# Patient Record
Sex: FEMALE | Race: BLACK OR AFRICAN AMERICAN | Employment: FULL TIME | URBAN - METROPOLITAN AREA
[De-identification: names, ages, dates, MRNs, and addresses within clinical notes are randomized per-mention and may not be internally consistent; named-entity substitution may affect disease eponyms.]

---

## 2017-01-03 ENCOUNTER — TELEPHONE (OUTPATIENT)
Dept: FAMILY MEDICINE CLINIC | Age: 57
End: 2017-01-03

## 2017-01-05 ENCOUNTER — OFFICE VISIT (OUTPATIENT)
Dept: FAMILY MEDICINE CLINIC | Age: 57
End: 2017-01-05

## 2017-01-05 VITALS
SYSTOLIC BLOOD PRESSURE: 116 MMHG | RESPIRATION RATE: 16 BRPM | DIASTOLIC BLOOD PRESSURE: 70 MMHG | OXYGEN SATURATION: 97 % | HEART RATE: 67 BPM | TEMPERATURE: 97.8 F | HEIGHT: 61 IN

## 2017-01-05 DIAGNOSIS — J40 BRONCHITIS: Primary | ICD-10-CM

## 2017-01-05 PROCEDURE — 99213 OFFICE O/P EST LOW 20 MIN: CPT | Performed by: NURSE PRACTITIONER

## 2017-01-05 RX ORDER — PROMETHAZINE HYDROCHLORIDE AND CODEINE PHOSPHATE 6.25; 1 MG/5ML; MG/5ML
10 SYRUP ORAL 4 TIMES DAILY PRN
Qty: 120 ML | Refills: 0 | Status: SHIPPED | OUTPATIENT
Start: 2017-01-05 | End: 2017-01-12

## 2017-01-05 RX ORDER — AZITHROMYCIN 250 MG/1
TABLET, FILM COATED ORAL
Qty: 6 TABLET | Refills: 0 | Status: SHIPPED | OUTPATIENT
Start: 2017-01-05 | End: 2017-01-13

## 2017-01-05 RX ORDER — PREDNISONE 20 MG/1
TABLET ORAL
Qty: 20 TABLET | Refills: 0 | Status: SHIPPED | OUTPATIENT
Start: 2017-01-05 | End: 2017-02-15

## 2017-01-11 ENCOUNTER — OFFICE VISIT (OUTPATIENT)
Dept: BARIATRICS/WEIGHT MGMT | Age: 57
End: 2017-01-11

## 2017-01-11 VITALS
HEIGHT: 61 IN | HEART RATE: 56 BPM | WEIGHT: 293 LBS | SYSTOLIC BLOOD PRESSURE: 156 MMHG | BODY MASS INDEX: 55.32 KG/M2 | DIASTOLIC BLOOD PRESSURE: 90 MMHG

## 2017-01-11 DIAGNOSIS — I10 ESSENTIAL HYPERTENSION: ICD-10-CM

## 2017-01-11 DIAGNOSIS — E66.01 MORBID OBESITY WITH BMI OF 50.0-59.9, ADULT (HCC): Primary | ICD-10-CM

## 2017-01-11 DIAGNOSIS — K76.0 FATTY LIVER: ICD-10-CM

## 2017-01-11 PROCEDURE — 99214 OFFICE O/P EST MOD 30 MIN: CPT | Performed by: SURGERY

## 2017-01-11 RX ORDER — B-COMPLEX WITH VITAMIN C
1 TABLET ORAL DAILY
Qty: 30 TABLET | Refills: 5 | Status: SHIPPED | OUTPATIENT
Start: 2017-01-11 | End: 2017-07-10 | Stop reason: SDUPTHER

## 2017-01-11 RX ORDER — AMOXICILLIN 875 MG/1
875 TABLET, COATED ORAL 2 TIMES DAILY
COMMUNITY
Start: 2016-11-10 | End: 2017-01-13

## 2017-01-13 ENCOUNTER — TELEPHONE (OUTPATIENT)
Dept: FAMILY MEDICINE CLINIC | Age: 57
End: 2017-01-13

## 2017-01-13 ENCOUNTER — HOSPITAL ENCOUNTER (OUTPATIENT)
Dept: OTHER | Age: 57
Discharge: HOME | End: 2017-01-13
Attending: NURSE PRACTITIONER | Admitting: NURSE PRACTITIONER

## 2017-01-13 DIAGNOSIS — J40 BRONCHITIS: Primary | ICD-10-CM

## 2017-01-13 DIAGNOSIS — J40 BRONCHITIS: ICD-10-CM

## 2017-01-13 RX ORDER — PREDNISONE 10 MG/1
TABLET ORAL
Qty: 30 TABLET | Refills: 0 | Status: SHIPPED | OUTPATIENT
Start: 2017-01-13 | End: 2017-02-15

## 2017-01-13 RX ORDER — LEVOFLOXACIN 500 MG/1
500 TABLET, FILM COATED ORAL DAILY
Qty: 10 TABLET | Refills: 0 | Status: SHIPPED | OUTPATIENT
Start: 2017-01-13 | End: 2017-01-23

## 2017-01-16 ENCOUNTER — OFFICE VISIT (OUTPATIENT)
Dept: CARDIOLOGY CLINIC | Age: 57
End: 2017-01-16

## 2017-01-16 VITALS
WEIGHT: 293 LBS | SYSTOLIC BLOOD PRESSURE: 124 MMHG | HEART RATE: 78 BPM | DIASTOLIC BLOOD PRESSURE: 84 MMHG | HEIGHT: 61 IN | BODY MASS INDEX: 55.32 KG/M2

## 2017-01-16 DIAGNOSIS — R06.02 SHORTNESS OF BREATH: ICD-10-CM

## 2017-01-16 DIAGNOSIS — Z01.810 PRE-OPERATIVE CARDIOVASCULAR EXAMINATION: Primary | ICD-10-CM

## 2017-01-16 DIAGNOSIS — E66.01 MORBID OBESITY, UNSPECIFIED OBESITY TYPE (HCC): ICD-10-CM

## 2017-01-16 PROCEDURE — 99203 OFFICE O/P NEW LOW 30 MIN: CPT | Performed by: INTERNAL MEDICINE

## 2017-01-16 PROCEDURE — 93000 ELECTROCARDIOGRAM COMPLETE: CPT | Performed by: INTERNAL MEDICINE

## 2017-01-18 DIAGNOSIS — E66.01 MORBID OBESITY WITH BMI OF 50.0-59.9, ADULT (HCC): Primary | ICD-10-CM

## 2017-01-23 ENCOUNTER — HOSPITAL ENCOUNTER (OUTPATIENT)
Dept: ENDOSCOPY | Age: 57
Discharge: HOME | End: 2017-01-23
Attending: SURGERY | Admitting: SURGERY

## 2017-01-25 ENCOUNTER — TELEPHONE (OUTPATIENT)
Dept: BARIATRICS/WEIGHT MGMT | Age: 57
End: 2017-01-25

## 2017-01-30 ENCOUNTER — HOSPITAL ENCOUNTER (OUTPATIENT)
Dept: NON INVASIVE DIAGNOSTICS | Age: 57
Discharge: HOME | End: 2017-01-30
Attending: INTERNAL MEDICINE | Admitting: INTERNAL MEDICINE

## 2017-01-30 ENCOUNTER — TELEPHONE (OUTPATIENT)
Dept: CARDIOLOGY CLINIC | Age: 57
End: 2017-01-30

## 2017-01-30 DIAGNOSIS — Z01.810 ENCOUNTER FOR PREPROCEDURAL CARDIOVASCULAR EXAMINATION: ICD-10-CM

## 2017-01-30 LAB
LEFT VENTRICULAR EJECTION FRACTION HIGH VALUE: 55 %
LEFT VENTRICULAR EJECTION FRACTION MODE: NORMAL

## 2017-02-01 DIAGNOSIS — E66.01 MORBID OBESITY WITH BMI OF 50.0-59.9, ADULT (HCC): Primary | ICD-10-CM

## 2017-02-15 ENCOUNTER — OFFICE VISIT (OUTPATIENT)
Dept: BARIATRICS/WEIGHT MGMT | Age: 57
End: 2017-02-15

## 2017-02-15 VITALS
BODY MASS INDEX: 55.32 KG/M2 | WEIGHT: 293 LBS | HEIGHT: 61 IN | SYSTOLIC BLOOD PRESSURE: 119 MMHG | DIASTOLIC BLOOD PRESSURE: 73 MMHG | HEART RATE: 73 BPM

## 2017-02-15 DIAGNOSIS — I10 ESSENTIAL HYPERTENSION: ICD-10-CM

## 2017-02-15 DIAGNOSIS — E66.01 MORBID OBESITY WITH BMI OF 50.0-59.9, ADULT (HCC): Primary | ICD-10-CM

## 2017-02-15 DIAGNOSIS — K76.0 FATTY LIVER: ICD-10-CM

## 2017-02-15 PROCEDURE — 99213 OFFICE O/P EST LOW 20 MIN: CPT | Performed by: SURGERY

## 2017-02-17 ENCOUNTER — HOSPITAL ENCOUNTER (OUTPATIENT)
Dept: ENDOSCOPY | Age: 57
Discharge: HOME | End: 2017-02-17
Attending: SURGERY | Admitting: SURGERY

## 2017-02-17 VITALS
SYSTOLIC BLOOD PRESSURE: 118 MMHG | TEMPERATURE: 97.8 F | WEIGHT: 293 LBS | HEIGHT: 61 IN | OXYGEN SATURATION: 98 % | BODY MASS INDEX: 55.32 KG/M2 | HEART RATE: 82 BPM | RESPIRATION RATE: 16 BRPM | DIASTOLIC BLOOD PRESSURE: 82 MMHG

## 2017-02-17 PROCEDURE — 43239 EGD BIOPSY SINGLE/MULTIPLE: CPT | Performed by: SURGERY

## 2017-02-17 RX ORDER — MEPERIDINE HYDROCHLORIDE 25 MG/ML
12.5 INJECTION INTRAMUSCULAR; INTRAVENOUS; SUBCUTANEOUS EVERY 5 MIN PRN
Status: DISCONTINUED | OUTPATIENT
Start: 2017-02-17 | End: 2017-02-18 | Stop reason: HOSPADM

## 2017-02-17 RX ORDER — FENTANYL CITRATE 50 UG/ML
25 INJECTION, SOLUTION INTRAMUSCULAR; INTRAVENOUS EVERY 5 MIN PRN
Status: DISCONTINUED | OUTPATIENT
Start: 2017-02-17 | End: 2017-02-18 | Stop reason: HOSPADM

## 2017-02-17 RX ORDER — PROMETHAZINE HYDROCHLORIDE 25 MG/ML
6.25 INJECTION, SOLUTION INTRAMUSCULAR; INTRAVENOUS
Status: ACTIVE | OUTPATIENT
Start: 2017-02-17 | End: 2017-02-17

## 2017-02-17 RX ORDER — ONDANSETRON 2 MG/ML
4 INJECTION INTRAMUSCULAR; INTRAVENOUS
Status: ACTIVE | OUTPATIENT
Start: 2017-02-17 | End: 2017-02-17

## 2017-02-17 RX ORDER — HYDRALAZINE HYDROCHLORIDE 20 MG/ML
5 INJECTION INTRAMUSCULAR; INTRAVENOUS EVERY 10 MIN PRN
Status: DISCONTINUED | OUTPATIENT
Start: 2017-02-17 | End: 2017-02-18 | Stop reason: HOSPADM

## 2017-02-17 RX ORDER — SODIUM CHLORIDE, SODIUM LACTATE, POTASSIUM CHLORIDE, CALCIUM CHLORIDE 600; 310; 30; 20 MG/100ML; MG/100ML; MG/100ML; MG/100ML
INJECTION, SOLUTION INTRAVENOUS CONTINUOUS
Status: CANCELLED | OUTPATIENT
Start: 2017-02-17

## 2017-02-17 RX ORDER — LABETALOL HYDROCHLORIDE 5 MG/ML
5 INJECTION, SOLUTION INTRAVENOUS EVERY 10 MIN PRN
Status: DISCONTINUED | OUTPATIENT
Start: 2017-02-17 | End: 2017-02-18 | Stop reason: HOSPADM

## 2017-02-17 RX ORDER — DIPHENHYDRAMINE HYDROCHLORIDE 50 MG/ML
12.5 INJECTION INTRAMUSCULAR; INTRAVENOUS
Status: ACTIVE | OUTPATIENT
Start: 2017-02-17 | End: 2017-02-17

## 2017-02-17 ASSESSMENT — ENCOUNTER SYMPTOMS: SHORTNESS OF BREATH: 1

## 2017-02-23 ENCOUNTER — TELEPHONE (OUTPATIENT)
Dept: BARIATRICS/WEIGHT MGMT | Age: 57
End: 2017-02-23

## 2017-03-13 ENCOUNTER — TELEPHONE (OUTPATIENT)
Dept: BARIATRICS/WEIGHT MGMT | Age: 57
End: 2017-03-13

## 2017-03-13 DIAGNOSIS — I10 ESSENTIAL HYPERTENSION: ICD-10-CM

## 2017-03-13 RX ORDER — METOPROLOL SUCCINATE 100 MG/1
TABLET, EXTENDED RELEASE ORAL
Qty: 90 TABLET | Refills: 0 | Status: SHIPPED | OUTPATIENT
Start: 2017-03-13 | End: 2017-06-10 | Stop reason: SDUPTHER

## 2017-03-14 ENCOUNTER — INITIAL CONSULT (OUTPATIENT)
Dept: BARIATRICS/WEIGHT MGMT | Age: 57
End: 2017-03-14

## 2017-03-14 DIAGNOSIS — E66.01 MORBID OBESITY WITH BMI OF 50.0-59.9, ADULT (HCC): Primary | ICD-10-CM

## 2017-03-14 PROCEDURE — 96101 PR PSYCHOLOGIC TESTING BY PSYCH/PHYS: CPT | Performed by: PSYCHOLOGIST

## 2017-03-14 PROCEDURE — 90791 PSYCH DIAGNOSTIC EVALUATION: CPT | Performed by: PSYCHOLOGIST

## 2017-03-15 ENCOUNTER — OFFICE VISIT (OUTPATIENT)
Dept: BARIATRICS/WEIGHT MGMT | Age: 57
End: 2017-03-15

## 2017-03-15 ENCOUNTER — TELEPHONE (OUTPATIENT)
Dept: BARIATRICS/WEIGHT MGMT | Age: 57
End: 2017-03-15

## 2017-03-15 VITALS
HEIGHT: 61 IN | DIASTOLIC BLOOD PRESSURE: 80 MMHG | HEART RATE: 68 BPM | WEIGHT: 293 LBS | SYSTOLIC BLOOD PRESSURE: 152 MMHG | BODY MASS INDEX: 55.32 KG/M2

## 2017-03-15 DIAGNOSIS — K44.9 HIATAL HERNIA WITH GASTROESOPHAGEAL REFLUX DISEASE AND ESOPHAGITIS: ICD-10-CM

## 2017-03-15 DIAGNOSIS — K76.0 FATTY LIVER: ICD-10-CM

## 2017-03-15 DIAGNOSIS — K21.00 HIATAL HERNIA WITH GASTROESOPHAGEAL REFLUX DISEASE AND ESOPHAGITIS: ICD-10-CM

## 2017-03-15 DIAGNOSIS — E66.01 MORBID OBESITY WITH BMI OF 50.0-59.9, ADULT (HCC): Primary | ICD-10-CM

## 2017-03-15 PROCEDURE — 99213 OFFICE O/P EST LOW 20 MIN: CPT | Performed by: SURGERY

## 2017-03-21 ENCOUNTER — INITIAL CONSULT (OUTPATIENT)
Dept: SLEEP MEDICINE | Age: 57
End: 2017-03-21

## 2017-03-21 VITALS
SYSTOLIC BLOOD PRESSURE: 142 MMHG | BODY MASS INDEX: 45.99 KG/M2 | DIASTOLIC BLOOD PRESSURE: 102 MMHG | HEIGHT: 67 IN | OXYGEN SATURATION: 96 % | WEIGHT: 293 LBS | HEART RATE: 83 BPM

## 2017-03-21 DIAGNOSIS — G47.10 HYPERSOMNIA: Primary | ICD-10-CM

## 2017-03-21 DIAGNOSIS — K21.00 GERD WITH ESOPHAGITIS: Chronic | ICD-10-CM

## 2017-03-21 DIAGNOSIS — I10 HYPERTENSION, ESSENTIAL: Chronic | ICD-10-CM

## 2017-03-21 DIAGNOSIS — R06.83 SNORING: ICD-10-CM

## 2017-03-21 DIAGNOSIS — E66.01 MORBID OBESITY WITH BMI OF 50.0-59.9, ADULT (HCC): Chronic | ICD-10-CM

## 2017-03-21 PROCEDURE — 99244 OFF/OP CNSLTJ NEW/EST MOD 40: CPT | Performed by: INTERNAL MEDICINE

## 2017-03-21 ASSESSMENT — ENCOUNTER SYMPTOMS
SHORTNESS OF BREATH: 0
ALLERGIC/IMMUNOLOGIC NEGATIVE: 1
VOMITING: 0
APNEA: 0
EYE PAIN: 0
CHEST TIGHTNESS: 0
ABDOMINAL PAIN: 0
ABDOMINAL DISTENTION: 0
RHINORRHEA: 0
CHOKING: 0
PHOTOPHOBIA: 0
NAUSEA: 0

## 2017-03-21 ASSESSMENT — SLEEP AND FATIGUE QUESTIONNAIRES
HOW LIKELY ARE YOU TO NOD OFF OR FALL ASLEEP WHILE SITTING INACTIVE IN A PUBLIC PLACE: 0
NECK CIRCUMFERENCE (INCHES): 18
ESS TOTAL SCORE: 6
HOW LIKELY ARE YOU TO NOD OFF OR FALL ASLEEP IN A CAR, WHILE STOPPED FOR A FEW MINUTES IN TRAFFIC: 0
HOW LIKELY ARE YOU TO NOD OFF OR FALL ASLEEP WHILE SITTING QUIETLY AFTER LUNCH WITHOUT ALCOHOL: 0
HOW LIKELY ARE YOU TO NOD OFF OR FALL ASLEEP WHILE SITTING AND TALKING TO SOMEONE: 0
HOW LIKELY ARE YOU TO NOD OFF OR FALL ASLEEP WHILE LYING DOWN TO REST IN THE AFTERNOON WHEN CIRCUMSTANCES PERMIT: 2
HOW LIKELY ARE YOU TO NOD OFF OR FALL ASLEEP WHILE SITTING AND READING: 0
HOW LIKELY ARE YOU TO NOD OFF OR FALL ASLEEP WHILE WATCHING TV: 2
HOW LIKELY ARE YOU TO NOD OFF OR FALL ASLEEP WHEN YOU ARE A PASSENGER IN A CAR FOR AN HOUR WITHOUT A BREAK: 2

## 2017-04-12 ENCOUNTER — OFFICE VISIT (OUTPATIENT)
Dept: BARIATRICS/WEIGHT MGMT | Age: 57
End: 2017-04-12

## 2017-04-12 VITALS
BODY MASS INDEX: 55.32 KG/M2 | HEART RATE: 68 BPM | WEIGHT: 293 LBS | HEIGHT: 61 IN | DIASTOLIC BLOOD PRESSURE: 78 MMHG | SYSTOLIC BLOOD PRESSURE: 133 MMHG

## 2017-04-12 DIAGNOSIS — E66.01 MORBID OBESITY WITH BMI OF 50.0-59.9, ADULT (HCC): Primary | Chronic | ICD-10-CM

## 2017-04-12 DIAGNOSIS — I10 ESSENTIAL HYPERTENSION: Chronic | ICD-10-CM

## 2017-04-12 DIAGNOSIS — K44.9 HIATAL HERNIA WITH GASTROESOPHAGEAL REFLUX DISEASE AND ESOPHAGITIS: ICD-10-CM

## 2017-04-12 DIAGNOSIS — K21.00 HIATAL HERNIA WITH GASTROESOPHAGEAL REFLUX DISEASE AND ESOPHAGITIS: ICD-10-CM

## 2017-04-12 PROCEDURE — 99213 OFFICE O/P EST LOW 20 MIN: CPT | Performed by: SURGERY

## 2017-04-13 ENCOUNTER — TELEPHONE (OUTPATIENT)
Dept: BARIATRICS/WEIGHT MGMT | Age: 57
End: 2017-04-13

## 2017-04-18 ENCOUNTER — HOSPITAL ENCOUNTER (OUTPATIENT)
Dept: SLEEP MEDICINE | Age: 57
Discharge: HOME | End: 2017-04-18
Attending: INTERNAL MEDICINE | Admitting: INTERNAL MEDICINE

## 2017-04-18 DIAGNOSIS — R06.83 SNORING: ICD-10-CM

## 2017-04-18 DIAGNOSIS — G47.10 HYPERSOMNIA: ICD-10-CM

## 2017-04-18 PROCEDURE — 95806 SLEEP STUDY UNATT&RESP EFFT: CPT | Performed by: INTERNAL MEDICINE

## 2017-04-21 ENCOUNTER — OFFICE VISIT (OUTPATIENT)
Dept: SLEEP MEDICINE | Age: 57
End: 2017-04-21

## 2017-04-21 ENCOUNTER — TELEPHONE (OUTPATIENT)
Dept: BARIATRICS/WEIGHT MGMT | Age: 57
End: 2017-04-21

## 2017-04-21 VITALS — HEART RATE: 74 BPM | BODY MASS INDEX: 55.32 KG/M2 | WEIGHT: 293 LBS | OXYGEN SATURATION: 97 % | HEIGHT: 61 IN

## 2017-04-21 DIAGNOSIS — G47.33 OBSTRUCTIVE SLEEP APNEA SYNDROME: Primary | ICD-10-CM

## 2017-04-21 DIAGNOSIS — K21.00 GERD WITH ESOPHAGITIS: Chronic | ICD-10-CM

## 2017-04-21 DIAGNOSIS — E66.01 MORBID OBESITY WITH BMI OF 50.0-59.9, ADULT (HCC): Chronic | ICD-10-CM

## 2017-04-21 DIAGNOSIS — I10 HYPERTENSION, ESSENTIAL: Chronic | ICD-10-CM

## 2017-04-21 PROCEDURE — 99214 OFFICE O/P EST MOD 30 MIN: CPT | Performed by: NURSE PRACTITIONER

## 2017-04-21 ASSESSMENT — ENCOUNTER SYMPTOMS
COUGH: 0
ABDOMINAL PAIN: 0
APNEA: 0
RHINORRHEA: 0
ABDOMINAL DISTENTION: 0
SINUS PRESSURE: 0
SHORTNESS OF BREATH: 0

## 2017-04-24 DIAGNOSIS — K21.9 GASTROESOPHAGEAL REFLUX DISEASE WITHOUT ESOPHAGITIS: ICD-10-CM

## 2017-04-24 RX ORDER — OMEPRAZOLE 40 MG/1
CAPSULE, DELAYED RELEASE ORAL
Qty: 30 CAPSULE | Refills: 5 | Status: SHIPPED | OUTPATIENT
Start: 2017-04-24 | End: 2017-07-03 | Stop reason: SDUPTHER

## 2017-05-09 ENCOUNTER — OFFICE VISIT (OUTPATIENT)
Dept: FAMILY MEDICINE CLINIC | Age: 57
End: 2017-05-09

## 2017-05-09 VITALS
RESPIRATION RATE: 16 BRPM | OXYGEN SATURATION: 98 % | BODY MASS INDEX: 55.32 KG/M2 | SYSTOLIC BLOOD PRESSURE: 122 MMHG | HEART RATE: 66 BPM | DIASTOLIC BLOOD PRESSURE: 78 MMHG | WEIGHT: 293 LBS | HEIGHT: 61 IN

## 2017-05-09 DIAGNOSIS — J06.9 URI, ACUTE: Primary | ICD-10-CM

## 2017-05-09 PROCEDURE — 99213 OFFICE O/P EST LOW 20 MIN: CPT | Performed by: NURSE PRACTITIONER

## 2017-05-10 ENCOUNTER — OFFICE VISIT (OUTPATIENT)
Dept: BARIATRICS/WEIGHT MGMT | Age: 57
End: 2017-05-10

## 2017-05-10 VITALS
DIASTOLIC BLOOD PRESSURE: 68 MMHG | HEIGHT: 61 IN | BODY MASS INDEX: 55.32 KG/M2 | WEIGHT: 293 LBS | HEART RATE: 65 BPM | SYSTOLIC BLOOD PRESSURE: 152 MMHG

## 2017-05-10 DIAGNOSIS — K21.00 HIATAL HERNIA WITH GASTROESOPHAGEAL REFLUX DISEASE AND ESOPHAGITIS: ICD-10-CM

## 2017-05-10 DIAGNOSIS — G47.33 OBSTRUCTIVE SLEEP APNEA (ADULT) (PEDIATRIC): ICD-10-CM

## 2017-05-10 DIAGNOSIS — I10 ESSENTIAL HYPERTENSION: Chronic | ICD-10-CM

## 2017-05-10 DIAGNOSIS — E66.01 MORBID OBESITY WITH BMI OF 50.0-59.9, ADULT (HCC): Primary | Chronic | ICD-10-CM

## 2017-05-10 DIAGNOSIS — K44.9 HIATAL HERNIA WITH GASTROESOPHAGEAL REFLUX DISEASE AND ESOPHAGITIS: ICD-10-CM

## 2017-05-10 DIAGNOSIS — K21.00 GERD WITH ESOPHAGITIS: Chronic | ICD-10-CM

## 2017-05-10 PROCEDURE — 99214 OFFICE O/P EST MOD 30 MIN: CPT | Performed by: SURGERY

## 2017-05-26 DIAGNOSIS — A08.4 VIRAL GASTROENTERITIS: ICD-10-CM

## 2017-05-26 RX ORDER — ONDANSETRON 4 MG/1
TABLET, FILM COATED ORAL
Qty: 10 TABLET | Refills: 0 | Status: SHIPPED | OUTPATIENT
Start: 2017-05-26 | End: 2017-08-07 | Stop reason: SDUPTHER

## 2017-06-10 DIAGNOSIS — I10 ESSENTIAL HYPERTENSION: ICD-10-CM

## 2017-06-12 RX ORDER — METOPROLOL SUCCINATE 100 MG/1
TABLET, EXTENDED RELEASE ORAL
Qty: 90 TABLET | Refills: 0 | Status: SHIPPED | OUTPATIENT
Start: 2017-06-12 | End: 2017-07-03 | Stop reason: SDUPTHER

## 2017-06-19 ENCOUNTER — OFFICE VISIT (OUTPATIENT)
Dept: SLEEP MEDICINE | Age: 57
End: 2017-06-19

## 2017-06-19 VITALS
OXYGEN SATURATION: 97 % | HEART RATE: 89 BPM | HEIGHT: 61 IN | WEIGHT: 293 LBS | SYSTOLIC BLOOD PRESSURE: 124 MMHG | DIASTOLIC BLOOD PRESSURE: 82 MMHG | BODY MASS INDEX: 55.32 KG/M2

## 2017-06-19 DIAGNOSIS — G47.33 OBSTRUCTIVE SLEEP APNEA SYNDROME: Primary | ICD-10-CM

## 2017-06-19 DIAGNOSIS — E66.01 MORBID OBESITY WITH BMI OF 50.0-59.9, ADULT (HCC): Chronic | ICD-10-CM

## 2017-06-19 DIAGNOSIS — I10 HYPERTENSION, ESSENTIAL: Chronic | ICD-10-CM

## 2017-06-19 DIAGNOSIS — K21.00 GERD WITH ESOPHAGITIS: Chronic | ICD-10-CM

## 2017-06-19 PROCEDURE — 99214 OFFICE O/P EST MOD 30 MIN: CPT | Performed by: NURSE PRACTITIONER

## 2017-06-19 ASSESSMENT — ENCOUNTER SYMPTOMS
ABDOMINAL PAIN: 0
SHORTNESS OF BREATH: 0
SINUS PRESSURE: 0
APNEA: 0
RHINORRHEA: 0
ABDOMINAL DISTENTION: 0
COUGH: 0

## 2017-06-19 ASSESSMENT — SLEEP AND FATIGUE QUESTIONNAIRES
HOW LIKELY ARE YOU TO NOD OFF OR FALL ASLEEP WHILE SITTING QUIETLY AFTER LUNCH WITHOUT ALCOHOL: 0
HOW LIKELY ARE YOU TO NOD OFF OR FALL ASLEEP IN A CAR, WHILE STOPPED FOR A FEW MINUTES IN TRAFFIC: 0
ESS TOTAL SCORE: 0
HOW LIKELY ARE YOU TO NOD OFF OR FALL ASLEEP WHILE SITTING AND TALKING TO SOMEONE: 0
HOW LIKELY ARE YOU TO NOD OFF OR FALL ASLEEP WHILE SITTING AND READING: 0
HOW LIKELY ARE YOU TO NOD OFF OR FALL ASLEEP WHILE WATCHING TV: 0
HOW LIKELY ARE YOU TO NOD OFF OR FALL ASLEEP WHEN YOU ARE A PASSENGER IN A CAR FOR AN HOUR WITHOUT A BREAK: 0
HOW LIKELY ARE YOU TO NOD OFF OR FALL ASLEEP WHILE LYING DOWN TO REST IN THE AFTERNOON WHEN CIRCUMSTANCES PERMIT: 0
HOW LIKELY ARE YOU TO NOD OFF OR FALL ASLEEP WHILE SITTING INACTIVE IN A PUBLIC PLACE: 0

## 2017-06-21 ENCOUNTER — OFFICE VISIT (OUTPATIENT)
Dept: BARIATRICS/WEIGHT MGMT | Age: 57
End: 2017-06-21

## 2017-06-21 VITALS
HEIGHT: 61 IN | DIASTOLIC BLOOD PRESSURE: 77 MMHG | HEART RATE: 76 BPM | WEIGHT: 293 LBS | SYSTOLIC BLOOD PRESSURE: 139 MMHG | BODY MASS INDEX: 55.32 KG/M2

## 2017-06-21 DIAGNOSIS — K21.00 GERD WITH ESOPHAGITIS: ICD-10-CM

## 2017-06-21 DIAGNOSIS — G47.33 OBSTRUCTIVE SLEEP APNEA (ADULT) (PEDIATRIC): ICD-10-CM

## 2017-06-21 DIAGNOSIS — I10 ESSENTIAL HYPERTENSION: ICD-10-CM

## 2017-06-21 DIAGNOSIS — E66.01 MORBID OBESITY WITH BMI OF 60.0-69.9, ADULT (HCC): Primary | ICD-10-CM

## 2017-06-21 PROCEDURE — 99213 OFFICE O/P EST LOW 20 MIN: CPT | Performed by: SURGERY

## 2017-07-03 ENCOUNTER — OFFICE VISIT (OUTPATIENT)
Dept: FAMILY MEDICINE CLINIC | Age: 57
End: 2017-07-03

## 2017-07-03 VITALS
HEART RATE: 64 BPM | OXYGEN SATURATION: 98 % | SYSTOLIC BLOOD PRESSURE: 126 MMHG | DIASTOLIC BLOOD PRESSURE: 80 MMHG | HEIGHT: 61 IN | RESPIRATION RATE: 18 BRPM | BODY MASS INDEX: 55.32 KG/M2 | WEIGHT: 293 LBS

## 2017-07-03 DIAGNOSIS — I10 ESSENTIAL HYPERTENSION: Primary | ICD-10-CM

## 2017-07-03 DIAGNOSIS — K21.9 GASTROESOPHAGEAL REFLUX DISEASE WITHOUT ESOPHAGITIS: ICD-10-CM

## 2017-07-03 PROCEDURE — 99213 OFFICE O/P EST LOW 20 MIN: CPT | Performed by: REGISTERED NURSE

## 2017-07-03 RX ORDER — OMEPRAZOLE 40 MG/1
CAPSULE, DELAYED RELEASE ORAL
Qty: 30 CAPSULE | Refills: 5 | Status: SHIPPED | OUTPATIENT
Start: 2017-07-03 | End: 2017-09-21

## 2017-07-03 RX ORDER — METOPROLOL SUCCINATE 100 MG/1
TABLET, EXTENDED RELEASE ORAL
Qty: 90 TABLET | Refills: 1 | Status: SHIPPED | OUTPATIENT
Start: 2017-07-03 | End: 2017-09-13

## 2017-07-03 ASSESSMENT — ENCOUNTER SYMPTOMS
CHEST TIGHTNESS: 0
COUGH: 0
SHORTNESS OF BREATH: 0
WHEEZING: 0

## 2017-07-03 ASSESSMENT — PATIENT HEALTH QUESTIONNAIRE - PHQ9
SUM OF ALL RESPONSES TO PHQ9 QUESTIONS 1 & 2: 0
SUM OF ALL RESPONSES TO PHQ QUESTIONS 1-9: 0
2. FEELING DOWN, DEPRESSED OR HOPELESS: 0
1. LITTLE INTEREST OR PLEASURE IN DOING THINGS: 0

## 2017-07-10 RX ORDER — B-COMPLEX WITH VITAMIN C
1 TABLET ORAL DAILY
Qty: 30 TABLET | Refills: 5 | Status: SHIPPED | OUTPATIENT
Start: 2017-07-10 | End: 2018-01-18 | Stop reason: SDUPTHER

## 2017-07-12 ENCOUNTER — OFFICE VISIT (OUTPATIENT)
Dept: BARIATRICS/WEIGHT MGMT | Age: 57
End: 2017-07-12

## 2017-07-12 VITALS
BODY MASS INDEX: 55.32 KG/M2 | HEART RATE: 74 BPM | WEIGHT: 293 LBS | HEIGHT: 61 IN | DIASTOLIC BLOOD PRESSURE: 84 MMHG | SYSTOLIC BLOOD PRESSURE: 132 MMHG

## 2017-07-12 DIAGNOSIS — E66.01 MORBID OBESITY WITH BMI OF 60.0-69.9, ADULT (HCC): Primary | ICD-10-CM

## 2017-07-12 DIAGNOSIS — G47.33 OBSTRUCTIVE SLEEP APNEA (ADULT) (PEDIATRIC): ICD-10-CM

## 2017-07-12 DIAGNOSIS — I10 ESSENTIAL HYPERTENSION: ICD-10-CM

## 2017-07-12 DIAGNOSIS — K44.9 HIATAL HERNIA WITH GASTROESOPHAGEAL REFLUX DISEASE AND ESOPHAGITIS: ICD-10-CM

## 2017-07-12 DIAGNOSIS — K21.00 HIATAL HERNIA WITH GASTROESOPHAGEAL REFLUX DISEASE AND ESOPHAGITIS: ICD-10-CM

## 2017-07-12 PROCEDURE — 99214 OFFICE O/P EST MOD 30 MIN: CPT | Performed by: SURGERY

## 2017-07-14 ENCOUNTER — TELEPHONE (OUTPATIENT)
Dept: BARIATRICS/WEIGHT MGMT | Age: 57
End: 2017-07-14

## 2017-08-01 ENCOUNTER — TELEPHONE (OUTPATIENT)
Dept: BARIATRICS/WEIGHT MGMT | Age: 57
End: 2017-08-01

## 2017-08-01 ENCOUNTER — OFFICE VISIT (OUTPATIENT)
Dept: BARIATRICS/WEIGHT MGMT | Age: 57
End: 2017-08-01

## 2017-08-01 ENCOUNTER — OFFICE VISIT (OUTPATIENT)
Dept: FAMILY MEDICINE CLINIC | Age: 57
End: 2017-08-01

## 2017-08-01 VITALS
HEIGHT: 61 IN | SYSTOLIC BLOOD PRESSURE: 130 MMHG | OXYGEN SATURATION: 99 % | TEMPERATURE: 97.8 F | HEART RATE: 66 BPM | DIASTOLIC BLOOD PRESSURE: 82 MMHG | WEIGHT: 293 LBS | BODY MASS INDEX: 55.32 KG/M2 | RESPIRATION RATE: 20 BRPM

## 2017-08-01 VITALS
RESPIRATION RATE: 16 BRPM | BODY MASS INDEX: 55.32 KG/M2 | HEART RATE: 66 BPM | HEIGHT: 61 IN | WEIGHT: 293 LBS | DIASTOLIC BLOOD PRESSURE: 80 MMHG | SYSTOLIC BLOOD PRESSURE: 136 MMHG

## 2017-08-01 DIAGNOSIS — E66.01 MORBID OBESITY WITH BMI OF 60.0-69.9, ADULT (HCC): Primary | Chronic | ICD-10-CM

## 2017-08-01 DIAGNOSIS — E66.01 MORBID OBESITY WITH BMI OF 50.0-59.9, ADULT (HCC): Chronic | ICD-10-CM

## 2017-08-01 DIAGNOSIS — Z01.818 PRE-OP EXAMINATION: Primary | ICD-10-CM

## 2017-08-01 PROCEDURE — 99999 PR OFFICE/OUTPT VISIT,PROCEDURE ONLY: CPT | Performed by: NURSE PRACTITIONER

## 2017-08-01 PROCEDURE — 99244 OFF/OP CNSLTJ NEW/EST MOD 40: CPT | Performed by: REGISTERED NURSE

## 2017-08-01 ASSESSMENT — ENCOUNTER SYMPTOMS
COUGH: 0
WHEEZING: 0
SHORTNESS OF BREATH: 0
CHEST TIGHTNESS: 0

## 2017-08-07 DIAGNOSIS — A08.4 VIRAL GASTROENTERITIS: ICD-10-CM

## 2017-08-08 RX ORDER — NAPROXEN 375 MG/1
TABLET ORAL
Qty: 20 TABLET | Refills: 3 | Status: ON HOLD | OUTPATIENT
Start: 2017-08-08 | End: 2017-08-31 | Stop reason: HOSPADM

## 2017-08-08 RX ORDER — ONDANSETRON 4 MG/1
TABLET, FILM COATED ORAL
Qty: 10 TABLET | Refills: 0 | Status: ON HOLD | OUTPATIENT
Start: 2017-08-08 | End: 2017-08-31 | Stop reason: HOSPADM

## 2017-08-17 RX ORDER — SODIUM CHLORIDE, SODIUM LACTATE, POTASSIUM CHLORIDE, CALCIUM CHLORIDE 600; 310; 30; 20 MG/100ML; MG/100ML; MG/100ML; MG/100ML
INJECTION, SOLUTION INTRAVENOUS CONTINUOUS
Status: CANCELLED | OUTPATIENT
Start: 2017-08-17

## 2017-08-17 RX ORDER — APREPITANT 125 MG/1
125 CAPSULE ORAL ONCE
Status: CANCELLED | OUTPATIENT
Start: 2017-08-17 | End: 2017-08-17

## 2017-08-17 RX ORDER — SCOLOPAMINE TRANSDERMAL SYSTEM 1 MG/1
1 PATCH, EXTENDED RELEASE TRANSDERMAL ONCE
Status: CANCELLED | OUTPATIENT
Start: 2017-08-17

## 2017-08-18 ENCOUNTER — HOSPITAL ENCOUNTER (OUTPATIENT)
Dept: PREADMISSION TESTING | Age: 57
Discharge: HOME | End: 2017-08-18
Attending: SURGERY | Admitting: SURGERY

## 2017-08-18 VITALS
OXYGEN SATURATION: 100 % | BODY MASS INDEX: 55.32 KG/M2 | WEIGHT: 293 LBS | HEART RATE: 72 BPM | TEMPERATURE: 97.6 F | DIASTOLIC BLOOD PRESSURE: 68 MMHG | RESPIRATION RATE: 16 BRPM | HEIGHT: 61 IN | SYSTOLIC BLOOD PRESSURE: 120 MMHG

## 2017-08-18 LAB
A/G RATIO: 1.1 (ref 1.1–2.2)
ABO/RH: NORMAL
ALBUMIN SERPL-MCNC: 3.9 G/DL (ref 3.4–5)
ALP BLD-CCNC: 146 U/L (ref 40–129)
ALT SERPL-CCNC: 19 U/L (ref 10–40)
ANION GAP SERPL CALCULATED.3IONS-SCNC: 12 MMOL/L (ref 3–16)
ANTIBODY SCREEN: NORMAL
AST SERPL-CCNC: 18 U/L (ref 15–37)
BILIRUB SERPL-MCNC: 0.4 MG/DL (ref 0–1)
BUN BLDV-MCNC: 16 MG/DL (ref 7–20)
CALCIUM SERPL-MCNC: 9.9 MG/DL (ref 8.3–10.6)
CHLORIDE BLD-SCNC: 101 MMOL/L (ref 99–110)
CO2: 23 MMOL/L (ref 21–32)
CREAT SERPL-MCNC: 0.8 MG/DL (ref 0.6–1.1)
EKG ATRIAL RATE: 50 BPM
EKG DIAGNOSIS: NORMAL
EKG P AXIS: 52 DEGREES
EKG P-R INTERVAL: 152 MS
EKG Q-T INTERVAL: 406 MS
EKG QRS DURATION: 80 MS
EKG QTC CALCULATION (BAZETT): 370 MS
EKG R AXIS: 55 DEGREES
EKG T AXIS: 56 DEGREES
EKG VENTRICULAR RATE: 50 BPM
GFR AFRICAN AMERICAN: >60
GFR NON-AFRICAN AMERICAN: >60
GLOBULIN: 3.6 G/DL
GLUCOSE BLD-MCNC: 78 MG/DL (ref 70–99)
HCT VFR BLD CALC: 38.3 % (ref 36–48)
HEMOGLOBIN: 12.6 G/DL (ref 12–16)
MCH RBC QN AUTO: 26.1 PG (ref 26–34)
MCHC RBC AUTO-ENTMCNC: 32.9 G/DL (ref 31–36)
MCV RBC AUTO: 79.2 FL (ref 80–100)
PDW BLD-RTO: 15.6 % (ref 12.4–15.4)
PLATELET # BLD: 226 K/UL (ref 135–450)
PMV BLD AUTO: 8.5 FL (ref 5–10.5)
POTASSIUM SERPL-SCNC: 4.5 MMOL/L (ref 3.5–5.1)
RBC # BLD: 4.84 M/UL (ref 4–5.2)
SODIUM BLD-SCNC: 136 MMOL/L (ref 136–145)
TOTAL PROTEIN: 7.5 G/DL (ref 6.4–8.2)
WBC # BLD: 3.5 K/UL (ref 4–11)

## 2017-08-18 PROCEDURE — 93010 ELECTROCARDIOGRAM REPORT: CPT | Performed by: INTERNAL MEDICINE

## 2017-08-18 RX ORDER — CHLORHEXIDINE GLUCONATE 0.12 MG/ML
15 RINSE ORAL 2 TIMES DAILY
Status: CANCELLED | OUTPATIENT
Start: 2017-08-28

## 2017-08-29 PROBLEM — E66.01 MORBID OBESITY WITH BMI OF 45.0-49.9, ADULT (HCC): Status: ACTIVE | Noted: 2017-08-29

## 2017-09-05 ENCOUNTER — TELEPHONE (OUTPATIENT)
Dept: BARIATRICS/WEIGHT MGMT | Age: 57
End: 2017-09-05

## 2017-09-06 ENCOUNTER — TELEPHONE (OUTPATIENT)
Dept: FAMILY MEDICINE CLINIC | Age: 57
End: 2017-09-06

## 2017-09-06 RX ORDER — METOPROLOL TARTRATE 50 MG/1
50 TABLET, FILM COATED ORAL 2 TIMES DAILY
Qty: 60 TABLET | Refills: 3 | Status: SHIPPED | OUTPATIENT
Start: 2017-09-06 | End: 2017-10-11

## 2017-09-13 ENCOUNTER — OFFICE VISIT (OUTPATIENT)
Dept: BARIATRICS/WEIGHT MGMT | Age: 57
End: 2017-09-13

## 2017-09-13 VITALS
WEIGHT: 293 LBS | HEART RATE: 59 BPM | SYSTOLIC BLOOD PRESSURE: 130 MMHG | BODY MASS INDEX: 55.32 KG/M2 | HEIGHT: 61 IN | DIASTOLIC BLOOD PRESSURE: 85 MMHG

## 2017-09-13 DIAGNOSIS — Z98.84 S/P LAPAROSCOPIC SLEEVE GASTRECTOMY: Primary | ICD-10-CM

## 2017-09-13 PROCEDURE — 99024 POSTOP FOLLOW-UP VISIT: CPT | Performed by: SURGERY

## 2017-09-21 ENCOUNTER — OFFICE VISIT (OUTPATIENT)
Dept: FAMILY MEDICINE CLINIC | Age: 57
End: 2017-09-21

## 2017-09-21 VITALS
RESPIRATION RATE: 14 BRPM | HEIGHT: 61 IN | WEIGHT: 293 LBS | OXYGEN SATURATION: 97 % | DIASTOLIC BLOOD PRESSURE: 84 MMHG | HEART RATE: 57 BPM | SYSTOLIC BLOOD PRESSURE: 132 MMHG | BODY MASS INDEX: 55.32 KG/M2

## 2017-09-21 DIAGNOSIS — I10 ESSENTIAL HYPERTENSION: Primary | Chronic | ICD-10-CM

## 2017-09-21 DIAGNOSIS — E66.01 MORBID OBESITY, UNSPECIFIED OBESITY TYPE (HCC): ICD-10-CM

## 2017-09-21 DIAGNOSIS — Z23 NEEDS FLU SHOT: ICD-10-CM

## 2017-09-21 PROCEDURE — 90471 IMMUNIZATION ADMIN: CPT | Performed by: NURSE PRACTITIONER

## 2017-09-21 PROCEDURE — 90688 IIV4 VACCINE SPLT 0.5 ML IM: CPT | Performed by: NURSE PRACTITIONER

## 2017-09-21 PROCEDURE — 99213 OFFICE O/P EST LOW 20 MIN: CPT | Performed by: NURSE PRACTITIONER

## 2017-10-11 ENCOUNTER — OFFICE VISIT (OUTPATIENT)
Dept: BARIATRICS/WEIGHT MGMT | Age: 57
End: 2017-10-11

## 2017-10-11 VITALS
BODY MASS INDEX: 55.06 KG/M2 | DIASTOLIC BLOOD PRESSURE: 81 MMHG | HEIGHT: 61 IN | SYSTOLIC BLOOD PRESSURE: 122 MMHG | WEIGHT: 291.6 LBS | HEART RATE: 77 BPM

## 2017-10-11 DIAGNOSIS — Z98.84 S/P LAPAROSCOPIC SLEEVE GASTRECTOMY: Primary | ICD-10-CM

## 2017-10-11 PROCEDURE — 99024 POSTOP FOLLOW-UP VISIT: CPT | Performed by: SURGERY

## 2017-10-11 NOTE — PROGRESS NOTES
Dietary Assessment Note    Vitals:   Vitals:    10/11/17 1120   BP: 122/81   Pulse: 77   Weight: 291 lb 9.6 oz (132.3 kg)   Height: 5' 1\" (1.549 m)    Patient lost 7.6 lbs over the past 4 weeks. Total Weight Loss: 29.9lbs    Labs reviewed:  no lab studies available for review at time of visit    Protein intake: <60 grams/day; getting about 60     Fluid intake: 48-64 oz/day; sometimes getting 70-80oz    Multivitamin/mineral intake: 3 Fusion per day    Calcium intake: 1 citracal petite    Other: Vitamin D3    Exercise: No. How much: none organized yet. What kind: walking 1 lap at the mall    Nutrition Assessment: 4 spoonfuls oatmeal or turkey sausage link for bkrst; 4oz protein shake for snack; deli  meat cheese rollup for snack; leftovers or light progresso soup for lunch; meat, veg for dinner. Pt feels like she is not hungry and does not feel like eating. She just feels full a lot. She is  liquids and solids. Pt is drinking 80oz water. She denies n/v, reflux. Food Intolerances/issues: beef    Client Concerns: concerned that she does not feel hungry and does not feel like eating. Pt feels like she can only eat a few bites. She feels weak.     Goals: advance to regular textured foods at 6 weeks postop    Plan: follow up as needed      Denton Candelario

## 2017-10-11 NOTE — LETTER
[facility]  [address] [address] [facility]  Phone: [phone]  Fax: DO Dayne        October 11, 2017     Patient: Xiomara Boston   YOB: 1960   Date of Visit: 10/11/2017       To Whom It May Concern: It is my medical opinion that Xiomara Boston may return to work Monday October 16, 2017 without any restrictions. As she had major gastric surgery, she needs to be able to eat and drink every 1-2 hours to stay hydrated and maintain proper nutrition. If you have any questions or concerns, please don't hesitate to call.     Sincerely,        Alexandru Coreas, DO

## 2017-10-11 NOTE — PROGRESS NOTES
Patient doing well  Has lost 30# total  7.6 since last visit  Tolerating food without difficulty  No N/V/F/C  Drinking 80 OZ of fluids/day  Instructed to decrease fluids to 60 and increase protein intake  Cleared for work and activity    F/U in 6 weeks    Jaspal Marcos DO

## 2017-10-23 ENCOUNTER — TELEPHONE (OUTPATIENT)
Dept: BARIATRICS/WEIGHT MGMT | Age: 57
End: 2017-10-23

## 2017-10-23 NOTE — TELEPHONE ENCOUNTER
Patient is s/p sleeve 8/29/17. She received a letter to return to work and allow her to [address] every 1-2hrs. She states her job will not accept this letter. They are asking Dr. Cheyenne Tierney to complete a work form instead. Patient is coming into [place] on Wednesday to bring in form. She is requesting to have it completed that day. It can not be faxed or e-mailed. Her job will only accept signed original form. Explained Dr. Cheyenne Tierney does have clinic that day and she more than likely will have to wait for form to be completed and signed. Patient verbalized understanding.  Thanks

## 2017-11-29 ENCOUNTER — OFFICE VISIT (OUTPATIENT)
Dept: BARIATRICS/WEIGHT MGMT | Age: 57
End: 2017-11-29

## 2017-11-29 VITALS
HEIGHT: 61 IN | DIASTOLIC BLOOD PRESSURE: 89 MMHG | BODY MASS INDEX: 53.54 KG/M2 | SYSTOLIC BLOOD PRESSURE: 135 MMHG | HEART RATE: 65 BPM | WEIGHT: 283.6 LBS

## 2017-11-29 DIAGNOSIS — Z98.84 S/P LAPAROSCOPIC SLEEVE GASTRECTOMY: Primary | ICD-10-CM

## 2017-11-29 DIAGNOSIS — E66.01 MORBID OBESITY WITH BMI OF 50.0-59.9, ADULT (HCC): ICD-10-CM

## 2017-11-29 PROCEDURE — 99213 OFFICE O/P EST LOW 20 MIN: CPT | Performed by: SURGERY

## 2017-11-29 NOTE — PROGRESS NOTES
Dietary Assessment Note    Vitals:   Vitals:    11/29/17 0932   BP: (!) 151/94   Pulse: 80   Weight: 283 lb 9.6 oz (128.6 kg)   Height: 5' 1\" (1.549 m)    Patient lost 8 lbs over 6 weeks. Pt states she has experienced dumping syndrome a few times - inconsistent triggers. Pt states it happening 2-3 times a day. Pt states she is parking far away, is reading nutrition labels. Total Weight Loss: 37.9 lbs    Labs reviewed: no lab studies available for review at time of visit    Protein intake: <60 grams/day     Fluid intake: 48-64 oz/day    Multivitamin/mineral intake: Fusion - how many/day: 3    Calcium intake: citracal petite x 1 / day    Other: Vitamin D3 + calcium (prescribed) and biotin    Exercise: Yes. How much: 30 min 5 days a week. What kind: bike and strengthening ball    Nutrition Assessment: 12 wk post-op visit. eats a balanced diet Eating protein. Pt able to eat 4 bite sizes meat, 4 tsp of a side ~ estimates 3 oz of food in one sitting. Following 30-30-30 Rule. Pt is drinking water, crystal light. B-oatmeal, S-turkey sausages, L-1/2 burger/grilled salmon/tuna with a veggie/salad, S-turkey sausage OR cubed cheese, D-Turkey and potatoes, S-none or a sf popsicle    Food Intolerances/issues: Lettuce, spinach cooked from fresh    Client Concerns: increased bowel movements, gas    Goals:  Increase protein to 60-80 gm, stop using straw to see if this lessens gas    Plan: f/u at 6 months post op or as directed      Jessy Santoyo
Glaucoma    High Blood Pressure Sister     Asthma Sister     Heart Attack Sister 37     HEART ATTACK, POSSIBLE INFECTION IN PORT     Social History   Substance Use Topics    Smoking status: Never Smoker    Smokeless tobacco: Never Used    Alcohol use No     I counseled the patient on the importance of not smoking and risks of ETOH. No Known Allergies  Vitals:    11/29/17 0932 11/29/17 1021   BP: (!) 151/94 135/89   Pulse: 80 65   Weight: 283 lb 9.6 oz (128.6 kg)    Height: 5' 1\" (1.549 m)        Body mass index is 53.59 kg/m². Current Outpatient Prescriptions:     Multiple Vitamins-Minerals (BARIATRIC FUSION) CHEW, Take 4 tablets by mouth daily, Disp: , Rfl:     Calcium Carbonate-Vitamin D (OYSTER SHELL CALCIUM/D) 500-200 MG-UNIT TABS, Take 1 tablet by mouth daily, Disp: 30 tablet, Rfl: 5      Review of Systems - History obtained from the patient  General ROS: negative  Psychological ROS: negative  Ophthalmic ROS: negative  Neurological ROS: negative  ENT ROS: negative  Allergy and Immunology ROS: negative  Hematological and Lymphatic ROS: negative  Endocrine ROS: negative  Respiratory ROS: negative  Cardiovascular ROS: negative  Gastrointestinal ROS:negative  Genito-Urinary ROS: negative  Musculoskeletal ROS: negative   Skin ROS: negative    Physical Exam   Vitals Reviewed   Constitutional: Patient is oriented to person, place, and time. Patient appears well-developed and well-nourished. Patient is active and cooperative. Non-toxic appearance. No distress. HENT:   Head: Normocephalic and atraumatic. Head is without abrasion and without laceration. Hair is normal.   Right Ear: External ear normal. No lacerations. No drainage, swelling . Left Ear: External ear normal. No lacerations. No drainage, swelling. Nose: Nose normal. No nose lacerations or nasal deformity. Eyes: Conjunctivae, EOM and lids are normal. Right eye exhibits no discharge. No foreign body present in the right eye.  Left eye

## 2018-01-18 RX ORDER — B-COMPLEX WITH VITAMIN C
1 TABLET ORAL DAILY
Qty: 30 TABLET | Refills: 5 | Status: SHIPPED | OUTPATIENT
Start: 2018-01-18 | End: 2018-06-22 | Stop reason: ALTCHOICE

## 2018-01-23 ENCOUNTER — OFFICE VISIT (OUTPATIENT)
Dept: FAMILY MEDICINE CLINIC | Age: 58
End: 2018-01-23

## 2018-01-23 VITALS
OXYGEN SATURATION: 97 % | HEIGHT: 61 IN | SYSTOLIC BLOOD PRESSURE: 130 MMHG | RESPIRATION RATE: 20 BRPM | DIASTOLIC BLOOD PRESSURE: 80 MMHG | TEMPERATURE: 98.3 F | WEIGHT: 274.2 LBS | BODY MASS INDEX: 51.77 KG/M2 | HEART RATE: 71 BPM

## 2018-01-23 DIAGNOSIS — R52 BODY ACHES: ICD-10-CM

## 2018-01-23 DIAGNOSIS — J20.8 ACUTE VIRAL BRONCHITIS: Primary | ICD-10-CM

## 2018-01-23 LAB
INFLUENZA A ANTIBODY: NEGATIVE
INFLUENZA B ANTIBODY: NEGATIVE

## 2018-01-23 PROCEDURE — 99213 OFFICE O/P EST LOW 20 MIN: CPT | Performed by: REGISTERED NURSE

## 2018-01-23 PROCEDURE — 87804 INFLUENZA ASSAY W/OPTIC: CPT | Performed by: REGISTERED NURSE

## 2018-01-23 RX ORDER — ALBUTEROL SULFATE 90 UG/1
2 AEROSOL, METERED RESPIRATORY (INHALATION) EVERY 4 HOURS PRN
Qty: 1 INHALER | Refills: 2 | Status: SHIPPED | OUTPATIENT
Start: 2018-01-23 | End: 2019-02-18

## 2018-01-23 RX ORDER — PROMETHAZINE HYDROCHLORIDE AND CODEINE PHOSPHATE 6.25; 1 MG/5ML; MG/5ML
5 SYRUP ORAL NIGHTLY PRN
Qty: 120 ML | Refills: 0 | Status: SHIPPED | OUTPATIENT
Start: 2018-01-23 | End: 2018-01-30

## 2018-01-23 RX ORDER — BENZONATATE 100 MG/1
100 CAPSULE ORAL 3 TIMES DAILY PRN
Qty: 21 CAPSULE | Refills: 0 | Status: SHIPPED | OUTPATIENT
Start: 2018-01-23 | End: 2018-01-30

## 2018-01-23 RX ORDER — METHYLPREDNISOLONE 4 MG/1
4 TABLET ORAL SEE ADMIN INSTRUCTIONS
Qty: 1 KIT | Refills: 0 | Status: SHIPPED | OUTPATIENT
Start: 2018-01-23 | End: 2018-01-29

## 2018-01-23 ASSESSMENT — ENCOUNTER SYMPTOMS
DIARRHEA: 1
NAUSEA: 0
WHEEZING: 0
ABDOMINAL PAIN: 0
CONSTIPATION: 0
CHEST TIGHTNESS: 0
SINUS PAIN: 0
SHORTNESS OF BREATH: 0
ABDOMINAL DISTENTION: 0
COUGH: 1
VOMITING: 0
RHINORRHEA: 1
SINUS PRESSURE: 0
TROUBLE SWALLOWING: 0
SORE THROAT: 0
BLOOD IN STOOL: 0

## 2018-01-23 NOTE — PROGRESS NOTES
hemorrhoid, bleeding     pt states currently not having any problems with hemorrhoids    Obstructive sleep apnea syndrome 04/21/2017    New diagnosis needing treatment CPAP set at 16    Wears glasses        Past Surgical History:   Procedure Laterality Date    ABDOMEN SURGERY N/A 08/29/2017    Robotic assisted lapascopic gastric sleeve performed at [facility] by MD ZARA Manrique CHOLECYSTECTOMY  2015    LAPROSCOPIC    ENDOSCOPY, COLON, DIAGNOSTIC      HEMORRHOID SURGERY  2001    HYSTERECTOMY  JUNE 2009    Total     OTHER SURGICAL HISTORY  02/17/2017    EGD    TONSILLECTOMY      TUBAL LIGATION  1983    UTERINE FIBROID EMBOLIZATION  2002       Admission on 08/29/2017, Discharged on 08/31/2017   Component Date Value Ref Range Status    ABO/Rh 08/29/2017 O POS   Final    Antibody Screen 08/29/2017 NEG   Final    POC Glucose 08/29/2017 85  70 - 99 mg/dl Final    Performed on 08/29/2017 ACCU-CHEK   Final    Sodium 08/30/2017 138  136 - 145 mmol/L Final    Potassium 08/30/2017 4.7  3.5 - 5.1 mmol/L Final    Chloride 08/30/2017 104  99 - 110 mmol/L Final    CO2 08/30/2017 18* 21 - 32 mmol/L Final    Anion Gap 08/30/2017 16  3 - 16 Final    Glucose 08/30/2017 105* 70 - 99 mg/dL Final    BUN 08/30/2017 8  7 - 20 mg/dL Final    CREATININE 08/30/2017 0.8  0.6 - 1.1 mg/dL Final    GFR Non- 08/30/2017 >60  >60 Final    Comment: >60 mL/min/1.73m2 EGFR, calc. for ages 25 and older using the  MDRD formula (not corrected for weight), is valid for stable  renal function.  GFR  08/30/2017 >60  >60 Final    Comment: Chronic Kidney Disease: less than 60 ml/min/1.73 sq.m. Kidney Failure: less than 15 ml/min/1.73 sq.m. Results valid for patients 18 years and older.       Calcium 08/30/2017 8.9  8.3 - 10.6 mg/dL Final    WBC 08/30/2017 7.4  4.0 - 11.0 K/uL Final    RBC 08/30/2017 4.68  4.00 - 5.20 M/uL Final    Hemoglobin 08/30/2017 12.3  12.0 - 16.0 g/dL Final sore throat and trouble swallowing. Respiratory: Positive for cough. Negative for chest tightness, shortness of breath and wheezing. Cardiovascular: Negative for chest pain and palpitations. Gastrointestinal: Positive for diarrhea. Negative for abdominal distention, abdominal pain, blood in stool, constipation, nausea and vomiting. Neurological: Positive for headaches. Negative for dizziness, weakness and light-headedness. All other systems reviewed and are negative. Vitals:  /80 (Site: Left Arm, Position: Sitting, Cuff Size: Large Adult)   Pulse 71   Temp 98.3 °F (36.8 °C) (Oral)   Resp 20   Ht 5' 1\" (1.549 m)   Wt 274 lb 3.2 oz (124.4 kg) Comment: shoes on  LMP 06/02/2009   SpO2 97%   Breastfeeding? No   BMI 51.81 kg/m²     Physical Exam   Constitutional: She is oriented to person, place, and time. She appears well-developed and well-nourished. HENT:   Head: Normocephalic and atraumatic. Right Ear: Tympanic membrane, external ear and ear canal normal.   Left Ear: Tympanic membrane, external ear and ear canal normal.   Nose: Nose normal.   Mouth/Throat: Uvula is midline, oropharynx is clear and moist and mucous membranes are normal.   Eyes: Conjunctivae and EOM are normal. Pupils are equal, round, and reactive to light. Neck: Normal range of motion. Neck supple. Cardiovascular: Normal rate, regular rhythm, normal heart sounds and intact distal pulses. Pulmonary/Chest: Effort normal. She has decreased breath sounds. She has no wheezes. She has no rhonchi. She has no rales. Abdominal: Soft. Bowel sounds are normal.   Neurological: She is alert and oriented to person, place, and time. Skin: Skin is warm and dry. Psychiatric: She has a normal mood and affect. Her behavior is normal. Judgment and thought content normal.   Nursing note and vitals reviewed. Assessment/Plan     1.  Acute viral bronchitis  Suspect viral- given steroid taper, Albuterol inhaler for SOB,

## 2018-01-23 NOTE — PATIENT INSTRUCTIONS
For the first 7-14 days of symptoms follow instructions below, even before being seen in the office or even during treatment with antibiotics, until symptom free. 1. Water: Drink 1 ounce of water for every 2 pounds of body weight for adults, 90 Ounces of water per day. This will loosen mucus in the head and chest & improve the weak feeling of dehydration, allow the body to get germ fighting resources to the infection. Half can be juice or sugar free Crystal Light. Don't count drinks with caffeine or carbonation. Infants can have Pedialyte liquid or freezer pops. Avoid salt if you have high Blood Pressure, swelling in the feet or ankles or have heart problems. 2. Humidity: Humidify the air to 35-50% ( or until the windows fog over slightly).  Summer use of an air conditioner turned down too far and can result in dry air. Can use a humidifier, vaporizer, boil water on the stove or put a coffee can full of water on the heater vents. This will loosen mucus from infections and allergies. 3. Sleep: Get 8-10 hours a night and rest during the evening after work or school. If you have trouble sleeping, adults can take Melatonin 5mg up to 2 tabs at bedtime ( not for children or pregnant women). If Mono is suspected then sleep during 9PM to 9AM time span (if possible.)   4. Cough: Take cough medicines with Guaifenesin ( to loosen chest or head congestion) and Dextromethorphan ( to decrease excess cough). Robitussin D.M. Syrup every 4-6 hrs or Mucinex D. M. pills twice a day. Use the pediatric formulations for children over 6 months making sure they are alcohol & sugar free for children, pregnant women, and diabetics. 5. Pain And Fevers: Take Acetaminophen ( Tylenol) for fevers, aches, and headaches. 2-500 mg every 8 hours for adults. Appropriate doses at bedtime for children may help them sleep better. If pregnant take 1 -500 mg (Tylenol) every 8 hours as needed.  Ibuprofen may be used if not pregnant, but should be given with food to avoid nausea. Avoid Ibuprofen if you have high blood pressure, CHF, or kidney problems. 6.Gargle: (DAY ONE OF SYMPTOMS) Gargle in the back of the throat with the head tilted back and to the sides with a strong mouthwash  ( Listerine or Scope) after meals and at bedtime at least 4 -5 times a day. This helps kill bacteria and viruses in the back of the throat and will shorten the duration and decrease the severity of your symptoms: sore throat, cough, ear popping,/ear pain, and possibly dizziness. 7. Smoking: Avoid smoking or exposure to second hand smoke. 8. Zinc: (DAY ONE OF SYMPTOMS)  Zinc lozenges such as Cold Minh (available most stores), or Basic ([employer] brand) will help shorten the duration and lessen symptoms such as sore throat, cough, nasal congestion, runny nose, and post nasal drip. Use 1 lozenge every 2-4 hours ( after meals if stomach is sensitive). Children can use 10-15 mg or less 3-4 times a day or Zinc lollypops. In pregnancy limit to 50-60 mg a day for 7 days as prenatals have Zinc also.    With diarrhea use zinc pills 50 mg 1/2 to 1 pill 2x/day. 9. Vitamins: Vitamin C 500 mg with breakfast and dinner. Children and pregnant women should drink citrus juices. This speeds healing and strengthens immune system. 10. Chest Symptoms: Vicks Vapor rub to the chest at bedtime. 11. Decongestants: Avoid all decongestants. Try all of the above starting with day 1 of symptoms. If Strep throat symptoms appear call to be seen in the office as soon as possible and don't gargle on that day. Newborns, infants, or anyone with earaches or influenza may need to be seen quickly. Adults with fevers over 103 degrees or shortness of breath should call the office immediately. Patient Education        Bronchitis: Care Instructions  Your Care Instructions    Bronchitis is inflammation of the bronchial tubes, which carry air to the lungs. The tubes swell and produce mucus, or phlegm.  The

## 2018-01-31 ENCOUNTER — TELEPHONE (OUTPATIENT)
Dept: FAMILY MEDICINE CLINIC | Age: 58
End: 2018-01-31

## 2018-01-31 RX ORDER — AZITHROMYCIN 250 MG/1
TABLET, FILM COATED ORAL
Qty: 6 TABLET | Refills: 0 | Status: SHIPPED | OUTPATIENT
Start: 2018-01-31 | End: 2018-02-10

## 2018-03-07 ENCOUNTER — OFFICE VISIT (OUTPATIENT)
Dept: BARIATRICS/WEIGHT MGMT | Age: 58
End: 2018-03-07

## 2018-03-07 VITALS
BODY MASS INDEX: 51.16 KG/M2 | DIASTOLIC BLOOD PRESSURE: 90 MMHG | SYSTOLIC BLOOD PRESSURE: 125 MMHG | HEART RATE: 72 BPM | HEIGHT: 61 IN | WEIGHT: 271 LBS

## 2018-03-07 DIAGNOSIS — E66.01 MORBID OBESITY WITH BMI OF 50.0-59.9, ADULT (HCC): Primary | ICD-10-CM

## 2018-03-07 DIAGNOSIS — Z98.84 S/P LAPAROSCOPIC SLEEVE GASTRECTOMY: ICD-10-CM

## 2018-03-07 PROCEDURE — 99213 OFFICE O/P EST LOW 20 MIN: CPT | Performed by: SURGERY

## 2018-05-11 ENCOUNTER — OFFICE VISIT (OUTPATIENT)
Dept: FAMILY MEDICINE CLINIC | Age: 58
End: 2018-05-11

## 2018-05-11 ENCOUNTER — HOSPITAL ENCOUNTER (OUTPATIENT)
Dept: NON INVASIVE DIAGNOSTICS | Age: 58
Discharge: HOME | End: 2018-05-11
Attending: NURSE PRACTITIONER | Admitting: NURSE PRACTITIONER

## 2018-05-11 VITALS
HEIGHT: 61 IN | SYSTOLIC BLOOD PRESSURE: 128 MMHG | DIASTOLIC BLOOD PRESSURE: 84 MMHG | WEIGHT: 266.6 LBS | RESPIRATION RATE: 14 BRPM | BODY MASS INDEX: 50.34 KG/M2 | HEART RATE: 68 BPM

## 2018-05-11 DIAGNOSIS — R00.2 PALPITATIONS: ICD-10-CM

## 2018-05-11 DIAGNOSIS — F41.9 ANXIETY: ICD-10-CM

## 2018-05-11 DIAGNOSIS — R00.2 HEART PALPITATIONS: Primary | ICD-10-CM

## 2018-05-11 DIAGNOSIS — I49.9 IRREGULAR HEART RATE: ICD-10-CM

## 2018-05-11 LAB
ANION GAP SERPL CALCULATED.3IONS-SCNC: 12 MMOL/L (ref 3–16)
BUN BLDV-MCNC: 10 MG/DL (ref 7–20)
CALCIUM SERPL-MCNC: 9.3 MG/DL (ref 8.3–10.6)
CHLORIDE BLD-SCNC: 104 MMOL/L (ref 99–110)
CO2: 25 MMOL/L (ref 21–32)
CREAT SERPL-MCNC: 0.9 MG/DL (ref 0.6–1.1)
GFR AFRICAN AMERICAN: >60
GFR NON-AFRICAN AMERICAN: >60
GLUCOSE BLD-MCNC: 81 MG/DL (ref 70–99)
POTASSIUM SERPL-SCNC: 4.6 MMOL/L (ref 3.5–5.1)
SODIUM BLD-SCNC: 141 MMOL/L (ref 136–145)

## 2018-05-11 PROCEDURE — 93000 ELECTROCARDIOGRAM COMPLETE: CPT | Performed by: NURSE PRACTITIONER

## 2018-05-11 PROCEDURE — 99214 OFFICE O/P EST MOD 30 MIN: CPT | Performed by: NURSE PRACTITIONER

## 2018-05-11 RX ORDER — CLONAZEPAM 0.5 MG/1
0.5 TABLET ORAL 2 TIMES DAILY
Qty: 6 TABLET | Refills: 0 | Status: SHIPPED | OUTPATIENT
Start: 2018-05-11 | End: 2018-05-16 | Stop reason: SDUPTHER

## 2018-05-14 ENCOUNTER — TELEPHONE (OUTPATIENT)
Dept: FAMILY MEDICINE CLINIC | Age: 58
End: 2018-05-14

## 2018-05-16 ENCOUNTER — TELEPHONE (OUTPATIENT)
Dept: FAMILY MEDICINE CLINIC | Age: 58
End: 2018-05-16

## 2018-05-16 DIAGNOSIS — R00.2 HEART PALPITATIONS: ICD-10-CM

## 2018-05-16 DIAGNOSIS — F41.9 ANXIETY: ICD-10-CM

## 2018-05-16 LAB
ACQUISITION DURATION: NORMAL S
AVERAGE HEART RATE: 82 BPM
EKG DIAGNOSIS: NORMAL
FASTEST SUPRAVENTRICULAR RATE: 133 BPM
HOLTER MAX HEART RATE: 155 BPM
HOOKUP DATE: NORMAL
HOOKUP TIME: NORMAL
LONGEST SUPRAVENTRICULAR RATE: 133 BPM
Lab: NORMAL
MAX HEART RATE TIME/DATE: NORMAL
MIN HEART RATE TIME/DATE: NORMAL
MIN HEART RATE: 52 BPM
NUMBER OF FASTEST SUPRAVENTRICULAR BEATS: 4
NUMBER OF LONGEST SUPRAVENTRICULAR BEATS: 4
NUMBER OF QRS COMPLEXES: NORMAL
NUMBER OF SUPRAVENTRICULAR BEATS IN RUNS: 4
NUMBER OF SUPRAVENTRICULAR COUPLETS: 2
NUMBER OF SUPRAVENTRICULAR ECTOPICS: 22
NUMBER OF SUPRAVENTRICULAR ISOLATED BEATS: 14
NUMBER OF SUPRAVENTRICULAR RUNS: 1
NUMBER OF VENTRICULAR BEATS IN RUNS: 0
NUMBER OF VENTRICULAR BIGEMINAL CYCLES: 0
NUMBER OF VENTRICULAR COUPLETS: 0
NUMBER OF VENTRICULAR ECTOPICS: 5
NUMBER OF VENTRICULAR ISOLATED BEATS: 5
NUMBER OF VENTRICULAR RUNS: 0

## 2018-05-16 RX ORDER — CLONAZEPAM 0.5 MG/1
0.5 TABLET ORAL 2 TIMES DAILY
Qty: 6 TABLET | Refills: 0 | Status: SHIPPED | OUTPATIENT
Start: 2018-05-16 | End: 2018-08-02

## 2018-05-17 DIAGNOSIS — I47.1 ATRIAL TACHYCARDIA (HCC): Primary | ICD-10-CM

## 2018-05-17 DIAGNOSIS — I49.1 PREMATURE ATRIAL BEATS: ICD-10-CM

## 2018-05-17 PROBLEM — I47.19 ATRIAL TACHYCARDIA: Status: ACTIVE | Noted: 2018-05-17

## 2018-05-17 RX ORDER — ATENOLOL 25 MG/1
25 TABLET ORAL DAILY
Qty: 30 TABLET | Refills: 3 | Status: SHIPPED | OUTPATIENT
Start: 2018-05-17 | End: 2018-09-05 | Stop reason: SDUPTHER

## 2018-06-08 ENCOUNTER — OFFICE VISIT (OUTPATIENT)
Dept: FAMILY MEDICINE CLINIC | Age: 58
End: 2018-06-08

## 2018-06-08 VITALS
DIASTOLIC BLOOD PRESSURE: 80 MMHG | HEIGHT: 61 IN | RESPIRATION RATE: 12 BRPM | BODY MASS INDEX: 50.37 KG/M2 | SYSTOLIC BLOOD PRESSURE: 122 MMHG | WEIGHT: 266.8 LBS | HEART RATE: 64 BPM

## 2018-06-08 DIAGNOSIS — E55.9 VITAMIN D DEFICIENCY: Primary | ICD-10-CM

## 2018-06-08 LAB — VITAMIN D 25-HYDROXY: 33.6 NG/ML

## 2018-06-08 PROCEDURE — 99213 OFFICE O/P EST LOW 20 MIN: CPT | Performed by: REGISTERED NURSE

## 2018-06-08 RX ORDER — OMEPRAZOLE 40 MG/1
40 CAPSULE, DELAYED RELEASE ORAL PRN
COMMUNITY
Start: 2018-05-25 | End: 2018-07-11

## 2018-06-13 ASSESSMENT — ENCOUNTER SYMPTOMS
SHORTNESS OF BREATH: 0
COUGH: 0
GASTROINTESTINAL NEGATIVE: 1
WHEEZING: 0
CHEST TIGHTNESS: 0

## 2018-06-22 ENCOUNTER — OFFICE VISIT (OUTPATIENT)
Dept: FAMILY MEDICINE CLINIC | Age: 58
End: 2018-06-22

## 2018-06-22 VITALS
HEIGHT: 61 IN | BODY MASS INDEX: 50.79 KG/M2 | WEIGHT: 269 LBS | RESPIRATION RATE: 10 BRPM | HEART RATE: 64 BPM | DIASTOLIC BLOOD PRESSURE: 82 MMHG | SYSTOLIC BLOOD PRESSURE: 122 MMHG

## 2018-06-22 DIAGNOSIS — L72.3 SEBACEOUS CYST: Primary | ICD-10-CM

## 2018-06-22 PROCEDURE — 99213 OFFICE O/P EST LOW 20 MIN: CPT | Performed by: REGISTERED NURSE

## 2018-06-22 ASSESSMENT — PATIENT HEALTH QUESTIONNAIRE - PHQ9
2. FEELING DOWN, DEPRESSED OR HOPELESS: 0
SUM OF ALL RESPONSES TO PHQ QUESTIONS 1-9: 0
SUM OF ALL RESPONSES TO PHQ9 QUESTIONS 1 & 2: 0
1. LITTLE INTEREST OR PLEASURE IN DOING THINGS: 0

## 2018-06-25 ENCOUNTER — OFFICE VISIT (OUTPATIENT)
Dept: SURGERY | Age: 58
End: 2018-06-25

## 2018-06-25 VITALS
HEIGHT: 61 IN | SYSTOLIC BLOOD PRESSURE: 122 MMHG | DIASTOLIC BLOOD PRESSURE: 68 MMHG | WEIGHT: 267 LBS | BODY MASS INDEX: 50.41 KG/M2

## 2018-06-25 DIAGNOSIS — L72.3 SEBACEOUS CYST: Primary | ICD-10-CM

## 2018-06-25 PROCEDURE — 99241 PR OFFICE CONSULTATION NEW/ESTAB PATIENT 15 MIN: CPT | Performed by: SURGERY

## 2018-06-25 ASSESSMENT — ENCOUNTER SYMPTOMS
GASTROINTESTINAL NEGATIVE: 1
RESPIRATORY NEGATIVE: 1
ALLERGIC/IMMUNOLOGIC NEGATIVE: 1
EYES NEGATIVE: 1

## 2018-07-05 ENCOUNTER — PROCEDURE VISIT (OUTPATIENT)
Dept: SURGERY | Age: 58
End: 2018-07-05

## 2018-07-05 VITALS — DIASTOLIC BLOOD PRESSURE: 82 MMHG | SYSTOLIC BLOOD PRESSURE: 122 MMHG

## 2018-07-05 DIAGNOSIS — L72.3 SEBACEOUS CYST: Primary | ICD-10-CM

## 2018-07-05 PROCEDURE — 11421 EXC H-F-NK-SP B9+MARG 0.6-1: CPT | Performed by: SURGERY

## 2018-07-05 NOTE — PROGRESS NOTES
Office Procedure    Pre op Dx-tender cyst of the scalp    Post op Dx-same    Procedure-Excision of 8 mm cyst of the scalp    Surgeon-Dr. Paty Chavez    Anesthesia-local    EBL-min    Operative indications and consent-63year-old female brought in today for excision of a tender cyst of the right parietal region of the scalp. Patient explained risks,benefits, complications and alternatives. Procedure-Patient prepped and draped in the usual sterile fashion. Left lateral decubitus position. The skin and subcutaneous tissues were infiltrated with 1% lidocaine. An  8 mm cyst of the scalp was excised. The wound was closed with a 3-0 nylon suture. Antibiotic ointment was applied. The patient tolerated the procedure well.

## 2018-07-11 ENCOUNTER — OFFICE VISIT (OUTPATIENT)
Dept: BARIATRICS/WEIGHT MGMT | Age: 58
End: 2018-07-11

## 2018-07-11 VITALS
BODY MASS INDEX: 50.48 KG/M2 | DIASTOLIC BLOOD PRESSURE: 71 MMHG | HEART RATE: 76 BPM | HEIGHT: 61 IN | WEIGHT: 267.4 LBS | SYSTOLIC BLOOD PRESSURE: 114 MMHG

## 2018-07-11 DIAGNOSIS — Z98.84 S/P LAPAROSCOPIC SLEEVE GASTRECTOMY: ICD-10-CM

## 2018-07-11 DIAGNOSIS — E66.01 MORBID OBESITY WITH BMI OF 50.0-59.9, ADULT (HCC): Primary | ICD-10-CM

## 2018-07-11 PROCEDURE — 99213 OFFICE O/P EST LOW 20 MIN: CPT | Performed by: SURGERY

## 2018-07-11 NOTE — PROGRESS NOTES
[facility]) Physicians   General & Laparoscopic Surgery  [employer]       HPI:     Lida Humphries is a very pleasant 63 y.o. obese female , Body mass index is 50.52 kg/m². Heriberto Counter And multiple medical problems who is presenting for bariatric follow up care. Lida Humphries is s/p laparoscopic sleeve gastrectomy by me   Comes today to the clinic without any complaints. Patient denies any nausea, vomiting, fevers, chills, shortness of breath, chest pain, constipation or urinary symptoms. Denies any heartburn nor dysphagia. Patient is feeling very well, and is very active. Patient is very pleased with the weight loss and resolution of co-morbid conditions. Past Medical History:   Diagnosis Date    Chronic gastritis     Esophagitis     Fatty liver     GERD (gastroesophageal reflux disease)     Hiatal hernia     Hypertension     currently not medically treated    Internal hemorrhoid, bleeding     pt states currently not having any problems with hemorrhoids    Obstructive sleep apnea syndrome 2017    New diagnosis needing treatment CPAP set at 16    Wears glasses      Past Surgical History:   Procedure Laterality Date    ABDOMEN SURGERY N/A 2017    Robotic assisted lapascopic gastric sleeve performed at [facility] by MD ZARA Larry CHOLECYSTECTOMY      LAPROSCOPIC    ENDOSCOPY, COLON, DIAGNOSTIC      HEMORRHOID SURGERY      HYSTERECTOMY  2009    Total     OTHER SURGICAL HISTORY  2017    EGD    TONSILLECTOMY      TUBAL LIGATION      UTERINE FIBROID EMBOLIZATION       Family History   Problem Relation Age of Onset    High Blood Pressure Mother     Diabetes Father     High Blood Pressure Father     High Blood Pressure Maternal Grandmother     Stroke Maternal Grandmother 80         from stroke    Arthritis Maternal Grandmother     Vision Loss Maternal Grandmother         Glaucoma    High Blood Pressure Sister     Asthma Sister  Heart Attack Sister 37        HEART ATTACK, POSSIBLE INFECTION IN PORT     Social History   Substance Use Topics    Smoking status: Never Smoker    Smokeless tobacco: Never Used    Alcohol use No     I counseled the patient on the importance of not smoking and risks of ETOH. No Known Allergies  Vitals:    07/11/18 1536   BP: 114/71   Pulse: 76   Weight: 267 lb 6.4 oz (121.3 kg)   Height: 5' 1\" (1.549 m)       Body mass index is 50.52 kg/m². Current Outpatient Prescriptions:     atenolol (TENORMIN) 25 MG tablet, Take 1 tablet by mouth daily, Disp: 30 tablet, Rfl: 3    albuterol sulfate HFA (PROAIR HFA) 108 (90 Base) MCG/ACT inhaler, Inhale 2 puffs into the lungs every 4 hours as needed for Wheezing, Disp: 1 Inhaler, Rfl: 2    Multiple Vitamins-Minerals (BARIATRIC FUSION) CHEW, Take 4 tablets by mouth daily, Disp: , Rfl:     clonazePAM (KLONOPIN) 0.5 MG tablet, Take 1 tablet by mouth 2 times daily for 3 days. ., Disp: 6 tablet, Rfl: 0      Review of Systems - History obtained from the patient  General ROS: negative  Psychological ROS: negative  Respiratory ROS: negative  Cardiovascular ROS: negative  Gastrointestinal ROS:negative  Genito-Urinary ROS: negative  Musculoskeletal ROS: negative   Skin ROS: negative    Physical Exam   Vitals Reviewed   Constitutional: Patient is oriented to person, place, and time. Patient appears well-developed and well-nourished. Patient is active and cooperative. Non-toxic appearance. No distress. Neck: Trachea normal and normal range of motion. No JVD present. Pulmonary/Chest: Effort normal. No accessory muscle usage or stridor. No apnea. No respiratory distress. Cardiovascular: Normal rate and no JVD. Abdominal: Normal appearance. Patient exhibits no distension. Abdomen is soft, obese, non tender. Musculoskeletal: Normal range of motion. Patient exhibits no edema. Neurological: Patient is alert and oriented to person, place, and time.  Patient has normal strength. GCS eye subscore is 4. GCS verbal subscore is 5. GCS motor subscore is 6. Skin: Skin is warm and dry. No abrasion and no rash noted. Patient is not diaphoretic. No cyanosis or erythema. Psychiatric: Patient has a normal mood and affect. Speech is normal and behavior is normal. Cognition and memory are normal.         A/P     Paula Villarreal is 63 y.o. female , now with Body mass index is 50.52 kg/m². s/p Sleeve gastrectomy, has lost 3.6 lbs since last visit, total of 54 lbs weight loss. The patient underwent dietary counseling with registered dietician. I have reviewed, discussed and agree with the dietary plan. Patient is trying hard to keep good dietary and behavior modifications. Patient is monitoring portion sizes, food choices and liquid calories. Patient is trying to exercise regularly. Patient pleased with the surgery outcomes. We discussed how her weight affects her overall health including:  Patient Active Problem List   Diagnosis    Hypertension    Tinea pedis    Right knee pain    Positive hepatitis C antibody test    Internal hemorrhoid, bleeding    Cholecystitis    Fatty liver    Morbid obesity with BMI of 60.0-69.9, adult (HCC)    Pre-operative cardiovascular examination    Shortness of breath    Morbid obesity ([address]    Hiatal hernia with gastroesophageal reflux disease and esophagitis    GERD with esophagitis    Superficial gastritis without hemorrhage    Hiatal hernia    Obstructive sleep apnea    Morbid obesity with BMI of 45.0-49.9, adult (HCC)    S/P laparoscopic sleeve gastrectomy    Atrial tachycardia (HCC)    Premature atrial beats    Sebaceous cyst    and importance of weight loss to alleviate those co morbid conditions. I encouraged the patient to continue exercise and keeping healthy eating habits. Also counseled the patient extensively on post surgery care.      15 minutes spent counseling the patient, answering questions and addressing concerns as well reviewing  labs and/or other studies as well coordinating care. More than 50% was face to face time counseling the patient. RTC in 3 months  Diet and Exercise      Patient advised that its their responsibility to follow up for studies and/or labs ordered today.      Ej Penn, DO

## 2018-07-11 NOTE — PROGRESS NOTES
Dietary Assessment Note    Vitals:   Vitals:    07/11/18 1536   BP: 114/71   Pulse: 76   Weight: 267 lb 6.4 oz (121.3 kg)   Height: 5' 1\" (1.549 m)    Patient lost 3.6 lbs over the past 3 months. .    Total Weight Loss: 54.1lbs    Labs reviewed:  no lab studies available for review at time of visit    Protein intake: 60-80 grams/day; getting about 60g     Fluid intake: 48-64 oz/day    Multivitamin/mineral intake: 2 Fusion per day    Calcium intake: 2 citracal petite    Other: biotin    Exercise: No. How much: none organized. What kind: trying to take steps, park farther away. Wants to buy a treadmill    Nutrition Assessment: Brkst is oatmeal, sometimes hb egg and turkey rosen; not getting all snacks - may have apple and pb; lunch is shredded chix or salad with chix or leftovers; snack is protein shake or tuna pack; dinner is salad with protein. Pt is sometimes not getting snacks and dinner. She can eat about 2-3oz meat per meal; pt is  liquids and solids. Pt denies n/v, reflux.         Food Intolerances/issues: salad evans spinach    Client Concerns: can't tolerate grapes, lettuce, spinach, dairy - has diarrhea after eating a lot of foods     Goals: focus on protein and avoid foods causing distress    Plan: follow up as needed      Dragan Dwyer

## 2018-07-16 ENCOUNTER — OFFICE VISIT (OUTPATIENT)
Dept: SURGERY | Age: 58
End: 2018-07-16

## 2018-07-16 VITALS — SYSTOLIC BLOOD PRESSURE: 100 MMHG | DIASTOLIC BLOOD PRESSURE: 60 MMHG | WEIGHT: 267 LBS | BODY MASS INDEX: 50.45 KG/M2

## 2018-07-16 DIAGNOSIS — L72.3 SEBACEOUS CYST: Primary | ICD-10-CM

## 2018-07-16 PROCEDURE — 99024 POSTOP FOLLOW-UP VISIT: CPT | Performed by: SURGERY

## 2018-07-16 NOTE — COMMUNICATION BODY
Assessment:     61-year-old female status post excision of a cystic lesion of the scalp. Pathology showed findings of calcinosis cutis. There was no evidence of malignancy. She is doing well postoperatively. Plan:     Follow-up as needed.

## 2018-07-17 DIAGNOSIS — K21.9 GASTROESOPHAGEAL REFLUX DISEASE WITHOUT ESOPHAGITIS: ICD-10-CM

## 2018-07-17 RX ORDER — OMEPRAZOLE 40 MG/1
CAPSULE, DELAYED RELEASE ORAL
Qty: 30 CAPSULE | Refills: 5 | Status: SHIPPED | OUTPATIENT
Start: 2018-07-17 | End: 2018-08-02

## 2018-07-20 ENCOUNTER — OFFICE VISIT (OUTPATIENT)
Dept: FAMILY MEDICINE CLINIC | Age: 58
End: 2018-07-20

## 2018-07-20 VITALS
SYSTOLIC BLOOD PRESSURE: 128 MMHG | RESPIRATION RATE: 18 BRPM | OXYGEN SATURATION: 98 % | BODY MASS INDEX: 50.34 KG/M2 | HEIGHT: 61 IN | DIASTOLIC BLOOD PRESSURE: 86 MMHG | HEART RATE: 73 BPM | WEIGHT: 266.6 LBS | TEMPERATURE: 98.4 F

## 2018-07-20 DIAGNOSIS — J02.9 SORE THROAT: Primary | ICD-10-CM

## 2018-07-20 LAB — S PYO AG THROAT QL: NORMAL

## 2018-07-20 PROCEDURE — 99213 OFFICE O/P EST LOW 20 MIN: CPT | Performed by: NURSE PRACTITIONER

## 2018-07-20 PROCEDURE — 87880 STREP A ASSAY W/OPTIC: CPT | Performed by: NURSE PRACTITIONER

## 2018-07-20 RX ORDER — AZITHROMYCIN 250 MG/1
TABLET, FILM COATED ORAL
Qty: 6 TABLET | Refills: 0 | Status: SHIPPED | OUTPATIENT
Start: 2018-07-20 | End: 2018-08-02

## 2018-07-20 ASSESSMENT — ENCOUNTER SYMPTOMS: SORE THROAT: 1

## 2018-07-20 NOTE — PROGRESS NOTES
SUBJECTIVE:    Patient ID: Joce Banks is a 63 y.o. y.o. female. HPI   Chief Complaint   Patient presents with    Pharyngitis     sore throat huts to swallow     SORE THROAT HURTS TO SWALLOW - NO TEMP TAKEN BUT SHE HAS FELT HOT AND SWEAT Y- NO EAR PAIN- RATES THE PAIN 9/10 HAS TRIED THROAT SPRAY IT HAS NOT HELPED    Review of Systems   Constitutional: Positive for chills. Negative for fever. HENT: Positive for sore throat. All other systems reviewed and are negative. OBJECTIVE:      Physical Exam   Constitutional: Vital signs are normal. She appears well-developed. She is active. HENT:   Mouth/Throat: Uvula is midline and mucous membranes are normal. Posterior oropharyngeal erythema present. Cardiovascular: Normal rate, regular rhythm, S1 normal, S2 normal and normal heart sounds. Lymphadenopathy:        Head (right side): No submental, no submandibular and no tonsillar adenopathy present. Head (left side): No submental, no submandibular and no tonsillar adenopathy present. Neurological: She is alert. Psychiatric: She has a normal mood and affect. Her speech is normal and behavior is normal. Thought content normal. Cognition and memory are normal.       ASSESSMENT:   Diagnosis Orders   1. Sore throat  POCT rapid strep A     PLAN:  Robert Foy was seen today for pharyngitis.     Diagnoses and all orders for this visit:    Sore throat  -     POCT rapid strep A- negative  -     THROAT CULTURE  -     azithromycin (ZITHROMAX) 250 MG tablet; 2 tabs day 1 then 1 tab day 2- 5  Pt instructed to gargle with salt water - continue throat sprays

## 2018-07-22 LAB — THROAT CULTURE: NORMAL

## 2018-07-26 ENCOUNTER — TELEPHONE (OUTPATIENT)
Dept: FAMILY MEDICINE CLINIC | Age: 58
End: 2018-07-26

## 2018-07-26 RX ORDER — DOXYCYCLINE HYCLATE 100 MG
100 TABLET ORAL 2 TIMES DAILY
Qty: 20 TABLET | Refills: 0 | Status: SHIPPED | OUTPATIENT
Start: 2018-07-26 | End: 2018-08-05

## 2018-08-02 ENCOUNTER — OFFICE VISIT (OUTPATIENT)
Dept: FAMILY MEDICINE CLINIC | Age: 58
End: 2018-08-02

## 2018-08-02 VITALS
WEIGHT: 266 LBS | HEART RATE: 72 BPM | HEIGHT: 61 IN | DIASTOLIC BLOOD PRESSURE: 80 MMHG | RESPIRATION RATE: 16 BRPM | SYSTOLIC BLOOD PRESSURE: 122 MMHG | BODY MASS INDEX: 50.22 KG/M2

## 2018-08-02 DIAGNOSIS — J00 ACUTE NASOPHARYNGITIS (COMMON COLD): Primary | ICD-10-CM

## 2018-08-02 PROCEDURE — 99213 OFFICE O/P EST LOW 20 MIN: CPT | Performed by: REGISTERED NURSE

## 2018-08-02 RX ORDER — METHYLPREDNISOLONE 4 MG/1
4 TABLET ORAL SEE ADMIN INSTRUCTIONS
Qty: 1 KIT | Refills: 0 | Status: SHIPPED | OUTPATIENT
Start: 2018-08-02 | End: 2018-08-08

## 2018-08-02 ASSESSMENT — ENCOUNTER SYMPTOMS
CHEST TIGHTNESS: 0
SINUS PAIN: 0
SHORTNESS OF BREATH: 1
TROUBLE SWALLOWING: 0
VOICE CHANGE: 1
SORE THROAT: 1
COUGH: 1
WHEEZING: 0
RHINORRHEA: 1
SINUS PRESSURE: 0

## 2018-08-02 ASSESSMENT — PATIENT HEALTH QUESTIONNAIRE - PHQ9
2. FEELING DOWN, DEPRESSED OR HOPELESS: 0
1. LITTLE INTEREST OR PLEASURE IN DOING THINGS: 0
SUM OF ALL RESPONSES TO PHQ9 QUESTIONS 1 & 2: 0
SUM OF ALL RESPONSES TO PHQ QUESTIONS 1-9: 0

## 2018-08-02 NOTE — PROGRESS NOTES
[facility]  Clinic Note    Date: 8/2/2018                                               Subjective/Objective:     Chief Complaint   Patient presents with    Pharyngitis     PT FOLLOWING UP FOR SORE THROAT, STARTING TO FEEL BETTER, BUT STILL HAVING SORENESS, AND DRYNESS, HARD TO TALK, PHLEM, AT WORK SHE FEELS LIKE ITS SUPER THICK, THEN SHE STARTS COUGHING AND CANT CATCH BREATH, TWO MORE DAYS LEFT FOR ANTIBIOTIC       HPI  Patient present for follow up sore throat. Things are improving, but not all the way back to normal. Still having soreness, dryness, hoarseness, PND- thick, and cough. Gets SOB with cough at times due to congestion. Has 2 more days left of antibiotics Doxy. Failed Zpack initially. Has not been trying any other therapies or OTC medications in conjunction with antibiotics. Denies fever, chills, ear pain, sinus pressure, HA, CP, dizzines, or trouble swallowing.          Patient Active Problem List    Diagnosis Date Noted    Sebaceous cyst 06/25/2018    Atrial tachycardia ([facility] 05/17/2018    Premature atrial beats 05/17/2018    S/P laparoscopic sleeve gastrectomy 09/13/2017    Morbid obesity with BMI of 45.0-49.9, adult ([address] 08/29/2017    Obstructive sleep apnea 04/21/2017    Hiatal hernia     Hiatal hernia with gastroesophageal reflux disease and esophagitis     GERD with esophagitis     Superficial gastritis without hemorrhage     Pre-operative cardiovascular examination 01/16/2017    Shortness of breath 01/16/2017    Morbid obesity ([address] 01/16/2017    Morbid obesity with BMI of 60.0-69.9, adult ([facility] 12/14/2016    Fatty liver     Cholecystitis     Internal hemorrhoid, bleeding     Positive hepatitis C antibody test 04/19/2013    Right knee pain 03/26/2013    Tinea pedis 11/28/2012    Hypertension 09/24/2012       Past Medical History:   Diagnosis Date    Chronic gastritis     Esophagitis     Fatty liver     GERD (gastroesophageal reflux disease)     Hiatal

## 2018-08-15 ENCOUNTER — OFFICE VISIT (OUTPATIENT)
Dept: BARIATRICS/WEIGHT MGMT | Age: 58
End: 2018-08-15

## 2018-08-15 VITALS
HEART RATE: 78 BPM | BODY MASS INDEX: 50.41 KG/M2 | DIASTOLIC BLOOD PRESSURE: 84 MMHG | HEIGHT: 61 IN | WEIGHT: 267 LBS | SYSTOLIC BLOOD PRESSURE: 129 MMHG

## 2018-08-15 DIAGNOSIS — I10 ESSENTIAL HYPERTENSION: ICD-10-CM

## 2018-08-15 DIAGNOSIS — Z98.84 S/P LAPAROSCOPIC SLEEVE GASTRECTOMY: ICD-10-CM

## 2018-08-15 DIAGNOSIS — G47.33 OBSTRUCTIVE SLEEP APNEA (ADULT) (PEDIATRIC): ICD-10-CM

## 2018-08-15 DIAGNOSIS — E66.01 MORBID OBESITY WITH BMI OF 50.0-59.9, ADULT (HCC): Primary | ICD-10-CM

## 2018-08-15 PROCEDURE — 99213 OFFICE O/P EST LOW 20 MIN: CPT | Performed by: SURGERY

## 2018-08-15 NOTE — PROGRESS NOTES
Dietary Assessment Note    Vitals:   Vitals:    08/15/18 1445   BP: 129/84   Pulse: 78   Weight: 267 lb (121.1 kg)   Height: 5' 1\" (1.549 m)    Patient lost 0.4 lbs over the past month. Total Weight Loss: 54.5lbs    Labs reviewed:  no lab studies available for review at time of visit    Protein intake: 60-80 grams/day; getting about 60g     Fluid intake: 48-64 oz/day    Multivitamin/mineral intake: 2 mvi daily    Calcium intake: citracal petite    Other: biotin    Exercise: Yes. How much: just purchased a treadmill. What kind: goal is 3x week    Nutrition Assessment: hb egg for brkst or oatmeal; snack is turkey P3 pack; lunch is chix or beef or Lean Cuisine; no afternoon snack; dinner is ground beef or turkey or chix. Pt is avoiding CHO and she states she is very tired. Pt  Gets a sense of fullness quickly. She is  liquids and solids.       Food Intolerances/issues: alot of fruits and veg -gets diarrhea immediately after eating    Client Concerns: concerned about slow weight loss; concern about diarrhea when eating fruit and veg    Goals: exercise 3x week; add healthful carbs as tolerated    Plan: follow up as needed      Krysta Session
and/or other studies as well coordinating care. More than 50% was face to face time counseling the patient. RTC in 6 months  Diet and Exercise      Patient advised that its their responsibility to follow up for studies and/or labs ordered today.      Heather Ahumada, DO

## 2018-08-17 DIAGNOSIS — E66.01 MORBID OBESITY WITH BMI OF 50.0-59.9, ADULT (HCC): ICD-10-CM

## 2018-08-18 LAB
A/G RATIO: 1.5 (ref 1.1–2.2)
ALBUMIN SERPL-MCNC: 4.3 G/DL (ref 3.4–5)
ALP BLD-CCNC: 151 U/L (ref 40–129)
ALT SERPL-CCNC: 36 U/L (ref 10–40)
ANION GAP SERPL CALCULATED.3IONS-SCNC: 13 MMOL/L (ref 3–16)
AST SERPL-CCNC: 30 U/L (ref 15–37)
BASOPHILS ABSOLUTE: 0 K/UL (ref 0–0.2)
BASOPHILS RELATIVE PERCENT: 0.8 %
BILIRUB SERPL-MCNC: 0.5 MG/DL (ref 0–1)
BUN BLDV-MCNC: 9 MG/DL (ref 7–20)
CALCIUM SERPL-MCNC: 9.9 MG/DL (ref 8.3–10.6)
CHLORIDE BLD-SCNC: 102 MMOL/L (ref 99–110)
CHOLESTEROL, TOTAL: 174 MG/DL (ref 0–199)
CO2: 25 MMOL/L (ref 21–32)
CREAT SERPL-MCNC: 0.8 MG/DL (ref 0.6–1.1)
EOSINOPHILS ABSOLUTE: 0.2 K/UL (ref 0–0.6)
EOSINOPHILS RELATIVE PERCENT: 4.1 %
ESTIMATED AVERAGE GLUCOSE: 116.9 MG/DL
FOLATE: >20 NG/ML (ref 4.78–24.2)
GFR AFRICAN AMERICAN: >60
GFR NON-AFRICAN AMERICAN: >60
GLOBULIN: 2.9 G/DL
GLUCOSE BLD-MCNC: 87 MG/DL (ref 70–99)
HBA1C MFR BLD: 5.7 %
HCT VFR BLD CALC: 38.9 % (ref 36–48)
HDLC SERPL-MCNC: 75 MG/DL (ref 40–60)
HEMOGLOBIN: 12.9 G/DL (ref 12–16)
IRON SATURATION: 27 % (ref 15–50)
IRON: 100 UG/DL (ref 37–145)
LDL CHOLESTEROL CALCULATED: 84 MG/DL
LYMPHOCYTES ABSOLUTE: 2.1 K/UL (ref 1–5.1)
LYMPHOCYTES RELATIVE PERCENT: 50.7 %
MCH RBC QN AUTO: 27.5 PG (ref 26–34)
MCHC RBC AUTO-ENTMCNC: 33.1 G/DL (ref 31–36)
MCV RBC AUTO: 83 FL (ref 80–100)
MONOCYTES ABSOLUTE: 0.3 K/UL (ref 0–1.3)
MONOCYTES RELATIVE PERCENT: 6.8 %
NEUTROPHILS ABSOLUTE: 1.6 K/UL (ref 1.7–7.7)
NEUTROPHILS RELATIVE PERCENT: 37.6 %
PDW BLD-RTO: 15.8 % (ref 12.4–15.4)
PLATELET # BLD: 233 K/UL (ref 135–450)
PMV BLD AUTO: 8.6 FL (ref 5–10.5)
POTASSIUM SERPL-SCNC: 4.3 MMOL/L (ref 3.5–5.1)
RBC # BLD: 4.68 M/UL (ref 4–5.2)
SODIUM BLD-SCNC: 140 MMOL/L (ref 136–145)
TOTAL IRON BINDING CAPACITY: 366 UG/DL (ref 260–445)
TOTAL PROTEIN: 7.2 G/DL (ref 6.4–8.2)
TRIGL SERPL-MCNC: 74 MG/DL (ref 0–150)
TSH REFLEX: 1.34 UIU/ML (ref 0.27–4.2)
VITAMIN B-12: 388 PG/ML (ref 211–911)
VITAMIN D 25-HYDROXY: 22.5 NG/ML
VLDLC SERPL CALC-MCNC: 15 MG/DL
WBC # BLD: 4.2 K/UL (ref 4–11)

## 2018-08-24 ENCOUNTER — TELEPHONE (OUTPATIENT)
Dept: SURGERY | Age: 58
End: 2018-08-24

## 2018-08-24 NOTE — TELEPHONE ENCOUNTER
Called and advised patients that her labs were normal. Pt voiced understanding and did not have any further questions

## 2018-08-31 RX ORDER — NAPROXEN 375 MG/1
TABLET ORAL
Qty: 20 TABLET | Refills: 1 | Status: SHIPPED | OUTPATIENT
Start: 2018-08-31 | End: 2019-09-09

## 2018-09-05 DIAGNOSIS — I47.1 ATRIAL TACHYCARDIA (HCC): ICD-10-CM

## 2018-09-05 DIAGNOSIS — I49.1 PREMATURE ATRIAL BEATS: ICD-10-CM

## 2018-09-05 RX ORDER — ATENOLOL 25 MG/1
TABLET ORAL
Qty: 30 TABLET | Refills: 3 | Status: SHIPPED | OUTPATIENT
Start: 2018-09-05 | End: 2019-02-18

## 2018-09-27 PROBLEM — Z01.810 PRE-OPERATIVE CARDIOVASCULAR EXAMINATION: Status: RESOLVED | Noted: 2017-01-16 | Resolved: 2018-09-27

## 2019-02-18 ENCOUNTER — OFFICE VISIT (OUTPATIENT)
Dept: FAMILY MEDICINE CLINIC | Age: 59
End: 2019-02-18
Payer: COMMERCIAL

## 2019-02-18 VITALS
WEIGHT: 275.8 LBS | BODY MASS INDEX: 52.07 KG/M2 | HEIGHT: 61 IN | OXYGEN SATURATION: 98 % | HEART RATE: 62 BPM | SYSTOLIC BLOOD PRESSURE: 152 MMHG | TEMPERATURE: 98.2 F | DIASTOLIC BLOOD PRESSURE: 90 MMHG

## 2019-02-18 DIAGNOSIS — K52.9 ACUTE GASTROENTERITIS: ICD-10-CM

## 2019-02-18 DIAGNOSIS — J02.9 SORE THROAT: Primary | ICD-10-CM

## 2019-02-18 LAB
INFLUENZA A ANTIBODY: NORMAL
INFLUENZA B ANTIBODY: NORMAL
S PYO AG THROAT QL: NORMAL

## 2019-02-18 PROCEDURE — 87804 INFLUENZA ASSAY W/OPTIC: CPT | Performed by: NURSE PRACTITIONER

## 2019-02-18 PROCEDURE — 87880 STREP A ASSAY W/OPTIC: CPT | Performed by: NURSE PRACTITIONER

## 2019-02-18 PROCEDURE — 99213 OFFICE O/P EST LOW 20 MIN: CPT | Performed by: NURSE PRACTITIONER

## 2019-02-18 RX ORDER — PROMETHAZINE HYDROCHLORIDE 25 MG/1
25 TABLET ORAL 4 TIMES DAILY PRN
Qty: 20 TABLET | Refills: 0 | Status: SHIPPED | OUTPATIENT
Start: 2019-02-18 | End: 2019-02-25

## 2019-02-18 ASSESSMENT — ENCOUNTER SYMPTOMS
ABDOMINAL PAIN: 1
BLOOD IN STOOL: 0
NAUSEA: 1
VOMITING: 0
SORE THROAT: 1
DIARRHEA: 1

## 2019-02-18 ASSESSMENT — PATIENT HEALTH QUESTIONNAIRE - PHQ9
SUM OF ALL RESPONSES TO PHQ QUESTIONS 1-9: 0
2. FEELING DOWN, DEPRESSED OR HOPELESS: 0
1. LITTLE INTEREST OR PLEASURE IN DOING THINGS: 0
SUM OF ALL RESPONSES TO PHQ QUESTIONS 1-9: 0
SUM OF ALL RESPONSES TO PHQ9 QUESTIONS 1 & 2: 0

## 2019-08-09 ENCOUNTER — TELEPHONE (OUTPATIENT)
Dept: BARIATRICS/WEIGHT MGMT | Age: 59
End: 2019-08-09

## 2019-08-09 NOTE — TELEPHONE ENCOUNTER
LM on VM for patient to call the office to schedule an appointment with Dr. Capo Alcala. Pt is s/p 08/29/17.  Last seen 08/15/18

## 2019-09-03 ENCOUNTER — HOSPITAL ENCOUNTER (EMERGENCY)
Age: 59
Discharge: HOME | End: 2019-09-03
Payer: COMMERCIAL

## 2019-09-03 VITALS
HEART RATE: 70 BPM | OXYGEN SATURATION: 100 % | RESPIRATION RATE: 18 BRPM | DIASTOLIC BLOOD PRESSURE: 82 MMHG | HEIGHT: 61 IN | WEIGHT: 259 LBS | SYSTOLIC BLOOD PRESSURE: 170 MMHG | BODY MASS INDEX: 48.9 KG/M2 | TEMPERATURE: 98.3 F
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
## 2019-09-03 PROCEDURE — 99281 EMR DPT VST MAYX REQ PHY/QHP: CPT

## 2019-09-03 RX ORDER — PREDNISONE 10 MG/1
40 TABLET ORAL DAILY
Qty: 20 TABLET | Refills: 0 | Status: SHIPPED | OUTPATIENT
Start: 2019-09-03 | End: 2019-09-08

## 2019-09-03 RX ORDER — DIPHENHYDRAMINE HCL 25 MG
25 CAPSULE ORAL EVERY 6 HOURS PRN
Qty: 30 CAPSULE | Refills: 0 | Status: SHIPPED | OUTPATIENT
Start: 2019-09-03 | End: 2019-09-09

## 2019-09-03 ASSESSMENT — PAIN DESCRIPTION - LOCATION: LOCATION: ARM

## 2019-09-03 ASSESSMENT — PAIN DESCRIPTION - ORIENTATION: ORIENTATION: LEFT

## 2019-09-03 ASSESSMENT — ENCOUNTER SYMPTOMS
ABDOMINAL PAIN: 0
CONSTIPATION: 0
NAUSEA: 0
COLOR CHANGE: 0
BACK PAIN: 0
SHORTNESS OF BREATH: 0
VOMITING: 0
COUGH: 0
DIARRHEA: 0

## 2019-09-03 ASSESSMENT — PAIN DESCRIPTION - PAIN TYPE: TYPE: ACUTE PAIN

## 2019-09-03 ASSESSMENT — PAIN SCALES - GENERAL: PAINLEVEL_OUTOF10: 8

## 2019-09-04 NOTE — ED PROVIDER NOTES
**EVALUATED BY KEVYN**    [address] [facility]  eMERGENCY dEPARTMENT eNCOUnter    Pt Name: Kristin Cervantes  MRN: [id number]  Mireillegfkeyana 1960  Date of evaluation: 9/3/2019  Provider: WALKER Hill    Chief Complaint:    Chief Complaint   Patient presents with   Ayla Edwards 84     pt states she was bitten by a insect this past Sunday to left forearm. Pt has some redness and swelling to site       Nursing Notes, Past Medical Hx, Past Surgical Hx, Social Hx, Allergies, and Family Hx were all reviewed and agreedwith or any disagreements were addressed in the HPI.    HPI:  (Location, Duration, Timing, Severity, Quality, Assoc Sx, Context, Modifying factors)  This is a  63 y.o. female who presents to the emergency department with complaints of left forearm irritation and minimal swelling that she noticed after taking a walk on Sunday. She believes that she was either bit or stung by something. She has had itching to the area. She has tried cortisone cream and ice. She does have a area of localized swelling. She does complain of pain 8 out of 10 in severity and does not radiate. Denies any numbness or tingling. Denies any decreased sensation or motor deficits. No fevers or chills. No open wounds. All other systems were reviewed and are negative. Past Medical/Surgical History:      Diagnosis Date    Chronic gastritis     Esophagitis     Fatty liver     GERD (gastroesophageal reflux disease)     Hiatal hernia     Hypertension     currently not medically treated    Internal hemorrhoid, bleeding     pt states currently not having any problems with hemorrhoids    Obstructive sleep apnea syndrome 04/21/2017    New diagnosis needing treatment CPAP set at 16    Wears glasses          Procedure Laterality Date    ABDOMEN SURGERY N/A 08/29/2017    Robotic assisted lapascopic gastric sleeve performed at [facility] by MD ZARA Meza CHOLECYSTECTOMY  2015    LAPROSCOPIC    distress. She has no wheezes. She has no rales. Musculoskeletal: Normal range of motion. Neurological: She is alert and oriented to person, place, and time. Normal distal sensation to light touch of left upper extremity. Normal  strength. Skin: Skin is warm and dry. She is not diaphoretic. No pallor. Small area of distal forearm of mild excoriated skin, mildly erythematous and warm to touch. This does not have the appearance of cellulitis but more so of a irritated bug bite. No skin sloughing or induration. No lymphangitic streaking. Psychiatric: She has a normal mood and affect. Her behavior is normal.   Nursing note and vitals reviewed. MEDICAL DECISION MAKING    Vitals:    Vitals:    09/03/19 1930   BP: (!) 170/82   Pulse: 70   Resp: 18   Temp: 98.3 °F (36.8 °C)   TempSrc: Oral   SpO2: 100%   Weight: 259 lb (117.5 kg)   Height: 5' 1\" (1.549 m)       LABS: Labs Reviewed - No data to display     Remainder of labs reviewed and were negative at this time or not returned at the time of this note. RADIOLOGY:   Non-plain film images suchas CT, Ultrasound and MRI are read by the radiologist. Armida VALENCIA PA have directly visualized the radiologic plain film image(s) with the below findings:    Interpretation per the Radiologist below, if available at the time of this note:    No orders to display     [address] [place] [address] / ED COURSE:      PROCEDURES:   Procedures    Patient was given:  Medications - No data to display  This is a  63 y.o. female who presents to the emergency department with complaints of left forearm irritation and minimal swelling that she noticed after taking a walk on Sunday. She believes that she was either bit or stung by something. She has had itching to the area. She has tried cortisone cream and ice. She does have a area of localized swelling. She does complain of pain 8 out of 10 in severity and does not radiate. Denies any numbness or tingling.   Denies any decreased sensation or motor deficits. No fevers or chills. No open wounds. Exam patient is well-appearing, small area of skin irritation to the left distal forearm. I do not believe that this is secondarily infected with cellulitis. Patient at this time will be tried on Benadryl and short course of steroids. Encouraged continuation of ice. She is return if any worsening of symptoms or fevers. The patient tolerated their visit well. I have evaluated the patient with physician available for consultation as needed. I have discussed the findings of today's workup with the patient and addressed the patient's questions and concerns. Important warning signs as well as new or worsening symptoms which wouldnecessitate immediate return to the ED were discussed. The plan is to discharge from the ED at this time, and the patient is in stable condition. The patient acknowledged understanding is agreeable with this plan. CLINICAL IMPRESSION:  1.  Bug bite, initial encounter        DISPOSITION Decision To Discharge 09/03/2019 07:50:45 PM      PATIENT REFERRED TO:  Homa Osborn, APRN - [address] [place] [address] [address]  [phone]    Schedule an appointment as soon as possible for a visit       [facility]  [address]  Go to   If symptoms worsen      DISCHARGE MEDICATIONS:  Discharge Medication List as of 9/3/2019  7:53 PM      START taking these medications    Details   diphenhydrAMINE (BENADRYL) 25 MG capsule Take 1 capsule by mouth every 6 hours as needed for Itching, Disp-30 capsule, R-0Print      predniSONE (DELTASONE) 10 MG tablet Take 4 tablets by mouth daily for 5 doses, Disp-20 tablet, R-0Print                    (Please note the MDM and HPI sections of this note were completed with avoice recognition program.  Efforts were made to edit the dictations but occasionally words are

## 2019-09-09 ENCOUNTER — OFFICE VISIT (OUTPATIENT)
Dept: FAMILY MEDICINE CLINIC | Age: 59
End: 2019-09-09
Payer: COMMERCIAL

## 2019-09-09 VITALS
HEART RATE: 74 BPM | BODY MASS INDEX: 52.15 KG/M2 | TEMPERATURE: 98.3 F | RESPIRATION RATE: 22 BRPM | OXYGEN SATURATION: 99 % | WEIGHT: 276.2 LBS | SYSTOLIC BLOOD PRESSURE: 110 MMHG | HEIGHT: 61 IN | DIASTOLIC BLOOD PRESSURE: 70 MMHG

## 2019-09-09 DIAGNOSIS — J06.9 VIRAL URI: ICD-10-CM

## 2019-09-09 DIAGNOSIS — R06.02 SOB (SHORTNESS OF BREATH): ICD-10-CM

## 2019-09-09 DIAGNOSIS — J30.2 SEASONAL ALLERGIES: Primary | ICD-10-CM

## 2019-09-09 PROCEDURE — 99214 OFFICE O/P EST MOD 30 MIN: CPT | Performed by: NURSE PRACTITIONER

## 2019-09-09 PROCEDURE — 94640 AIRWAY INHALATION TREATMENT: CPT | Performed by: NURSE PRACTITIONER

## 2019-09-09 RX ORDER — ALBUTEROL SULFATE 2.5 MG/3ML
2.5 SOLUTION RESPIRATORY (INHALATION) ONCE
Status: COMPLETED | OUTPATIENT
Start: 2019-09-09 | End: 2019-09-09

## 2019-09-09 RX ORDER — MONTELUKAST SODIUM 10 MG/1
10 TABLET ORAL NIGHTLY
Qty: 90 TABLET | Refills: 1 | Status: SHIPPED | OUTPATIENT
Start: 2019-09-09 | End: 2020-02-06

## 2019-09-09 RX ADMIN — ALBUTEROL SULFATE 2.5 MG: 2.5 SOLUTION RESPIRATORY (INHALATION) at 15:24

## 2019-09-09 ASSESSMENT — ENCOUNTER SYMPTOMS
WHEEZING: 1
CHEST TIGHTNESS: 0
SHORTNESS OF BREATH: 1
SORE THROAT: 0

## 2019-09-09 NOTE — PROGRESS NOTES
TREATENT  -     montelukast (SINGULAIR) 10 MG tablet; Take 1 tablet by mouth nightly  -     albuterol sulfate (PROAIR RESPICLICK) 963 (90 Base) MCG/ACT aerosol powder inhalation; Inhale 2 puffs into the lungs every 6 hours as needed for Shortness of Breath  Instructions for Respiratory Infections (SAVE THIS SHEET)   For the first 7-14 days of symptoms follow instructions below, even before being seen in the office or even during treatment with antibiotics, until symptom free. 1. Water: Drink 1 ounce of water for every 2 pounds of body weight for adults need 64 Ounces of water per day. This will loosen mucus in the head and chest & improve the weak feeling of dehydration, allow the body to get germ fighting resources to the infection. Half can be juice or sugar free Crystal Light. Don't count drinks with caffeine or carbonation. Infants can have Pedialyte liquid or freezer pops. Avoid salt if you have high Blood Pressure, swelling in the feet or ankles or have heart problems. 2. Humidity: Humidify the air to 35-50% ( or until the windows fog over slightly). Can use a humidifier, vaporizer, boil water on the stove or put a coffee can full of water on the heater vents. This will loosen mucus from infections and allergies. 3. Sleep: Get 8-10 hours a night and rest during the evening after work or school. If you have trouble sleeping, adults can take Melatonin 3mg up to 3 tabs at bedtime ( not for children or pregnant women). If Mono is suspected then sleep during 9PM to 9AM time span (if possible.)   4. Cough: Take cough medicines with Guaifenesin ( to loosen chest or head congestion) and Dextromethorphan ( to decrease excess cough). Robitussin D.M. Syrup every 4-6 hrs or Mucinex D.M. Pills twice a day. Use the pediatric formulations for children over 6 months making sure they are alcohol & sugar free for children, pregnant women, and diabetics. 5. Pain And Fevers:  Take Acetaminophen ( Tylenol) for fevers, aches,

## 2019-09-11 ENCOUNTER — OFFICE VISIT (OUTPATIENT)
Dept: FAMILY MEDICINE CLINIC | Age: 59
End: 2019-09-11
Payer: COMMERCIAL

## 2019-09-11 VITALS
OXYGEN SATURATION: 99 % | WEIGHT: 273 LBS | TEMPERATURE: 97.8 F | DIASTOLIC BLOOD PRESSURE: 80 MMHG | HEART RATE: 73 BPM | RESPIRATION RATE: 16 BRPM | BODY MASS INDEX: 51.54 KG/M2 | SYSTOLIC BLOOD PRESSURE: 134 MMHG | HEIGHT: 61 IN

## 2019-09-11 DIAGNOSIS — J04.0 LARYNGITIS: ICD-10-CM

## 2019-09-11 DIAGNOSIS — J45.909 ACUTE ASTHMATIC BRONCHITIS: Primary | ICD-10-CM

## 2019-09-11 DIAGNOSIS — R09.1 PLEURISY: ICD-10-CM

## 2019-09-11 PROCEDURE — 99213 OFFICE O/P EST LOW 20 MIN: CPT | Performed by: NURSE PRACTITIONER

## 2019-09-11 RX ORDER — PREDNISONE 10 MG/1
TABLET ORAL
Qty: 30 TABLET | Refills: 0 | Status: SHIPPED | OUTPATIENT
Start: 2019-09-11 | End: 2019-09-25

## 2019-09-11 RX ORDER — DOXYCYCLINE HYCLATE 100 MG
100 TABLET ORAL 2 TIMES DAILY
Qty: 20 TABLET | Refills: 0 | Status: SHIPPED | OUTPATIENT
Start: 2019-09-11 | End: 2019-09-21

## 2019-09-11 RX ORDER — GUAIFENESIN AND CODEINE PHOSPHATE 100; 10 MG/5ML; MG/5ML
5 SOLUTION ORAL EVERY 4 HOURS PRN
Qty: 240 ML | Refills: 0 | Status: SHIPPED | OUTPATIENT
Start: 2019-09-11 | End: 2019-09-25

## 2019-09-11 NOTE — PROGRESS NOTES
Shawna Du  63 y.o. female    1960      CC: Cough     Chief Complaint   Patient presents with    Cough     PT C/O COUGH AND BREATHING ISSUES,  CHEST AND RIGHT LOWER BACK HURTS WHEN COUGHING  X5DAYS      HPI    Cough with pain in the right back. Worse with breathing. Has pain in the center of the chest as well. In Aug left to [facility], and then had ST and in bed for week and then stung by a bee and went to ER and mirza was in and could not get it out and given steroid and benadryl  Then cough started. Went to pharmacy and told to stop benadry. Then worsened. Went to work on [employer] and left early and had breathing treatment and singulair and inhaler. Went to work [employer] and sent her home. Is on phones all the time, and cannot  Work with this due to having to talk at her job. Cough is loose, but will not come up - did have one time she was able to bring up phlegm and was thick gray and a little pink. No fever. Will need STs disability for work. Feels wheezy   Only short of breath if talking on the phone for a longer period of time. On singulair and inhaler - used twice and did help some. First partial off day was Monday. Yesterday sent her home. Off today. No Known Allergies    Physical  Examination    Physical Exam   Constitutional: She is oriented to person, place, and time. She appears well-developed and well-nourished. She is cooperative. She appears distressed. HENT:   Head: Normocephalic. Right Ear: Tympanic membrane, external ear and ear canal normal. No drainage, swelling or tenderness. Tympanic membrane is not injected, not erythematous and not bulging. No middle ear effusion. Left Ear: Tympanic membrane, external ear and ear canal normal. No drainage, swelling or tenderness. Tympanic membrane is not injected, not erythematous and not bulging. No middle ear effusion. Nose: Nose normal. No mucosal edema, rhinorrhea, sinus tenderness or nasal deformity. No epistaxis. reviewed. Vitals:    09/11/19 1540   BP: 134/80   Site: Left Lower Arm   Position: Sitting   Cuff Size: Medium Adult   Pulse: 73   Resp: 16   Temp: 97.8 °F (36.6 °C)   TempSrc: Oral   SpO2: 99%   Weight: 273 lb (123.8 kg)   Height: 5' 1\" (1.549 m)     Body mass index is 51.58 kg/m². Wt Readings from Last 3 Encounters:   09/11/19 273 lb (123.8 kg)   09/09/19 276 lb 3.2 oz (125.3 kg)   09/03/19 259 lb (117.5 kg)     BP Readings from Last 3 Encounters:   09/11/19 134/80   09/09/19 110/70   09/03/19 (!) 170/82        No results found for this visit on 09/11/19. Assessment     Diagnosis Orders   1. Acute asthmatic bronchitis  predniSONE (DELTASONE) 10 MG tablet    doxycycline hyclate (VIBRA-TABS) 100 MG tablet    guaiFENesin-codeine (CHERATUSSIN AC) 100-10 MG/5ML syrup   2. Laryngitis  doxycycline hyclate (VIBRA-TABS) 100 MG tablet   3. Pleurisy  predniSONE (DELTASONE) 10 MG tablet    doxycycline hyclate (VIBRA-TABS) 100 MG tablet    guaiFENesin-codeine (CHERATUSSIN AC) 100-10 MG/5ML syrup         Plan    Doxycycline  Prednisone taper  Cough syrup with codeine    Return if symptoms worsen or fail to improve. There are no Patient Instructions on file for this visit.

## 2019-09-25 ENCOUNTER — OFFICE VISIT (OUTPATIENT)
Dept: FAMILY MEDICINE CLINIC | Age: 59
End: 2019-09-25
Payer: COMMERCIAL

## 2019-09-25 VITALS
SYSTOLIC BLOOD PRESSURE: 118 MMHG | TEMPERATURE: 98.1 F | HEIGHT: 61 IN | DIASTOLIC BLOOD PRESSURE: 66 MMHG | BODY MASS INDEX: 52.18 KG/M2 | RESPIRATION RATE: 18 BRPM | WEIGHT: 276.4 LBS | HEART RATE: 88 BPM

## 2019-09-25 DIAGNOSIS — J40 BRONCHITIS: ICD-10-CM

## 2019-09-25 DIAGNOSIS — B96.89 ACUTE BACTERIAL SINUSITIS: Primary | ICD-10-CM

## 2019-09-25 DIAGNOSIS — J01.90 ACUTE BACTERIAL SINUSITIS: Primary | ICD-10-CM

## 2019-09-25 PROCEDURE — 99213 OFFICE O/P EST LOW 20 MIN: CPT | Performed by: NURSE PRACTITIONER

## 2019-09-25 RX ORDER — BENZONATATE 200 MG/1
200 CAPSULE ORAL 3 TIMES DAILY PRN
Qty: 30 CAPSULE | Refills: 0 | Status: SHIPPED | OUTPATIENT
Start: 2019-09-25 | End: 2019-10-02

## 2019-09-25 RX ORDER — FLUTICASONE PROPIONATE 50 MCG
2 SPRAY, SUSPENSION (ML) NASAL DAILY
Qty: 1 BOTTLE | Refills: 3 | Status: SHIPPED | OUTPATIENT
Start: 2019-09-25 | End: 2019-11-24 | Stop reason: SDUPTHER

## 2019-09-25 RX ORDER — AMOXICILLIN AND CLAVULANATE POTASSIUM 875; 125 MG/1; MG/1
1 TABLET, FILM COATED ORAL 2 TIMES DAILY WITH MEALS
Qty: 20 TABLET | Refills: 0 | Status: SHIPPED | OUTPATIENT
Start: 2019-09-25 | End: 2019-10-05

## 2019-09-27 ENCOUNTER — TELEPHONE (OUTPATIENT)
Dept: FAMILY MEDICINE CLINIC | Age: 59
End: 2019-09-27

## 2019-10-03 ENCOUNTER — TELEPHONE (OUTPATIENT)
Dept: FAMILY MEDICINE CLINIC | Age: 59
End: 2019-10-03

## 2019-10-31 ENCOUNTER — OFFICE VISIT (OUTPATIENT)
Dept: FAMILY MEDICINE CLINIC | Age: 59
End: 2019-10-31
Payer: COMMERCIAL

## 2019-10-31 VITALS
TEMPERATURE: 98.6 F | WEIGHT: 281.4 LBS | BODY MASS INDEX: 53.13 KG/M2 | SYSTOLIC BLOOD PRESSURE: 128 MMHG | HEART RATE: 82 BPM | DIASTOLIC BLOOD PRESSURE: 82 MMHG | RESPIRATION RATE: 16 BRPM | HEIGHT: 61 IN

## 2019-10-31 DIAGNOSIS — E55.9 VITAMIN D DEFICIENCY: ICD-10-CM

## 2019-10-31 DIAGNOSIS — J45.909 ACUTE ASTHMATIC BRONCHITIS: Primary | ICD-10-CM

## 2019-10-31 DIAGNOSIS — Z23 NEED FOR PNEUMOCOCCAL VACCINATION: ICD-10-CM

## 2019-10-31 DIAGNOSIS — J30.9 ALLERGIC RHINITIS, UNSPECIFIED SEASONALITY, UNSPECIFIED TRIGGER: ICD-10-CM

## 2019-10-31 DIAGNOSIS — Z23 NEED FOR INFLUENZA VACCINATION: ICD-10-CM

## 2019-10-31 PROCEDURE — 90472 IMMUNIZATION ADMIN EACH ADD: CPT | Performed by: NURSE PRACTITIONER

## 2019-10-31 PROCEDURE — 90732 PPSV23 VACC 2 YRS+ SUBQ/IM: CPT | Performed by: NURSE PRACTITIONER

## 2019-10-31 PROCEDURE — 90471 IMMUNIZATION ADMIN: CPT | Performed by: NURSE PRACTITIONER

## 2019-10-31 PROCEDURE — 99214 OFFICE O/P EST MOD 30 MIN: CPT | Performed by: NURSE PRACTITIONER

## 2019-10-31 PROCEDURE — 90686 IIV4 VACC NO PRSV 0.5 ML IM: CPT | Performed by: NURSE PRACTITIONER

## 2019-10-31 RX ORDER — ERGOCALCIFEROL 1.25 MG/1
50000 CAPSULE ORAL WEEKLY
Qty: 12 CAPSULE | Refills: 1 | Status: SHIPPED | OUTPATIENT
Start: 2019-10-31 | End: 2020-07-02

## 2019-10-31 RX ORDER — BUDESONIDE AND FORMOTEROL FUMARATE DIHYDRATE 160; 4.5 UG/1; UG/1
2 AEROSOL RESPIRATORY (INHALATION) 2 TIMES DAILY
Qty: 1 INHALER | Refills: 5 | Status: SHIPPED | OUTPATIENT
Start: 2019-10-31 | End: 2020-02-06 | Stop reason: ALTCHOICE

## 2019-11-14 ENCOUNTER — TELEPHONE (OUTPATIENT)
Dept: FAMILY MEDICINE CLINIC | Age: 59
End: 2019-11-14

## 2019-11-14 DIAGNOSIS — J45.901 REACTIVE AIRWAY DISEASE WITH ACUTE EXACERBATION, UNSPECIFIED ASTHMA SEVERITY, UNSPECIFIED WHETHER PERSISTENT: ICD-10-CM

## 2019-11-14 DIAGNOSIS — J45.909 ACUTE ASTHMATIC BRONCHITIS: Primary | ICD-10-CM

## 2019-11-24 DIAGNOSIS — B96.89 ACUTE BACTERIAL SINUSITIS: ICD-10-CM

## 2019-11-24 DIAGNOSIS — J01.90 ACUTE BACTERIAL SINUSITIS: ICD-10-CM

## 2019-11-25 ENCOUNTER — HOSPITAL ENCOUNTER (OUTPATIENT)
Dept: GENERAL RADIOLOGY | Age: 59
Discharge: HOME | End: 2019-11-25
Payer: COMMERCIAL

## 2019-11-25 ENCOUNTER — HOSPITAL ENCOUNTER (OUTPATIENT)
Age: 59
Discharge: HOME | End: 2019-11-25
Payer: COMMERCIAL

## 2019-11-25 ENCOUNTER — OFFICE VISIT (OUTPATIENT)
Dept: PULMONOLOGY | Age: 59
End: 2019-11-25
Payer: COMMERCIAL

## 2019-11-25 VITALS
OXYGEN SATURATION: 99 % | SYSTOLIC BLOOD PRESSURE: 177 MMHG | HEART RATE: 72 BPM | BODY MASS INDEX: 53.05 KG/M2 | TEMPERATURE: 97.4 F | HEIGHT: 61 IN | RESPIRATION RATE: 20 BRPM | WEIGHT: 281 LBS | DIASTOLIC BLOOD PRESSURE: 90 MMHG

## 2019-11-25 DIAGNOSIS — J45.40 MODERATE PERSISTENT REACTIVE AIRWAY DISEASE WITHOUT COMPLICATION: Primary | ICD-10-CM

## 2019-11-25 DIAGNOSIS — J45.40 MODERATE PERSISTENT REACTIVE AIRWAY DISEASE WITHOUT COMPLICATION: ICD-10-CM

## 2019-11-25 DIAGNOSIS — G47.33 OBSTRUCTIVE SLEEP APNEA: ICD-10-CM

## 2019-11-25 PROBLEM — J45.909 REACTIVE AIRWAY DISEASE: Status: ACTIVE | Noted: 2019-11-25

## 2019-11-25 PROCEDURE — 71046 X-RAY EXAM CHEST 2 VIEWS: CPT

## 2019-11-25 PROCEDURE — 99244 OFF/OP CNSLTJ NEW/EST MOD 40: CPT | Performed by: INTERNAL MEDICINE

## 2019-11-25 RX ORDER — FLUTICASONE PROPIONATE 50 MCG
SPRAY, SUSPENSION (ML) NASAL
Qty: 1 BOTTLE | Refills: 5 | Status: SHIPPED | OUTPATIENT
Start: 2019-11-25 | End: 2021-03-26

## 2019-11-27 LAB
ALLERGEN ASPERGILLUS FUMIGATUS IGE: <0.1 KU/L
ALLERGEN BERMUDA GRASS IGE: <0.1 KU/L
ALLERGEN BIRCH IGE: <0.1 KU/L
ALLERGEN CAT DANDER IGE: <0.1 KU/L
ALLERGEN COMMON SHORT RAGWEED IGE: <0.1 KU/L
ALLERGEN COTTONWOOD: <0.1 KU/L
ALLERGEN DOG DANDER IGE: <0.1 KU/L
ALLERGEN ELM IGE: <0.1 KU/L
ALLERGEN FUNGI/MOLD M.RACEMOSUS IGE: <0.1 KU/L
ALLERGEN GERMAN COCKROACH IGE: <0.1 KU/L
ALLERGEN HORMODENDRUM HORDEI IGE: <0.1 KU/L
ALLERGEN MAPLE/BOX ELDER IGE: <0.1 KU/L
ALLERGEN MITE DUST FARINAE IGE: <0.1 KU/L
ALLERGEN MITE DUST PTERONYSSINUS IGE: <0.1 KU/L
ALLERGEN MOUNTAIN CEDAR: <0.1 KU/L
ALLERGEN MOUSE EPITHELIA IGE: <0.1 KU/L
ALLERGEN OAK TREE IGE: <0.1 KU/L
ALLERGEN PECAN TREE IGE: <0.1 KU/L
ALLERGEN PENICILLIUM NOTATUM: <0.1 KU/L
ALLERGEN ROUGH PIGWEED (W14) IGE: <0.1 KU/L
ALLERGEN RUSSIAN THISTLE IGE: <0.1 KU/L
ALLERGEN SEE NOTE: NORMAL
ALLERGEN SHEEP SORREL (W18) IGE: <0.1 KU/L
ALLERGEN TIMOTHY GRASS: <0.1 KU/L
ALLERGEN TREE SYCAMORE: <0.1 KU/L
ALLERGEN WALNUT TREE IGE: <0.1 KU/L
ALLERGEN WHITE MULBERRY TREE, IGE: <0.1 KU/L
ALLERGEN, TREE, WHITE ASH IGE: <0.1 KU/L
IGE: 30 KU/L

## 2019-11-29 ENCOUNTER — HOSPITAL ENCOUNTER (OUTPATIENT)
Dept: PULMONOLOGY | Age: 59
Discharge: HOME | End: 2019-11-29
Payer: COMMERCIAL

## 2019-11-29 VITALS — OXYGEN SATURATION: 98 %

## 2019-11-29 DIAGNOSIS — J45.40 MODERATE PERSISTENT REACTIVE AIRWAY DISEASE WITHOUT COMPLICATION: ICD-10-CM

## 2019-11-29 PROCEDURE — 94729 DIFFUSING CAPACITY: CPT

## 2019-11-29 PROCEDURE — 6370000000 HC RX 637 (ALT 250 FOR IP): Performed by: INTERNAL MEDICINE

## 2019-11-29 PROCEDURE — 94726 PLETHYSMOGRAPHY LUNG VOLUMES: CPT

## 2019-11-29 PROCEDURE — 94070 EVALUATION OF WHEEZING: CPT

## 2019-11-29 PROCEDURE — 94200 LUNG FUNCTION TEST (MBC/MVV): CPT

## 2019-11-29 PROCEDURE — 94010 BREATHING CAPACITY TEST: CPT

## 2019-11-29 PROCEDURE — 94760 N-INVAS EAR/PLS OXIMETRY 1: CPT

## 2019-11-29 PROCEDURE — 6360000002 HC RX W HCPCS: Performed by: INTERNAL MEDICINE

## 2019-11-29 RX ORDER — SODIUM CHLORIDE FOR INHALATION 0.9 %
3 VIAL, NEBULIZER (ML) INHALATION ONCE
Status: COMPLETED | OUTPATIENT
Start: 2019-11-29 | End: 2019-11-29

## 2019-11-29 RX ORDER — ALBUTEROL SULFATE 2.5 MG/3ML
2.5 SOLUTION RESPIRATORY (INHALATION) ONCE
Status: COMPLETED | OUTPATIENT
Start: 2019-11-29 | End: 2019-11-29

## 2019-11-29 RX ADMIN — ISODIUM CHLORIDE 3 ML: 0.03 SOLUTION RESPIRATORY (INHALATION) at 08:42

## 2019-11-29 RX ADMIN — METHACHOLINE CHLORIDE 1 MG: 100 POWDER, FOR SOLUTION RESPIRATORY (INHALATION) at 09:40

## 2019-11-29 RX ADMIN — METHACHOLINE CHLORIDE 0.25 MG: 100 POWDER, FOR SOLUTION RESPIRATORY (INHALATION) at 09:39

## 2019-11-29 RX ADMIN — ALBUTEROL SULFATE 2.5 MG: 2.5 SOLUTION RESPIRATORY (INHALATION) at 09:49

## 2019-11-29 RX ADMIN — METHACHOLINE CHLORIDE 0.06 MG: 100 POWDER, FOR SOLUTION RESPIRATORY (INHALATION) at 09:35

## 2019-11-29 RX ADMIN — METHACHOLINE CHLORIDE 4 MG: 100 POWDER, FOR SOLUTION RESPIRATORY (INHALATION) at 09:43

## 2019-11-29 RX ADMIN — METHACHOLINE CHLORIDE 16 MG: 100 POWDER, FOR SOLUTION RESPIRATORY (INHALATION) at 09:45

## 2019-12-02 ENCOUNTER — TELEPHONE (OUTPATIENT)
Dept: PULMONOLOGY | Age: 59
End: 2019-12-02

## 2019-12-17 ENCOUNTER — OFFICE VISIT (OUTPATIENT)
Dept: PULMONOLOGY | Age: 59
End: 2019-12-17
Payer: COMMERCIAL

## 2019-12-17 VITALS
HEIGHT: 61 IN | OXYGEN SATURATION: 100 % | BODY MASS INDEX: 52.87 KG/M2 | DIASTOLIC BLOOD PRESSURE: 91 MMHG | HEART RATE: 75 BPM | SYSTOLIC BLOOD PRESSURE: 178 MMHG | WEIGHT: 280 LBS | TEMPERATURE: 97.1 F | RESPIRATION RATE: 16 BRPM

## 2019-12-17 DIAGNOSIS — J45.40 MODERATE PERSISTENT REACTIVE AIRWAY DISEASE WITHOUT COMPLICATION: Primary | ICD-10-CM

## 2019-12-17 DIAGNOSIS — R06.02 SHORTNESS OF BREATH: ICD-10-CM

## 2019-12-17 PROCEDURE — 99213 OFFICE O/P EST LOW 20 MIN: CPT | Performed by: INTERNAL MEDICINE

## 2019-12-27 ENCOUNTER — HOSPITAL ENCOUNTER (OUTPATIENT)
Dept: PULMONOLOGY | Age: 59
Discharge: HOME | End: 2019-12-27
Payer: COMMERCIAL

## 2019-12-27 DIAGNOSIS — J45.40 MODERATE PERSISTENT REACTIVE AIRWAY DISEASE WITHOUT COMPLICATION: ICD-10-CM

## 2019-12-27 PROCEDURE — 6370000000 HC RX 637 (ALT 250 FOR IP): Performed by: INTERNAL MEDICINE

## 2019-12-27 RX ORDER — SODIUM CHLORIDE FOR INHALATION 0.9 %
3 VIAL, NEBULIZER (ML) INHALATION ONCE
Status: DISCONTINUED | OUTPATIENT
Start: 2019-12-27 | End: 2019-12-27

## 2019-12-27 RX ORDER — ALBUTEROL SULFATE 2.5 MG/3ML
2.5 SOLUTION RESPIRATORY (INHALATION) ONCE
Status: DISCONTINUED | OUTPATIENT
Start: 2019-12-27 | End: 2019-12-27

## 2019-12-30 ENCOUNTER — TELEPHONE (OUTPATIENT)
Dept: PULMONOLOGY | Age: 59
End: 2019-12-30

## 2019-12-30 NOTE — TELEPHONE ENCOUNTER
Complains of cough and SOB  Duration 2 days  Cough with sputum production? no  Color n/a  Fever? no  Any other Symptoms? no  Any current treatment tried? All prescribed   Using inhalers? yes do they help? no  Pharmacy? [facility] on [address]       Voice is very hoarse and hard to hear patient.     Please advise

## 2020-01-03 NOTE — TELEPHONE ENCOUNTER
Please call to check on patient's status. If not improved would recommend follow up in clinic next available.

## 2020-01-06 ENCOUNTER — OFFICE VISIT (OUTPATIENT)
Dept: PULMONOLOGY | Age: 60
End: 2020-01-06
Payer: COMMERCIAL

## 2020-01-06 VITALS
HEART RATE: 79 BPM | WEIGHT: 280 LBS | RESPIRATION RATE: 16 BRPM | DIASTOLIC BLOOD PRESSURE: 78 MMHG | SYSTOLIC BLOOD PRESSURE: 118 MMHG | TEMPERATURE: 98 F | BODY MASS INDEX: 52.87 KG/M2 | HEIGHT: 61 IN | OXYGEN SATURATION: 96 %

## 2020-01-06 PROBLEM — R49.0 HOARSENESS OF VOICE: Status: ACTIVE | Noted: 2020-01-06

## 2020-01-06 PROCEDURE — 99213 OFFICE O/P EST LOW 20 MIN: CPT | Performed by: INTERNAL MEDICINE

## 2020-01-06 RX ORDER — OMEPRAZOLE 20 MG/1
20 CAPSULE, DELAYED RELEASE ORAL
Qty: 180 CAPSULE | Refills: 1 | Status: SHIPPED | OUTPATIENT
Start: 2020-01-06 | End: 2020-02-26

## 2020-01-06 ASSESSMENT — ASTHMA QUESTIONNAIRES
QUESTION_2 LAST FOUR WEEKS HOW OFTEN HAVE YOU HAD SHORTNESS OF BREATH: 1
QUESTION_5 LAST FOUR WEEKS HOW WOULD YOU RATE YOUR ASTHMA CONTROL: 2
QUESTION_4 LAST FOUR WEEKS HOW OFTEN HAVE YOU USED YOUR RESCUE INHALER OR NEBULIZER MEDICATION (SUCH AS ALBUTEROL): 1
QUESTION_1 LAST FOUR WEEKS HOW MUCH OF THE TIME DID YOUR ASTHMA KEEP YOU FROM GETTING AS MUCH DONE AT WORK, SCHOOL OR AT HOME: 2
QUESTION_3 LAST FOUR WEEKS HOW OFTEN DID YOUR ASTHMA SYMPTOMS (WHEEZING, COUGHING, SHORTNESS OF BREATH, CHEST TIGHTNESS OR PAIN) WAKE YOU UP AT NIGHT OR EARLIER THAN USUAL IN THE MORNING: 5
ACT_TOTALSCORE: 11

## 2020-01-06 NOTE — ASSESSMENT & PLAN NOTE
-exacerbation of symptoms at work. She does not have Reactive airways or asthma based on recent MCCT  -peak flow meters with ~75% reduction at work per her diary  -Given negative methacholine challenge test likelihood of asthma is very low. But may still represent a workplace induced laryngospasm, or RADS. Cannot rule out possibility of workplace associated anxiety/panic attacks.

## 2020-01-06 NOTE — PROGRESS NOTES
REASON FOR CONSULTATION:  Chief Complaint   Patient presents with    Follow-up     Shortness of breath         Consult at request of MARINA Mancia CNP     PCP: MARINA Mancia CNP    HISTORY OF PRESENT ILLNESS: Silvia Barraza is a 63y.o. year old female with a history of obesity, status post bariatric surgery who presents for evaluation of episodic shortness of breath, wheezing, cough. She is currently undergoing work up for Occupational asthma versus Work place aggravated asthma versus laryngeal spasm due to local irritants. Symptoms only present at work and for a short time when she first gets home. Currently on symbicort, singulair and albuterol PRN. PFTs did not show overt airflow obstruction. She also had MCCT negative for hyperreactive airways. New hoarseness. Past Medical History:   Diagnosis Date    Chronic gastritis     Esophagitis     Fatty liver     GERD (gastroesophageal reflux disease)     Hiatal hernia     Hypertension     currently not medically treated    Internal hemorrhoid, bleeding     pt states currently not having any problems with hemorrhoids    Obstructive sleep apnea syndrome 04/21/2017    New diagnosis needing treatment CPAP set at 16    Wears glasses        Past Surgical History:   Procedure Laterality Date    ABDOMEN SURGERY N/A 08/29/2017    Robotic assisted lapascopic gastric sleeve performed at [facility] by MD ZARA Anton CHOLECYSTECTOMY  2015    LAPROSCOPIC    ENDOSCOPY, COLON, DIAGNOSTIC      HEMORRHOID SURGERY  2001    HYSTERECTOMY  JUNE 2009    Total     OTHER SURGICAL HISTORY  02/17/2017    EGD    TONSILLECTOMY      TUBAL LIGATION  1983    UTERINE FIBROID EMBOLIZATION  2002       family history includes Arthritis in her maternal grandmother; Asthma in her sister; Diabetes in her father; Heart Attack (age of onset: 37) in her sister; High Blood Pressure in her father, maternal grandmother, mother, and sister; Stroke (age of onset: 80) in her maternal grandmother; Vision Loss in her maternal grandmother. SOCIAL HISTORY:   reports that she has never smoked. She has never used smokeless tobacco.      ALLERGIES:  Patient has No Known Allergies. REVIEW OF SYSTEMS:  Constitutional: Negative for fever   HENT: Negative for sore throat  Eyes: Negative for redness   Respiratory: + Dyspnea, cough  Cardiovascular: Negative for chest pain  Gastrointestinal: Negative for vomiting, diarrhea   Genitourinary: Negative for hematuria   Musculoskeletal: Negative for arthralgias   Skin: Negative for rash  Neurological: Negative for syncope  Hematological: Negative for adenopathy  Psychiatric/Behavorial: Negative for anxiety    Objective:   PHYSICAL EXAM:  Blood pressure 118/78, pulse 79, temperature 98 °F (36.7 °C), temperature source Oral, resp. rate 16, height 5' 1\" (1.549 m), weight 280 lb (127 kg), last menstrual period 06/02/2009, SpO2 96 %, not currently breastfeeding.'  Gen: No distress. Eyes: PERRL. No sclera icterus. No conjunctival injection. ENT: No discharge. Pharynx clear. External appearance of ears and nose normal.  Neck: Trachea midline. No obvious mass. Resp: No accessory muscle use. No crackles. No wheezes. No rhonchi. CV: Regular rate. Regular rhythm. No murmur or rub. No edema. GI: Non-tender. Non-distended. No hernia. Skin: Warm, dry, normal texture and turgor. No nodule on exposed extremities. Lymph: No cervical LAD. No supraclavicular LAD. M/S: No cyanosis. No clubbing. No joint deformity. Neuro: Moves all four extremities. Psych: Oriented x 3. No anxiety. Awake. Alert. Intact judgement and insight.     Current Outpatient Medications   Medication Sig Dispense Refill    omeprazole (PRILOSEC) 20 MG delayed release capsule Take 1 capsule by mouth 2 times daily (before meals) 180 capsule 1    fluticasone (FLONASE) 50 MCG/ACT nasal spray SPRAY 2 SPRAYS INTO EACH NOSTRIL EVERY DAY 1 Bottle 5    budesonide-formoterol (SYMBICORT) 160-4.5 MCG/ACT AERO Inhale 2 puffs into the lungs 2 times daily Rinse mouth after use. 1 Inhaler 5    vitamin D (ERGOCALCIFEROL) 1.25 MG (60461 UT) CAPS capsule Take 1 capsule by mouth once a week 12 capsule 1    montelukast (SINGULAIR) 10 MG tablet Take 1 tablet by mouth nightly 90 tablet 1    albuterol sulfate (PROAIR RESPICLICK) 326 (90 Base) MCG/ACT aerosol powder inhalation Inhale 2 puffs into the lungs every 6 hours as needed for Shortness of Breath 1 Inhaler 1    Multiple Vitamins-Minerals (BARIATRIC FUSION) CHEW Take 4 tablets by mouth daily       No current facility-administered medications for this visit. Data Reviewed:   CBC and Renal reviewed  Last CBC  Lab Results   Component Value Date    WBC 4.2 08/17/2018    RBC 4.68 08/17/2018    HGB 12.9 08/17/2018    MCV 83.0 08/17/2018     08/17/2018     Last Renal  Lab Results   Component Value Date     08/17/2018    K 4.3 08/17/2018     08/17/2018    CO2 25 08/17/2018    CO2 25 05/11/2018    CO2 18 08/30/2017    BUN 9 08/17/2018    CREATININE 0.8 08/17/2018    GLUCOSE 87 08/17/2018    CALCIUM 9.9 08/17/2018       Last ABG  POC Blood Gas: No results found for: POCPH, POCPCO2, POCPO2, POCHCO3, NBEA, QTJS0BPM  No results for input(s): PH, PCO2, PO2, HCO3, BE, O2SAT in the last 72 hours. Radiology Review:  Pertinent images / reports were reviewed as a part of this visit. Chest x-ray images reviewed personally by me, interpretation as follows:  -Mild bibasilar haziness, likely due to body habitus, no acute airspace process bilaterally. CXR PA/LAT:   Results for orders placed during the hospital encounter of 01/13/17   XR Chest Standard TWO VW    Narrative EXAMINATION:  TWO VIEWS OF THE CHEST    1/13/2017 1:07 pm    COMPARISON:  None.     HISTORY:  ORDERING SYSTEM PROVIDED HISTORY: Bronchitis  TECHNOLOGIST PROVIDED HISTORY:  Ordering Physician Provided Reason for Exam: Otolaryngology, [place]           This note was transcribed using [id number] Qiro. Please disregard any translational errors.     Mckenzie Tierney [address] [place] [address] Pulmonary, Sleep and Critical Care

## 2020-01-20 ENCOUNTER — OFFICE VISIT (OUTPATIENT)
Dept: ENT CLINIC | Age: 60
End: 2020-01-20
Payer: COMMERCIAL

## 2020-01-20 VITALS
BODY MASS INDEX: 52.91 KG/M2 | TEMPERATURE: 97.1 F | SYSTOLIC BLOOD PRESSURE: 143 MMHG | HEART RATE: 62 BPM | DIASTOLIC BLOOD PRESSURE: 81 MMHG | WEIGHT: 280 LBS

## 2020-01-20 PROCEDURE — 99204 OFFICE O/P NEW MOD 45 MIN: CPT | Performed by: OTOLARYNGOLOGY

## 2020-01-20 PROCEDURE — 31575 DIAGNOSTIC LARYNGOSCOPY: CPT | Performed by: OTOLARYNGOLOGY

## 2020-01-20 NOTE — PROGRESS NOTES
CHOLECYSTECTOMY      LAPROSCOPIC    ENDOSCOPY, COLON, DIAGNOSTIC      HEMORRHOID SURGERY      HYSTERECTOMY  2009    Total     OTHER SURGICAL HISTORY  2017    EGD    TONSILLECTOMY      TUBAL LIGATION      UTERINE FIBROID EMBOLIZATION       Family History   Problem Relation Age of Onset    High Blood Pressure Mother     Diabetes Father     High Blood Pressure Father     High Blood Pressure Maternal Grandmother     Stroke Maternal Grandmother 80         from stroke    Arthritis Maternal Grandmother     Vision Loss Maternal Grandmother         Glaucoma    High Blood Pressure Sister     Asthma Sister     Heart Attack Sister 37        HEART ATTACK, POSSIBLE INFECTION IN PORT     Social History     Socioeconomic History    Marital status: Single     Spouse name: Not on file    Number of children: Not on file    Years of education: Not on file    Highest education level: Not on file   Occupational History    Not on file   Social Needs    Financial resource strain: Not on file    Food insecurity:     Worry: Not on file     Inability: Not on file    Transportation needs:     Medical: Not on file     Non-medical: Not on file   Tobacco Use    Smoking status: Never Smoker    Smokeless tobacco: Never Used   Substance and Sexual Activity    Alcohol use: No    Drug use: No    Sexual activity: Never   Lifestyle    Physical activity:     Days per week: Not on file     Minutes per session: Not on file    Stress: Not on file   Relationships    Social connections:     Talks on phone: Not on file     Gets together: Not on file     Attends Cheondo[employer] service: Not on file     Active member of club or organization: Not on file     Attends meetings of clubs or organizations: Not on file     Relationship status: Not on file    Intimate partner violence:     Fear of current or ex partner: Not on file     Emotionally abused: Not on file     Physically abused: Not on file movement. ASSESSMENT/PLAN  1. Vocal cord dysfunction  This patient's history and exam is consistent with vocal cord dysfunction. I referred her to speech therapy for evaluation and treatment. I will see her afterwards. - [facility]    2. Laryngopharyngeal reflux (LPR)  Think this patient has ongoing laryngal pharyngeal reflux is contributing to her voice issues. She is only been on omeprazole for 2 weeks so I encouraged her to continue this. 3. Dysphonia  Likely secondary to the vocal cord dysfunction and laryngal pharyngeal reflux. I have performed a head and neck physical exam personally or was physically present during the key or critical portions of the service. Medical Decision Making:   The following items were considered in medical decision making:  Independent review of images  Review / order clinical lab tests  Review / order radiology tests  Decision to obtain old records

## 2020-01-24 ENCOUNTER — HOSPITAL ENCOUNTER (OUTPATIENT)
Dept: SPEECH THERAPY | Age: 60
Setting detail: THERAPIES SERIES
Discharge: HOME | End: 2020-01-24
Payer: COMMERCIAL

## 2020-01-24 PROCEDURE — 92523 SPEECH SOUND LANG COMPREHEN: CPT

## 2020-01-24 NOTE — PROGRESS NOTES
so I encouraged her to continue this. 3. Dysphonia  Likely secondary to the vocal cord dysfunction and laryngeal pharyngeal reflux.     · Pulmonology Consult:    GERD with esophagitis (chronic)  SOB  Hoarseness of Voice    ·  has a past medical history of Chronic gastritis, Esophagitis, Fatty liver, GERD (gastroesophageal reflux disease), Hiatal hernia, Hypertension, Internal hemorrhoid, bleeding, Obstructive sleep apnea syndrome (04/21/2017), and Wears glasses. Primary Complaint:   Voice problems. Pt self reports voice is weak and sometimes gives out. Assessment impressions:  1. Voice Evaluation: Vocal Cord Dysfunction  · Vocal Hygiene: pt denies alcohol or tobacco use. Pt reports avoids caffeine and avoids foods/beverages due to history of GERD. Pt with history of Bronchitis     · Vocal Activities: : Pt self reports her job is 80% use of voice. Pt reports singing history; but not for >5 years. Pt reports voice endurance and strength is completely different at end of day versus at onset of day. Pt reports problems do impact her job. · Environmental Issues: Reflux : pt with history of reflux. ENT and Pulmonology consults noted with concern for reflux contributing. Pt denies and states she avoids certain food/beverages. Pt is currently on medications. Pt currently denies heart burn and indigestion. Pt reports workplace environment may allergen concerns  · Post surgical: Pt with history of gastric sleeve: Pt self reports she has not seen surgeon for approximately a year. Pt self report adheres to recommended 6 small meals and voiding certain food/beverages. Laryngeal  · Maximum phonation time: 7-8 seconds duration of frontal/mid/back vowels; soft in volume  · Pitch Glide: no audible pitch breaks for frontal,  mid, back vowels; <10% cessation of voice  · S/Z ratio: 1.5 (norms indicate s/z ration of >1.4 may indicate vocal cord dysfunction)  · Grandfather Passage: soft voice quality with variable SOB.  No cessation of voice  · Muscle Tension Assessment: unremarkable   · Postural :  Slumped ; Wt front     · Voice quality:episodes of soft, breathy voice  Therapeutic Probes  · Easy Onset  · Easy onset  · Breath support  · Postural adjustment    2. Cognitive-communication screen was unremarkable     Prognosis:     Good       Recommendations   Voice Therapy    Yes    No    Frequency    Duration  Pt/Caregiver Ed   Pt expresses understanding and needs further ed    Plan:   Goals:  Short-term Goals  Timeframe for Short-term Goals: Pt wants improved voice for vocational needs  Goal 1: Pt will demonstrate improved voice endurance for phonation duration for frontal, mid, back vowels >10 seconds without cessation of voice  Goal 2: Pt will demonstrate improved voice quality and volume via use of easy onset; respiratory control (diaphragm versus clavicle) for 4-8 syllable utterance level  Goal 3: Pt will demonstrate improved pitch glide duration to >3 low to marissa to low pitch glides x 4 cycles without cessation of voice  Goal 4: PT will demonstrate improved voice quality with reduced laryngeal tension emphasizing frontal focus; easy onset via functional voice ex for improved s/z ratio. Long-term Goals  Goal 1: Pt will demonstrate IND with Home Ex Voice Program  Goal 2: Pt will demonstrate improved ease and endurance of voice quality across situations    Patient/family involved in developing goals and treatment plan: yes    Subjective:   Previous level of function and limitations: History per pt self report  General  Chart Reviewed: Yes  Family / Caregiver Present: No  General Comment  Comments: talks about 80% of my job; losing my voice during the day and cuts off my breathing; Pt denies anxiety; avoids caffeine; smoking; On an inhaler (symbacort; rescue inhaler; as needed); Asthma has been ruled out; history of Bronchitis (Pulmonologist Dr. Aleshia Cervantes);  Exercises about once a week; Job is sedentary  Social/Functional History  Lives With: Son  Type of Home: House  Active : Yes  Mode of Transportation: Car  Occupation: Full time employment  Type of occupation:   Leisure & Hobbies: knitting; singing (in a [address]; it has been about 10 years-can't do it anymore)  Additional Comments: comply with LIfestyle tips regarding food selections to minimize if not omit any acid reflux; sleeping mostly on the side             Objective: Oral Motor Speech/Voice Mechanism Exam; Cognitive-communication screen  Vision  Vision: Impaired  Vision Exceptions: Wears glasses at all times  Hearing  Hearing: Within functional limits    Auditory/Reading Comprehension  Comprehension: Within Functional Limits(WFL for concrete auditory and reading comprehension)    Expression  Primary Mode of Expression: Verbal  Verbal Expression  Verbal Expression: Within functional limits(for expressing needs and wants and event descriptions)  Written Expression  Written Expression: (DNT)    Motor Speech  Motor Speech: Zoila Brady  Oral Motor: Within functional limits(rom/strength appeared symmetrical bilaterally. good rom. Oral reflexes intact)    Voice Evaluation  · Vocal Hygiene: pt denies alcohol or tobacco use. Pt reports avoids caffeine and avoids foods/beverages due to history of GERD     · Vocal Activities: : Pt self reports her job is 80% use of voice. Pt reports singing history; but not for >5 years. Pt reports voice endurance and strength is completely different at end of day versus at onset of day. Pt reports problems do impact her job. · Environmental Issues: Reflux : pt with history of reflux. ENT and Pulmonology consults noted with concern for reflux contributing. Pt denies and states she avoids certain food/beverages. Pt is currently on medications. Pt currently denies heart burn and indigestion. · Post surgical: Pt with history of gastric sleeve: Pt self reports she has not seen surgeon for approximately a year.  Pt self report adheres to recommended 6 small meals and voiding certain food/beverages. Laryngeal  · Maximum phonation time: 7-8 seconds duration of frontal/mid/back vowels; soft in volume  · Pitch Glide: no audible pitch breaks for frontal,  mid, back vowels; <10% cessation of voice  · S/Z ratio: 1.5 (norms indicate s/z ration of >1.4 may indicate vocal cord dysfunction  · Grandfather Passage: soft voice quality with variable SOB. No cessation of voice  · Muscle Tension Assessment: unremarkable   · Postural :  Slumped ; Wt front     Therapeutic Probes  · Easy Onset  · Easy onset  · Breath support  · Postural adjustment    Pragmatics/Social Functioning  Pragmatics: Within functional limits    Cognition:   Orientation  Overall Orientation Status: Within Functional Limits  Attention  Attention: (functional for sustained/selective attention)  Memory  Memory: Within Functional Limits(Limited exam; but functional for recall 5/5 after a 5 minute delay/distraction)  Problem Solving  Problem Solving: (DNT)  Numeric Reasoning  Numeric Reasoning: (DNT)  Abstract Reasoning  Abstract Reasoning: (DNT)  Safety/Judgement  Safety/Judgement: (DNT)    Education:  Pt ed via verbal explanation and demonstration. Ongoing ed recommended  Therapy Time:   Individual   Time In 1345   Time Out 1438   Minutes Eduarda Hamlin 587 EVERARDO Rajan,MS,CCC,SLP [id number]  Speech and Language Pathologist  1/24/2020 7:06 PM

## 2020-01-31 ENCOUNTER — OFFICE VISIT (OUTPATIENT)
Dept: ENT CLINIC | Age: 60
End: 2020-01-31
Payer: COMMERCIAL

## 2020-01-31 VITALS
TEMPERATURE: 97.4 F | DIASTOLIC BLOOD PRESSURE: 95 MMHG | SYSTOLIC BLOOD PRESSURE: 149 MMHG | BODY MASS INDEX: 52.91 KG/M2 | WEIGHT: 280 LBS | HEART RATE: 77 BPM

## 2020-01-31 PROCEDURE — 99213 OFFICE O/P EST LOW 20 MIN: CPT | Performed by: OTOLARYNGOLOGY

## 2020-01-31 NOTE — PROGRESS NOTES
Arm, Position: Sitting, Cuff Size: Large Adult)   Pulse 77   Temp 97.4 °F (36.3 °C) (Oral)   Wt 280 lb (127 kg)   LMP 06/02/2009   BMI 52.91 kg/m²     GENERAL: No Acute Distress, Alert and Oriented, no Hoarseness, strong voice  EYES: EOMI, Anti-icteric  HENT:   Head: Normocephalic and atraumatic. Face:  Symmetric, facial nerve intact, no sinus tenderness  Right Ear: Normal external ear, normal external auditory canal, intact tympanic membrane with normal mobility and aerated middle ear  Left Ear: Normal external ear, normal external auditory canal, intact tympanic membrane with normal mobility and aerated middle ear  Mouth/Oral Cavity:  normal lips, Uvula is midline, no mucosal lesions, no trismus, normal dentition, normal salivary quality/flow  Oropharynx/Larynx:  normal oropharynx, normal tonsils; normal larynx/nasopharynx on mirror exam  Nose:Normal external nasal appearance. Anterior rhinoscopy shows a normal septum. normal turbinates. Normal mucosa   NECK: Normal range of motion, no thyromegaly, trachea is midline, no lymphadenopathy, no neck masses, no crepitus  CHEST: Normal respiratory effort, no retractions, breathing comfortably  SKIN: No rashes, normal appearing skin, no evidence of skin lesions/tumors  Neuro:  cranial nerve II-XII intact; normal gait  Cardio:  no edema          ASSESSMENT/PLAN  1. Vocal cord dysfunction  I still think that vocal cord dysfunction is likely for this patient. I want her to continue to see speech therapy for this. - Alisa Goss MD, Allergy & Immunology, [facility]    2. Laryngopharyngeal reflux (LPR)  Continue reflux medications for now. - Alisa Goss MD, Allergy & Immunology, [facility]    3. Respiratory distress  The patient is fairly convinced that she does not have any anxiety or anything at work that triggers her respiratory problems. she believes that she is being exposed to something there.   I do think this is fairly unlikely, but believe that is reasonable for her to be evaluated by an allergist to see if they think she could be exposed to something that would trigger a mild allergic attack. I like to see her back in a couple of months after she sees an allergist and continues to work with speech therapy. - Marisabel Amaro MD, Allergy & Immunology, [facility]             I have performed a head and neck physical exam personally or was physically present during the key or critical portions of the service. Medical Decision Making:   The following items were considered in medical decision making:  Independent review of images  Review / order clinical lab tests  Review / order radiology tests  Decision to obtain old records

## 2020-02-06 ENCOUNTER — OFFICE VISIT (OUTPATIENT)
Dept: FAMILY MEDICINE CLINIC | Age: 60
End: 2020-02-06
Payer: COMMERCIAL

## 2020-02-06 VITALS
BODY MASS INDEX: 54.07 KG/M2 | RESPIRATION RATE: 18 BRPM | SYSTOLIC BLOOD PRESSURE: 124 MMHG | HEART RATE: 72 BPM | TEMPERATURE: 97.6 F | WEIGHT: 286.4 LBS | DIASTOLIC BLOOD PRESSURE: 82 MMHG | HEIGHT: 61 IN

## 2020-02-06 PROCEDURE — 99396 PREV VISIT EST AGE 40-64: CPT | Performed by: NURSE PRACTITIONER

## 2020-02-06 RX ORDER — PHENTERMINE HYDROCHLORIDE 37.5 MG/1
37.5 TABLET ORAL
Qty: 30 TABLET | Refills: 0 | Status: SHIPPED | OUTPATIENT
Start: 2020-02-06 | End: 2020-03-07

## 2020-02-06 RX ORDER — SULFAMETHOXAZOLE AND TRIMETHOPRIM 800; 160 MG/1; MG/1
1 TABLET ORAL 2 TIMES DAILY
Qty: 20 TABLET | Refills: 0 | Status: SHIPPED | OUTPATIENT
Start: 2020-02-06 | End: 2020-02-16

## 2020-02-06 ASSESSMENT — PATIENT HEALTH QUESTIONNAIRE - PHQ9
2. FEELING DOWN, DEPRESSED OR HOPELESS: 0
SUM OF ALL RESPONSES TO PHQ QUESTIONS 1-9: 0
1. LITTLE INTEREST OR PLEASURE IN DOING THINGS: 0
SUM OF ALL RESPONSES TO PHQ QUESTIONS 1-9: 0
SUM OF ALL RESPONSES TO PHQ9 QUESTIONS 1 & 2: 0

## 2020-02-06 NOTE — PATIENT INSTRUCTIONS
Patient Education        Hip Flexor Strain: Rehab Exercises  Introduction  Here are some examples of exercises for you to try. The exercises may be suggested for a condition or for rehabilitation. Start each exercise slowly. Ease off the exercises if you start to have pain. You will be told when to start these exercises and which ones will work best for you. How to do the exercises  Pelvic tilt with marching   1. Lie on your back with your knees bent and your feet flat on the floor. 2. Tighten your belly muscles and buttocks, and press your lower back to the floor. 3. Keeping your knees bent, lift and then lower one leg up off the floor, and then lift and lower your other leg like you are marching. Each time you lift your leg, hold that position for about 6 seconds before lowering your leg. 4. Repeat 8 to 12 times. Scissors   1. Lie on your back with your knees bent at a 90-degree angle and your feet off the floor. 2. Tighten your belly muscles and buttocks, and press your lower back to the floor. 3. Slowly straighten one leg, and hold that position for about 6 seconds. Your leg should be about 12 inches off the floor. Bring that leg back to the starting position, and then straighten your other leg. Hold that position for about 6 seconds, and then switch legs again. 4. Repeat 8 to 12 times. Hamstring stretch (lying down)   1. Lie flat on your back with your legs straight. If you feel discomfort in your back, place a small towel roll under your lower back. 2. Holding the back of your affected leg for support, lift your leg straight up and toward your body until you feel a stretch at the back of your thigh. 3. Hold the stretch for at least 30 seconds. 4. Repeat 2 to 4 times. Quadricep and hip flexor stretch (lying on side)   1. Lie on your side with your good leg flat on the floor and your hand supporting your head.   2. Bend your top leg, and reach behind you to grab the front of that foot or healthcare professional. [address] any warranty or liability for your use of this information. Patient Education        Hip Arthritis: Exercises  Introduction  Here are some examples of exercises for you to try. The exercises may be suggested for a condition or for rehabilitation. Start each exercise slowly. Ease off the exercises if you start to have pain. You will be told when to start these exercises and which ones will work best for you. How to do the exercises  Straight-leg raises to the outside   5. Lie on your side, with your affected hip on top. 6. Tighten the front thigh muscles of your top leg to keep your knee straight. 7. Keep your hip and your leg straight in line with the rest of your body, and keep your knee pointing forward. Do not drop your hip back. 8. Lift your top leg straight up toward the ceiling, about 12 inches off the floor. Hold for about 6 seconds, then slowly lower your leg. 9. Repeat 8 to 12 times. 10. Switch legs and repeat steps 1 through 5, even if only one hip is sore. Straight-leg raises to the inside   5. Lie on your side with your affected hip on the floor. 6. You can either prop up your other leg on a chair, or you can bend that knee and put that foot in front of your other knee. Do not drop your hip back. 7. Tighten the muscles on the front thigh of your bottom leg to straighten that knee. 8. Keep your kneecap pointing forward and your leg straight, and lift your bottom leg up toward the ceiling about 6 inches. Hold for about 6 seconds, then lower slowly. 9. Repeat 8 to 12 times. 10. Switch legs and repeat steps 1 through 5, even if only one hip is sore. Hip hike   5. Stand sideways on the bottom step of a staircase, and hold on to the banister or wall. 6. Keeping both knees straight, lift your good leg off the step and let it hang down.  Then hike your good hip up to the same level as your affected hip or a little higher. 7. Repeat 8 to 12 times. 8. Switch legs and repeat steps 1 through 3, even if only one hip is sore. Bridging   6. Lie on your back with both knees bent. Your knees should be bent about 90 degrees. 7. Then push your feet into the floor, squeeze your buttocks, and lift your hips off the floor until your shoulders, hips, and knees are all in a straight line. 8. Hold for about 6 seconds as you continue to breathe normally, and then slowly lower your hips back down to the floor and rest for up to 10 seconds. 9. Repeat 8 to 12 times. Hamstring stretch (lying down)   5. Lie flat on your back with your legs straight. If you feel discomfort in your back, place a small towel roll under your lower back. 6. Holding the back of your affected leg, lift your leg straight up and toward your body until you feel a stretch at the back of your thigh. 7. Hold the stretch for at least 30 seconds. 8. Repeat 2 to 4 times. 9. Switch legs and repeat steps 1 through 4, even if only one hip is sore. Standing quadriceps stretch   5. If you are not steady on your feet, hold on to a chair, counter, or wall. You can also lie on your stomach or your side to do this exercise. 6. Bend the knee of the leg you want to stretch, and reach behind you to grab the front of your foot or ankle with the hand on the same side. For example, if you are stretching your right leg, use your right hand. 7. Keeping your knees next to each other, pull your foot toward your buttock until you feel a gentle stretch across the front of your hip and down the front of your thigh. Your knee should be pointed directly to the ground, and not out to the side. 8. Hold the stretch for at least 15 to 30 seconds. 9. Repeat 2 to 4 times. 10. Switch legs and repeat steps 1 through 5, even if only one hip is sore. Hip rotator stretch   1. Lie on your back with both knees bent and your feet flat on the floor.   2. Put the ankle of your affected leg on your opposite thigh near your knee. 3. Use your hand to gently push your knee away from your body until you feel a gentle stretch around your hip. 4. Hold the stretch for 15 to 30 seconds. 5. Repeat 2 to 4 times. 6. Repeat steps 1 through 5, but this time use your hand to gently pull your knee toward your opposite shoulder. 7. Switch legs and repeat steps 1 through 6, even if only one hip is sore. Knee-to-chest   1. Lie on your back with your knees bent and your feet flat on the floor. 2. Bring your affected leg to your chest, keeping the other foot flat on the floor (or keeping the other leg straight, whichever feels better on your lower back). 3. Keep your lower back pressed to the floor. Hold for at least 15 to 30 seconds. 4. Relax, and lower the knee to the starting position. 5. Repeat 2 to 4 times. 6. Switch legs and repeat steps 1 through 5, even if only one hip is sore. 7. To get more stretch, put your other leg flat on the floor while pulling your knee to your chest.    Clamshell   1. Lie on your side, with your affected hip on top. Keep your feet and knees together and your knees bent. 2. Raise your top knee, but keep your feet together. Do not let your hips roll back. Your legs should open up like a clamshell. 3. Hold for 6 seconds. 4. Slowly lower your knee back down. Rest for 10 seconds. 5. Repeat 8 to 12 times. 6. Switch legs and repeat steps 1 through 5, even if only one hip is sore. Follow-up care is a key part of your treatment and safety. Be sure to make and go to all appointments, and call your doctor if you are having problems. It's also a good idea to know your test results and keep a list of the medicines you take. Where can you learn more? Go to https://[employer] [place]. org and sign in to your [employer] account. Enter I827 in the [employer] box to learn more about \"Hip Arthritis: Exercises. \"     If you do not have an account, please click on the \"Sign Up Now\" link. Current as of: June 26, 2019  Content Version: 12.3  © 3728-7635 Healthwise, [employer]. Care instructions adapted under license by [facility] ([facility]). If you have questions about a medical condition or this instruction, always ask your healthcare professional. [address] any warranty or liability for your use of this information. Patient Education        Piriformis Syndrome: Exercises  Introduction  Here are some examples of exercises for you to try. The exercises may be suggested for a condition or for rehabilitation. Start each exercise slowly. Ease off the exercises if you start to have pain. You will be told when to start these exercises and which ones will work best for you. How to do the exercises  Hip rotator stretch   11. Lie on your back with both knees bent and your feet flat on the floor. 12. Put the ankle of your affected leg on your opposite thigh near your knee. 13. Use your hand to gently push your knee (on your affected leg) away from your body until you feel a gentle stretch around your hip. 14. Hold the stretch for 15 to 30 seconds. 15. Repeat 2 to 4 times. 16. Switch legs and repeat steps 1 through 5. Piriformis stretch   11. Lie on your back with your legs straight. 12. Lift your affected leg and bend your knee. With your opposite hand, reach across your body, and then gently pull your knee toward your opposite shoulder. 13. Hold the stretch for 15 to 30 seconds. 14. Repeat with your other leg. 15. Repeat 2 to 4 times on each side. Lower abdominal strengthening   9. Lie on your back with your knees bent and your feet flat on the floor. 10. Tighten your belly muscles by pulling your belly button in toward your spine. 11. Lift one foot off the floor and bring your knee toward your chest, so that your knee is straight above your hip and your leg is bent like the letter \"L. \"  12.  Lift the other knee up to the same

## 2020-02-06 NOTE — PROGRESS NOTES
Years of education: Not on file    Highest education level: Not on file   Occupational History    Not on file   Social Needs    Financial resource strain: Not on file    Food insecurity:     Worry: Not on file     Inability: Not on file    Transportation needs:     Medical: Not on file     Non-medical: Not on file   Tobacco Use    Smoking status: Never Smoker    Smokeless tobacco: Never Used   Substance and Sexual Activity    Alcohol use: No    Drug use: No    Sexual activity: Never   Lifestyle    Physical activity:     Days per week: Not on file     Minutes per session: Not on file    Stress: Not on file   Relationships    Social connections:     Talks on phone: Not on file     Gets together: Not on file     Attends Alevism service: Not on file     Active member of club or organization: Not on file     Attends meetings of clubs or organizations: Not on file     Relationship status: Not on file    Intimate partner violence:     Fear of current or ex partner: Not on file     Emotionally abused: Not on file     Physically abused: Not on file     Forced sexual activity: Not on file   Other Topics Concern    Not on file   Social History Narrative    Not on file        Family History   Problem Relation Age of Onset    High Blood Pressure Mother     Diabetes Father     High Blood Pressure Father     High Blood Pressure Maternal Grandmother     Stroke Maternal Grandmother 80         from stroke    Arthritis Maternal Grandmother     Vision Loss Maternal Grandmother         Glaucoma    High Blood Pressure Sister     Asthma Sister     Heart Attack Sister 37        HEART ATTACK, POSSIBLE INFECTION IN PORT       ADVANCE DIRECTIVE: N, Not Received    Vitals:    20 1320   BP: 124/82   Site: Left Upper Arm   Position: Sitting   Cuff Size: Large Adult   Pulse: 72   Resp: 18   Temp: 97.6 °F (36.4 °C)   TempSrc: Oral   Weight: 286 lb 6.4 oz (129.9 kg)  Comment: shoes on   Height: 5' 1\" (1.549 m)     Estimated body mass index is 54.11 kg/m² as calculated from the following:    Height as of this encounter: 5' 1\" (1.549 m). Weight as of this encounter: 286 lb 6.4 oz (129.9 kg). Physical Exam  Vitals signs and nursing note reviewed. Constitutional:       General: She is not in acute distress. Appearance: She is well-developed. She is not diaphoretic. HENT:      Head: Normocephalic. Right Ear: Tympanic membrane, ear canal and external ear normal. No drainage, swelling or tenderness. No middle ear effusion. Tympanic membrane is not injected or bulging. Left Ear: Tympanic membrane, ear canal and external ear normal. No drainage, swelling or tenderness. No middle ear effusion. Tympanic membrane is not injected or bulging. Nose: Mucosal edema present. No nasal deformity, congestion or rhinorrhea. Right Sinus: No maxillary sinus tenderness or frontal sinus tenderness. Left Sinus: No maxillary sinus tenderness or frontal sinus tenderness. Mouth/Throat:      Mouth: Mucous membranes are not pale, not dry and not cyanotic. No oral lesions. Dentition: Normal dentition. Pharynx: Uvula midline. No oropharyngeal exudate or posterior oropharyngeal erythema. Eyes:      General:         Right eye: No discharge. Left eye: No discharge. Conjunctiva/sclera:      Right eye: Right conjunctiva is not injected. No exudate. Left eye: Left conjunctiva is not injected. No exudate. Pupils: Pupils are equal, round, and reactive to light. Neck:      Musculoskeletal: Normal range of motion. Thyroid: No thyromegaly. Cardiovascular:      Rate and Rhythm: Normal rate and regular rhythm. Heart sounds: Normal heart sounds. No murmur. No friction rub. No gallop. Pulmonary:      Effort: Pulmonary effort is normal. No respiratory distress. Breath sounds: Normal breath sounds. No wheezing or rales.    Abdominal:      General: Bowel sounds are normal.

## 2020-02-07 ENCOUNTER — HOSPITAL ENCOUNTER (OUTPATIENT)
Dept: GENERAL RADIOLOGY | Age: 60
Discharge: HOME | End: 2020-02-07
Payer: COMMERCIAL

## 2020-02-07 ENCOUNTER — HOSPITAL ENCOUNTER (OUTPATIENT)
Age: 60
Discharge: HOME | End: 2020-02-07
Payer: COMMERCIAL

## 2020-02-07 ENCOUNTER — NURSE ONLY (OUTPATIENT)
Dept: FAMILY MEDICINE CLINIC | Age: 60
End: 2020-02-07
Payer: COMMERCIAL

## 2020-02-07 LAB
A/G RATIO: 1.4 (ref 1.1–2.2)
ALBUMIN SERPL-MCNC: 4 G/DL (ref 3.4–5)
ALP BLD-CCNC: 173 U/L (ref 40–129)
ALT SERPL-CCNC: 23 U/L (ref 10–40)
ANION GAP SERPL CALCULATED.3IONS-SCNC: 11 MMOL/L (ref 3–16)
AST SERPL-CCNC: 21 U/L (ref 15–37)
BILIRUB SERPL-MCNC: 0.4 MG/DL (ref 0–1)
BUN BLDV-MCNC: 9 MG/DL (ref 7–20)
CALCIUM SERPL-MCNC: 9.2 MG/DL (ref 8.3–10.6)
CHLORIDE BLD-SCNC: 104 MMOL/L (ref 99–110)
CHOLESTEROL, TOTAL: 149 MG/DL (ref 0–199)
CO2: 24 MMOL/L (ref 21–32)
CREAT SERPL-MCNC: 0.9 MG/DL (ref 0.6–1.1)
GFR AFRICAN AMERICAN: >60
GFR NON-AFRICAN AMERICAN: >60
GLOBULIN: 2.9 G/DL
GLUCOSE BLD-MCNC: 85 MG/DL (ref 70–99)
HDLC SERPL-MCNC: 62 MG/DL (ref 40–60)
LDL CHOLESTEROL CALCULATED: 70 MG/DL
POTASSIUM SERPL-SCNC: 4.4 MMOL/L (ref 3.5–5.1)
SODIUM BLD-SCNC: 139 MMOL/L (ref 136–145)
TOTAL PROTEIN: 6.9 G/DL (ref 6.4–8.2)
TRIGL SERPL-MCNC: 86 MG/DL (ref 0–150)
TSH REFLEX: 2.4 UIU/ML (ref 0.27–4.2)
VLDLC SERPL CALC-MCNC: 17 MG/DL

## 2020-02-07 PROCEDURE — 73502 X-RAY EXAM HIP UNI 2-3 VIEWS: CPT

## 2020-02-07 PROCEDURE — 86003 ALLG SPEC IGE CRUDE XTRC EA: CPT

## 2020-02-07 PROCEDURE — 36415 COLL VENOUS BLD VENIPUNCTURE: CPT | Performed by: NURSE PRACTITIONER

## 2020-02-08 LAB
ESTIMATED AVERAGE GLUCOSE: 116.9 MG/DL
HBA1C MFR BLD: 5.7 %

## 2020-02-10 LAB
MISCELLANEOUS LAB TEST ORDER: NORMAL
MISCELLANEOUS LAB TEST RESULT: NORMAL

## 2020-02-11 ENCOUNTER — TELEPHONE (OUTPATIENT)
Dept: FAMILY MEDICINE CLINIC | Age: 60
End: 2020-02-11

## 2020-02-11 NOTE — TELEPHONE ENCOUNTER
Patient had her xray of left hip done last Friday and she would like the result. Please give her a call back.        [facility]

## 2020-02-13 ENCOUNTER — TELEPHONE (OUTPATIENT)
Dept: FAMILY MEDICINE CLINIC | Age: 60
End: 2020-02-13

## 2020-02-19 RX ORDER — BUDESONIDE AND FORMOTEROL FUMARATE DIHYDRATE 160; 4.5 UG/1; UG/1
AEROSOL RESPIRATORY (INHALATION)
Qty: 30.6 INHALER | Refills: 1 | OUTPATIENT
Start: 2020-02-19

## 2020-02-20 ENCOUNTER — OFFICE VISIT (OUTPATIENT)
Dept: ORTHOPEDIC SURGERY | Age: 60
End: 2020-02-20
Payer: COMMERCIAL

## 2020-02-20 VITALS
HEIGHT: 61 IN | HEART RATE: 80 BPM | BODY MASS INDEX: 53.24 KG/M2 | DIASTOLIC BLOOD PRESSURE: 87 MMHG | TEMPERATURE: 98.3 F | SYSTOLIC BLOOD PRESSURE: 141 MMHG | WEIGHT: 282 LBS

## 2020-02-20 PROCEDURE — 99203 OFFICE O/P NEW LOW 30 MIN: CPT | Performed by: PHYSICIAN ASSISTANT

## 2020-02-26 ENCOUNTER — OFFICE VISIT (OUTPATIENT)
Dept: BARIATRICS/WEIGHT MGMT | Age: 60
End: 2020-02-26
Payer: COMMERCIAL

## 2020-02-26 ENCOUNTER — OFFICE VISIT (OUTPATIENT)
Dept: ORTHOPEDIC SURGERY | Age: 60
End: 2020-02-26
Payer: COMMERCIAL

## 2020-02-26 VITALS
WEIGHT: 282 LBS | DIASTOLIC BLOOD PRESSURE: 68 MMHG | HEART RATE: 65 BPM | BODY MASS INDEX: 53.24 KG/M2 | SYSTOLIC BLOOD PRESSURE: 122 MMHG | HEIGHT: 61 IN

## 2020-02-26 VITALS
HEART RATE: 66 BPM | WEIGHT: 280.6 LBS | BODY MASS INDEX: 52.98 KG/M2 | SYSTOLIC BLOOD PRESSURE: 137 MMHG | DIASTOLIC BLOOD PRESSURE: 88 MMHG | HEIGHT: 61 IN

## 2020-02-26 PROCEDURE — 99999 PR OFFICE/OUTPT VISIT,PROCEDURE ONLY: CPT | Performed by: ORTHOPAEDIC SURGERY

## 2020-02-26 PROCEDURE — 20611 DRAIN/INJ JOINT/BURSA W/US: CPT | Performed by: ORTHOPAEDIC SURGERY

## 2020-02-26 PROCEDURE — 99213 OFFICE O/P EST LOW 20 MIN: CPT | Performed by: SURGERY

## 2020-02-26 RX ORDER — METHYLPREDNISOLONE ACETATE 40 MG/ML
80 INJECTION, SUSPENSION INTRA-ARTICULAR; INTRALESIONAL; INTRAMUSCULAR; SOFT TISSUE ONCE
Status: COMPLETED | OUTPATIENT
Start: 2020-02-26 | End: 2020-02-26

## 2020-02-26 RX ORDER — LIDOCAINE HYDROCHLORIDE 10 MG/ML
5 INJECTION, SOLUTION INFILTRATION; PERINEURAL ONCE
Status: COMPLETED | OUTPATIENT
Start: 2020-02-26 | End: 2020-02-26

## 2020-02-26 RX ADMIN — METHYLPREDNISOLONE ACETATE 80 MG: 40 INJECTION, SUSPENSION INTRA-ARTICULAR; INTRALESIONAL; INTRAMUSCULAR; SOFT TISSUE at 13:45

## 2020-02-26 RX ADMIN — LIDOCAINE HYDROCHLORIDE 5 ML: 10 INJECTION, SOLUTION INFILTRATION; PERINEURAL at 13:44

## 2020-02-26 NOTE — PROGRESS NOTES
Sister     Heart Attack Sister 37        HEART ATTACK, POSSIBLE INFECTION IN PORT     Social History     Tobacco Use    Smoking status: Never Smoker    Smokeless tobacco: Never Used   Substance Use Topics    Alcohol use: No     I counseled the patient on the importance of not smoking and risks of ETOH. No Known Allergies  Vitals:    02/26/20 0824   BP: 137/88   Pulse: 66   Weight: 280 lb 9.6 oz (127.3 kg)   Height: 5' 1\" (1.549 m)       Body mass index is 53.02 kg/m². Current Outpatient Medications:     AZELASTINE HCL NA, by Nasal route, Disp: , Rfl:     phentermine (ADIPEX-P) 37.5 MG tablet, Take 1 tablet by mouth every morning (before breakfast) for 30 days. , Disp: 30 tablet, Rfl: 0    fluticasone (FLONASE) 50 MCG/ACT nasal spray, SPRAY 2 SPRAYS INTO EACH NOSTRIL EVERY DAY, Disp: 1 Bottle, Rfl: 5    vitamin D (ERGOCALCIFEROL) 1.25 MG (86827 UT) CAPS capsule, Take 1 capsule by mouth once a week, Disp: 12 capsule, Rfl: 1    Multiple Vitamins-Minerals (BARIATRIC FUSION) CHEW, Take 4 tablets by mouth daily, Disp: , Rfl:       Review of Systems - History obtained from the patient  General ROS: negative  Psychological ROS: negative  Endocrine ROS: negative  Respiratory ROS: negative  Cardiovascular ROS: negative  Gastrointestinal ROS:negative  Genito-Urinary ROS: negative  Musculoskeletal ROS: negative   Skin ROS: negative    Physical Exam   Vitals Reviewed   Constitutional: Patient is oriented to person, place, and time. Patient appears well-developed and well-nourished. Patient is active and cooperative. Non-toxic appearance. No distress. Neck: Trachea normal and normal range of motion. No JVD present. Pulmonary/Chest: Effort normal. No accessory muscle usage or stridor. No apnea. No respiratory distress. Cardiovascular: Normal rate and no JVD. Abdominal: Normal appearance. Patient exhibits no distension. Abdomen is soft, obese, non tender. Musculoskeletal: Normal range of motion.  Patient exhibits no edema. Neurological: Patient is alert and oriented to person, place, and time. Patient has normal strength. GCS eye subscore is 4. GCS verbal subscore is 5. GCS motor subscore is 6. Skin: Skin is warm and dry. No abrasion and no rash noted. Patient is not diaphoretic. No cyanosis or erythema. Psychiatric: Patient has a normal mood and affect. Speech is normal and behavior is normal. Cognition and memory are normal.         A/P     Mian Miranda is 63 y.o. female , now with Body mass index is 53.02 kg/m². s/p Sleeve gastrectomy, has gained 14 lbs since last visit, total of 41 lbs weight loss. The patient underwent dietary counseling with registered dietician. I have reviewed, discussed and agree with the dietary plan. Patient is trying hard to keep good dietary and behavior modifications. Patient is monitoring portion sizes, food choices and liquid calories. Patient is trying to exercise regularly. Patient pleased with the surgery outcomes. We discussed how her weight affects her overall health including:  Mike Love was seen today for bariatrics post op follow up. Diagnoses and all orders for this visit:    Morbid obesity with BMI of 50.0-59.9, adult ([facility] [address]    Status post laparoscopic sleeve gastrectomy       and importance of weight loss to alleviate those co morbid conditions. I encouraged the patient to continue exercise and keeping healthy eating habits. Also counseled the patient extensively on post surgery care. I spent 15 minutes face to face with patient with more than 50% of the time counseling and/or coordinating care for post-bariatric surgical care. RTC in 12 months  Diet and Exercise      Patient advised that its their responsibility to follow up for studies and/or labs ordered today.      Sandra Lin

## 2020-02-26 NOTE — PATIENT INSTRUCTIONS
Physical Exam:   Examination of the lefthip shows a positive logroll. No Lesion of the skin is noted over the injection site    Impression:  left hip osteoarthritis. Plan:  1. Injection with cortisone was recommended into  left   hip joint using ultrasound visualization. She understands the risks and benefits of the injection and describes no potential allergies. Time out was performed to verify correct person, correct procedure, and correct site. 2. Ultrasound visualization was first performed utilizing the [facility] Ultrasound unit using an 5/2 MHz Curved Probe. finding the femoral neck head junction. Next the femoral artery and vein were visualized with ultrasound medial to the femoral head. The location of the needle insertion was determined well lateral to the neurovascular structures and the site was anesthetized with 3 mL of 1% Lidocaine. The site was then prepped with ChloraPrep. A sterile cover was placed over the transducer head and the femoral head neck junction once again visualized. A 20-gauge spinal needle was then introduced under ultrasound control to the head neck junction. Once the needle was intracapsular the hip joint was injected with 2 mL  of 1% Lidocaine mixed with 2 ml of 40 mg Depo Medrol. Daniella tolerated the procedure well and  did report 95% relief of  hip pain within 5 minutes of the injection. 3.  She is return to Mr. Quinteros's care.     Adeola Marsh MD  2/26/2020

## 2020-03-06 ENCOUNTER — OFFICE VISIT (OUTPATIENT)
Dept: FAMILY MEDICINE CLINIC | Age: 60
End: 2020-03-06
Payer: COMMERCIAL

## 2020-03-06 VITALS
BODY MASS INDEX: 52.71 KG/M2 | HEIGHT: 61 IN | WEIGHT: 279.2 LBS | HEART RATE: 80 BPM | DIASTOLIC BLOOD PRESSURE: 82 MMHG | TEMPERATURE: 98.6 F | SYSTOLIC BLOOD PRESSURE: 128 MMHG | RESPIRATION RATE: 18 BRPM

## 2020-03-06 PROCEDURE — 99213 OFFICE O/P EST LOW 20 MIN: CPT | Performed by: NURSE PRACTITIONER

## 2020-03-06 RX ORDER — PHENTERMINE HYDROCHLORIDE 37.5 MG/1
37.5 TABLET ORAL
Qty: 30 TABLET | Refills: 0 | Status: SHIPPED | OUTPATIENT
Start: 2020-03-06 | End: 2020-04-05

## 2020-03-06 ASSESSMENT — PATIENT HEALTH QUESTIONNAIRE - PHQ9
SUM OF ALL RESPONSES TO PHQ QUESTIONS 1-9: 0
1. LITTLE INTEREST OR PLEASURE IN DOING THINGS: 0
SUM OF ALL RESPONSES TO PHQ QUESTIONS 1-9: 0
SUM OF ALL RESPONSES TO PHQ9 QUESTIONS 1 & 2: 0
2. FEELING DOWN, DEPRESSED OR HOPELESS: 0

## 2020-03-06 NOTE — PROGRESS NOTES
SUBJECTIVE:    Patient ID: Edward Guerin is a 63 y.o. y.o. female. HPI    Chief Complaint   Patient presents with    Obesity     pt is being seen today for weight managment    Pt in for one month follow up of Adipex- she has lost seven pounds since last visit- she has not been able to work out because she had a hip injection- has had some constipation with the medication  Review of Systems     OBJECTIVE:    Vitals:    03/06/20 0723   BP: 128/82   Pulse: 80   Resp: 18   Temp: 98.6 °F (37 °C)       Physical Exam  Constitutional:       General: She is awake. She is not in acute distress. Appearance: Normal appearance. She is well-developed, well-groomed and normal weight. She is not ill-appearing, toxic-appearing or diaphoretic. Neurological:      Mental Status: She is alert. Psychiatric:         Attention and Perception: Attention and perception normal.         Mood and Affect: Mood and affect normal.         Speech: Speech normal.         Behavior: Behavior normal. Behavior is cooperative. Thought Content: Thought content normal.         Cognition and Memory: Cognition and memory normal.         Judgment: Judgment normal.         Maurizio Howe was seen today for obesity. Diagnoses and all orders for this visit:    Morbid obesity with BMI of 50.0-59.9, adult ([facility])  -     phentermine (ADIPEX-P) 37.5 MG tablet; Take 1 tablet by mouth every morning (before breakfast) for 30 days.  Fill 3/6/20  CONTINUE BENEFIBER AS DIRECTED  ENCOURAGED THE PT TO INCREASE PHYSICAL ACTIVITY TO AT LEAST 30 MIN 4-5 X WEEKLY PREFERABLY CARDIO  FOLLOW UP IN ONE MONTH

## 2020-03-06 NOTE — PATIENT INSTRUCTIONS
Patient Education        phentermine  Pronunciation:  FEN [address]  Brand: Adipex-P, Stein Part  What is the most important information I should know about phentermine? You should not use this medicine if you have glaucoma, overactive thyroid, severe heart problems, uncontrolled high blood pressure, advanced coronary artery disease, extreme agitation, or a history of drug abuse. Do not use this medicine if you have used an MAO inhibitor in the past 14 days, such as isocarboxazid, linezolid, methylene blue injection, phenelzine, rasagiline, selegiline, or tranylcypromine. What is phentermine? Phentermine is similar to an amphetamine. Phentermine stimulates the central nervous system (nerves and brain), which increases your heart rate and blood pressure and decreases your appetite. Phentermine is used together with diet and exercise to treat obesity, especially in people with risk factors such as high blood pressure, high cholesterol, or diabetes. Phentermine may also be used for purposes not listed in this medication guide. What should I discuss with my healthcare provider before taking phentermine? You should not use phentermine if you are allergic to it, or if you have:  · a history of heart disease (coronary artery disease, heart rhythm problems, congestive heart failure, stroke);  · severe or uncontrolled high blood pressure;  · overactive thyroid;  · glaucoma;  · extreme agitation or nervousness;  · a history of drug abuse; or  · if you take other diet pills. Do not use phentermine if you have used an MAO inhibitor in the past 14 days. A dangerous drug interaction could occur. MAO inhibitors include isocarboxazid, linezolid, methylene blue injection, phenelzine, rasagiline, selegiline, tranylcypromine, and others. Weight loss during pregnancy can harm an unborn baby, even if you are overweight. Do not use phentermine if you are pregnant.  Tell your doctor right away if you become pregnant during treatment. You should not breast-feed while using phentermine. Tell your doctor if you have ever had:  · heart disease or coronary artery disease;  · a heart valve disorder;  · high blood pressure;  · diabetes (your diabetes medication dose may need to be adjusted); or  · kidney disease. Phentermine is not approved for use by anyone younger than 12years old. How should I take phentermine? Follow all directions on your prescription label and read all medication guides or instruction sheets. Your doctor may occasionally change your dose. Use the medicine exactly as directed. Phentermine is usually taken before breakfast, or 1 to 2 hours after breakfast. Follow your doctor's dosing instructions very carefully. Never use phentermine in larger amounts, or for longer than prescribed. Taking more of this medication will not make it more effective and can cause serious, life-threatening side effects. Phentermine is for short-term use only. The effects of appetite suppression may wear off after a few weeks. Phentermine may be habit-forming. Misuse can cause addiction, overdose, or death. Selling or giving away this medicine is against the law. Call your doctor at once if you think this medicine is not working as well, or if you have not lost at least 4 pounds within 4 weeks. Do not stop using phentermine suddenly, or you could have unpleasant withdrawal symptoms. Ask your doctor how to safely stop using this medicine. Store at room temperature away from moisture and heat. Keep the bottle tightly closed when not in use. What happens if I miss a dose? Take the medicine as soon as you can, but skip the missed dose if it is late in the day. Do not  take two doses at one time. What happens if I overdose? Seek emergency medical attention or call the Poison Help line at 1-965.794.7627.  An overdose of phentermine can be fatal.  Overdose symptoms may include confusion, panic, hallucinations, extreme restlessness, nausea, vomiting, diarrhea, stomach cramps, feeling tired or depressed, irregular heartbeats, weak pulse, seizure, or slow breathing (breathing may stop). What should I avoid while taking phentermine? Avoid driving or hazardous activity until you know how this medicine will affect you. Your reactions could be impaired. Drinking alcohol with this medicine can cause side effects. What are the possible side effects of phentermine? Get emergency medical help if you have signs of an allergic reaction: hives; difficult breathing; swelling of your face, lips, tongue, or throat. Call your doctor at once if you have:  · feeling short of breath, even with mild exertion;  · chest pain, feeling like you might pass out;  · swelling in your ankles or feet;  · pounding heartbeats or fluttering in your chest;  · tremors, feeling restless, trouble sleeping;  · unusual changes in mood or behavior; or  · increased blood pressure --severe headache, blurred vision, pounding in your neck or ears, anxiety, nosebleed. Common side effects may include:  · itching;  · dizziness, headache;  · dry mouth, unpleasant taste;  · diarrhea, constipation, stomach pain; or  · increased or decreased interest in sex. This is not a complete list of side effects and others may occur. Call your doctor for medical advice about side effects. You may report side effects to FDA at 5-573-FDA-4422. What other drugs will affect phentermine? Taking phentermine together with other diet medications such as fenfluramine (Phen-Fen) or dexfenfluramine (Redux) can cause a rare fatal lung disorder called pulmonary hypertension. Do not take phentermine with any other diet medications without your doctor's advice. Many drugs can affect phentermine. This includes prescription and over-the-counter medicines, vitamins, and herbal products. Not all possible interactions are listed here.  Tell your doctor about all your current medicines and any medicine you start or stop using. Where can I get more information? Your pharmacist can provide more information about phentermine. Remember, keep this and all other medicines out of the reach of children, never share your medicines with others, and use this medication only for the indication prescribed. Every effort has been made to ensure that the information provided by [address] is accurate, up-to-date, and complete, but no guarantee is made to that effect. Drug information contained herein may be time sensitive. [facility] information has been compiled for use by healthcare practitioners and consumers in the [place] and therefore [facility] does not warrant that uses outside of the [place] are appropriate, unless specifically indicated otherwise. [facility]'s drug information does not endorse drugs, diagnose patients or recommend therapy. [facility]'s drug information is an informational resource designed to assist licensed healthcare practitioners in caring for their patients and/or to serve consumers viewing this service as a supplement to, and not a substitute for, the expertise, skill, knowledge and judgment of healthcare practitioners. The absence of a warning for a given drug or drug combination in no way should be construed to indicate that the drug or drug combination is safe, effective or appropriate for any given patient. [facility] does not assume any responsibility for any aspect of healthcare administered with the aid of information [facility] provides. The information contained herein is not intended to cover all possible uses, directions, precautions, warnings, drug interactions, allergic reactions, or adverse effects. If you have questions about the drugs you are taking, check with your doctor, nurse or pharmacist.  Copyright 8628-6664 [address] Version: 10.01. Revision date: 7/26/2018. Care instructions adapted under license by [facility] [address]  If you have questions about a medical condition or this instruction, always ask your healthcare professional. [address] any warranty or liability for your use of this information. Patient Education        Eating Healthy Foods: Care Instructions  Your Care Instructions    Eating healthy foods can help lower your risk for disease. Healthy food gives you energy and keeps your heart strong, your brain active, your muscles working, and your bones strong. A healthy diet includes a variety of foods from the basic food groups: grains, vegetables, fruits, milk and milk products, and meat and beans. Some people may eat more of their favorite foods from only one food group and, as a result, miss getting the nutrients they need. So, it is important to pay attention not only to what you eat but also to what you are missing from your diet. You can eat a healthy, balanced diet by making a few small changes. Follow-up care is a key part of your treatment and safety. Be sure to make and go to all appointments, and call your doctor if you are having problems. It's also a good idea to know your test results and keep a list of the medicines you take. How can you care for yourself at home? Look at what you eat  · Keep a food diary for a week or two and record everything you eat or drink. Track the number of servings you eat from each food group. · For a balanced diet every day, eat a variety of:  ? 6 or more ounce-equivalents of grains, such as cereals, breads, crackers, rice, or pasta, every day. An ounce-equivalent is 1 slice of bread, 1 cup of ready-to-eat cereal, or ½ cup of cooked rice, cooked pasta, or cooked cereal.  ? 2½ cups of vegetables, especially:  § Dark-green vegetables such as broccoli and spinach. § Orange vegetables such as carrots and sweet potatoes. § Dry beans (such as kenny and kidney beans) and peas (such as lentils). ? 2 cups of fresh, frozen, or canned fruit.  A small apple or 1 banana or orange equals 1 eating out, try:  ? A veggie pizza with a whole wheat crust or grilled chicken (instead of sausage or pepperoni). ? Pasta with roasted vegetables, grilled chicken, or marinara sauce instead of cream sauce. ? A vegetable wrap or grilled chicken wrap. ? Broiled or poached food instead of fried or breaded items. Make healthy choices easy  · Buy packaged, prewashed, ready-to-eat fresh vegetables and fruits, such as baby carrots, salad mixes, and chopped or shredded broccoli and cauliflower. · Buy packaged, presliced fruits, such as melon or pineapple. · Choose 100% fruit or vegetable juice instead of soda. Limit juice intake to 4 to 6 oz (½ to ¾ cup) a day. · Blend low-fat yogurt, fruit juice, and canned or frozen fruit to make a smoothie for breakfast or a snack. Where can you learn more? Go to https://[employer].Fiiiling. org and sign in to your [employer] account. Enter P596 in the NPM box to learn more about \"Eating Healthy Foods: Care Instructions. \"     If you do not have an account, please click on the \"Sign Up Now\" link. Current as of: August 21, 2019  Content Version: 12.3  © 7532-5238 [employer]. Care instructions adapted under license by [facility] ([facility]). If you have questions about a medical condition or this instruction, always ask your healthcare professional. [place] [address] any warranty or liability for your use of this information. Patient Education        Learning About Dietary Guidelines  What are the Dietary Guidelines for Americans? Dietary Guidelines for Americans provide tips for eating well and staying healthy. This helps reduce the risk for long-term (chronic) diseases. These adult guidelines from the [place] recommend that you:  · Eat lots of fruits, vegetables, whole grains, and low-fat or nonfat dairy products. · Try to balance your eating with your activity.  This helps you stay at a healthy weight. · Drink alcohol in moderation, if at all. · Limit foods high in salt, saturated fat, trans fat, and added sugar. What is MyPlate? MyPlate is the [place] government's food guide. It can help you make your own well-balanced eating plan. A balanced eating plan means that you eat enough, but not too much, and that your food gives you the nutrients you need to stay healthy. MyPlate focuses on eating plenty of whole grains, fruits, and vegetables, and on limiting fat and sugar. It is available online at www. ChooseMyPlate.gov. How can you get started? MyPlate suggests that most adults eat certain amounts from the different food groups:  Grains  Eat 5 to 8 ounces of grains each day. Half of those should be whole grains. Choose whole-grain breads, cold and cooked cereals and grains, pasta (without creamy sauces), hard rolls, or low-fat or fat-free crackers. Vegetables  Eat 2 to 3 cups of vegetables every day. They contain little if any fat. And they have lots of nutrients that help protect against heart disease. Fruits  Eat 1½ to 2 cups of fruits every day. Fruits contain very little fat but lots of nutrients. Protein foods  Most adults need 5 to 6½ ounces each day. Choose fish and lean poultry more often. Eat red meat and fried meats less often. Dried beans, tofu, and nuts are also good sources of protein. Dairy  Most adults need 3 cups of milk and milk products a day. Choose low-fat or fat-free products from this food group. If you have problems digesting milk, try eating cheese or yogurt instead. Limit fats and oils, including those used in cooking. When you do use fats, choose oils that are liquid at room temperature (unsaturated fats). These include canola oil and olive oil. Avoid foods with trans fats, such as many fried foods, cookies, and snack foods. Where can you learn more? Go to https://tae.healthSymcatpartners. org and sign in to your [employer] account.  Enter X006 in the [address] Information box to learn more about \"Learning About Dietary Guidelines. \"     If you do not have an account, please click on the \"Sign Up Now\" link. Current as of: August 21, 2019  Content Version: 12.3  © 5076-8084 [employer]. Care instructions adapted under license by [facility] ([facility]). If you have questions about a medical condition or this instruction, always ask your healthcare professional. [address] any warranty or liability for your use of this information.

## 2020-03-09 ENCOUNTER — TELEPHONE (OUTPATIENT)
Dept: INTERNAL MEDICINE CLINIC | Age: 60
End: 2020-03-09

## 2020-03-10 RX ORDER — MONTELUKAST SODIUM 10 MG/1
TABLET ORAL
Qty: 90 TABLET | Refills: 1 | OUTPATIENT
Start: 2020-03-10

## 2020-03-12 ENCOUNTER — OFFICE VISIT (OUTPATIENT)
Dept: INTERNAL MEDICINE CLINIC | Age: 60
End: 2020-03-12
Payer: COMMERCIAL

## 2020-03-12 VITALS
SYSTOLIC BLOOD PRESSURE: 137 MMHG | HEART RATE: 97 BPM | OXYGEN SATURATION: 97 % | BODY MASS INDEX: 53.09 KG/M2 | RESPIRATION RATE: 16 BRPM | WEIGHT: 281.2 LBS | HEIGHT: 61 IN | DIASTOLIC BLOOD PRESSURE: 92 MMHG

## 2020-03-12 PROCEDURE — 99212 OFFICE O/P EST SF 10 MIN: CPT | Performed by: NURSE PRACTITIONER

## 2020-03-12 RX ORDER — HYDROXYZINE PAMOATE 25 MG/1
25 CAPSULE ORAL 2 TIMES DAILY PRN
Qty: 60 CAPSULE | Refills: 1 | Status: SHIPPED | OUTPATIENT
Start: 2020-03-12 | End: 2020-04-06

## 2020-03-13 ASSESSMENT — ENCOUNTER SYMPTOMS: CHEST TIGHTNESS: 0

## 2020-04-01 ENCOUNTER — VIRTUAL VISIT (OUTPATIENT)
Dept: PULMONOLOGY | Age: 60
End: 2020-04-01
Payer: COMMERCIAL

## 2020-04-01 PROCEDURE — 99213 OFFICE O/P EST LOW 20 MIN: CPT | Performed by: INTERNAL MEDICINE

## 2020-04-01 RX ORDER — ALBUTEROL SULFATE 90 UG/1
2 AEROSOL, METERED RESPIRATORY (INHALATION) EVERY 6 HOURS PRN
Qty: 1 INHALER | Refills: 5 | Status: SHIPPED | OUTPATIENT
Start: 2020-04-01

## 2020-04-01 RX ORDER — BUDESONIDE AND FORMOTEROL FUMARATE DIHYDRATE 80; 4.5 UG/1; UG/1
2 AEROSOL RESPIRATORY (INHALATION) 2 TIMES DAILY
Qty: 1 INHALER | Refills: 6 | Status: SHIPPED | OUTPATIENT
Start: 2020-04-01 | End: 2020-06-16 | Stop reason: SDUPTHER

## 2020-04-01 NOTE — PROGRESS NOTES
REASON FOR CONSULTATION:  Chief Complaint   Patient presents with    Breathing Problem    Wheezing   Follow-up reactive airway disease    Consult at request of MARINA Ramirez CNP     PCP: MARINA Ramirez CNP    HISTORY OF PRESENT ILLNESS: Pj Kelly is a 63y.o. year old female with a history of obesity, status post bariatric surgery who presents for evaluation of episodic shortness of breath, wheezing, cough. Her methacholine challenge test was negative, she does not have much in the way of atopic or allergic signs or symptoms. Refer to ENT who suspects vocal cord dysfunction. She was also referred to allergy it seems as though they feel allergy is playing a smaller component in this process as well. Of note, since the coronavirus pandemic she has been out of work at home and notes that her symptoms have completely resolved. Past Medical History:   Diagnosis Date    Chronic gastritis     Esophagitis     Fatty liver     GERD (gastroesophageal reflux disease)     Hiatal hernia     Hypertension     currently not medically treated    Internal hemorrhoid, bleeding     pt states currently not having any problems with hemorrhoids    Obstructive sleep apnea syndrome 04/21/2017    New diagnosis needing treatment CPAP set at 16    Wears glasses        Past Surgical History:   Procedure Laterality Date    ABDOMEN SURGERY N/A 08/29/2017    Robotic assisted lapascopic gastric sleeve performed at [facility] by MD [facility] CHOLECYSTECTOMY  2015    LAPROSCOPIC    ENDOSCOPY, COLON, DIAGNOSTIC      HEMORRHOID SURGERY  2001    HYSTERECTOMY  JUNE 2009    Total     OTHER SURGICAL HISTORY  02/17/2017    EGD    TONSILLECTOMY      TUBAL LIGATION  1983    UTERINE FIBROID EMBOLIZATION  2002       family history includes Arthritis in her maternal grandmother; Asthma in her sister; Diabetes in her father; Heart Attack (age of onset: 37) in her sister; High Blood Pressure in her father, maternal grandmother, mother, and sister; Stroke (age of onset: 80) in her maternal grandmother; Vision Loss in her maternal grandmother. SOCIAL HISTORY:   reports that she has never smoked. She has never used smokeless tobacco.      ALLERGIES:  Patient has No Known Allergies. REVIEW OF SYSTEMS:  Constitutional: Negative for fever   HENT: Negative for sore throat  Eyes: Negative for redness   Respiratory: + Dyspnea, cough  Cardiovascular: Negative for chest pain  Gastrointestinal: Negative for vomiting, diarrhea   Genitourinary: Negative for hematuria   Musculoskeletal: Negative for arthralgias   Skin: Negative for rash  Neurological: Negative for syncope  Hematological: Negative for adenopathy  Psychiatric/Behavorial: Negative for anxiety    Objective:   PHYSICAL EXAM:  Last menstrual period 06/02/2009, not currently breastfeeding.'  Gen: No distress. Eyes: PERRL. No sclera icterus. No conjunctival injection. ENT: No discharge. Normal dentition. External appearance of ears and nose normal.  Psych: Oriented x 3. No anxiety. Awake. Alert. Intact judgement and insight. Current Outpatient Medications   Medication Sig Dispense Refill    albuterol sulfate  (90 Base) MCG/ACT inhaler Inhale 2 puffs into the lungs every 6 hours as needed for Wheezing or Shortness of Breath 1 Inhaler 5    budesonide-formoterol (SYMBICORT) 80-4.5 MCG/ACT AERO Inhale 2 puffs into the lungs 2 times daily 1 Inhaler 6    hydrOXYzine (VISTARIL) 25 MG capsule Take 1 capsule by mouth 2 times daily as needed for Itching or Anxiety 60 capsule 1    phentermine (ADIPEX-P) 37.5 MG tablet Take 1 tablet by mouth every morning (before breakfast) for 30 days.  Fill 3/6/20 30 tablet 0    AZELASTINE HCL NA by Nasal route      fluticasone (FLONASE) 50 MCG/ACT nasal spray SPRAY 2 SPRAYS INTO EACH NOSTRIL EVERY DAY 1 Bottle 5    vitamin D (ERGOCALCIFEROL) 1.25 MG (01315 UT) CAPS capsule Take 1 capsule by mouth once a week 12 capsule 1    Multiple Vitamins-Minerals (BARIATRIC FUSION) CHEW Take 4 tablets by mouth daily       No current facility-administered medications for this visit. Data Reviewed:   CBC and Renal reviewed  Last CBC  Lab Results   Component Value Date    WBC 4.2 08/17/2018    RBC 4.68 08/17/2018    HGB 12.9 08/17/2018    MCV 83.0 08/17/2018     08/17/2018     Last Renal  Lab Results   Component Value Date     02/07/2020    K 4.4 02/07/2020     02/07/2020    CO2 24 02/07/2020    CO2 25 08/17/2018    CO2 25 05/11/2018    BUN 9 02/07/2020    CREATININE 0.9 02/07/2020    GLUCOSE 85 02/07/2020    CALCIUM 9.2 02/07/2020       Last ABG  POC Blood Gas: No results found for: POCPH, POCPCO2, POCPO2, POCHCO3, NBEA, IKLZ8IVS  No results for input(s): PH, PCO2, PO2, HCO3, BE, O2SAT in the last 72 hours. Radiology Review:  Pertinent images / reports were reviewed as a part of this visit. CXR PA/LAT:   Results for orders placed during the hospital encounter of 01/13/17   XR Chest Standard TWO VW    Narrative EXAMINATION:  TWO VIEWS OF THE CHEST    1/13/2017 1:07 pm    COMPARISON:  None. HISTORY:  ORDERING SYSTEM PROVIDED HISTORY: Bronchitis  TECHNOLOGIST PROVIDED HISTORY:  Ordering Physician Provided Reason for Exam: cough/congestion  Acuity: Acute  Type of Exam: Initial    FINDINGS:  Subtle patchy opacities lung bases. Lateral view reveals no pleural  effusion. Focal dense consolidation not appreciated. Lung volumes are low. No acute bony abnormality. Cardiomediastinal silhouette mildly enlarged. Impression Scattered bibasilar opacities may relate to atelectasis with low lung  volumes. Airspace disease lung bases could have similar appearance. Clinical correlation is recommended. CXR portable: No results found for this or any previous visit.      Pulmonary function testing  12/2016  IMPRESSION:  Normal pulmonary function testing with the exception of Preparedness and Response Supplemental Appropriations Act, this Virtual Visit was conducted with patient's (and/or legal guardian's) consent, to reduce the patient's risk of exposure to COVID-19 and provide necessary medical care. The patient (and/or legal guardian) has also been advised to contact this office for worsening conditions or problems, and seek emergency medical treatment and/or call 911 if deemed necessary. Services were provided through a video synchronous discussion virtually to substitute for in-person clinic visit. Patient and provider were located at their individual homes. --Javy Wing MD on 4/8/2020 at 1:19 PM    An electronic signature was used to authenticate this note.

## 2020-04-03 ENCOUNTER — TELEMEDICINE (OUTPATIENT)
Dept: FAMILY MEDICINE CLINIC | Age: 60
End: 2020-04-03
Payer: COMMERCIAL

## 2020-04-03 VITALS
TEMPERATURE: 97.1 F | DIASTOLIC BLOOD PRESSURE: 80 MMHG | SYSTOLIC BLOOD PRESSURE: 122 MMHG | HEIGHT: 61 IN | BODY MASS INDEX: 50.98 KG/M2 | WEIGHT: 270 LBS | HEART RATE: 110 BPM

## 2020-04-03 PROCEDURE — 99441 PR PHYS/QHP TELEPHONE EVALUATION 5-10 MIN: CPT | Performed by: NURSE PRACTITIONER

## 2020-04-03 RX ORDER — PHENTERMINE HYDROCHLORIDE 37.5 MG/1
37.5 TABLET ORAL
Qty: 30 TABLET | Refills: 0 | Status: SHIPPED | OUTPATIENT
Start: 2020-04-06 | End: 2020-05-06

## 2020-04-03 ASSESSMENT — PATIENT HEALTH QUESTIONNAIRE - PHQ9
SUM OF ALL RESPONSES TO PHQ9 QUESTIONS 1 & 2: 0
SUM OF ALL RESPONSES TO PHQ QUESTIONS 1-9: 0
SUM OF ALL RESPONSES TO PHQ QUESTIONS 1-9: 0
2. FEELING DOWN, DEPRESSED OR HOPELESS: 0
1. LITTLE INTEREST OR PLEASURE IN DOING THINGS: 0

## 2020-04-06 RX ORDER — HYDROXYZINE PAMOATE 25 MG/1
25 CAPSULE ORAL 2 TIMES DAILY PRN
Qty: 60 CAPSULE | Refills: 1 | Status: SHIPPED | OUTPATIENT
Start: 2020-04-06 | End: 2020-06-05

## 2020-04-29 RX ORDER — HYDROXYZINE PAMOATE 25 MG/1
25 CAPSULE ORAL 2 TIMES DAILY PRN
Qty: 60 CAPSULE | Refills: 1 | OUTPATIENT
Start: 2020-04-29 | End: 2020-06-28

## 2020-06-16 RX ORDER — DILTIAZEM HYDROCHLORIDE 60 MG/1
2 TABLET, FILM COATED ORAL 2 TIMES DAILY
Qty: 3 INHALER | Refills: 0 | Status: SHIPPED | OUTPATIENT
Start: 2020-06-16 | End: 2020-09-09

## 2020-07-02 ENCOUNTER — OFFICE VISIT (OUTPATIENT)
Dept: PULMONOLOGY | Age: 60
End: 2020-07-02
Payer: COMMERCIAL

## 2020-07-02 VITALS
SYSTOLIC BLOOD PRESSURE: 130 MMHG | DIASTOLIC BLOOD PRESSURE: 86 MMHG | TEMPERATURE: 98 F | OXYGEN SATURATION: 99 % | HEART RATE: 70 BPM | HEIGHT: 61 IN | RESPIRATION RATE: 16 BRPM | WEIGHT: 283 LBS | BODY MASS INDEX: 53.43 KG/M2

## 2020-07-02 PROCEDURE — 99213 OFFICE O/P EST LOW 20 MIN: CPT | Performed by: INTERNAL MEDICINE

## 2020-07-02 NOTE — ASSESSMENT & PLAN NOTE
-clinically inconsistent with asthma, though clearly has some occupational exposure that was causing her symptoms.  -could be VCD or RADS  -as she is now asymptomatic I am okay with trial off of symbicort. If she returns to worksite I have advised her to resume scheduled ICS/LABA.

## 2020-07-02 NOTE — PROGRESS NOTES
REASON FOR CONSULTATION:  Chief Complaint   Patient presents with    Follow-up     RAD    Follow-up reactive airway disease    Consult at request of MARINA Obrien CNP     PCP: MARINA Obrien CNP    HISTORY OF PRESENT ILLNESS: Charlene Slaughter is a 62y.o. year old female with a history of obesity, status post bariatric surgery who presents for evaluation of episodic shortness of breath, wheezing, cough. Her methacholine challenge test was negative, she does not have much in the way of atopic or allergic signs or symptoms. She has been working from home since covid and has had complete resolution of her symptoms. She no longer takes symbicort BID but has it on hand. Past Medical History:   Diagnosis Date    Chronic gastritis     Esophagitis     Fatty liver     GERD (gastroesophageal reflux disease)     Hiatal hernia     Hypertension     currently not medically treated    Internal hemorrhoid, bleeding     pt states currently not having any problems with hemorrhoids    Obstructive sleep apnea syndrome 04/21/2017    New diagnosis needing treatment CPAP set at 16    Wears glasses        Past Surgical History:   Procedure Laterality Date    ABDOMEN SURGERY N/A 08/29/2017    Robotic assisted lapascopic gastric sleeve performed at [facility] by MD ZARA Melara CHOLECYSTECTOMY  2015    LAPROSCOPIC    ENDOSCOPY, COLON, DIAGNOSTIC      HEMORRHOID SURGERY  2001    HYSTERECTOMY  JUNE 2009    Total     OTHER SURGICAL HISTORY  02/17/2017    EGD    TONSILLECTOMY      TUBAL LIGATION  1983    UTERINE FIBROID EMBOLIZATION  2002       family history includes Arthritis in her maternal grandmother; Asthma in her sister; Diabetes in her father; Heart Attack (age of onset: 37) in her sister; High Blood Pressure in her father, maternal grandmother, mother, and sister; Stroke (age of onset: 80) in her maternal grandmother; Vision Loss in her maternal grandmother. SOCIAL HISTORY:   reports that she has never smoked. She has never used smokeless tobacco.      ALLERGIES:  Patient has No Known Allergies. REVIEW OF SYSTEMS:  Constitutional: Negative for fever   HENT: Negative for sore throat  Eyes: Negative for redness   Respiratory: negative Dyspnea, cough  Cardiovascular: Negative for chest pain  Gastrointestinal: Negative for vomiting, diarrhea   Genitourinary: Negative for hematuria   Musculoskeletal: Negative for arthralgias   Skin: Negative for rash  Neurological: Negative for syncope  Hematological: Negative for adenopathy  Psychiatric/Behavorial: Negative for anxiety    Objective:   PHYSICAL EXAM:  Blood pressure 130/86, pulse 70, temperature 98 °F (36.7 °C), temperature source Oral, resp. rate 16, height 5' 1\" (1.549 m), weight 283 lb (128.4 kg), last menstrual period 06/02/2009, SpO2 99 %, not currently breastfeeding.'  Gen:  No acute distress. Eyes: PERRL. EOMI. Anicteric sclera. No conjunctival injection. ENT: No discharge. oropharynx clear. External appearance of ears and nose normal.  Neck: Trachea midline. No mass   Resp:  No crackles. No wheezes. No rhonchi. No dullness on percussion. CV: Regular rate. Regular rhythm. No murmur or rub. No edema. GI: Soft, Non-tender. Obese. +BS  Skin: Warm, dry, w/o erythema. Lymph: No cervical or supraclavicular LAD. M/S: No cyanosis. No clubbing. Neuro:  no focal neurologic deficit. Moves all extremities  Psych: Awake and alert, Oriented x 3. Judgement and insight appropriate. Mood stable.     Current Outpatient Medications   Medication Sig Dispense Refill    SYMBICORT 80-4.5 MCG/ACT AERO Inhale 2 puffs into the lungs 2 times daily 3 Inhaler 0    albuterol sulfate  (90 Base) MCG/ACT inhaler Inhale 2 puffs into the lungs every 6 hours as needed for Wheezing or Shortness of Breath 1 Inhaler 5    AZELASTINE HCL NA by Nasal route      fluticasone (FLONASE) 50 MCG/ACT nasal spray SPRAY 2 SPRAYS INTO EACH NOSTRIL EVERY DAY 1 Bottle 5    Multiple Vitamins-Minerals (BARIATRIC FUSION) CHEW Take 4 tablets by mouth daily       No current facility-administered medications for this visit. Data Reviewed:   CBC and Renal reviewed  Last CBC  Lab Results   Component Value Date    WBC 4.2 08/17/2018    RBC 4.68 08/17/2018    HGB 12.9 08/17/2018    MCV 83.0 08/17/2018     08/17/2018     Last Renal  Lab Results   Component Value Date     02/07/2020    K 4.4 02/07/2020     02/07/2020    CO2 24 02/07/2020    CO2 25 08/17/2018    CO2 25 05/11/2018    BUN 9 02/07/2020    CREATININE 0.9 02/07/2020    GLUCOSE 85 02/07/2020    CALCIUM 9.2 02/07/2020       Last ABG  POC Blood Gas: No results found for: POCPH, POCPCO2, POCPO2, POCHCO3, NBEA, GHVC8JDW  No results for input(s): PH, PCO2, PO2, HCO3, BE, O2SAT in the last 72 hours. Radiology Review:  Pertinent images / reports were reviewed as a part of this visit. Ultrasound guided needle placement  Radiology result is complete; follow up with provider / physician office   for radiology results        Pulmonary function testing  12/2016  IMPRESSION:  Normal pulmonary function testing with the exception of moderate  reduction in diffusing capacity. 2019  -MCCT. No evidence of COPD, no BDR.       Assessment/Plan:       Diagnosis Orders   1. Moderate persistent reactive airway disease without complication           Problem List Items Addressed This Visit        Pulmonary Problems    Reactive airway disease - Primary     -clinically inconsistent with asthma, though clearly has some occupational exposure that was causing her symptoms.  -could be VCD or RADS  -as she is now asymptomatic I am okay with trial off of symbicort. If she returns to worksite I have advised her to resume scheduled ICS/LABA. RTC in 6 months. --Carolyne Rainey MD on 7/2/2020 at 7:25 AM    An electronic signature was used to authenticate this note.

## 2020-07-16 RX ORDER — OMEPRAZOLE 20 MG/1
20 CAPSULE, DELAYED RELEASE ORAL
Qty: 180 CAPSULE | Refills: 1 | Status: SHIPPED | OUTPATIENT
Start: 2020-07-16 | End: 2021-01-11

## 2020-07-20 ENCOUNTER — E-VISIT (OUTPATIENT)
Dept: FAMILY MEDICINE CLINIC | Age: 60
End: 2020-07-20

## 2020-09-08 ENCOUNTER — TELEPHONE (OUTPATIENT)
Dept: FAMILY MEDICINE CLINIC | Age: 60
End: 2020-09-08

## 2020-09-08 NOTE — TELEPHONE ENCOUNTER
Patient is scheduled for foot surgery on 10/2/20 with Dr Brenda Harden.   Needs to have a Pre Op done prior along with covid testing 6 days prior

## 2020-09-09 RX ORDER — DILTIAZEM HYDROCHLORIDE 60 MG/1
2 TABLET, FILM COATED ORAL 2 TIMES DAILY
Qty: 1 INHALER | Refills: 5 | Status: SHIPPED | OUTPATIENT
Start: 2020-09-09 | End: 2021-03-26

## 2020-09-30 ENCOUNTER — OFFICE VISIT (OUTPATIENT)
Dept: FAMILY MEDICINE CLINIC | Age: 60
End: 2020-09-30
Payer: COMMERCIAL

## 2020-09-30 VITALS
TEMPERATURE: 98.7 F | OXYGEN SATURATION: 98 % | HEIGHT: 61 IN | WEIGHT: 293 LBS | BODY MASS INDEX: 55.32 KG/M2 | SYSTOLIC BLOOD PRESSURE: 118 MMHG | HEART RATE: 78 BPM | DIASTOLIC BLOOD PRESSURE: 68 MMHG | RESPIRATION RATE: 12 BRPM

## 2020-09-30 LAB
A/G RATIO: 1.6 (ref 1.1–2.2)
ALBUMIN SERPL-MCNC: 4.2 G/DL (ref 3.4–5)
ALP BLD-CCNC: 186 U/L (ref 40–129)
ALT SERPL-CCNC: 26 U/L (ref 10–40)
ANION GAP SERPL CALCULATED.3IONS-SCNC: 13 MMOL/L (ref 3–16)
AST SERPL-CCNC: 19 U/L (ref 15–37)
BASOPHILS ABSOLUTE: 0 K/UL (ref 0–0.2)
BASOPHILS RELATIVE PERCENT: 0.8 %
BILIRUB SERPL-MCNC: 0.3 MG/DL (ref 0–1)
BILIRUBIN, POC: NORMAL
BLOOD URINE, POC: NORMAL
BUN BLDV-MCNC: 12 MG/DL (ref 7–20)
CALCIUM SERPL-MCNC: 9.7 MG/DL (ref 8.3–10.6)
CHLORIDE BLD-SCNC: 101 MMOL/L (ref 99–110)
CLARITY, POC: CLEAR
CO2: 23 MMOL/L (ref 21–32)
COLOR, POC: YELLOW
CREAT SERPL-MCNC: 0.9 MG/DL (ref 0.6–1.1)
EOSINOPHILS ABSOLUTE: 0.1 K/UL (ref 0–0.6)
EOSINOPHILS RELATIVE PERCENT: 2.7 %
GFR AFRICAN AMERICAN: >60
GFR NON-AFRICAN AMERICAN: >60
GLOBULIN: 2.7 G/DL
GLUCOSE BLD-MCNC: 83 MG/DL (ref 70–99)
GLUCOSE URINE, POC: NORMAL
HCT VFR BLD CALC: 36.6 % (ref 36–48)
HEMOGLOBIN: 11.8 G/DL (ref 12–16)
KETONES, POC: NORMAL
LEUKOCYTE EST, POC: NORMAL
LYMPHOCYTES ABSOLUTE: 2 K/UL (ref 1–5.1)
LYMPHOCYTES RELATIVE PERCENT: 44.2 %
MCH RBC QN AUTO: 25.7 PG (ref 26–34)
MCHC RBC AUTO-ENTMCNC: 32.2 G/DL (ref 31–36)
MCV RBC AUTO: 79.7 FL (ref 80–100)
MONOCYTES ABSOLUTE: 0.3 K/UL (ref 0–1.3)
MONOCYTES RELATIVE PERCENT: 6.5 %
NEUTROPHILS ABSOLUTE: 2.1 K/UL (ref 1.7–7.7)
NEUTROPHILS RELATIVE PERCENT: 45.8 %
NITRITE, POC: NORMAL
PDW BLD-RTO: 16.3 % (ref 12.4–15.4)
PH, POC: 6
PLATELET # BLD: 238 K/UL (ref 135–450)
PMV BLD AUTO: 9 FL (ref 5–10.5)
POTASSIUM SERPL-SCNC: 4.4 MMOL/L (ref 3.5–5.1)
PROTEIN, POC: NORMAL
RBC # BLD: 4.59 M/UL (ref 4–5.2)
SODIUM BLD-SCNC: 137 MMOL/L (ref 136–145)
SPECIFIC GRAVITY, POC: 1.02
TOTAL PROTEIN: 6.9 G/DL (ref 6.4–8.2)
UROBILINOGEN, POC: 0.2
WBC # BLD: 4.6 K/UL (ref 4–11)

## 2020-09-30 PROCEDURE — 81002 URINALYSIS NONAUTO W/O SCOPE: CPT | Performed by: NURSE PRACTITIONER

## 2020-09-30 PROCEDURE — 99214 OFFICE O/P EST MOD 30 MIN: CPT | Performed by: NURSE PRACTITIONER

## 2020-09-30 PROCEDURE — 93000 ELECTROCARDIOGRAM COMPLETE: CPT | Performed by: NURSE PRACTITIONER
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
## 2021-01-11 DIAGNOSIS — R49.0 HOARSENESS OF VOICE: ICD-10-CM

## 2021-01-11 LAB — SARS-COV-2: NEGATIVE

## 2021-01-11 RX ORDER — OMEPRAZOLE 20 MG/1
20 CAPSULE, DELAYED RELEASE ORAL
Qty: 180 CAPSULE | Refills: 0 | Status: SHIPPED | OUTPATIENT
Start: 2021-01-11 | End: 2021-03-26 | Stop reason: SDUPTHER

## 2021-01-13 ENCOUNTER — VIRTUAL VISIT (OUTPATIENT)
Dept: FAMILY MEDICINE CLINIC | Age: 61
End: 2021-01-13
Payer: COMMERCIAL

## 2021-01-13 DIAGNOSIS — B34.9 VIRAL SYNDROME: Primary | ICD-10-CM

## 2021-01-13 PROCEDURE — 99213 OFFICE O/P EST LOW 20 MIN: CPT | Performed by: FAMILY MEDICINE

## 2021-01-13 RX ORDER — IBUPROFEN 800 MG/1
1 TABLET ORAL 3 TIMES DAILY
COMMUNITY
Start: 2020-12-19 | End: 2022-03-31 | Stop reason: ALTCHOICE

## 2021-01-13 ASSESSMENT — PATIENT HEALTH QUESTIONNAIRE - PHQ9: 2. FEELING DOWN, DEPRESSED OR HOPELESS: 0

## 2021-01-13 NOTE — PROGRESS NOTES
2021    TELEHEALTH EVALUATION -- Audio/Visual (During OMRIU-60 public health emergency)    HPI:    nAaly Callahan (:  1960) has requested an audio/video evaluation for the following concern(s):    Chief Complaint   Patient presents with    Headache     PT C/O HEADACHE, HOARSENESS, PND, NASAL DR CLEAR, SOB, WHEEZING IF EXERTS HERSELF, BODY ACHES ALL OVER, NECK PAIN, CHILLS, NO LOSS OF TASTE BUT SMELL IS OFF AND TEMP 97.9 X4 DAYS. PT TESTED NEGATIVE FOR COVID ON MONDAY THAT WAS NEGATIVE. started symptoms Sanya 1/10 ha/ body ache evans in neck at onset - moved through body over course of day  Runny nose, nausea developed - no fever - not real active but some [place] - w/ showering/ short walks  Neg covid test on  day of symptoms. No exposures known - son just tested neg - no sxs. Not coughing up much mucus right now - hoarse - some pnd - can smell but no taste. A little sore to move. Stopped symbicort as no problems   152/93 - 83 hr.  BP Readings from Last 3 Encounters:   20 118/68   20 130/86   20 122/80           Review of Systems    Prior to Visit Medications    Medication Sig Taking?  Authorizing Provider   ibuprofen (ADVIL;MOTRIN) 800 MG tablet Take 1 tablet by mouth 3 times daily Yes Historical Provider, MD   SYMBICORT 80-4.5 MCG/ACT AERO Inhale 2 puffs into the lungs 2 times daily Yes Debora Bhatia APRN - CNP   albuterol sulfate  (90 Base) MCG/ACT inhaler Inhale 2 puffs into the lungs every 6 hours as needed for Wheezing or Shortness of Breath Yes Isabella Becker MD   fluticasone (FLONASE) 50 MCG/ACT nasal spray SPRAY 2 SPRAYS INTO EACH NOSTRIL EVERY DAY Yes MARINA Mejia - CNP   Multiple Vitamins-Minerals (BARIATRIC FUSION) CHEW Take 4 tablets by mouth daily Yes Historical Provider, MD   omeprazole (PRILOSEC) 20 MG delayed release capsule TAKE 1 CAPSULE BY MOUTH 2 TIMES DAILY (BEFORE MEALS)  Patient not taking: Reported on 2021  Isabella Becker MD AZELASTINE HCL NA by Nasal route  Historical Provider, MD       Social History     Tobacco Use    Smoking status: Never Smoker    Smokeless tobacco: Never Used   Substance Use Topics    Alcohol use: No    Drug use: No            PHYSICAL EXAMINATION:  [ INSTRUCTIONS:  \"[x]\" Indicates a positive item  \"[]\" Indicates a negative item  -- DELETE ALL ITEMS NOT EXAMINED]  Vital Signs: (As obtained by patient/caregiver or practitioner observation)    Blood pressure-  Heart rate-    Respiratory rate-    Temperature-  Pulse oximetry-     Constitutional: [x] Appears well-developed and well-nourished [] No apparent distress      [] Abnormal-   Mental status  [x] Alert and awake  [] Oriented to person/place/time []Able to follow commands      Eyes:  EOM    []  Normal  [] Abnormal-  Sclera  []  Normal  [] Abnormal -         Discharge []  None visible  [] Abnormal -    HENT:   [x] Normocephalic, atraumatic. [] Abnormal   [] Mouth/Throat: Mucous membranes are moist.     External Ears [] Normal  [] Abnormal-     Neck: [] No visualized mass     Pulmonary/Chest: [x] Respiratory effort normal.  [] No visualized signs of difficulty breathing or respiratory distress        [] Abnormal-      Musculoskeletal:   [] Normal gait with no signs of ataxia         [] Normal range of motion of neck        [] Abnormal-       Neurological:        [x] No Facial Asymmetry (Cranial nerve 7 motor function) (limited exam to video visit)          [] No gaze palsy        [] Abnormal-         Skin:        [x] No significant exanthematous lesions or discoloration noted on facial skin         [] Abnormal-            Psychiatric:       [x] Normal Affect [] No Hallucinations        [] Abnormal-     Other pertinent observable physical exam findings-     ASSESSMENT/PLAN:  There are no diagnoses linked to this encounter. Diagnosis Orders   1.  Viral syndrome  COVID-19 Ambulatory    POCT Influenza A/B Recheck covid test tomorrow am - cdc recommendations on sx'atic tx and isolation d/w pt  Also check flu test  Sx's seem viral - hold on abx at this time  Let me know if worsens  Hydration, rest, symbicort prn, mucinex dm and apap/ ibuprofen combination for now  No follow-ups on file. Areli Kevin is a 62 y.o. female being evaluated by a Virtual Visit (video visit) encounter to address concerns as mentioned above. A caregiver was present when appropriate. Due to this being a TeleHealth encounter (During SGBXF-47 public health emergency), evaluation of the following organ systems was limited: Vitals/Constitutional/EENT/Resp/CV/GI//MS/Neuro/Skin/Heme-Lymph-Imm. Pursuant to the emergency declaration under the [address] [place] [address], [address] [place] [address] authority and the [employer] Act, this Virtual Visit was conducted with patient's (and/or legal guardian's) consent, to reduce the patient's risk of exposure to COVID-19 and provide necessary medical care. The patient (and/or legal guardian) has also been advised to contact this office for worsening conditions or problems, and seek emergency medical treatment and/or call 911 if deemed necessary. Patient identification was verified at the start of the visit: Yes    Total time spent on this encounter: 11-20 minutes    Services were provided through a video synchronous discussion virtually to substitute for in-person clinic visit. Patient and provider were located at their individual homes. --Steve Tim MD on 1/13/2021 at 11:58 AM    An electronic signature was used to authenticate this note.

## 2021-01-14 ENCOUNTER — OFFICE VISIT (OUTPATIENT)
Dept: PRIMARY CARE CLINIC | Age: 61
End: 2021-01-14
Payer: COMMERCIAL

## 2021-01-14 DIAGNOSIS — Z20.828 EXPOSURE TO SARS-ASSOCIATED CORONAVIRUS: Primary | ICD-10-CM

## 2021-01-14 LAB
INFLUENZA A ANTIGEN, POC: NORMAL
INFLUENZA B ANTIGEN, POC: NORMAL

## 2021-01-14 PROCEDURE — 87804 INFLUENZA ASSAY W/OPTIC: CPT | Performed by: NURSE PRACTITIONER

## 2021-01-14 PROCEDURE — 99211 OFF/OP EST MAY X REQ PHY/QHP: CPT | Performed by: NURSE PRACTITIONER

## 2021-01-14 NOTE — PATIENT INSTRUCTIONS
You have received a viral test for COVID-19. Below is education on quarantine per the CDC guidelines. For any symptoms, seek care from your PCP, call [phone] to establish care with a doctor, or go directly to an urgent care or the emergency room. Test results will take 2-7 days and will be sent to you in your [employer] account. If you test positive, you will be contacted via phone. If you test negative, the ONLY communication will be through [address] GO TO [employer] [place] AND SIGN UP FOR [employer]  (LOWER LEFT OF THE HOME PAGE)  No test is 100%. If you have symptoms, you should follow the guidance of quarantine as previously stated. You can still be contagious if you have symptoms. Your [facility] will reach out to you if you have a positive result. They will provide you with a return to work date and note. If you were tested for a pre-op, then you should remain in quarantine until your procedure. How do I know if I need to be in quarantine? If you live in a community where COVID-19 is or might be spreading (currently, that is virtually everywhere in the [place])  Be alert for symptoms. Watch for fever, cough, shortness of breath, or other symptoms of COVID-19.  ? Take your temperature if symptoms develop. ? Practice social distancing. Maintain 6 feet of distance from others and stay out of crowded places. ? Follow CDC guidance if symptoms develop. If you feel healthy but:  ? Recently had close contact with a person with COVID-19 you need to Quarantine:  ? Stay home until 14 days after your last exposure. ? Check your temperature twice a day and watch for symptoms of COVID-19.  ? If possible, stay away from people who are at higher-risk for getting very sick from COVID-19. Stay Home and Monitor Your Health if you:  ? Have been diagnosed with COVID-19, or  ? Are waiting for test results, or  ?  Have cough, fever, or shortness of breath, or symptoms of COVID-19 When You Can be Around Others After You Had or Likely Had COVID-19     If you have or think you might have COVID-19, it is important to stay home and away from other people. Staying away from others helps stop the spread of COVID-19. If you have an emergency warning sign (including trouble breathing), get emergency medical care immediately. When you can be around others (end home isolation) depends on different factors for different situations. Find CDC's recommendations for your situation below. I think or know I had COVID-19, and I had symptoms  You can be with others after  ? 3 days with no fever and  ? Respiratory symptoms have improved (e.g. cough, shortness of breath) and  ? 10 days since symptoms first appeared  Depending on your healthcare provider's advice and availability of testing, you might get tested to see if you still have COVID-19. If you will be tested, you can be around others when you have no fever, respiratory symptoms have improved, and you receive two negative test results in a row, at least 24 hours apart. I tested positive for COVID-19 but had no symptoms  If you continue to have no symptoms, you can be with others after:  ? 10 days have passed since test or 14 days since your exposure test   Depending on your healthcare provider's advice and availability of testing, you might get tested to see if you still have COVID-19. If you will be tested, you can be around others after you receive two negative test results in a row, at least 24 hours apart. If you develop symptoms after testing positive, follow the guidance above for I think or know I had COVID, and I had symptoms.   For Anyone Who Has Been Around a Person with COVID-19  It is important to remember that anyone who has close contact with someone with COVID-19 should stay home for 14 days after exposure based on the time it takes to develop illness. Testing is not necessary.     www.cdc.gov/coronavirus/2019-ncov/index.html

## 2021-01-14 NOTE — PROGRESS NOTES
Yohan Davis received a viral test for COVID-19 and Influenza. They were educated on isolation and quarantine as appropriate. For any symptoms, they were directed to seek care from their PCP, given contact information to establish with a doctor, directed to an urgent care or the emergency room.

## 2021-01-15 LAB — SARS-COV-2, NAA: NOT DETECTED

## 2021-01-18 ENCOUNTER — TELEPHONE (OUTPATIENT)
Dept: FAMILY MEDICINE CLINIC | Age: 61
End: 2021-01-18

## 2021-01-18 ENCOUNTER — TELEPHONE (OUTPATIENT)
Dept: PULMONOLOGY | Age: 61
End: 2021-01-18

## 2021-01-18 DIAGNOSIS — R53.83 FATIGUE, UNSPECIFIED TYPE: ICD-10-CM

## 2021-01-18 DIAGNOSIS — R19.7 ACUTE DIARRHEA: ICD-10-CM

## 2021-01-18 DIAGNOSIS — R74.8 ELEVATED ALKALINE PHOSPHATASE LEVEL: Primary | ICD-10-CM

## 2021-01-18 DIAGNOSIS — D50.9 MICROCYTIC ANEMIA: ICD-10-CM

## 2021-01-18 NOTE — TELEPHONE ENCOUNTER
PT CALLING SHE STATES SHE IS WAITING FOR DR. MORELAND TO CALL HER ABOUT HER LAB WORK FROM September - RE:  Jenifer ROMO     PT @  [phone]

## 2021-01-18 NOTE — TELEPHONE ENCOUNTER
Dr. Dave Marinelli is scheduled to see you on 1/19/2021. She had a COVID test 1/11 & 1/14 both negative but said that she is still feeling sick. She is asking for a video visit tomorrow or do you want her to reschedule?

## 2021-01-18 NOTE — TELEPHONE ENCOUNTER
Flu testing/ covid testing negative  Was anemic - hgb 11.8 in September. Having diarrhea since Saturday - still w/ ha and feeling very tired  Check labs including eval for elevated alk phos after review of previous labs  F/u pending results.

## 2021-01-19 ENCOUNTER — VIRTUAL VISIT (OUTPATIENT)
Dept: PULMONOLOGY | Age: 61
End: 2021-01-19
Payer: COMMERCIAL

## 2021-01-19 DIAGNOSIS — R19.7 ACUTE DIARRHEA: ICD-10-CM

## 2021-01-19 DIAGNOSIS — D50.9 MICROCYTIC ANEMIA: ICD-10-CM

## 2021-01-19 DIAGNOSIS — R06.02 SOB (SHORTNESS OF BREATH): Primary | ICD-10-CM

## 2021-01-19 DIAGNOSIS — R74.8 ELEVATED ALKALINE PHOSPHATASE LEVEL: ICD-10-CM

## 2021-01-19 DIAGNOSIS — R53.83 FATIGUE, UNSPECIFIED TYPE: ICD-10-CM

## 2021-01-19 LAB
A/G RATIO: 1.4 (ref 1.1–2.2)
ALBUMIN SERPL-MCNC: 4.1 G/DL (ref 3.4–5)
ALP BLD-CCNC: 190 U/L (ref 40–129)
ALT SERPL-CCNC: 17 U/L (ref 10–40)
ANION GAP SERPL CALCULATED.3IONS-SCNC: 15 MMOL/L (ref 3–16)
AST SERPL-CCNC: 19 U/L (ref 15–37)
BASOPHILS ABSOLUTE: 0.1 K/UL (ref 0–0.2)
BASOPHILS RELATIVE PERCENT: 1.5 %
BILIRUB SERPL-MCNC: 0.3 MG/DL (ref 0–1)
BUN BLDV-MCNC: 10 MG/DL (ref 7–20)
CALCIUM SERPL-MCNC: 9.8 MG/DL (ref 8.3–10.6)
CHLORIDE BLD-SCNC: 101 MMOL/L (ref 99–110)
CO2: 22 MMOL/L (ref 21–32)
CREAT SERPL-MCNC: 0.9 MG/DL (ref 0.6–1.1)
EOSINOPHILS ABSOLUTE: 0.1 K/UL (ref 0–0.6)
EOSINOPHILS RELATIVE PERCENT: 4.3 %
FERRITIN: 19.5 NG/ML (ref 15–150)
GFR AFRICAN AMERICAN: >60
GFR NON-AFRICAN AMERICAN: >60
GLOBULIN: 2.9 G/DL
GLUCOSE BLD-MCNC: 89 MG/DL (ref 70–99)
HCT VFR BLD CALC: 36.5 % (ref 36–48)
HEMOGLOBIN: 11.9 G/DL (ref 12–16)
IRON SATURATION: 10 % (ref 15–50)
IRON: 42 UG/DL (ref 37–145)
LYMPHOCYTES ABSOLUTE: 1.6 K/UL (ref 1–5.1)
LYMPHOCYTES RELATIVE PERCENT: 46.8 %
MAGNESIUM: 2 MG/DL (ref 1.8–2.4)
MCH RBC QN AUTO: 26 PG (ref 26–34)
MCHC RBC AUTO-ENTMCNC: 32.5 G/DL (ref 31–36)
MCV RBC AUTO: 79.9 FL (ref 80–100)
MONOCYTES ABSOLUTE: 0.3 K/UL (ref 0–1.3)
MONOCYTES RELATIVE PERCENT: 9 %
NEUTROPHILS ABSOLUTE: 1.3 K/UL (ref 1.7–7.7)
NEUTROPHILS RELATIVE PERCENT: 38.4 %
PDW BLD-RTO: 15.5 % (ref 12.4–15.4)
PLATELET # BLD: 248 K/UL (ref 135–450)
PMV BLD AUTO: 9 FL (ref 5–10.5)
POTASSIUM SERPL-SCNC: 4.7 MMOL/L (ref 3.5–5.1)
RBC # BLD: 4.56 M/UL (ref 4–5.2)
SODIUM BLD-SCNC: 138 MMOL/L (ref 136–145)
TOTAL IRON BINDING CAPACITY: 418 UG/DL (ref 260–445)
TOTAL PROTEIN: 7 G/DL (ref 6.4–8.2)
TSH REFLEX: 1.74 UIU/ML (ref 0.27–4.2)
WBC # BLD: 3.5 K/UL (ref 4–11)

## 2021-01-19 PROCEDURE — 99213 OFFICE O/P EST LOW 20 MIN: CPT | Performed by: INTERNAL MEDICINE

## 2021-01-19 NOTE — PROGRESS NOTES
Long Larsen is a 62 y.o. female being evaluated by a Virtual Visit (video visit) encounter to address concerns as mentioned above. A caregiver was present when appropriate. Due to this being a TeleHealth encounter (During AVZAH-04 public health emergency), evaluation of the following organ systems was limited: Vitals/Constitutional/EENT/Resp/CV/GI//MS/Neuro/Skin/Heme-Lymph-Imm. Pursuant to the emergency declaration under the [address] [place] [address] and the [employer] Act, this Virtual Visit was conducted with patient's (and/or legal guardian's) consent, to reduce the patient's risk of exposure to COVID-19 and provide necessary medical care. The patient (and/or legal guardian) has also been advised to contact this office for worsening conditions or problems, and seek emergency medical treatment and/or call 911 if deemed necessary. Patient identification was verified at the start of the visit: Yes    Total time spent for this encounter: 23    Services were provided through a video synchronous discussion virtually to substitute for in-person clinic visit. Patient and provider were located at their individual homes. --Christ Leonard MD on 1/19/2021 at 7:39 AM    An electronic signature was used to authenticate this note. REASON FOR CONSULTATION:  Chief Complaint   Patient presents with    Shortness of Breath   Follow-up reactive airway disease    Consult at request of MARINA Yuan CNP     PCP: MARINA Yuan CNP    HISTORY OF PRESENT ILLNESS: Long Larsen is a 62y.o. year old female with a history of obesity, status post bariatric surgery who presents for evaluation of episodic shortness of breath, wheezing, cough.       (-) MCCT, nonatopic Hematological: Negative for adenopathy  Psychiatric/Behavorial: Negative for anxiety    Objective:   PHYSICAL EXAM:  Last menstrual period 06/02/2009, not currently breastfeeding.'  Gen:  No acute distress. Eyes: . EOMI. Anicteric sclera. No conjunctival injection. Neuro: Lavonne Frida Moves all extremities  Psych: Awake and alert, Oriented x 3. Judgement and insight appropriate. Mood stable. Current Outpatient Medications   Medication Sig Dispense Refill    ibuprofen (ADVIL;MOTRIN) 800 MG tablet Take 1 tablet by mouth 3 times daily      omeprazole (PRILOSEC) 20 MG delayed release capsule TAKE 1 CAPSULE BY MOUTH 2 TIMES DAILY (BEFORE MEALS) (Patient not taking: Reported on 1/13/2021) 180 capsule 0    SYMBICORT 80-4.5 MCG/ACT AERO Inhale 2 puffs into the lungs 2 times daily 1 Inhaler 5    albuterol sulfate  (90 Base) MCG/ACT inhaler Inhale 2 puffs into the lungs every 6 hours as needed for Wheezing or Shortness of Breath 1 Inhaler 5    AZELASTINE HCL NA by Nasal route      fluticasone (FLONASE) 50 MCG/ACT nasal spray SPRAY 2 SPRAYS INTO EACH NOSTRIL EVERY DAY 1 Bottle 5    Multiple Vitamins-Minerals (BARIATRIC FUSION) CHEW Take 4 tablets by mouth daily       No current facility-administered medications for this visit. Data Reviewed:   CBC and Renal reviewed  Last CBC  Lab Results   Component Value Date    WBC 4.6 09/30/2020    RBC 4.59 09/30/2020    HGB 11.8 09/30/2020    MCV 79.7 09/30/2020     09/30/2020     Last Renal  Lab Results   Component Value Date     09/30/2020    K 4.4 09/30/2020     09/30/2020    CO2 23 09/30/2020    CO2 24 02/07/2020    CO2 25 08/17/2018    BUN 12 09/30/2020    CREATININE 0.9 09/30/2020    GLUCOSE 83 09/30/2020    CALCIUM 9.7 09/30/2020       Last ABG  POC Blood Gas: No results found for: POCPH, POCPCO2, POCPO2, POCHCO3, NBEA, ZZFR3ADR  No results for input(s): PH, PCO2, PO2, HCO3, BE, O2SAT in the last 72 hours. Radiology Review:  Pertinent images / reports were reviewed as a part of this visit. XR FOOT RIGHT (MIN 3 VIEWS)  Site: [facility] #: [id number] #: [id number]: EMCPERGSAccount #: [de-identified] #: [id number] #: [id number]: XR FOOT RIGHT AP LATERAL AND OBLIQUEExam Date/Time: 10/02/2020 11:00 AMAdmitting Diagnosis: post opReason for   Exam: post op  Dictated by: Ellie Juares KYM: 10/02/2020 02:39 PMT: This document is confidential medical information. Unauthorized disclosure or use of this information is prohibited by law. If you are not the intended recipient of this document, please advise us by   calling immediately [phone]. Impression/Conclusion below    HISTORY:  post op  post op  COMPARISON: None  NOTE:  If there are questions about the content of this report, please contact [facility]   radiology by calling [phone]    FINDINGS:                   BONES:  Corrective osteotomy at the first metatarsal and second metatarsal  JOINTS:  Unremarkable. No evidence of significant joint space narrowing, spurring, or   malalignment  SOFT TISSUES:  Unremarkable  OTHER:  None      IMPRESSION:       Status post corrective osteotomy of the first and second metatarsal      SIGNED BY: Caridad Fang MD on 10/2/2020  2:36 PM      [facility] Imaging Report - [address] [facility],[address] [place] [address] [phone] - [address] [facility] [address]: [phone]    XR FOOT RIGHT (2 VIEWS)  Site: [facility] #: [id number] #: [id number]: Laurel Bishop #: [de-identified] #: [id number] #: [id number]: XR FOOT RIGHT AP AND LATERALExam Date/Time: 10/02/2020 07:10 AMAdmitting Diagnosis: RIGHT FOOT SURGERYReason   for Exam: RIGHT FOOT SURGERY  Dictated by: Omer Bhatia KYM: 10/02/2020 11:20 AMT: This document is confidential medical information. Unauthorized disclosure or use of this information is prohibited by law. If you are not the intended recipient of this document, please advise us by calling immediately [phone]. Impression/Conclusion below    HISTORY:  RIGHT FOOT SURGERY. intraop right foot . COMPARISON:  None. Fluoroscopy time: 7 seconds    FINDINGS:    Single intraoperative fluoroscopic image of the foot used for localization and treatment   planning during surgery. IMPRESSION:    Intraoperative fluoroscopy during right foot surgery. Please see operative report for details. SIGNED BY: Amita Mcduffie MD on 10/2/2020 11:17 AM      [address] [facility] [phone] -  [address] [facility]: [phone]          Pulmonary function testing  12/2016  IMPRESSION:  Normal pulmonary function testing with the exception of moderate  reduction in diffusing capacity. 2019  -MCCT. No evidence of COPD, no BDR.       Assessment/Plan:       Diagnosis Orders   1. SOB (shortness of breath)           Problem List Items Addressed This Visit     SOB (shortness of breath) - Primary     -not very consistent with asthma, though clearly has some occupational exposure that was causing her symptoms.  -considering VCD versus RADS. -doing well off of scheduled symbicort. With recent viral URI she has needed to use again.  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 RTC in 1 year    I spent 19 minutes with the patient, >50% was face-to-face in discussion of the diagnosis and the coordination of care in regards to the treatment plan. All questions answered. --Kecia Wright MD on 1/19/2021 at 7:39 AM    An electronic signature was used to authenticate this note.

## 2021-01-20 LAB — VITAMIN B-12: 389 PG/ML (ref 211–911)

## 2021-01-21 DIAGNOSIS — D50.9 IRON DEFICIENCY ANEMIA, UNSPECIFIED IRON DEFICIENCY ANEMIA TYPE: Primary | ICD-10-CM

## 2021-01-21 LAB
ALK PHOS OTHER CALC: 0 U/L
ALK PHOSPHATASE: 197 U/L (ref 40–120)
ALKALINE PHOSPHATASE BONE FRACTION: 85 U/L (ref 0–55)
ALKALINE PHOSPHATASE LIVER FRACTION: 112 U/L (ref 0–94)

## 2021-01-22 DIAGNOSIS — R74.8 ELEVATED ALKALINE PHOSPHATASE LEVEL: Primary | ICD-10-CM

## 2021-02-12 ENCOUNTER — OFFICE VISIT (OUTPATIENT)
Dept: FAMILY MEDICINE CLINIC | Age: 61
End: 2021-02-12
Payer: COMMERCIAL

## 2021-02-12 VITALS
SYSTOLIC BLOOD PRESSURE: 116 MMHG | RESPIRATION RATE: 18 BRPM | OXYGEN SATURATION: 98 % | DIASTOLIC BLOOD PRESSURE: 74 MMHG | WEIGHT: 293 LBS | BODY MASS INDEX: 55.32 KG/M2 | TEMPERATURE: 97.8 F | HEART RATE: 68 BPM | HEIGHT: 61 IN

## 2021-02-12 DIAGNOSIS — Z01.818 PRE-OP EXAM: ICD-10-CM

## 2021-02-12 DIAGNOSIS — M21.612 BUNION OF GREAT TOE OF LEFT FOOT: Primary | ICD-10-CM

## 2021-02-12 DIAGNOSIS — R73.03 PRE-DIABETES: ICD-10-CM

## 2021-02-12 DIAGNOSIS — R74.8 ELEVATED ALKALINE PHOSPHATASE LEVEL: ICD-10-CM

## 2021-02-12 LAB
A/G RATIO: 1.3 (ref 1.1–2.2)
ALBUMIN SERPL-MCNC: 4.2 G/DL (ref 3.4–5)
ALP BLD-CCNC: 215 U/L (ref 40–129)
ALT SERPL-CCNC: 21 U/L (ref 10–40)
ANION GAP SERPL CALCULATED.3IONS-SCNC: 13 MMOL/L (ref 3–16)
AST SERPL-CCNC: 19 U/L (ref 15–37)
BASOPHILS ABSOLUTE: 0 K/UL (ref 0–0.2)
BASOPHILS RELATIVE PERCENT: 0.8 %
BILIRUB SERPL-MCNC: 0.4 MG/DL (ref 0–1)
BILIRUBIN, POC: NORMAL
BLOOD URINE, POC: NORMAL
BUN BLDV-MCNC: 9 MG/DL (ref 7–20)
CALCIUM SERPL-MCNC: 9.9 MG/DL (ref 8.3–10.6)
CHLORIDE BLD-SCNC: 102 MMOL/L (ref 99–110)
CLARITY, POC: CLEAR
CO2: 23 MMOL/L (ref 21–32)
COLOR, POC: YELLOW
CREAT SERPL-MCNC: 0.7 MG/DL (ref 0.6–1.1)
EOSINOPHILS ABSOLUTE: 0.2 K/UL (ref 0–0.6)
EOSINOPHILS RELATIVE PERCENT: 3.6 %
GFR AFRICAN AMERICAN: >60
GFR NON-AFRICAN AMERICAN: >60
GLOBULIN: 3.2 G/DL
GLUCOSE BLD-MCNC: 86 MG/DL (ref 70–99)
GLUCOSE URINE, POC: NORMAL
HCT VFR BLD CALC: 37.1 % (ref 36–48)
HEMOGLOBIN: 12 G/DL (ref 12–16)
KETONES, POC: NORMAL
LEUKOCYTE EST, POC: NORMAL
LYMPHOCYTES ABSOLUTE: 2.1 K/UL (ref 1–5.1)
LYMPHOCYTES RELATIVE PERCENT: 48.3 %
MCH RBC QN AUTO: 25.8 PG (ref 26–34)
MCHC RBC AUTO-ENTMCNC: 32.3 G/DL (ref 31–36)
MCV RBC AUTO: 80 FL (ref 80–100)
MONOCYTES ABSOLUTE: 0.4 K/UL (ref 0–1.3)
MONOCYTES RELATIVE PERCENT: 8.8 %
NEUTROPHILS ABSOLUTE: 1.6 K/UL (ref 1.7–7.7)
NEUTROPHILS RELATIVE PERCENT: 38.5 %
NITRITE, POC: NORMAL
PDW BLD-RTO: 15.2 % (ref 12.4–15.4)
PH, POC: 6
PLATELET # BLD: 273 K/UL (ref 135–450)
PMV BLD AUTO: 9.4 FL (ref 5–10.5)
POTASSIUM SERPL-SCNC: 4.5 MMOL/L (ref 3.5–5.1)
PROTEIN, POC: NORMAL
RBC # BLD: 4.64 M/UL (ref 4–5.2)
SODIUM BLD-SCNC: 138 MMOL/L (ref 136–145)
SPECIFIC GRAVITY, POC: 1.02
TOTAL PROTEIN: 7.4 G/DL (ref 6.4–8.2)
UROBILINOGEN, POC: 0.2
WBC # BLD: 4.3 K/UL (ref 4–11)

## 2021-02-12 PROCEDURE — 99214 OFFICE O/P EST MOD 30 MIN: CPT | Performed by: NURSE PRACTITIONER

## 2021-02-12 PROCEDURE — 81002 URINALYSIS NONAUTO W/O SCOPE: CPT | Performed by: NURSE PRACTITIONER
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
## 2021-02-13 LAB
ESTIMATED AVERAGE GLUCOSE: 108.3 MG/DL
HBA1C MFR BLD: 5.4 %

## 2021-03-10 RX ORDER — OXYCODONE HYDROCHLORIDE AND ACETAMINOPHEN 5; 325 MG/1; MG/1
1 TABLET ORAL EVERY 4 HOURS PRN
COMMUNITY
End: 2021-06-02

## 2021-03-10 NOTE — PROGRESS NOTES
Patient _X__ reached   _____not reached-preop instructions left on voice mail_____________      DATE__3/18/21______ TIME__0800______ARRIVAL__0630  FEC_______      Nothing to eat or drink after midnight the night before,except for what the prep instructions call for. If you do not have the instructions or do not understand them please contact your doctors office. Follow any instructions your doctors office has given you including what medications to take the AM of your procedure and which ones to hold. You may use your inhalers. If you take a long acting insulin the iggy prior please cut the dose in half and take no diabetic medications that AM.Follow specific doctors office instructions regarding blood thinners and if they want you to hold and for how long. If you are on a blood thinner and have no instructions please contact the office and ask. Dress comfortably,bring your insurance card,picture ID,and a complete list of medications, including supplements. You must have a responsible adult to stay with you during the procedure,drive you home and stay with you. [facility] phone number [phone] for any questions. OTHER INTRUCTIONS(if applicable)___take inhalers prn am of procedure______________________________________________________      COVID TEST         _____ Done ___ where ____       __X___ Scheduled 3/12/21_ where _MFF___       _____Other_________________      [place] POLICY(subject to change)    There is a one visitor policy at [facility] for all surgeries and endoscopies. Whether the visitor can stay or will be asked to wait in the car will depend on the current policy and if social distancing can be maintained. The policy is subject to change at any time. Please make sure the visitor has a cell phone that is on,charged and able to accept calls, as this may be the way that the staff communicates with them. Pain management is NO VISITOR policyThe patients ride is expected to remain in the car with a cell phone for communication. If the ride is leaving the hospital grounds please make sure they are back in time for pickup. Have the patient inform the staff on arrival what their rides plans are while the patient is in the facility. At the MAIN there is one visitor allowed. Please note that the visitor policy is subject to change.

## 2021-03-12 ENCOUNTER — OFFICE VISIT (OUTPATIENT)
Dept: PRIMARY CARE CLINIC | Age: 61
End: 2021-03-12
Payer: COMMERCIAL

## 2021-03-12 DIAGNOSIS — Z20.828 EXPOSURE TO SARS-ASSOCIATED CORONAVIRUS: Primary | ICD-10-CM

## 2021-03-12 PROCEDURE — 99211 OFF/OP EST MAY X REQ PHY/QHP: CPT | Performed by: NURSE PRACTITIONER

## 2021-03-13 LAB — SARS-COV-2: NOT DETECTED

## 2021-03-18 ENCOUNTER — ANESTHESIA EVENT (OUTPATIENT)
Dept: ENDOSCOPY | Age: 61
End: 2021-03-18
Payer: COMMERCIAL

## 2021-03-18 ENCOUNTER — HOSPITAL ENCOUNTER (OUTPATIENT)
Age: 61
Setting detail: OUTPATIENT SURGERY
Discharge: HOME | End: 2021-03-18
Attending: INTERNAL MEDICINE | Admitting: INTERNAL MEDICINE
Payer: COMMERCIAL

## 2021-03-18 ENCOUNTER — ANESTHESIA (OUTPATIENT)
Dept: ENDOSCOPY | Age: 61
End: 2021-03-18
Payer: COMMERCIAL

## 2021-03-18 VITALS
SYSTOLIC BLOOD PRESSURE: 143 MMHG | WEIGHT: 293 LBS | OXYGEN SATURATION: 99 % | RESPIRATION RATE: 16 BRPM | DIASTOLIC BLOOD PRESSURE: 87 MMHG | TEMPERATURE: 97.3 F | HEART RATE: 81 BPM | BODY MASS INDEX: 55.32 KG/M2 | HEIGHT: 61 IN

## 2021-03-18 VITALS — OXYGEN SATURATION: 100 % | SYSTOLIC BLOOD PRESSURE: 218 MMHG | DIASTOLIC BLOOD PRESSURE: 170 MMHG

## 2021-03-18 PROCEDURE — 2709999900 HC NON-CHARGEABLE SUPPLY: Performed by: INTERNAL MEDICINE

## 2021-03-18 PROCEDURE — 2500000003 HC RX 250 WO HCPCS: Performed by: INTERNAL MEDICINE

## 2021-03-18 PROCEDURE — 3700000001 HC ADD 15 MINUTES (ANESTHESIA): Performed by: INTERNAL MEDICINE

## 2021-03-18 PROCEDURE — 2580000003 HC RX 258: Performed by: INTERNAL MEDICINE

## 2021-03-18 PROCEDURE — 6360000002 HC RX W HCPCS: Performed by: NURSE ANESTHETIST, CERTIFIED REGISTERED

## 2021-03-18 PROCEDURE — 7100000011 HC PHASE II RECOVERY - ADDTL 15 MIN: Performed by: INTERNAL MEDICINE

## 2021-03-18 PROCEDURE — 2500000003 HC RX 250 WO HCPCS: Performed by: NURSE ANESTHETIST, CERTIFIED REGISTERED

## 2021-03-18 PROCEDURE — 2580000003 HC RX 258: Performed by: NURSE ANESTHETIST, CERTIFIED REGISTERED

## 2021-03-18 PROCEDURE — 3609027000 HC COLONOSCOPY: Performed by: INTERNAL MEDICINE

## 2021-03-18 PROCEDURE — 6360000002 HC RX W HCPCS: Performed by: ANESTHESIOLOGY

## 2021-03-18 PROCEDURE — 3700000000 HC ANESTHESIA ATTENDED CARE: Performed by: INTERNAL MEDICINE

## 2021-03-18 PROCEDURE — 6370000000 HC RX 637 (ALT 250 FOR IP): Performed by: INTERNAL MEDICINE

## 2021-03-18 PROCEDURE — 7100000010 HC PHASE II RECOVERY - FIRST 15 MIN: Performed by: INTERNAL MEDICINE

## 2021-03-18 PROCEDURE — 3609017100 HC EGD: Performed by: INTERNAL MEDICINE

## 2021-03-18 RX ORDER — SODIUM CHLORIDE 9 MG/ML
INJECTION, SOLUTION INTRAVENOUS CONTINUOUS PRN
Status: DISCONTINUED | OUTPATIENT
Start: 2021-03-18 | End: 2021-03-18 | Stop reason: SDUPTHER

## 2021-03-18 RX ORDER — ALBUTEROL SULFATE 2.5 MG/3ML
2.5 SOLUTION RESPIRATORY (INHALATION) ONCE
Status: COMPLETED | OUTPATIENT
Start: 2021-03-18 | End: 2021-03-18

## 2021-03-18 RX ORDER — SODIUM CHLORIDE 9 MG/ML
INJECTION, SOLUTION INTRAVENOUS CONTINUOUS
Status: DISCONTINUED | OUTPATIENT
Start: 2021-03-18 | End: 2021-03-18 | Stop reason: HOSPADM

## 2021-03-18 RX ORDER — ONDANSETRON 2 MG/ML
4 INJECTION INTRAMUSCULAR; INTRAVENOUS
Status: DISCONTINUED | OUTPATIENT
Start: 2021-03-18 | End: 2021-03-18 | Stop reason: HOSPADM

## 2021-03-18 RX ORDER — ONDANSETRON 2 MG/ML
INJECTION INTRAMUSCULAR; INTRAVENOUS PRN
Status: DISCONTINUED | OUTPATIENT
Start: 2021-03-18 | End: 2021-03-18 | Stop reason: SDUPTHER

## 2021-03-18 RX ORDER — LIDOCAINE HYDROCHLORIDE 10 MG/ML
1 INJECTION, SOLUTION EPIDURAL; INFILTRATION; INTRACAUDAL; PERINEURAL
Status: DISCONTINUED | OUTPATIENT
Start: 2021-03-18 | End: 2021-03-18 | Stop reason: HOSPADM

## 2021-03-18 RX ORDER — LIDOCAINE HYDROCHLORIDE 20 MG/ML
INJECTION, SOLUTION INFILTRATION; PERINEURAL PRN
Status: DISCONTINUED | OUTPATIENT
Start: 2021-03-18 | End: 2021-03-18 | Stop reason: SDUPTHER

## 2021-03-18 RX ORDER — PROPOFOL 10 MG/ML
INJECTION, EMULSION INTRAVENOUS PRN
Status: DISCONTINUED | OUTPATIENT
Start: 2021-03-18 | End: 2021-03-18 | Stop reason: SDUPTHER

## 2021-03-18 RX ORDER — HYDROCODONE BITARTRATE AND ACETAMINOPHEN 5; 325 MG/1; MG/1
1 TABLET ORAL
Status: DISCONTINUED | OUTPATIENT
Start: 2021-03-18 | End: 2021-03-18 | Stop reason: HOSPADM

## 2021-03-18 RX ADMIN — PROPOFOL 40 MG: 10 INJECTION, EMULSION INTRAVENOUS at 08:12

## 2021-03-18 RX ADMIN — ALBUTEROL SULFATE 2.5 MG: 2.5 SOLUTION RESPIRATORY (INHALATION) at 07:44

## 2021-03-18 RX ADMIN — PROPOFOL 50 MG: 10 INJECTION, EMULSION INTRAVENOUS at 08:00

## 2021-03-18 RX ADMIN — ONDANSETRON 4 MG: 2 INJECTION INTRAMUSCULAR; INTRAVENOUS at 07:55

## 2021-03-18 RX ADMIN — PROPOFOL 50 MG: 10 INJECTION, EMULSION INTRAVENOUS at 08:05

## 2021-03-18 RX ADMIN — PROPOFOL 40 MG: 10 INJECTION, EMULSION INTRAVENOUS at 08:07

## 2021-03-18 RX ADMIN — SODIUM CHLORIDE: 9 INJECTION, SOLUTION INTRAVENOUS at 07:54

## 2021-03-18 RX ADMIN — PROPOFOL 40 MG: 10 INJECTION, EMULSION INTRAVENOUS at 08:20

## 2021-03-18 RX ADMIN — SODIUM CHLORIDE: 9 INJECTION, SOLUTION INTRAVENOUS at 07:00

## 2021-03-18 RX ADMIN — PROPOFOL 40 MG: 10 INJECTION, EMULSION INTRAVENOUS at 08:16

## 2021-03-18 RX ADMIN — PROPOFOL 100 MG: 10 INJECTION, EMULSION INTRAVENOUS at 07:56

## 2021-03-18 RX ADMIN — LIDOCAINE HYDROCHLORIDE 100 MG: 20 INJECTION, SOLUTION INFILTRATION; PERINEURAL at 07:56

## 2021-03-18 ASSESSMENT — PAIN SCALES - GENERAL
PAINLEVEL_OUTOF10: 0
PAINLEVEL_OUTOF10: 0

## 2021-03-18 NOTE — H&P
Gastroenterology Note             Pre-operative History and Physical    Patient: Geraldo April  : 1960  CSN:     History Obtained From:  patient and/or guardian. HISTORY OF PRESENT ILLNESS:    The patient is a 62 y.o. female  here for colonoscopy. Past Medical History:    Past Medical History:   Diagnosis Date    Abnormal antibody titer     Hepatitis C      Chronic gastritis     Esophagitis     Fatty liver     GERD (gastroesophageal reflux disease)     Hiatal hernia     History of atrial tachycardia     Before gastric sleeve    Hx of hiatal hernia     repaired with gastric sleeve surgery    Hypertension     currently not medically treated    Internal hemorrhoid, bleeding     pt states currently not having any problems with hemorrhoids    Obstructive sleep apnea syndrome 2017    New diagnosis needing treatment CPAP set at 17    PONV (postoperative nausea and vomiting)     Wears glasses      Past Surgical History:    Past Surgical History:   Procedure Laterality Date    ABDOMEN SURGERY N/A 2017    Robotic assisted lapascopic gastric sleeve performed at [facility] by MD ZARA Paige BUNIONECTOMY Bilateral 10/02/2020, 21    CHOLECYSTECTOMY      LAPROSCOPIC    ENDOSCOPY, COLON, DIAGNOSTIC      HEMORRHOID SURGERY      HYSTERECTOMY  2009    Total     OTHER SURGICAL HISTORY  2017    EGD    TONSILLECTOMY      TUBAL LIGATION      UTERINE FIBROID EMBOLIZATION       Medications Prior to Admission:   No current facility-administered medications on file prior to encounter. Current Outpatient Medications on File Prior to Encounter   Medication Sig Dispense Refill    oxyCODONE-acetaminophen (PERCOCET) 5-325 MG per tablet Take 1 tablet by mouth every 4 hours as needed for Pain.       ibuprofen (ADVIL;MOTRIN) 800 MG tablet Take 1 tablet by mouth 3 times daily      AZELASTINE HCL NA by Nasal route as needed       fluticasone (FLONASE) 50 MCG/ACT nasal spray SPRAY 2 SPRAYS INTO EACH NOSTRIL EVERY DAY (Patient taking differently: as needed ) 1 Bottle 5    omeprazole (PRILOSEC) 20 MG delayed release capsule TAKE 1 CAPSULE BY MOUTH 2 TIMES DAILY (BEFORE MEALS) (Patient taking differently: Take 20 mg by mouth as needed ) 180 capsule 0    SYMBICORT 80-4.5 MCG/ACT AERO Inhale 2 puffs into the lungs 2 times daily (Patient taking differently: Inhale 2 puffs into the lungs as needed ) 1 Inhaler 5    albuterol sulfate  (90 Base) MCG/ACT inhaler Inhale 2 puffs into the lungs every 6 hours as needed for Wheezing or Shortness of Breath 1 Inhaler 5    Multiple Vitamins-Minerals (BARIATRIC FUSION) CHEW Take 4 tablets by mouth daily          Allergies:  Latex      Social History:   Social History     Tobacco Use    Smoking status: Never Smoker    Smokeless tobacco: Never Used   Substance Use Topics    Alcohol use: No     Family History:   Family History   Problem Relation Age of Onset    High Blood Pressure Mother     Diabetes Father     High Blood Pressure Father     High Blood Pressure Maternal Grandmother     Stroke Maternal Grandmother 80         from stroke    Arthritis Maternal Grandmother     Vision Loss Maternal Grandmother         Glaucoma    High Blood Pressure Sister     Asthma Sister     Heart Attack Sister 37        HEART ATTACK, POSSIBLE INFECTION IN PORT       PHYSICAL EXAM:      BP (!) 156/85 Comment: GERMÁN  Pulse 75   Temp 97.2 °F (36.2 °C) (Temporal)   Resp 16   Ht 5' 1\" (1.549 m)   Wt 293 lb (132.9 kg)   LMP 2009   SpO2 100%   BMI 55.36 kg/m²  I        Heart:   RRR, normal s1s2    Lungs:  CTA bilat,  Normal effort    Abdomen:   NT, ND      ASA Grade:  ASA 2 - Patient with mild systemic disease with no functional limitations    Mallampati Class:  Class I: Soft palate, uvula, fauces, pillars visible  __________  Class II: Soft palate, uvula, fauces visible  __________   Class III: Soft palate, base of uvula visible  __________  Class IV: Hard palate only visible   __________        ASSESSMENT AND PLAN:    1. Patient is a 62 y.o. female here for colonoscopy with MAC.   2.  Procedure options, risks and benefits reviewed with patient. Patient expresses understanding.

## 2021-03-18 NOTE — PROGRESS NOTES
Reviewed patient's medical and surgical history in electronic record and with patient at the bedside. All questions regarding procedure answered. Scope number and equipment verified using a two person system.      Electronically signed by Eloisa Mcdaniel RN on 3/18/2021 at 7:56 AM

## 2021-03-18 NOTE — ANESTHESIA POSTPROCEDURE EVALUATION
Department of Anesthesiology  Postprocedure Note    Patient: Lorelei Robles  MRN: [id number]  YOB: 1960  Date of evaluation: 3/18/2021  Time:  9:08 AM     Procedure Summary     Date: 03/18/21 Room / Location: [address] [place] [address],Building [facility]    Anesthesia Start: 8751 Anesthesia Stop: 7757    Procedures:       COLONOSCOPY DIAGNOSTIC (N/A )      EGD DIAGNOSTIC ONLY (N/A Abdomen) Diagnosis: (ANEMIA D64.9)    Surgeons: Odilia Engle MD Responsible Provider: Corinne Polka, MD    Anesthesia Type: MAC ASA Status: 3          Anesthesia Type: MAC    Flash Phase I: Flash Score: 10    Flash Phase II: Flash Score: 10    Last vitals: Reviewed and per EMR flowsheets.        Anesthesia Post Evaluation    Patient location during evaluation: PACU  Patient participation: complete - patient participated  Level of consciousness: awake  Airway patency: patent  Nausea & Vomiting: no vomiting  Complications: no  Cardiovascular status: hemodynamically stable  Respiratory status: acceptable  Hydration status: euvolemic

## 2021-03-18 NOTE — ANESTHESIA PRE PROCEDURE
Department of Anesthesiology  Preprocedure Note       Name:  Yordy Gonzalez   Age:  62 y.o.  :  1960                                          MRN:  [id number]         Date:  3/18/2021      Surgeon: Darlyn Tobar):  Mariluz Sales MD    Procedure: Procedure(s):  COLONOSCOPY DIAGNOSTIC  EGD DIAGNOSTIC ONLY    Medications prior to admission:   Prior to Admission medications    Medication Sig Start Date End Date Taking? Authorizing Provider   oxyCODONE-acetaminophen (PERCOCET) 5-325 MG per tablet Take 1 tablet by mouth every 4 hours as needed for Pain.    Yes Historical Provider, MD   ibuprofen (ADVIL;MOTRIN) 800 MG tablet Take 1 tablet by mouth 3 times daily 20   Historical Provider, MD   omeprazole (PRILOSEC) 20 MG delayed release capsule TAKE 1 CAPSULE BY MOUTH 2 TIMES DAILY (BEFORE MEALS)  Patient taking differently: Take 20 mg by mouth as needed  21   Sam Hubbard MD   SYMBICORT 80-4.5 MCG/ACT AERO Inhale 2 puffs into the lungs 2 times daily  Patient taking differently: Inhale 2 puffs into the lungs as needed  20   MARINA Hernandez CNP   albuterol sulfate  (90 Base) MCG/ACT inhaler Inhale 2 puffs into the lungs every 6 hours as needed for Wheezing or Shortness of Breath 20   Sam Hubbard MD   AZELASTINE HCL NA by Nasal route as needed     Historical Provider, MD   fluticasone (FLONASE) 50 MCG/ACT nasal spray SPRAY 2 SPRAYS INTO EACH NOSTRIL EVERY DAY  Patient taking differently: as needed  19   MARINA Morillo CNP   Multiple Vitamins-Minerals (BARIATRIC FUSION) CHEW Take 4 tablets by mouth daily    Historical Provider, MD       Current medications:    Current Facility-Administered Medications   Medication Dose Route Frequency Provider Last Rate Last Admin    0.9 % sodium chloride infusion   Intravenous Continuous Mariluz Sales MD        HYDROcodone-acetaminophen (NORCO) 5-325 MG per tablet 1 tablet  1 tablet Oral Once PRN Katerine Agrawal MD       [facility] ondansetron (ZOFRAN) injection 4 mg  4 mg Intravenous Once PRN Scotty Gomez MD           Allergies:  No Known Allergies    Problem List:    Patient Active Problem List   Diagnosis Code    Hypertension I10    Tinea pedis B35.3    Right knee pain M25.561    Positive hepatitis C antibody test R76.8    Internal hemorrhoid, bleeding K64.8    Cholecystitis K81.9    Fatty liver K76.0    Morbid obesity with BMI of 60.0-69.9, adult ([facility] [address] E66.01, Z68.44    SOB (shortness of breath) R06.02    Morbid obesity (HCC) E66.01    Hiatal hernia with gastroesophageal reflux disease and esophagitis K44.9, K21.00    GERD with esophagitis K21.00    Superficial gastritis without hemorrhage K29.30    Hiatal hernia K44.9    Obstructive sleep apnea G47.33    Morbid obesity with BMI of 45.0-49.9, adult (HCC) E66.01, Z68.42    S/P laparoscopic sleeve gastrectomy Z98.84    Atrial tachycardia (HCC) I47.1    Premature atrial beats I49.1    Sebaceous cyst L72.3    Reactive airway disease J45.909    Hoarseness of voice R49.0       Past Medical History:        Diagnosis Date    Chronic gastritis     Esophagitis     Fatty liver     GERD (gastroesophageal reflux disease)     Hiatal hernia     Hypertension     currently not medically treated    Internal hemorrhoid, bleeding     pt states currently not having any problems with hemorrhoids    Obstructive sleep apnea syndrome 04/21/2017    New diagnosis needing treatment CPAP set at 17    PONV (postoperative nausea and vomiting)     Wears glasses        Past Surgical History:        Procedure Laterality Date    ABDOMEN SURGERY N/A 08/29/2017    Robotic assisted lapascopic gastric sleeve performed at [facility] by MD ZARA Natarajan Spar BUNIONECTOMY Bilateral 10/02/2020, 2/26/21    CHOLECYSTECTOMY  2015    LAPROSCOPIC    ENDOSCOPY, COLON, DIAGNOSTIC      HEMORRHOID SURGERY  2001    HYSTERECTOMY  JUNE 2009    Total     OTHER SURGICAL HISTORY  02/17/2017    EGD    Results   Component Value Date    HEPCAB  04/16/2013     REACTIVE Screen-Confirmation with Hep C RIBA not available. Depending on clinical history HCV RNA Quant RT PCR ([id number]) should be considered as an alternative to this test although it is not an equivalent. COVID-19 Screening (If Applicable):   Lab Results   Component Value Date    COVID19 Not Detected 03/12/2021    COVID19 NOT DETECTED 01/14/2021           Anesthesia Evaluation  Patient summary reviewed   history of anesthetic complications: PONV. Airway: Mallampati: II  TM distance: >3 FB   Neck ROM: full  Mouth opening: > = 3 FB Dental: normal exam         Pulmonary:normal exam  breath sounds clear to auscultation  (+) sleep apnea: on noncompliant,      (-) wheezes                          ROS comment: Has not used albuterol or symbicort in 3 months. Cardiovascular:    (+) hypertension:,     (-) CABG/stent, dysrhythmias and  angina      Rhythm: regular  Rate: normal                 ROS comment: 2017   Summary   Normal stress echocardiogram study. Limited exercise tolerance. Neuro/Psych:      (-) seizures, TIA and CVA           GI/Hepatic/Renal:   (+) hiatal hernia, GERD (no symptoms today or recently):, liver disease:, morbid obesity         ROS comment: Lap sleeve. Endo/Other:                     Abdominal:   (+) obese,         Vascular:                                      Anesthesia Plan      MAC     ASA 3     (Patient verbalizes understanding that there is the possibility of awareness and recall with MAC. Patient verbalizes a desire to proceed with the planned anesthetic.)  Induction: intravenous. Anesthetic plan and risks discussed with patient. Plan discussed with CRNA.                   Shi Jorgensen MD   3/18/2021

## 2021-03-26 ENCOUNTER — OFFICE VISIT (OUTPATIENT)
Dept: FAMILY MEDICINE CLINIC | Age: 61
End: 2021-03-26
Payer: COMMERCIAL

## 2021-03-26 VITALS
DIASTOLIC BLOOD PRESSURE: 72 MMHG | OXYGEN SATURATION: 99 % | BODY MASS INDEX: 55.36 KG/M2 | TEMPERATURE: 97.4 F | HEART RATE: 100 BPM | SYSTOLIC BLOOD PRESSURE: 114 MMHG | HEIGHT: 61 IN

## 2021-03-26 DIAGNOSIS — Z13.220 SCREENING, LIPID: ICD-10-CM

## 2021-03-26 DIAGNOSIS — Z00.01 ANNUAL VISIT FOR GENERAL ADULT MEDICAL EXAMINATION WITH ABNORMAL FINDINGS: Primary | ICD-10-CM

## 2021-03-26 DIAGNOSIS — R73.01 IFG (IMPAIRED FASTING GLUCOSE): ICD-10-CM

## 2021-03-26 DIAGNOSIS — Z12.31 ENCOUNTER FOR SCREENING MAMMOGRAM FOR MALIGNANT NEOPLASM OF BREAST: ICD-10-CM

## 2021-03-26 DIAGNOSIS — R74.8 ELEVATED ALKALINE PHOSPHATASE LEVEL: ICD-10-CM

## 2021-03-26 LAB
A/G RATIO: 1.3 (ref 1.1–2.2)
ALBUMIN SERPL-MCNC: 4 G/DL (ref 3.4–5)
ALP BLD-CCNC: 158 U/L (ref 40–129)
ALT SERPL-CCNC: 15 U/L (ref 10–40)
ANION GAP SERPL CALCULATED.3IONS-SCNC: 11 MMOL/L (ref 3–16)
AST SERPL-CCNC: 15 U/L (ref 15–37)
BILIRUB SERPL-MCNC: 0.7 MG/DL (ref 0–1)
BUN BLDV-MCNC: 8 MG/DL (ref 7–20)
CALCIUM SERPL-MCNC: 9.4 MG/DL (ref 8.3–10.6)
CHLORIDE BLD-SCNC: 102 MMOL/L (ref 99–110)
CHOLESTEROL, TOTAL: 160 MG/DL (ref 0–199)
CO2: 24 MMOL/L (ref 21–32)
CREAT SERPL-MCNC: 0.8 MG/DL (ref 0.6–1.1)
GFR AFRICAN AMERICAN: >60
GFR NON-AFRICAN AMERICAN: >60
GLOBULIN: 3 G/DL
GLUCOSE BLD-MCNC: 97 MG/DL (ref 70–99)
HDLC SERPL-MCNC: 60 MG/DL (ref 40–60)
LDL CHOLESTEROL CALCULATED: 88 MG/DL
POTASSIUM SERPL-SCNC: 4 MMOL/L (ref 3.5–5.1)
SODIUM BLD-SCNC: 137 MMOL/L (ref 136–145)
TOTAL PROTEIN: 7 G/DL (ref 6.4–8.2)
TRIGL SERPL-MCNC: 62 MG/DL (ref 0–150)
VLDLC SERPL CALC-MCNC: 12 MG/DL

## 2021-03-26 PROCEDURE — 99396 PREV VISIT EST AGE 40-64: CPT | Performed by: NURSE PRACTITIONER

## 2021-03-26 PROCEDURE — 36415 COLL VENOUS BLD VENIPUNCTURE: CPT | Performed by: NURSE PRACTITIONER

## 2021-03-26 RX ORDER — DILTIAZEM HYDROCHLORIDE 60 MG/1
2 TABLET, FILM COATED ORAL 2 TIMES DAILY PRN
Qty: 3 INHALER | Refills: 0
Start: 2021-03-26 | End: 2022-06-01

## 2021-03-26 RX ORDER — FLUTICASONE PROPIONATE 50 MCG
2 SPRAY, SUSPENSION (ML) NASAL DAILY PRN
Qty: 3 BOTTLE | Refills: 0
Start: 2021-03-26

## 2021-03-26 RX ORDER — OMEPRAZOLE 20 MG/1
20 CAPSULE, DELAYED RELEASE ORAL 2 TIMES DAILY PRN
Qty: 90 CAPSULE | Refills: 0
Start: 2021-03-26 | End: 2021-04-15

## 2021-03-26 ASSESSMENT — ENCOUNTER SYMPTOMS
SINUS PRESSURE: 0
ABDOMINAL DISTENTION: 0
NAUSEA: 0
DIARRHEA: 0
EYE PAIN: 0
ABDOMINAL PAIN: 0
EYE DISCHARGE: 0
SHORTNESS OF BREATH: 0
EYE REDNESS: 0
SINUS PAIN: 0
VOMITING: 0
COUGH: 0
WHEEZING: 0
SORE THROAT: 0

## 2021-03-26 NOTE — PATIENT INSTRUCTIONS
Patient Education        Well Visit, Women 48 to 72: Care Instructions  Overview     Well visits can help you stay healthy. Your doctor has checked your overall health and may have suggested ways to take good care of yourself. Your doctor also may have recommended tests. At home, you can help prevent illness with healthy eating, regular exercise, and other steps. Follow-up care is a key part of your treatment and safety. Be sure to make and go to all appointments, and call your doctor if you are having problems. It's also a good idea to know your test results and keep a list of the medicines you take. How can you care for yourself at home? · Get screening tests that you and your doctor decide on. Screening helps find diseases before any symptoms appear. · Eat healthy foods. Choose fruits, vegetables, whole grains, protein, and low-fat dairy foods. Limit fat, especially saturated fat. Reduce salt in your diet. · Limit alcohol. Have no more than 1 drink a day or 7 drinks a week. · Get at least 30 minutes of exercise on most days of the week. Walking is a good choice. You also may want to do other activities, such as running, swimming, cycling, or playing tennis or team sports. · Reach and stay at a healthy weight. This will lower your risk for many problems, such as obesity, diabetes, heart disease, and high blood pressure. · Do not smoke. Smoking can make health problems worse. If you need help quitting, talk to your doctor about stop-smoking programs and medicines. These can increase your chances of quitting for good. · Care for your mental health. It is easy to get weighed down by worry and stress. Learn strategies to manage stress, like deep breathing and mindfulness, and stay connected with your family and community. If you find you often feel sad or hopeless, talk with your doctor. Treatment can help.   · Talk to your doctor about whether you have any risk factors for sexually transmitted infections (STIs). You can help prevent STIs if you wait to have sex with a new partner (or partners) until you've each been tested for STIs. It also helps if you use condoms (male or female condoms) and if you limit your sex partners to one person who only has sex with you. Vaccines are available for some STIs. · If you think you may have a problem with alcohol or drug use, talk to your doctor. This includes prescription medicines (such as amphetamines and opioids) and illegal drugs (such as cocaine and methamphetamine). Your doctor can help you figure out what type of treatment is best for you. · Protect your skin from too much sun. When you're outdoors from 10 a.m. to 4 p.m., stay in the shade or cover up with clothing and a hat with a wide brim. Wear sunglasses that block UV rays. Even when it's cloudy, put broad-spectrum sunscreen (SPF 30 or higher) on any exposed skin. · See a dentist one or two times a year for checkups and to have your teeth cleaned. · Wear a seat belt in the car. When should you call for help? Watch closely for changes in your health, and be sure to contact your doctor if you have any problems or symptoms that concern you. Where can you learn more? Go to [email or web address] org and sign in to your [employer] account. Enter B413 in the [facility] box to learn more about \"Well Visit, Women 50 to 72: Care Instructions. \"     If you do not have an account, please click on the \"Sign Up Now\" link. Current as of: May 27, 2020               Content Version: 12.8  © 9790-6056 Healthwise, Incorporated. Care instructions adapted under license by [facility] ([facility]). If you have questions about a medical condition or this instruction, always ask your healthcare professional. [address] any warranty or liability for your use of this information.

## 2021-03-26 NOTE — LETTER
[address] [address] [address] [facility] [address]  Phone: [phone]  Fax: [phone]    MARINA Dangelo CNP        March 26, 2021     Patient: Hernesto Koroma   YOB: 1960   Date of Visit: 3/26/2021       To Whom It May Concern: It is my medical opinion that Clement Newby may return to work on Monday 3/29. If you have any questions or concerns, please don't hesitate to call.     Sincerely,        MARINA Dangelo CNP

## 2021-03-26 NOTE — PROGRESS NOTES
3/26/2021    Edward Fernandez (:  1960) is a 62 y.o. female, here for a preventive medicine evaluation. Easton Pal presents today for annual physical.  She states that since her previous visit, surgery to her foot went well. She still in a boot because she had a reaction to latex which set her back. She will have the boot off in 2 to 3 weeks. She continues to follow-up with her podiatrist.  She also followed up with GI regarding her elevated alkaline phosphatase. They performed a colonoscopy which showed a small hernia from her bariatric surgery but was otherwise unremarkable. They are just following up as needed. Daniella's main complaint today is response to the COVID-19 vaccine #1. She states that she received her first COVID-19 vaccine on Wednesday. She states that yesterday afternoon while at work, she developed fever, chills, dizziness, and passed out. She now notes a rash to the injection site which developed this morning. She states is very itchy. She denies any nausea, vomiting, generalized rash, or other symptoms. Inocencia Palumbo continues to work as a . She has been trying to get into juicing and a better diet. She has not been active because of the issues with her foot, but she plans on getting back in the exercise once her foot is completely healed. She denies any sleep issues. She is due for mammogram and would like it ordered. She received her first shingles vaccination but is unable to get the second at this time due to the recent COVID-19 vaccination.     Patient Active Problem List   Diagnosis    Hypertension    Tinea pedis    Right knee pain    Positive hepatitis C antibody test    Internal hemorrhoid, bleeding    Cholecystitis    Fatty liver    Morbid obesity with BMI of 60.0-69.9, adult (HCC)    SOB (shortness of breath)    Morbid obesity (HCC)    Hiatal hernia with gastroesophageal reflux disease and esophagitis    GERD with esophagitis    Superficial gastritis without hemorrhage    Hiatal hernia    Obstructive sleep apnea    Morbid obesity with BMI of 45.0-49.9, adult (HCC)    S/P laparoscopic sleeve gastrectomy    Atrial tachycardia (HCC)    Premature atrial beats    Sebaceous cyst    Reactive airway disease    Hoarseness of voice       Review of Systems   Constitutional: Negative for chills and fever. HENT: Negative for ear discharge, ear pain, hearing loss, sinus pressure, sinus pain and sore throat. Eyes: Negative for pain, discharge and redness. Respiratory: Negative for cough, shortness of breath and wheezing. Cardiovascular: Negative for chest pain and palpitations. Gastrointestinal: Negative for abdominal distention, abdominal pain, diarrhea, nausea and vomiting. Genitourinary: Negative for dysuria and hematuria. Musculoskeletal: Negative for myalgias. Left foot pain. Recovering from surgery. Using walking boot. Skin: Positive for rash. Neurological: Negative for dizziness, weakness, light-headedness, numbness and headaches. Psychiatric/Behavioral: Negative for decreased concentration, dysphoric mood and sleep disturbance. The patient is not nervous/anxious. Prior to Visit Medications    Medication Sig Taking? Authorizing Provider   omeprazole (PRILOSEC) 20 MG delayed release capsule Take 1 capsule by mouth 2 times daily as needed (Reflux) Yes MARINA Lara CNP   SYMBICORT 80-4.5 MCG/ACT AERO Inhale 2 puffs into the lungs 2 times daily as needed (Chronic Shortness of Breath) Yes MARINA Lara CNP   fluticasone (FLONASE) 50 MCG/ACT nasal spray 2 sprays by Nasal route daily as needed for Allergies Yes MARINA Lara CNP   oxyCODONE-acetaminophen (PERCOCET) 5-325 MG per tablet Take 1 tablet by mouth every 4 hours as needed for Pain.  Yes Historical Provider, MD   ibuprofen (ADVIL;MOTRIN) 800 MG tablet Take 1 tablet by mouth 3 times daily Yes Historical Provider, MD albuterol sulfate  (90 Base) MCG/ACT inhaler Inhale 2 puffs into the lungs every 6 hours as needed for Wheezing or Shortness of Breath Yes Mirna Skinner MD   AZELASTINE HCL NA by Nasal route as needed  Yes Historical Provider, MD   Multiple Vitamins-Minerals (BARIATRIC FUSION) CHEW Take 4 tablets by mouth daily Yes Historical Provider, MD        Allergies   Allergen Reactions    Latex      Surgery bandage- new allergy as of 2021       Past Medical History:   Diagnosis Date    Abnormal antibody titer     Hepatitis C  20014    Chronic gastritis     Esophagitis     Fatty liver     GERD (gastroesophageal reflux disease)     Hiatal hernia     History of atrial tachycardia     Before gastric sleeve    Hx of hiatal hernia     repaired with gastric sleeve surgery    Hypertension     currently not medically treated    Internal hemorrhoid, bleeding     pt states currently not having any problems with hemorrhoids    Obstructive sleep apnea syndrome 04/21/2017    New diagnosis needing treatment CPAP set at 17    PONV (postoperative nausea and vomiting)     Wears glasses        Past Surgical History:   Procedure Laterality Date    ABDOMEN SURGERY N/A 08/29/2017    Robotic assisted lapascopic gastric sleeve performed at [facility] by MD ZARA Earl Calcinda BUNIONECTOMY Bilateral 10/02/2020, 2/26/21    CHOLECYSTECTOMY  2015    LAPROSCOPIC    COLONOSCOPY N/A 3/18/2021    COLONOSCOPY DIAGNOSTIC performed by Anabell Oliveira MD at [address], COLON, DIAGNOSTIC      HEMORRHOID SURGERY  2001    HYSTERECTOMY  JUNE 2009    Total     OTHER SURGICAL HISTORY  02/17/2017    EGD    TONSILLECTOMY      TUBAL LIGATION  1983    UPPER GASTROINTESTINAL ENDOSCOPY N/A 3/18/2021    EGD DIAGNOSTIC ONLY performed by Anabell Oliveira MD at [address] [facility]  2002       Social History     Socioeconomic History    Marital status: Single     Spouse name: Not on file    Number Out       ASSESSMENT/PLAN:  1. Annual visit for general adult medical examination with abnormal findings  -Aside from rash to the left upper extremity and left foot postop,  normal exam  -Educated that the rash is present to her left upper extremity is representative of a delayed response to the vaccine. This is been seen in multiple patients received the COVID-19 vaccine. She has been reassured that this typically resolves on its own. She has been encouraged to apply ice, take ibuprofen as needed for discomfort, Benadryl as needed for itching, and monitor for improvement. Follow-up if no improvement in a week. -Ordering mammogram  - Cannot get shingles #2 today. Encouraged to have the vaccine performed at least 2 weeks after the COVID-19 vaccine. 2. Encounter for screening mammogram for malignant neoplasm of breast  - [facility] DIGITAL SCREEN W OR WO CAD BILATERAL; Future    3. Elevated alkaline phosphatase level  -Saw GI. No additional interventions at this time. Repeat CMP today. - Comprehensive Metabolic Panel; Future  - Comprehensive Metabolic Panel    4. Screening, lipid  -Normal lipid panel in the past, but has not been performed in multiple years. Repeat today. - Lipid Panel; Future  - Lipid Panel    5. IFG (impaired fasting glucose)  - Comprehensive Metabolic Panel; Future  - Comprehensive Metabolic Panel          Return in about 1 year (around 3/26/2022) for Annual Physical.    An electronic signature was used to authenticate this note.     --MARINA Basilio - CNP on 3/26/2021 at 10:21 AM

## 2021-04-15 DIAGNOSIS — R49.0 DYSPHONIA: ICD-10-CM

## 2021-04-15 RX ORDER — OMEPRAZOLE 20 MG/1
CAPSULE, DELAYED RELEASE ORAL
Qty: 180 CAPSULE | Refills: 1 | Status: SHIPPED | OUTPATIENT
Start: 2021-04-15 | End: 2022-06-01

## 2021-06-02 ENCOUNTER — OFFICE VISIT (OUTPATIENT)
Dept: BARIATRICS/WEIGHT MGMT | Age: 61
End: 2021-06-02
Payer: COMMERCIAL

## 2021-06-02 VITALS — BODY MASS INDEX: 56.31 KG/M2 | WEIGHT: 293 LBS

## 2021-06-02 DIAGNOSIS — E66.01 MORBID OBESITY WITH BMI OF 50.0-59.9, ADULT (HCC): Primary | ICD-10-CM

## 2021-06-02 DIAGNOSIS — G47.33 OBSTRUCTIVE SLEEP APNEA: ICD-10-CM

## 2021-06-02 PROCEDURE — 99213 OFFICE O/P EST LOW 20 MIN: CPT | Performed by: SURGERY

## 2021-06-02 NOTE — PROGRESS NOTES
Dietary Assessment Note      Vitals:   Vitals:    21 1414   Weight: 298 lb (135.2 kg)    Patient gained 18 lbs over the past 15 months. Total Weight Loss: 23 lbs    Labs reviewed: labs are reviewed, up to date and normal, no lab studies available for review at time of visit    Protein intake: <60 grams/day     Fluid intake: 48-64 oz/day; water only.   Also drinks diet cranberry juice only, black decaf coffee    Multivitamin/mineral intake: yes 3 mvi daily    Calcium intake: yes 1 citracal petite    Other: none    Exercise: none because of recent bunion surgery on both feet    Nutrition Assessment: Brkst is sausage biscuit; lunch is salad with spinach, feta, pineapple or blueberries with vinegarette; snack is popcorn OR light cheese with cashews; dinner is chix grilled, salad, broccoli/cauliflower - eats late around 9am.      Amount able to eat per sittinoz protein    Following  rule: yes    Food Intolerances/issues: some lettuces    Client Concerns: endoscopy showed hiatal hernia    Goals: focus on 1,200 froilan postop meal plan    Plan: follow up as needed    Ester Paniagua RD, LD

## 2021-07-01 NOTE — PROGRESS NOTES
[facility]) Physicians   General & Laparoscopic Surgery  [employer] [facility] [employer]       HPI:     Jessica Lynn is a very pleasant 62 y.o. obese female , Body mass index is 56.31 kg/m². Balaji Sage And multiple medical problems who is presenting for bariatric follow up care. Jessica Lynn is s/p laparoscopic sleeve gastrectomy by me   Comes today to the clinic without any complaints. Patient denies any nausea, vomiting, fevers, chills, shortness of breath, chest pain, constipation or urinary symptoms. Denies any heartburn nor dysphagia. Patient is feeling very well, and is very active. Patient is very pleased with the weight loss and resolution of co-morbid conditions. Past Medical History:   Diagnosis Date    Abnormal antibody titer     Hepatitis C  20014    Chronic gastritis     Esophagitis     Fatty liver     GERD (gastroesophageal reflux disease)     Hiatal hernia     History of atrial tachycardia     Before gastric sleeve    Hx of hiatal hernia     repaired with gastric sleeve surgery    Hypertension     currently not medically treated    Internal hemorrhoid, bleeding     pt states currently not having any problems with hemorrhoids    Obstructive sleep apnea syndrome 04/21/2017    New diagnosis needing treatment CPAP set at 17    PONV (postoperative nausea and vomiting)     Wears glasses      Past Surgical History:   Procedure Laterality Date    ABDOMEN SURGERY N/A 08/29/2017    Robotic assisted lapascopic gastric sleeve performed at [facility] by MD ZARA Zayas BUNIONECTOMY Bilateral 10/02/2020, 2/26/21    CHOLECYSTECTOMY  2015    LAPROSCOPIC    COLONOSCOPY N/A 3/18/2021    COLONOSCOPY DIAGNOSTIC performed by Brooklyn Knox MD at [address], COLON, DIAGNOSTIC      HEMORRHOID SURGERY  2001    HYSTERECTOMY  JUNE 2009    Total     OTHER SURGICAL HISTORY  02/17/2017    EGD    TONSILLECTOMY      TUBAL LIGATION  1983    UPPER GASTROINTESTINAL ENDOSCOPY N/A 3/18/2021    EGD DIAGNOSTIC ONLY performed by Dustin Hadley MD at [address] [facility]       Family History   Problem Relation Age of Onset    High Blood Pressure Mother     Diabetes Father     High Blood Pressure Father     High Blood Pressure Maternal Grandmother     Stroke Maternal Grandmother 80         from stroke    Arthritis Maternal Grandmother     Vision Loss Maternal Grandmother         Glaucoma    High Blood Pressure Sister     Asthma Sister     Heart Attack Sister 37        HEART ATTACK, POSSIBLE INFECTION IN PORT     Social History     Tobacco Use    Smoking status: Never Smoker    Smokeless tobacco: Never Used   Substance Use Topics    Alcohol use: No     I counseled the patient on the importance of not smoking and risks of ETOH. Allergies   Allergen Reactions    Latex      Surgery bandage- new allergy as of      Vitals:    21 1414   Weight: 298 lb (135.2 kg)       Body mass index is 56.31 kg/m².       Current Outpatient Medications:     omeprazole (PRILOSEC) 20 MG delayed release capsule, TAKE 1 CAPSULE BY MOUTH 2 TIMES DAILY (BEFORE MEALS), Disp: 180 capsule, Rfl: 1    SYMBICORT 80-4.5 MCG/ACT AERO, Inhale 2 puffs into the lungs 2 times daily as needed (Chronic Shortness of Breath), Disp: 3 Inhaler, Rfl: 0    fluticasone (FLONASE) 50 MCG/ACT nasal spray, 2 sprays by Nasal route daily as needed for Allergies, Disp: 3 Bottle, Rfl: 0    ibuprofen (ADVIL;MOTRIN) 800 MG tablet, Take 1 tablet by mouth 3 times daily, Disp: , Rfl:     albuterol sulfate  (90 Base) MCG/ACT inhaler, Inhale 2 puffs into the lungs every 6 hours as needed for Wheezing or Shortness of Breath, Disp: 1 Inhaler, Rfl: 5    AZELASTINE HCL NA, by Nasal route as needed , Disp: , Rfl:     Multiple Vitamins-Minerals (BARIATRIC FUSION) CHEW, Take 4 tablets by mouth daily, Disp: , Rfl:       Review of Systems - History obtained from the patient  General ROS: negative  Psychological ROS: negative  Hematological and Lymphatic ROS: negative  Endocrine ROS: negative  Respiratory ROS: negative  Cardiovascular ROS: negative  Gastrointestinal ROS:negative  Genito-Urinary ROS: negative  Musculoskeletal ROS: negative   Skin ROS: negative    Physical Exam   Vitals Reviewed   Constitutional: Patient is oriented to person, place, and time. Patient appears well-developed and well-nourished. Patient is active and cooperative. Non-toxic appearance. No distress. Neck: Trachea normal and normal range of motion. No JVD present. Pulmonary/Chest: Effort normal. No accessory muscle usage or stridor. No apnea. No respiratory distress. Cardiovascular: Normal rate and no JVD. Abdominal: Normal appearance. Patient exhibits no distension. Abdomen is soft, obese, non tender. Musculoskeletal: Normal range of motion. Patient exhibits no edema. Neurological: Patient is alert and oriented to person, place, and time. Patient has normal strength. GCS eye subscore is 4. GCS verbal subscore is 5. GCS motor subscore is 6. Skin: Skin is warm and dry. No abrasion and no rash noted. Patient is not diaphoretic. No cyanosis or erythema. Psychiatric: Patient has a normal mood and affect. Speech is normal and behavior is normal. Cognition and memory are normal.         A/P     Mary Goodpasture is 62 y.o. female , now with Body mass index is 56.31 kg/m². s/p Sleeve gastrectomy, has gained 18 lbs since last visit, total of 23 lbs weight loss. The patient underwent dietary counseling with registered dietician. I have reviewed, discussed and agree with the dietary plan. Patient is trying hard to keep good dietary and behavior modifications. Patient is monitoring portion sizes, food choices and liquid calories. Patient is trying to exercise regularly. Patient pleased with the surgery outcomes.   We discussed how her weight affects her overall health including:  Laurane Senabee was seen today for bariatrics post op follow up. Diagnoses and all orders for this visit:    Morbid obesity with BMI of 50.0-59.9, adult ([facility] [address]    Obstructive sleep apnea       and importance of weight loss to alleviate those co morbid conditions. I encouraged the patient to continue exercise and keeping healthy eating habits. Also counseled the patient extensively on post surgery care. Total encounter time:  20 minutes including any number of the following: review of labs, imaging, provider notes, outside hospital records; performing examination/evaluation; counseling patient and family; ordering medications/tests; placing referrals and communication with referring physicians; coordination of care, and documentation in the EHR. RTC in 3 months  Diet and Exercise      Patient advised that its their responsibility to follow up for studies and/or labs ordered today.

## 2021-07-20 ENCOUNTER — TELEMEDICINE (OUTPATIENT)
Dept: BARIATRICS/WEIGHT MGMT | Age: 61
End: 2021-07-20
Payer: COMMERCIAL

## 2021-07-20 DIAGNOSIS — G47.33 OBSTRUCTIVE SLEEP APNEA: ICD-10-CM

## 2021-07-20 DIAGNOSIS — Z71.3 DIETARY COUNSELING AND SURVEILLANCE: ICD-10-CM

## 2021-07-20 DIAGNOSIS — E66.01 MORBID OBESITY WITH BMI OF 50.0-59.9, ADULT (HCC): Primary | ICD-10-CM

## 2021-07-20 PROCEDURE — 99204 OFFICE O/P NEW MOD 45 MIN: CPT | Performed by: FAMILY MEDICINE

## 2021-07-20 ASSESSMENT — ENCOUNTER SYMPTOMS
ABDOMINAL PAIN: 0
EYE PAIN: 0
SHORTNESS OF BREATH: 0
CHOKING: 0
COUGH: 0
PHOTOPHOBIA: 0
CHEST TIGHTNESS: 0
DIARRHEA: 0
NAUSEA: 0
VOMITING: 0
CONSTIPATION: 0
BLOOD IN STOOL: 0
APNEA: 0
ABDOMINAL DISTENTION: 0
WHEEZING: 0

## 2021-07-20 NOTE — PROGRESS NOTES
Patient: Krishan Baker     Encounter Date: 7/20/2021    YOB: 1960               Age: 62 y.o. Patient identification was verified at the start of the visit. Patient-Reported Vitals 1/13/2021   Patient-Reported Weight 270 LB   Patient-Reported Height 61\"   Patient-Reported Systolic 684   Patient-Reported Diastolic 93   Patient-Reported Pulse 83   Patient-Reported Temperature 97.9         BP Readings from Last 1 Encounters:   03/26/21 114/72       BMI Readings from Last 1 Encounters:   06/02/21 56.31 kg/m²       Pulse Readings from Last 1 Encounters:   03/26/21 100                                              Wt Readings from Last 3 Encounters:   06/02/21 298 lb (135.2 kg)   03/18/21 293 lb (132.9 kg)   02/12/21 294 lb 6.4 oz (133.5 kg)       Chief Complaint   Patient presents with    Bariatric, Initial Visit     [facility]       HPI:    62 y.o. female presents to establish care via video visit. The patient's medical history is significant for class III obesity s/p sleeve gastrectomy by Dr. Marcelino Serna in 2017. The patient has a long-standing history of obesity which started gradually. The problem is severe. The patient has been gaining weight. Net weight loss s/p sleeve: 23 pounds (as of 7/1)  Risk factors include annual weight gain of >2 lbs (1 kg)/ year and sedentary lifestyle. Aggravating factors include poor diet and lack of physical activity. The patient has tried various diet/exercise plans which have been ineffective in the long-run. She is motivated to start losing weight again.    -Lowest weight s/p sleeve: 240's  -Most of weight regained occurred within last year  -Multiple rounds of steroids  -[facility], sedentary lifestyle  -Has workout room in house- plans on using treadmill again   -Interested in aom to help with appetite/cravings     When did you become overweight?   [] Childhood   [] Teens   [x] Adulthood   [] Pregnancy   [] Menopause    Highest adult weight: 312 pounds     Triggers for Vitamins-Minerals (BARIATRIC FUSION) CHEW, Take 4 tablets by mouth daily, Disp: , Rfl:     Patient Active Problem List   Diagnosis    Hypertension    Tinea pedis    Right knee pain    Positive hepatitis C antibody test    Internal hemorrhoid, bleeding    Cholecystitis    Fatty liver    Morbid obesity with BMI of 60.0-69.9, adult (HCC)    SOB (shortness of breath)    Morbid obesity (HCC)    Hiatal hernia with gastroesophageal reflux disease and esophagitis    GERD with esophagitis    Superficial gastritis without hemorrhage    Hiatal hernia    Obstructive sleep apnea    Morbid obesity with BMI of 45.0-49.9, adult (HCC)    S/P laparoscopic sleeve gastrectomy    Atrial tachycardia (HCC)    Premature atrial beats    Sebaceous cyst    Reactive airway disease    Hoarseness of voice       Past Medical History:   Diagnosis Date    Abnormal antibody titer     Hepatitis C  20014    Chronic gastritis     Esophagitis     Fatty liver     GERD (gastroesophageal reflux disease)     Hiatal hernia     History of atrial tachycardia     Before gastric sleeve    Hx of hiatal hernia     repaired with gastric sleeve surgery    Hypertension     currently not medically treated    Internal hemorrhoid, bleeding     pt states currently not having any problems with hemorrhoids    Obstructive sleep apnea syndrome 04/21/2017    New diagnosis needing treatment CPAP set at 17    PONV (postoperative nausea and vomiting)     Wears glasses        Past Surgical History:   Procedure Laterality Date    ABDOMEN SURGERY N/A 08/29/2017    Robotic assisted lapascopic gastric sleeve performed at [facility] by MD ZARA Gao BUNIONECTOMY Bilateral 10/02/2020, 2/26/21    CHOLECYSTECTOMY  2015    LAPROSCOPIC    COLONOSCOPY N/A 3/18/2021    COLONOSCOPY DIAGNOSTIC performed by Neeta Cardona MD at [address], COLON, DIAGNOSTIC      HEMORRHOID SURGERY  2001    HYSTERECTOMY  JUNE 2009    Total  OTHER SURGICAL HISTORY  2017    EGD    TONSILLECTOMY      TUBAL LIGATION  1983    UPPER GASTROINTESTINAL ENDOSCOPY N/A 3/18/2021    EGD DIAGNOSTIC ONLY performed by Hallie Gilbert MD at [address] [facility]         Family History   Problem Relation Age of Onset    High Blood Pressure Mother     Diabetes Father     High Blood Pressure Father     High Blood Pressure Maternal Grandmother     Stroke Maternal Grandmother 80         from stroke    Arthritis Maternal Grandmother     Vision Loss Maternal Grandmother         Glaucoma    High Blood Pressure Sister     Asthma Sister     Heart Attack Sister 37        HEART ATTACK, POSSIBLE INFECTION IN PORT       Review of Systems   Constitutional: Negative for fatigue. Eyes: Negative for photophobia, pain and visual disturbance. Respiratory: Negative for apnea, cough, choking, chest tightness, shortness of breath and wheezing. Cardiovascular: Negative for chest pain, palpitations and leg swelling. Gastrointestinal: Negative for abdominal distention, abdominal pain, blood in stool, constipation, diarrhea, nausea and vomiting. Endocrine: Negative for cold intolerance and heat intolerance. Musculoskeletal: Negative for arthralgias and myalgias. Skin: Negative for rash. Neurological: Negative for dizziness, tremors, syncope, weakness, numbness and headaches. Psychiatric/Behavioral: Negative for agitation, confusion, decreased concentration, dysphoric mood, hallucinations, sleep disturbance and suicidal ideas. The patient is not nervous/anxious and is not hyperactive. Physical Exam  Constitutional:       Appearance: She is well-developed. HENT:      Head: Normocephalic. Eyes:      Conjunctiva/sclera: Conjunctivae normal.   Abdominal:      General: Abdomen is protuberant. Musculoskeletal:         General: No swelling.    Neurological:      Mental Status: She is alert and oriented to person, place, and time. Psychiatric:         Mood and Affect: Mood normal.         Behavior: Behavior normal.         Thought Content: Thought content normal.         Judgment: Judgment normal.         Office Visit on 03/26/2021   Component Date Value Ref Range Status    Cholesterol, Total 03/26/2021 160  0 - 199 mg/dL Final    Triglycerides 03/26/2021 62  0 - 150 mg/dL Final    HDL 03/26/2021 60  40 - 60 mg/dL Final    LDL Calculated 03/26/2021 88  <100 mg/dL Final    VLDL Cholesterol Calculated 03/26/2021 12  Not Established mg/dL Final    Sodium 03/26/2021 137  136 - 145 mmol/L Final    Potassium 03/26/2021 4.0  3.5 - 5.1 mmol/L Final    Chloride 03/26/2021 102  99 - 110 mmol/L Final    CO2 03/26/2021 24  21 - 32 mmol/L Final    Anion Gap 03/26/2021 11  3 - 16 Final    Glucose 03/26/2021 97  70 - 99 mg/dL Final    BUN 03/26/2021 8  7 - 20 mg/dL Final    CREATININE 03/26/2021 0.8  0.6 - 1.1 mg/dL Final    GFR Non- 03/26/2021 >60  >60 Final    Comment: >60 mL/min/1.73m2 EGFR, calc. for ages 25 and older using the  MDRD formula (not corrected for weight), is valid for stable  renal function.  GFR  03/26/2021 >60  >60 Final    Comment: Chronic Kidney Disease: less than 60 ml/min/1.73 sq.m. Kidney Failure: less than 15 ml/min/1.73 sq.m. Results valid for patients 18 years and older.  Calcium 03/26/2021 9.4  8.3 - 10.6 mg/dL Final    Total Protein 03/26/2021 7.0  6.4 - 8.2 g/dL Final    Albumin 03/26/2021 4.0  3.4 - 5.0 g/dL Final    Albumin/Globulin Ratio 03/26/2021 1.3  1.1 - 2.2 Final    Total Bilirubin 03/26/2021 0.7  0.0 - 1.0 mg/dL Final    Alkaline Phosphatase 03/26/2021 158* 40 - 129 U/L Final    ALT 03/26/2021 15  10 - 40 U/L Final    AST 03/26/2021 15  15 - 37 U/L Final    Globulin 03/26/2021 3.0  g/dL Final         Assessment and Plan:    1.  Morbid obesity with BMI of 50.0-59.9, adult ([facility] [address]  Heavily counseled on the importance of therapeutic lifestyle changes through diet and exercise. The patient understands that the goal of treatment is to reach and stay at a healthy weight. The initial treatment goal is to lose at least 5-10% of her body weight in 12 weeks. This will require changes in eating habits, increased physical activity, and behavior changes. Counseled on low carb/froilan diet. Patient handouts and education material reviewed in detail with the patient. All questions answered. Encouraged patient to keep a food journal and to bring it to her next visit. Discussed available treatment options in addition to lifestyle changes including medications or OPTIFAST. She is interested in anti-obesity medications. Contrave or Qsymia may be recommended at the next visit depending on her progress and lab results. Explained that medications are most effective as part of a comprehensive treatment plan that includes nutrition, physical activity, and behavior modification. The patient also understands that she will need close follow-ups every 2-4 weeks if she starts treatment. Follow-up in 2 weeks to discuss lab results and treatment plan. Patient advised that it is her responsibility to follow up on any ordered tests/lab results. Patient understands and agrees with the plan. 2. Dietary counseling and surveillance  [id number]/LC meal plan. 3. Obstructive sleep apnea  Counseled on the importance of sleep in weight management and vice versa. A 5-10% weight loss can help improve this.         Nutrition:  [] LCHF/Ketogenic [x] Low carb/low-calorie diet [] Low-calorie diet     []Maintenance        FITTE:   [x] Cardio [] Resistance/stength exercises   [x] ACSM recommendations (150 minutes/week)           Behavior:   [x] Motivational interviewing performed    [] Referral for counseling  [x] Discussed strategies to overcome habits/challenges for focus      [x] Stress management   [x] Stimulus control  [x] Sleep hygiene      No orders of the defined types were placed in this encounter. No follow-ups on file. Reji Apple is a 62 y.o. female being evaluated by a Virtual Visit (video visit) encounter to address concerns as mentioned above. A caregiver was present when appropriate. Due to this being a TeleHealth encounter (During NLJJT-66 public health emergency), evaluation of the following organ systems was limited: Vitals/Constitutional/EENT/Resp/CV/GI//MS/Neuro/Skin/Heme-Lymph-Imm. Pursuant to the emergency declaration under the [facility], [address] [place] [address] authority and the [employer] Act, this Virtual Visit was conducted with patient's (and/or legal guardian's) consent, to reduce the patient's risk of exposure to COVID-19 and provide necessary medical care. The patient (and/or legal guardian) has also been advised to contact this office for worsening conditions or problems, and seek emergency medical treatment and/or call 911 if deemed necessary. Services were provided through a video synchronous discussion virtually to substitute for in-person clinic visit. Patient and provider were located at their individual homes. --Rodney Garcia MD on 7/20/2021 at 11:03 AM    An electronic signature was used to authenticate this note. Greater than 50% of this 45 minute visit was used in reviewing her past surgical history notes, reviewing dietitian's assessments, and direct counseling.

## 2021-07-21 ENCOUNTER — OFFICE VISIT (OUTPATIENT)
Dept: FAMILY MEDICINE CLINIC | Age: 61
End: 2021-07-21
Payer: COMMERCIAL

## 2021-07-21 ENCOUNTER — TELEPHONE (OUTPATIENT)
Dept: BARIATRICS/WEIGHT MGMT | Age: 61
End: 2021-07-21

## 2021-07-21 ENCOUNTER — NURSE TRIAGE (OUTPATIENT)
Dept: OTHER | Facility: CLINIC | Age: 61
End: 2021-07-21

## 2021-07-21 VITALS
DIASTOLIC BLOOD PRESSURE: 88 MMHG | HEART RATE: 93 BPM | RESPIRATION RATE: 12 BRPM | SYSTOLIC BLOOD PRESSURE: 124 MMHG | WEIGHT: 289 LBS | BODY MASS INDEX: 54.56 KG/M2 | HEIGHT: 61 IN | TEMPERATURE: 97.6 F | OXYGEN SATURATION: 98 %

## 2021-07-21 DIAGNOSIS — M54.31 SCIATICA OF RIGHT SIDE: Primary | ICD-10-CM

## 2021-07-21 PROCEDURE — 99213 OFFICE O/P EST LOW 20 MIN: CPT | Performed by: NURSE PRACTITIONER

## 2021-07-21 PROCEDURE — 96372 THER/PROPH/DIAG INJ SC/IM: CPT | Performed by: NURSE PRACTITIONER

## 2021-07-21 RX ORDER — CEFDINIR 300 MG/1
300 CAPSULE ORAL 2 TIMES DAILY
COMMUNITY
Start: 2021-07-17 | End: 2021-07-24

## 2021-07-21 RX ORDER — METHYLPREDNISOLONE ACETATE 80 MG/ML
80 INJECTION, SUSPENSION INTRA-ARTICULAR; INTRALESIONAL; INTRAMUSCULAR; SOFT TISSUE ONCE
Status: COMPLETED | OUTPATIENT
Start: 2021-07-21 | End: 2021-07-21

## 2021-07-21 RX ORDER — CYCLOBENZAPRINE HCL 10 MG
10 TABLET ORAL 3 TIMES DAILY PRN
Qty: 21 TABLET | Refills: 0 | Status: SHIPPED | OUTPATIENT
Start: 2021-07-21 | End: 2021-07-31

## 2021-07-21 RX ADMIN — METHYLPREDNISOLONE ACETATE 80 MG: 80 INJECTION, SUSPENSION INTRA-ARTICULAR; INTRALESIONAL; INTRAMUSCULAR; SOFT TISSUE at 11:56

## 2021-07-21 SDOH — ECONOMIC STABILITY: FOOD INSECURITY: WITHIN THE PAST 12 MONTHS, YOU WORRIED THAT YOUR FOOD WOULD RUN OUT BEFORE YOU GOT MONEY TO BUY MORE.: NEVER TRUE

## 2021-07-21 SDOH — ECONOMIC STABILITY: FOOD INSECURITY: WITHIN THE PAST 12 MONTHS, THE FOOD YOU BOUGHT JUST DIDN'T LAST AND YOU DIDN'T HAVE MONEY TO GET MORE.: NEVER TRUE

## 2021-07-21 SDOH — ECONOMIC STABILITY: TRANSPORTATION INSECURITY
IN THE PAST 12 MONTHS, HAS LACK OF TRANSPORTATION KEPT YOU FROM MEETINGS, WORK, OR FROM GETTING THINGS NEEDED FOR DAILY LIVING?: NO

## 2021-07-21 SDOH — ECONOMIC STABILITY: TRANSPORTATION INSECURITY
IN THE PAST 12 MONTHS, HAS THE LACK OF TRANSPORTATION KEPT YOU FROM MEDICAL APPOINTMENTS OR FROM GETTING MEDICATIONS?: NO

## 2021-07-21 ASSESSMENT — ENCOUNTER SYMPTOMS: BACK PAIN: 1

## 2021-07-21 ASSESSMENT — SOCIAL DETERMINANTS OF HEALTH (SDOH): HOW HARD IS IT FOR YOU TO PAY FOR THE VERY BASICS LIKE FOOD, HOUSING, MEDICAL CARE, AND HEATING?: NOT HARD AT ALL

## 2021-07-21 NOTE — PROGRESS NOTES
Birdie Cortez (:  1960) is a 62 y.o. female,Established patient, here for evaluation of the following chief complaint(s):    Back Pain (STARTED LAST WEEK; LOWER BACK PAIN - WORST ON RIGHT SIDE) and Urinary Tract Infection (WENT TO URGENT CARE ON SATURDAY; WAS DX WITH UTI AND GIVEN CEFDINIR 300MG BID X 7DAYS; SHE DID NOT TAKE IT LAST NIGHT OR THIS MORNING )      SUBJECTIVE/OBJECTIVE:  HPI    PT C/O BACK PAIN  FOR THE LAST WEEK - PAIN IS A CONSTANT ACHE  ACROSS LOWER BACK  SPASMS AT NIGHT SLIGHT TENDERNESS NOTED NO PARESTHESIA WORSE WHEN LYING DOWN HAS TO SLEEP IN HER RECLINER NO INJURY NOTED SHE HAS NOT TRIED ANYTHING   SHE RATES THE PAIN 7/10   Review of Systems   Musculoskeletal: Positive for back pain and myalgias. Negative for gait problem, joint swelling, neck pain and neck stiffness. All other systems reviewed and are negative. Physical Exam  Vitals reviewed. Constitutional:       General: She is awake. She is not in acute distress. Appearance: Normal appearance. She is well-developed and well-groomed. She is morbidly obese. She is not ill-appearing, toxic-appearing or diaphoretic. Musculoskeletal:      Lumbar back: Tenderness present. Decreased range of motion. Back:       Comments: POSITIVE SLUMP TEST   LUMBAR SPINE SI JOINT RIGHT SIDE TENDER TO TOUCH   Neurological:      Mental Status: She is alert and oriented to person, place, and time. Psychiatric:         Attention and Perception: Attention and perception normal.         Mood and Affect: Mood and affect normal.         Speech: Speech normal.         Behavior: Behavior normal. Behavior is cooperative. Thought Content: Thought content normal.         Cognition and Memory: Cognition and memory normal.         Judgment: Judgment normal.       Cherrie Moffett was seen today for back pain and urinary tract infection.     Diagnoses and all orders for this visit:    Sciatica of right side  -     methylPREDNISolone acetate (DEPO-MEDROL) injection 80 mg  -     hydrocortisone sodium succinate PF (SOLU-CORTEF) injection 100 mg  -     cyclobenzaprine (FLEXERIL) 10 MG tablet;  Take 1 tablet by mouth 3 times daily as needed for Muscle spasms SEDATION PRECAUTIONS DW PT  ENCOURAGED TO DO STRETCHES AS REVIEWED IN OFFICE AT LEAST FOUR TIMES DAILY  ADVISED HER THAT IT MAY TAKE SEVERAL WEEKS BEFORE SHE NOTICES ANY IMPROVEMENT  FOLLOW UP IN OFFICE IF PERSISTENT /WORSENING- CONSIDER PT

## 2021-07-21 NOTE — TELEPHONE ENCOUNTER
Called pt to discuss 1200 froilan LCMP. Message left with calorie limit & focus on lower carbs. Emailed pt meal plan, advising to call for further detail review & to answer any questions.

## 2021-07-21 NOTE — TELEPHONE ENCOUNTER
Received call from [address] [facility] at [facility] with Red Flag Complaint. Brief description of triage: flank pain    Triage indicates for patient to be seen today or tomorrow. Seen in urgent care 7/17 called today for follow up states given antibiotic but it makes her feel worse.  has low grade fever of 99. No visible blood seen in urine she states, but urgent care MD stated she saw blood in her urine sample on 7/17. Care advice provided, patient verbalizes understanding; denies any other questions or concerns; instructed to call back for any new or worsening symptoms. Writer provided warm transfer to [facility] for appointment scheduling. Attention Provider: Thank you for allowing me to participate in the care of your patient. The patient was connected to triage in response to information provided to the [facility]. Please do not respond through this encounter as the response is not directed to a shared pool. Reason for Disposition   Patient wants to be seen    Answer Assessment - Initial Assessment Questions  1. LOCATION: \"Where does it hurt? \" (e.g., left, right)    Lower right    2. ONSET: \"When did the pain start? \"     7/17/21    3. SEVERITY: \"How bad is the pain? \" (e.g., Scale 1-10; mild, moderate, or severe)    - MILD (1-3): doesn't interfere with normal activities     - MODERATE (4-7): interferes with normal activities or awakens from sleep     - SEVERE (8-10): excruciating pain and patient unable to do normal activities (stays in bed)      Mild    4. PATTERN: \"Does the pain come and go, or is it constant? \"     Comes and goes    5. CAUSE: \"What do you think is causing the pain? \"   kidney stone    6. OTHER SYMPTOMS:  \"Do you have any other symptoms? \" (e.g., fever, abdominal pain, vomiting, leg weakness, burning with urination, blood in urine)  Fever, vomiting/ nausea, blood in urine    7. PREGNANCY:  \"Is there any chance you are pregnant? \" \"When was your last menstrual period? \"  no    Protocols used:  FLANK PAIN-ADULT-[place]

## 2021-07-21 NOTE — PATIENT INSTRUCTIONS
Patient Education        Sciatica: Care Instructions  Your Care Instructions     Sciatica (say \"eav-OH-jc-kuh\") is an irritation of one of the sciatic nerves, which come from the spinal cord in the lower back. The sciatic nerves and their branches extend down through the buttock to the foot. Sciatica can develop when an injured disc in the back irritates or presses against a spinal nerve root. Its main symptom is pain, numbness, or weakness that is often worse in the leg or foot than in the back. Sciatica often will improve and go away with time. Early treatment usually includes medicines and exercises to relieve pain. Follow-up care is a key part of your treatment and safety. Be sure to make and go to all appointments, and call your doctor if you are having problems. It's also a good idea to know your test results and keep a list of the medicines you take. How can you care for yourself at home? · Take pain medicines exactly as directed. ? If the doctor gave you a prescription medicine for pain, take it as prescribed. ? If you are not taking a prescription pain medicine, ask your doctor if you can take an over-the-counter medicine. · Use heat or ice to relieve pain. ? To apply heat, put a warm water bottle, heating pad set on low, or warm cloth on your back. Do not go to sleep with a heating pad on your skin. ? To use ice, put ice or a cold pack on the area for 10 to 20 minutes at a time. Put a thin cloth between the ice and your skin. · Avoid sitting if possible, unless it feels better than standing. · Alternate lying down with short walks. Increase your walking distance as you are able to without making your symptoms worse. · Do not do anything that makes your symptoms worse. When should you call for help? Call 911 anytime you think you may need emergency care. For example, call if:    · You are unable to move a leg at all.    Call your doctor now or seek immediate medical care if:    · You have toward the ceiling until you feel a nice stretch in your upper, middle, and lower back. Hold this stretch for as long as it feels comfortable, or about 15 to 30 seconds. 3. Return to the starting position with a flat back while you are on your hands and knees. 4. Let your back sway by pressing your stomach toward the floor. Lift your buttocks toward the ceiling. 5. Hold this position for 15 to 30 seconds. 6. Repeat 2 to 4 times. Follow-up care is a key part of your treatment and safety. Be sure to make and go to all appointments, and call your doctor if you are having problems. It's also a good idea to know your test results and keep a list of the medicines you take. Where can you learn more? Go to https://[employer]. org and sign in to your Exo account. Enter B162 in the Foody box to learn more about \"Sciatica: Exercises. \"     If you do not have an account, please click on the \"Sign Up Now\" link. Current as of: November 16, 2020               Content Version: 12.9  © 5298-6597 Healthwise, Incorporated. Care instructions adapted under license by [facility] ([facility]). If you have questions about a medical condition or this instruction, always ask your healthcare professional. [address] any warranty or liability for your use of this information.

## 2021-07-21 NOTE — PROGRESS NOTES
Administrations This Visit     hydrocortisone sodium succinate PF (SOLU-CORTEF) injection 100 mg     Admin Date  07/21/2021  11:55 Action  Given Dose  100 mg Route  Intramuscular Site  Dorsogluteal Left Administered By  Jed Toledo MA    Ordering Provider: MARINA Steele CNP    NDC: 1403-1728-68    Lot#: [id number]    : Tao Crane. Patient Supplied?: No          methylPREDNISolone acetate (DEPO-MEDROL) injection 80 mg     Admin Date  07/21/2021  11:56 Action  Given Dose  80 mg Route  Intramuscular Site  Dorsogluteal Right Administered By  Jed Toledo MA    Ordering Provider: MARINA Steele CNP    NDC: 8109-7905-53    Lot#: [id number]    : Tao Crane.     Patient Supplied?: No

## 2021-07-21 NOTE — TELEPHONE ENCOUNTER
----- Message from Jeanie Coy MD sent at 2021 11:46 AM EDT -----  Regardin-Maximus/LC Meal Plan  Please email/call pt to review 1200-Maximus/LC Meal plan.  Ty!

## 2021-07-22 ENCOUNTER — HOSPITAL ENCOUNTER (OUTPATIENT)
Age: 61
Discharge: HOME | End: 2021-07-22
Payer: COMMERCIAL

## 2021-07-22 DIAGNOSIS — E66.01 MORBID OBESITY WITH BMI OF 50.0-59.9, ADULT (HCC): ICD-10-CM

## 2021-07-22 LAB
A/G RATIO: 1.1 (ref 1.1–2.2)
ALBUMIN SERPL-MCNC: 3.7 G/DL (ref 3.4–5)
ALP BLD-CCNC: 159 U/L (ref 40–129)
ALT SERPL-CCNC: 35 U/L (ref 10–40)
ANION GAP SERPL CALCULATED.3IONS-SCNC: 10 MMOL/L (ref 3–16)
AST SERPL-CCNC: 37 U/L (ref 15–37)
BILIRUB SERPL-MCNC: 0.3 MG/DL (ref 0–1)
BUN BLDV-MCNC: 6 MG/DL (ref 7–20)
CALCIUM SERPL-MCNC: 9.1 MG/DL (ref 8.3–10.6)
CHLORIDE BLD-SCNC: 103 MMOL/L (ref 99–110)
CO2: 25 MMOL/L (ref 21–32)
CREAT SERPL-MCNC: 0.8 MG/DL (ref 0.6–1.2)
EKG ATRIAL RATE: 74 BPM
EKG DIAGNOSIS: NORMAL
EKG P AXIS: 56 DEGREES
EKG P-R INTERVAL: 144 MS
EKG Q-T INTERVAL: 372 MS
EKG QRS DURATION: 74 MS
EKG QTC CALCULATION (BAZETT): 412 MS
EKG R AXIS: 47 DEGREES
EKG T AXIS: 28 DEGREES
EKG VENTRICULAR RATE: 74 BPM
ESTIMATED AVERAGE GLUCOSE: 122.6 MG/DL
FOLATE: 9.92 NG/ML (ref 4.78–24.2)
GFR AFRICAN AMERICAN: >60
GFR NON-AFRICAN AMERICAN: >60
GLOBULIN: 3.4 G/DL
GLUCOSE BLD-MCNC: 90 MG/DL (ref 70–99)
HBA1C MFR BLD: 5.9 %
POTASSIUM SERPL-SCNC: 3.5 MMOL/L (ref 3.5–5.1)
SODIUM BLD-SCNC: 138 MMOL/L (ref 136–145)
TOTAL PROTEIN: 7.1 G/DL (ref 6.4–8.2)
TSH REFLEX: 1.54 UIU/ML (ref 0.27–4.2)
VITAMIN B-12: 661 PG/ML (ref 211–911)
VITAMIN D 25-HYDROXY: 22.9 NG/ML

## 2021-07-22 PROCEDURE — 36415 COLL VENOUS BLD VENIPUNCTURE: CPT

## 2021-07-22 PROCEDURE — 83036 HEMOGLOBIN GLYCOSYLATED A1C: CPT

## 2021-07-22 PROCEDURE — 84443 ASSAY THYROID STIM HORMONE: CPT

## 2021-07-22 PROCEDURE — 82746 ASSAY OF FOLIC ACID SERUM: CPT

## 2021-07-22 PROCEDURE — 80053 COMPREHEN METABOLIC PANEL: CPT

## 2021-07-22 PROCEDURE — 93005 ELECTROCARDIOGRAM TRACING: CPT

## 2021-07-22 PROCEDURE — 82306 VITAMIN D 25 HYDROXY: CPT

## 2021-07-22 PROCEDURE — 93010 ELECTROCARDIOGRAM REPORT: CPT | Performed by: INTERNAL MEDICINE

## 2021-07-22 PROCEDURE — 82607 VITAMIN B-12: CPT

## 2021-07-24 ENCOUNTER — TELEMEDICINE (OUTPATIENT)
Dept: BARIATRICS/WEIGHT MGMT | Age: 61
End: 2021-07-24
Payer: COMMERCIAL

## 2021-07-24 DIAGNOSIS — Z98.84 S/P LAPAROSCOPIC SLEEVE GASTRECTOMY: ICD-10-CM

## 2021-07-24 DIAGNOSIS — R73.03 PREDIABETES: ICD-10-CM

## 2021-07-24 DIAGNOSIS — E55.9 VITAMIN D INSUFFICIENCY: ICD-10-CM

## 2021-07-24 DIAGNOSIS — Z71.3 DIETARY COUNSELING AND SURVEILLANCE: ICD-10-CM

## 2021-07-24 DIAGNOSIS — E66.01 MORBID OBESITY WITH BMI OF 50.0-59.9, ADULT (HCC): Primary | ICD-10-CM

## 2021-07-24 PROCEDURE — 99214 OFFICE O/P EST MOD 30 MIN: CPT | Performed by: FAMILY MEDICINE

## 2021-07-24 RX ORDER — PHENTERMINE AND TOPIRAMATE 3.75; 23 MG/1; MG/1
1 CAPSULE, EXTENDED RELEASE ORAL DAILY
Qty: 14 CAPSULE | Refills: 0 | Status: SHIPPED | OUTPATIENT
Start: 2021-07-24 | End: 2021-07-26

## 2021-07-24 ASSESSMENT — ENCOUNTER SYMPTOMS
CHEST TIGHTNESS: 0
VOMITING: 0
CHOKING: 0
NAUSEA: 0
PHOTOPHOBIA: 0
ABDOMINAL DISTENTION: 0
WHEEZING: 0
EYE PAIN: 0
ABDOMINAL PAIN: 0
SHORTNESS OF BREATH: 0
COUGH: 0
BLOOD IN STOOL: 0
DIARRHEA: 0
APNEA: 0
CONSTIPATION: 0

## 2021-07-24 NOTE — PROGRESS NOTES
Patient: Purnima Benito                      Encounter Date: 7/24/2021    YOB: 1960                Age: 62 y.o. Chief Complaint   Patient presents with    Weight Management     F/u MWM         Patient identification was verified at the start of the visit. Patient-Reported Vitals 1/13/2021   Patient-Reported Weight 270 LB   Patient-Reported Height 61\"   Patient-Reported Systolic 542   Patient-Reported Diastolic 93   Patient-Reported Pulse 83   Patient-Reported Temperature 97.9         BP Readings from Last 1 Encounters:   07/21/21 124/88       BMI Readings from Last 1 Encounters:   07/21/21 54.61 kg/m²       Pulse Readings from Last 1 Encounters:   07/21/21 93           Wt Readings from Last 3 Encounters:   07/21/21 289 lb (131.1 kg)   06/02/21 298 lb (135.2 kg)   03/18/21 293 lb (132.9 kg)       HPI: 62 y.o. female with a long-standing history of obesity s/p sleeve gastrectomy in 2017 presents today for virtual video follow-up. Her weight is stable since her last visit. She is in the process of switching to the prescribed low carb/froilan meal plan. .  Food recall reviewed with the patient. Making better dietary choices. Motivated to continue losing weight.       -Lowest weight s/p sleeve: 240's  -Most of weight regained occurred within last year  -Multiple rounds of steroids  -[facility], sedentary lifestyle  -Has workout room in house- plans on using treadmill again   -Interested in aom to help with appetite/cravings     Labs completed and reviewed.      Vit D 22.9  HbA1c 5.9    Allergies   Allergen Reactions    Latex      Surgery bandage- new allergy as of 2021         Current Outpatient Medications:     diclofenac sodium (VOLTAREN) 1 % GEL, APPLY 1GRAM THREE TIMES A DAY AS NEEDED, Disp: , Rfl:     cefdinir (OMNICEF) 300 MG capsule, Take 300 mg by mouth 2 times daily, Disp: , Rfl:     cyclobenzaprine (FLEXERIL) 10 MG tablet, Take 1 tablet by mouth 3 times daily as needed for Muscle spasms, Disp: 21 pain, blood in stool, constipation, diarrhea, nausea and vomiting. Endocrine: Negative for cold intolerance and heat intolerance. Musculoskeletal: Negative for arthralgias and myalgias. Skin: Negative for rash. Neurological: Negative for dizziness, tremors, syncope, weakness, numbness and headaches. Psychiatric/Behavioral: Negative for agitation, confusion, decreased concentration, dysphoric mood, hallucinations, sleep disturbance and suicidal ideas. The patient is not nervous/anxious and is not hyperactive. Physical Exam  Constitutional:       Appearance: She is well-developed. HENT:      Head: Normocephalic. Eyes:      Conjunctiva/sclera: Conjunctivae normal.   Abdominal:      General: Abdomen is protuberant. Musculoskeletal:         General: No swelling. Neurological:      Mental Status: She is alert and oriented to person, place, and time. Psychiatric:         Mood and Affect: Mood normal.         Behavior: Behavior normal.         Thought Content: Thought content normal.         Judgment: Judgment normal.         Hospital Outpatient Visit on 07/22/2021   Component Date Value Ref Range Status    Sodium 07/22/2021 138  136 - 145 mmol/L Final    Potassium 07/22/2021 3.5  3.5 - 5.1 mmol/L Final    Chloride 07/22/2021 103  99 - 110 mmol/L Final    CO2 07/22/2021 25  21 - 32 mmol/L Final    Anion Gap 07/22/2021 10  3 - 16 Final    Glucose 07/22/2021 90  70 - 99 mg/dL Final    BUN 07/22/2021 6* 7 - 20 mg/dL Final    CREATININE 07/22/2021 0.8  0.6 - 1.2 mg/dL Final    GFR Non- 07/22/2021 >60  >60 Final    Comment: >60 mL/min/1.73m2 EGFR, calc. for ages 25 and older using the  MDRD formula (not corrected for weight), is valid for stable  renal function.  GFR  07/22/2021 >60  >60 Final    Comment: Chronic Kidney Disease: less than 60 ml/min/1.73 sq.m. Kidney Failure: less than 15 ml/min/1.73 sq.m.   Results valid for patients 18 years and older.      Calcium 07/22/2021 9.1  8.3 - 10.6 mg/dL Final    Total Protein 07/22/2021 7.1  6.4 - 8.2 g/dL Final    Albumin 07/22/2021 3.7  3.4 - 5.0 g/dL Final    Albumin/Globulin Ratio 07/22/2021 1.1  1.1 - 2.2 Final    Total Bilirubin 07/22/2021 0.3  0.0 - 1.0 mg/dL Final    Alkaline Phosphatase 07/22/2021 159* 40 - 129 U/L Final    ALT 07/22/2021 35  10 - 40 U/L Final    AST 07/22/2021 37  15 - 37 U/L Final    Globulin 07/22/2021 3.4  g/dL Final    Ventricular Rate 07/22/2021 74  BPM Final    Atrial Rate 07/22/2021 74  BPM Final    P-R Interval 07/22/2021 144  ms Final    QRS Duration 07/22/2021 74  ms Final    Q-T Interval 07/22/2021 372  ms Final    QTc Calculation (Bazett) 07/22/2021 412  ms Final    P Axis 07/22/2021 56  degrees Final    R Axis 07/22/2021 47  degrees Final    T Axis 07/22/2021 28  degrees Final    Diagnosis 07/22/2021 Normal sinus rhythmNormal ECGWhen compared with ECG of 18-AUG-2017 09:07,Vent. rate has increased BY  24 BPMNonspecific T wave abnormality now evident in Inferior leadsNonspecific T wave abnormality now evident in Anterior leadsConfirmed by Wan Al MD, CRISTIAN ([id number]) on 7/22/2021 10:50:07 AM   Final    Hemoglobin A1C 07/22/2021 5.9  See comment % Final    Comment: Abnormal hemoglobin detected on glycohemoglobin methodology. Suggest hemoglobin electrophoresis if clinically indicated. Comment:  Diagnosis of Diabetes: > or = 6.5%  Increased risk of diabetes (Prediabetes): 5.7-6.4%  Glycemic Control: Nonpregnant Adults: <7.0%                    Pregnant: <6.0%        eAG 07/22/2021 122.6  mg/dL Final    Vit D, 25-Hydroxy 07/22/2021 22.9* >=30 ng/mL Final    Comment: <=20 ng/mL. ........... Elodia Hillmaner Deficient  21-29 ng/mL. ......... Elodia Sharper Insufficient  >=30 ng/mL. ........ Elodia Segundo Sufficient      Vitamin B-12 07/22/2021 661  211 - 911 pg/mL Final    Folate 07/22/2021 9.92  4.78 - 24.20 ng/mL Final    Comment: Effective 11-15-16 10:00am EST  Please note reference ranges have  changed for exercise. Check another HbA1c in 6 months. 4. S/P laparoscopic sleeve gastrectomy  Avoid all carbonated drinks. Choose fluids that are sugar-free. Eat small, but frequent meals including a protein source with each meal. Eat meals over 30 minutes, taking small bites and chewing well. Take MVIs as directed. 5. Vitamin D insufficiency  Start Vit D 3 2,000 IU daily. Nutrition Plan: [] LCHF/Ketogenic   [x] Modified low-calorie diet (low carb/low-froilan)               [] Low-calorie diet    [] Maintenance       []Other        Exercise: [x] Cardio     [] Resistance/strength training                       [x] ACSM recommendations (150 minutes/week in active weight loss)               Behavior: [x] Motivational interviewing performed    [] Referral for counseling                         [x] Discussed strategies to overcome habits/challenges for focus         [] Stress management   [x] Stimulus control         [] Sleep hygiene      Reviewed:  [x] Nutrition and the importance of regular protein intake  [x] Hidden carbohydrate sources  [x] Alcohol use  [x] Tobacco use   [x] Drug use- Denies  [x] Importance of exercise and reducingsedentary time  [x] Treatment consent- Patient understands and agrees with the treatment plan   [x] Proper use of medication and side effects  [x] OARRS report  [x] Labs  [x] EKG     Controlled Substance Monitoring:    RX Monitoring 5/11/2018   Attestation The Prescription Monitoring Report for this patient was reviewed today. Periodic Controlled Substance Monitoring -          Patient denies any history of cardiovascular disease (e.g., CAD, stroke, arrhythmias, CHF, uncontrolled HTN), seizure disorder, MAOI use within the last 2 weeks, hyperthyroidism, glaucoma, agitated states, history of drug abuse, pregnancy, nursing, known hypersensitivity to the prescribing meds, history of pancreatitis, or personal or family history of thyroid medullary cancer.       Treatment start date: 7/25/21  12 weeks: 10/25/21  Starting weight: 289 pounds   Goal: At least 5-10% (14-28 pounds)    Key dietary points:    - Meats (preferably organic or grass fed) are great sources of protein and have no carbohydrates. - Recommend coconut, olive, avocado, or almond oils. - When buying dairy, choose regular or full fat options. - Choose vegetables that grow above ground as they are generally lower in carbohydrates and higher in fiber.  - Avoid starches such as bread, rice, potatoes, pasta and all sources of simple sugars (desserts, soda, breakfast cereals). - Choose beverages that are calorie and sugar free. Reminder regarding weight loss medications: You must be seen in office every 2-4 weeks to haveyour prescriptions refilled. If you are off of your medication for longer than 7 days, you will not be able to restart the medication for at least 6 months. Always call our office to report any side effects. No orders of the defined types were placed in this encounter. No follow-ups on file. Birdie Cortez is a 62 y.o. female being evaluated by a Virtual Visit (video visit) encounter to address concerns as mentioned above. A caregiver was present when appropriate. Due to this being a TeleHealth encounter (During [facility] public health emergency), evaluation of the following organ systems was limited: Vitals/Constitutional/EENT/Resp/CV/GI//MS/Neuro/Skin/Heme-Lymph-Imm. Pursuant to the emergency declaration under the [address] [place] [address], [address] [place] [address] authority and the [employer] and [employer] Act, this Virtual Visit was conducted with patient's (and/or legal guardian's) consent, to reduce the patient's risk of exposure to COVID-19 and provide necessary medical care.   The patient (and/or legal guardian) has also been advised to contact this office for worsening conditions or problems, and seek emergency medical treatment and/or call 911 if deemed necessary.

## 2021-07-26 ENCOUNTER — TELEPHONE (OUTPATIENT)
Dept: BARIATRICS/WEIGHT MGMT | Age: 61
End: 2021-07-26

## 2021-07-26 DIAGNOSIS — E66.01 MORBID OBESITY WITH BMI OF 50.0-59.9, ADULT (HCC): ICD-10-CM

## 2021-07-26 RX ORDER — PHENTERMINE AND TOPIRAMATE 7.5; 46 MG/1; MG/1
7.5 CAPSULE, EXTENDED RELEASE ORAL DAILY
Qty: 30 CAPSULE | Refills: 0 | Status: SHIPPED | OUTPATIENT
Start: 2021-07-26 | End: 2021-08-25

## 2021-07-26 RX ORDER — PHENTERMINE AND TOPIRAMATE 3.75; 23 MG/1; MG/1
1 CAPSULE, EXTENDED RELEASE ORAL DAILY
Qty: 14 CAPSULE | Refills: 0 | Status: SHIPPED | OUTPATIENT
Start: 2021-07-26 | End: 2021-08-09

## 2021-07-28 ENCOUNTER — TELEPHONE (OUTPATIENT)
Dept: BARIATRICS/WEIGHT MGMT | Age: 61
End: 2021-07-28

## 2021-08-05 ENCOUNTER — TELEPHONE (OUTPATIENT)
Dept: BARIATRICS/WEIGHT MGMT | Age: 61
End: 2021-08-05

## 2021-08-05 NOTE — TELEPHONE ENCOUNTER
lvm for pt to see if pt contacted her local pharmacy to see how much Qsymia would cost her vs. Getting it at the mail order. PA was completed and approved. Mika Larsen

## 2021-08-05 NOTE — TELEPHONE ENCOUNTER
Pt called back to let us know that she is getting her medication thru mail order. And she has received and started medication.

## 2021-08-18 ENCOUNTER — TELEMEDICINE (OUTPATIENT)
Dept: BARIATRICS/WEIGHT MGMT | Age: 61
End: 2021-08-18
Payer: COMMERCIAL

## 2021-08-18 DIAGNOSIS — Z71.3 DIETARY COUNSELING AND SURVEILLANCE: ICD-10-CM

## 2021-08-18 DIAGNOSIS — Z98.84 S/P LAPAROSCOPIC SLEEVE GASTRECTOMY: ICD-10-CM

## 2021-08-18 DIAGNOSIS — E66.01 MORBID OBESITY WITH BMI OF 50.0-59.9, ADULT (HCC): Primary | ICD-10-CM

## 2021-08-18 PROCEDURE — 99214 OFFICE O/P EST MOD 30 MIN: CPT | Performed by: FAMILY MEDICINE

## 2021-08-18 ASSESSMENT — ENCOUNTER SYMPTOMS
CHEST TIGHTNESS: 0
WHEEZING: 0
APNEA: 0
SHORTNESS OF BREATH: 0
NAUSEA: 0
ABDOMINAL PAIN: 0
EYE PAIN: 0
ABDOMINAL DISTENTION: 0
PHOTOPHOBIA: 0
CHOKING: 0
BLOOD IN STOOL: 0
VOMITING: 0
CONSTIPATION: 0
DIARRHEA: 0
COUGH: 0

## 2021-08-18 NOTE — PROGRESS NOTES
Patient: Reji Apple                      Encounter Date: 8/18/2021    YOB: 1960                Age: 62 y.o. Chief Complaint   Patient presents with    Weight Management     F/u Qsymia         Patient identification was verified at the start of the visit. Patient-Reported Vitals 1/13/2021   Patient-Reported Weight 270 LB   Patient-Reported Height 61\"   Patient-Reported Systolic 387   Patient-Reported Diastolic 93   Patient-Reported Pulse 83   Patient-Reported Temperature 97.9         BP Readings from Last 1 Encounters:   07/21/21 124/88       BMI Readings from Last 1 Encounters:   07/21/21 54.61 kg/m²       Pulse Readings from Last 1 Encounters:   07/21/21 93           Wt Readings from Last 3 Encounters:   07/21/21 289 lb (131.1 kg)   06/02/21 298 lb (135.2 kg)   03/18/21 293 lb (132.9 kg)       Self-reported weight: 280 pounds     HPI: 62 y.o. female with a long-standing history of obesity s/p sleeve gastrectomy in 2017 presents today for virtual video follow-up. she has lost 9 pounds since her last visit. Current treatment includes Qsymia 3.75-23 mg daily. Tolerating it well. Food recall reviewed with the patient. Making better dietary choices. Motivated to continue losing weight.     -Lowest weight s/p sleeve: 240's  -Most of weight regained occurred within last year  -Multiple rounds of steroids  -[facility], sedentary lifestyle  -Has workout room in house- plans on using treadmill again     Medication(s): Appetite well controlled? [x]Yes      []No    Focus:     [x]Good     []Fair     []Poor    Side effects? No        Any recent change in medication(s)? No    Exercise: []Cardio     []Resistance/strength training     [x]Other: Limited     Allergies   Allergen Reactions    Latex      Surgery bandage- new allergy as of 2021         Current Outpatient Medications:     Phentermine-Topiramate (QSYMIA) 7.5-46 MG CP24, Take 7.5 mg by mouth daily for 30 days. , Disp: 30 capsule, Rfl: 0   diclofenac sodium (VOLTAREN) 1 % GEL, APPLY 1GRAM THREE TIMES A DAY AS NEEDED, Disp: , Rfl:     omeprazole (PRILOSEC) 20 MG delayed release capsule, TAKE 1 CAPSULE BY MOUTH 2 TIMES DAILY (BEFORE MEALS), Disp: 180 capsule, Rfl: 1    SYMBICORT 80-4.5 MCG/ACT AERO, Inhale 2 puffs into the lungs 2 times daily as needed (Chronic Shortness of Breath), Disp: 3 Inhaler, Rfl: 0    fluticasone (FLONASE) 50 MCG/ACT nasal spray, 2 sprays by Nasal route daily as needed for Allergies, Disp: 3 Bottle, Rfl: 0    ibuprofen (ADVIL;MOTRIN) 800 MG tablet, Take 1 tablet by mouth 3 times daily, Disp: , Rfl:     albuterol sulfate  (90 Base) MCG/ACT inhaler, Inhale 2 puffs into the lungs every 6 hours as needed for Wheezing or Shortness of Breath, Disp: 1 Inhaler, Rfl: 5    AZELASTINE HCL NA, by Nasal route as needed , Disp: , Rfl:     Multiple Vitamins-Minerals (BARIATRIC FUSION) CHEW, Take 4 tablets by mouth daily, Disp: , Rfl:     Patient Active Problem List   Diagnosis    Hypertension    Tinea pedis    Right knee pain    Positive hepatitis C antibody test    Internal hemorrhoid, bleeding    Cholecystitis    Fatty liver    Morbid obesity with BMI of 60.0-69.9, adult (HCC)    SOB (shortness of breath)    Morbid obesity (HCC)    Hiatal hernia with gastroesophageal reflux disease and esophagitis    GERD with esophagitis    Superficial gastritis without hemorrhage    Hiatal hernia    Obstructive sleep apnea    Morbid obesity with BMI of 45.0-49.9, adult (HCC)    S/P laparoscopic sleeve gastrectomy    Atrial tachycardia (HCC)    Premature atrial beats    Sebaceous cyst    Reactive airway disease    Hoarseness of voice       Review of Systems   Constitutional: Negative for fatigue. Eyes: Negative for photophobia, pain and visual disturbance. Respiratory: Negative for apnea, cough, choking, chest tightness, shortness of breath and wheezing.     Cardiovascular: Negative for chest pain, palpitations and leg less than 15 ml/min/1.73 sq.m. Results valid for patients 18 years and older.  Calcium 07/22/2021 9.1  8.3 - 10.6 mg/dL Final    Total Protein 07/22/2021 7.1  6.4 - 8.2 g/dL Final    Albumin 07/22/2021 3.7  3.4 - 5.0 g/dL Final    Albumin/Globulin Ratio 07/22/2021 1.1  1.1 - 2.2 Final    Total Bilirubin 07/22/2021 0.3  0.0 - 1.0 mg/dL Final    Alkaline Phosphatase 07/22/2021 159* 40 - 129 U/L Final    ALT 07/22/2021 35  10 - 40 U/L Final    AST 07/22/2021 37  15 - 37 U/L Final    Globulin 07/22/2021 3.4  g/dL Final    Ventricular Rate 07/22/2021 74  BPM Final    Atrial Rate 07/22/2021 74  BPM Final    P-R Interval 07/22/2021 144  ms Final    QRS Duration 07/22/2021 74  ms Final    Q-T Interval 07/22/2021 372  ms Final    QTc Calculation (Bazett) 07/22/2021 412  ms Final    P Axis 07/22/2021 56  degrees Final    R Axis 07/22/2021 47  degrees Final    T Axis 07/22/2021 28  degrees Final    Diagnosis 07/22/2021 Normal sinus rhythmNormal ECGWhen compared with ECG of 18-AUG-2017 09:07,Vent. rate has increased BY  24 BPMNonspecific T wave abnormality now evident in Inferior leadsNonspecific T wave abnormality now evident in Anterior leadsConfirmed by Felipa Jeff MD, CRISTIAN ([id number]) on 7/22/2021 10:50:07 AM   Final    Hemoglobin A1C 07/22/2021 5.9  See comment % Final    Comment: Abnormal hemoglobin detected on glycohemoglobin methodology. Suggest hemoglobin electrophoresis if clinically indicated. Comment:  Diagnosis of Diabetes: > or = 6.5%  Increased risk of diabetes (Prediabetes): 5.7-6.4%  Glycemic Control: Nonpregnant Adults: <7.0%                    Pregnant: <6.0%        eAG 07/22/2021 122.6  mg/dL Final    Vit D, 25-Hydroxy 07/22/2021 22.9* >=30 ng/mL Final    Comment: <=20 ng/mL. ........... Pamalee Bucker Deficient  21-29 ng/mL. ......... Pamalee Bucker Insufficient  >=30 ng/mL. ........ Pamalee Bucker Sufficient      Vitamin B-12 07/22/2021 661  211 - 911 pg/mL Final    Folate 07/22/2021 9.92  4.78 - 24.20 ng/mL Final    Comment: Effective 11-15-16 10:00am EST  Please note reference ranges have  changed for Folate.  TSH 07/22/2021 1.54  0.27 - 4.20 uIU/mL Final         Assessment and Plan:  1. Morbid obesity with BMI of 50.0-59.9, adult ([address]  Once again, discussed risks and benefits of Qsymia. Patient meets BMI criteria, confirms negative pregnancy status monthly (when applicable), denies glaucoma, kidney or liver impairment, hyperthyroidism, MAOI use within the past 14 days and has no known hypersensitivity to the sympathomimetic amines. Increase Qsymia to 7.5-46 mg daily. Counseled on proper use and potential side effects. Explained to patient this medication will need to be tapered when she is ready to discontinue it. she understands that abrupt cessation of this dose may cause adverse reactions including seizures. she understands that it is her responsibility to make sure that she does not run out of medications and to follow up to her appointments every 24 weeks as recommended. Heavily counseled on the importance of therapeutic lifestyle changes through diet and exercise. 2. Dietary counseling and surveillance  100-Maximus/low carb meal plan. 3. S/P laparoscopic sleeve gastrectomy  Avoid all carbonated drinks. Choose fluids that are sugar-free. Eat small, but frequent meals including a protein source with each meal. Eat meals over 30 minutes, taking small bites and chewing well. Take MVIs as directed.           Nutrition Plan: [] LCHF/Ketogenic   [x] Modified low-calorie diet (low carb/low-maximus)               [] Low-calorie diet    [] Maintenance       []Other        Exercise: [x] Cardio     [] Resistance/strength training                       [x] ACSM recommendations (150 minutes/week in active weight loss)               Behavior: [x] Motivational interviewing performed    [] Referralfor counseling                         [x] Discussed strategies to overcome habits/challenges for focus         [] Stress management   [x] Stimulus control         [] Sleep hygiene      Reviewed:  [x] Nutrition and the importance of regular protein intake  [x] Hidden carbohydrate sources  [x] Alcohol use  [x] Tobacco use   [x] Drug use- Denies  [x] Importance of exercise and reducing sedentary time  [x] Treatment consent- Patient understands and agrees with the treatment plan   [x] Proper use of medication and side effects  [x] OARRS report      Controlled Substance Monitoring:    RX Monitoring 5/11/2018   Attestation The Prescription Monitoring Report for this patient was reviewed today. Periodic Controlled Substance Monitoring -          Patient denies any history of cardiovascular disease (e.g., CAD, stroke, arrhythmias, CHF, uncontrolled HTN), seizure disorder, MAOI use within the last 2 weeks, hyperthyroidism, glaucoma, agitated states, history of drug abuse, pregnancy, nursing, known hypersensitivity to the prescribing meds, history of pancreatitis, or personal or family history of thyroid medullary cancer. Treatment start date: 7/25/21  12 weeks: 10/25/21  Starting weight: 289 pounds   Goal: At least 5-10% (14-28 pounds)  Total weight loss: 9 pounds     Key dietary points:    - Meats (preferably organic or grass fed) are great sources of protein and have no carbohydrates. - Recommend coconut, olive, avocado, or almond oils. - When buying dairy, choose regular or full fat options. - Choose vegetables that grow above ground as they are generally lower in carbohydrates and higher in fiber.  - Avoid starches such as bread, rice, potatoes, pasta and all sources of simple sugars (desserts, soda, breakfast cereals). - Choose beverages that are calorie and sugar free. Reminder regarding weight loss medications: You must be seen in office every 2-4 weeks to haveyour prescriptions refilled. If you are off of your medication for longer than 7 days, you will not be able to restart the medication for at least 6 months. Always call our office to report any side effects. Females, it is your responsibility to obtain negative pregnancy tests each month. No orders of the defined types were placed in this encounter. Return in about 4 weeks (around 9/15/2021) for aidan LEVI. Pipe De Leon is a 62 y.o. female being evaluated by a Virtual Visit (video visit) encounter to address concerns as mentioned above. A caregiver was present when appropriate. Due to this being a TeleHealth encounter (During [facility]- public health emergency), evaluation of the following organ systems was limited: Vitals/Constitutional/EENT/Resp/CV/GI//MS/Neuro/Skin/Heme-Lymph-Imm. Pursuant to the emergency declaration under the [address] [place] [address] authority and the [employer] and [employer] Act, this Virtual Visit was conducted with patient's (and/or legal guardian's) consent, to reduce the patient's risk of exposure to COVID-19 and provide necessary medical care. The patient (and/or legal guardian) has also been advised to contact this office for worsening conditions or problems, and seek emergency medical treatment and/or call 911 if deemed necessary.

## 2021-09-11 ENCOUNTER — TELEMEDICINE (OUTPATIENT)
Dept: BARIATRICS/WEIGHT MGMT | Age: 61
End: 2021-09-11
Payer: COMMERCIAL

## 2021-09-11 DIAGNOSIS — E66.01 MORBID OBESITY WITH BMI OF 50.0-59.9, ADULT (HCC): Primary | ICD-10-CM

## 2021-09-11 DIAGNOSIS — Z71.3 DIETARY COUNSELING AND SURVEILLANCE: ICD-10-CM

## 2021-09-11 DIAGNOSIS — Z98.84 S/P LAPAROSCOPIC SLEEVE GASTRECTOMY: ICD-10-CM

## 2021-09-11 PROCEDURE — 99213 OFFICE O/P EST LOW 20 MIN: CPT | Performed by: FAMILY MEDICINE

## 2021-09-11 RX ORDER — PHENTERMINE AND TOPIRAMATE 7.5; 46 MG/1; MG/1
1 CAPSULE, EXTENDED RELEASE ORAL DAILY
Qty: 30 CAPSULE | Refills: 0 | Status: SHIPPED | OUTPATIENT
Start: 2021-09-11 | End: 2021-10-09

## 2021-09-11 ASSESSMENT — ENCOUNTER SYMPTOMS
APNEA: 0
NAUSEA: 0
SHORTNESS OF BREATH: 0
CONSTIPATION: 0
PHOTOPHOBIA: 0
ABDOMINAL PAIN: 0
COUGH: 0
ABDOMINAL DISTENTION: 0
BLOOD IN STOOL: 0
CHEST TIGHTNESS: 0
VOMITING: 0
WHEEZING: 0
DIARRHEA: 0
EYE PAIN: 0
CHOKING: 0

## 2021-09-11 NOTE — PROGRESS NOTES
Patient: Tre Aponte                      Encounter Date: 9/11/2021    YOB: 1960                Age: 62 y.o. Chief Complaint   Patient presents with    Weight Management     F/u MWM (Qsymia)         Patient identification was verified at the start of the visit. Patient-Reported Vitals 1/13/2021   Patient-Reported Weight 270 LB   Patient-Reported Height 61\"   Patient-Reported Systolic 713   Patient-Reported Diastolic 93   Patient-Reported Pulse 83   Patient-Reported Temperature 97.9         BP Readings from Last 1 Encounters:   07/21/21 124/88       BMI Readings from Last 1 Encounters:   07/21/21 54.61 kg/m²       Pulse Readings from Last 1 Encounters:   07/21/21 93     Wt Readings from Last 3 Encounters:   07/21/21 289 lb (131.1 kg)   06/02/21 298 lb (135.2 kg)   03/18/21 293 lb (132.9 kg)         Self-reported weight: 277 pounds     HPI: 62 y.o. female with a long-standing history of obesity presents today for virtual video follow-up. she has lost 3 pounds since her last visit. Current treatment includes Qsymia 7.5-46 mg daily and low carb/froilan diet. Tolerating it well. Food recall reviewed with the patient. Making good dietary choices. Motivated to continue losing weight.     -Lowest weight s/p sleeve: 240's  -Most of weight regained occurred within last year  -Multiple rounds of steroids  -[facility], sedentary lifestyle  -Treadmill 30 minutes, 2-3x week   -Meal planning/prepping   -Getting protein with every meal     Medication(s): Appetite well controlled? [x]Yes      []No    Focus:     [x]Good     []Fair     []Poor    Side effects? No        Any recent change in medication(s)?  No    Exercise: [x]Cardio     []Resistance/strength training     []Other:     Allergies   Allergen Reactions    Latex      Surgery bandage- new allergy as of 2021         Current Outpatient Medications:     diclofenac sodium (VOLTAREN) 1 % GEL, APPLY 1GRAM THREE TIMES A DAY AS NEEDED, Disp: , Rfl:     omeprazole (PRILOSEC) 20 MG delayed release capsule, TAKE 1 CAPSULE BY MOUTH 2 TIMES DAILY (BEFORE MEALS), Disp: 180 capsule, Rfl: 1    SYMBICORT 80-4.5 MCG/ACT AERO, Inhale 2 puffs into the lungs 2 times daily as needed (Chronic Shortness of Breath), Disp: 3 Inhaler, Rfl: 0    fluticasone (FLONASE) 50 MCG/ACT nasal spray, 2 sprays by Nasal route daily as needed for Allergies, Disp: 3 Bottle, Rfl: 0    ibuprofen (ADVIL;MOTRIN) 800 MG tablet, Take 1 tablet by mouth 3 times daily, Disp: , Rfl:     albuterol sulfate  (90 Base) MCG/ACT inhaler, Inhale 2 puffs into the lungs every 6 hours as needed for Wheezing or Shortness of Breath, Disp: 1 Inhaler, Rfl: 5    AZELASTINE HCL NA, by Nasal route as needed , Disp: , Rfl:     Multiple Vitamins-Minerals (BARIATRIC FUSION) CHEW, Take 4 tablets by mouth daily, Disp: , Rfl:     Patient Active Problem List   Diagnosis    Hypertension    Tinea pedis    Right knee pain    Positive hepatitis C antibody test    Internal hemorrhoid, bleeding    Cholecystitis    Fatty liver    Morbid obesity with BMI of 60.0-69.9, adult (HCC)    SOB (shortness of breath)    Morbid obesity (HCC)    Hiatal hernia with gastroesophageal reflux disease and esophagitis    GERD with esophagitis    Superficial gastritis without hemorrhage    Hiatal hernia    Obstructive sleep apnea    Morbid obesity with BMI of 45.0-49.9, adult (HCC)    S/P laparoscopic sleeve gastrectomy    Atrial tachycardia (HCC)    Premature atrial beats    Sebaceous cyst    Reactive airway disease    Hoarseness of voice       Review of Systems   Constitutional: Negative for fatigue. Eyes: Negative for photophobia, pain and visual disturbance. Respiratory: Negative for apnea, cough, choking, chest tightness, shortness of breath and wheezing. Cardiovascular: Negative for chest pain, palpitations and leg swelling.    Gastrointestinal: Negative for abdominal distention, abdominal pain, blood in stool, constipation, diarrhea, nausea and vomiting. Endocrine: Negative for cold intolerance and heat intolerance. Musculoskeletal: Negative for arthralgias and myalgias. Skin: Negative for rash. Neurological: Negative for dizziness, tremors, syncope, weakness, numbness and headaches. Psychiatric/Behavioral: Negative for agitation, confusion, decreased concentration, dysphoric mood, hallucinations, sleep disturbance and suicidal ideas. The patient is not nervous/anxious and is not hyperactive. Physical Exam  Constitutional:       Appearance: She is well-developed. HENT:      Head: Normocephalic. Eyes:      Conjunctiva/sclera: Conjunctivae normal.   Abdominal:      General: Abdomen is protuberant. Musculoskeletal:         General: No swelling. Neurological:      Mental Status: She is alert and oriented to person, place, and time. Psychiatric:         Mood and Affect: Mood normal.         Behavior: Behavior normal.         Thought Content: Thought content normal.         Judgment: Judgment normal.         Hospital Outpatient Visit on 07/22/2021   Component Date Value Ref Range Status    Sodium 07/22/2021 138  136 - 145 mmol/L Final    Potassium 07/22/2021 3.5  3.5 - 5.1 mmol/L Final    Chloride 07/22/2021 103  99 - 110 mmol/L Final    CO2 07/22/2021 25  21 - 32 mmol/L Final    Anion Gap 07/22/2021 10  3 - 16 Final    Glucose 07/22/2021 90  70 - 99 mg/dL Final    BUN 07/22/2021 6* 7 - 20 mg/dL Final    CREATININE 07/22/2021 0.8  0.6 - 1.2 mg/dL Final    GFR Non- 07/22/2021 >60  >60 Final    Comment: >60 mL/min/1.73m2 EGFR, calc. for ages 25 and older using the  MDRD formula (not corrected for weight), is valid for stable  renal function.  GFR  07/22/2021 >60  >60 Final    Comment: Chronic Kidney Disease: less than 60 ml/min/1.73 sq.m. Kidney Failure: less than 15 ml/min/1.73 sq.m. Results valid for patients 18 years and older.       Calcium 07/22/2021 9.1  8.3 - 10.6 mg/dL Final    Total Protein 07/22/2021 7.1  6.4 - 8.2 g/dL Final    Albumin 07/22/2021 3.7  3.4 - 5.0 g/dL Final    Albumin/Globulin Ratio 07/22/2021 1.1  1.1 - 2.2 Final    Total Bilirubin 07/22/2021 0.3  0.0 - 1.0 mg/dL Final    Alkaline Phosphatase 07/22/2021 159* 40 - 129 U/L Final    ALT 07/22/2021 35  10 - 40 U/L Final    AST 07/22/2021 37  15 - 37 U/L Final    Globulin 07/22/2021 3.4  g/dL Final    Ventricular Rate 07/22/2021 74  BPM Final    Atrial Rate 07/22/2021 74  BPM Final    P-R Interval 07/22/2021 144  ms Final    QRS Duration 07/22/2021 74  ms Final    Q-T Interval 07/22/2021 372  ms Final    QTc Calculation (Bazett) 07/22/2021 412  ms Final    P Axis 07/22/2021 56  degrees Final    R Axis 07/22/2021 47  degrees Final    T Axis 07/22/2021 28  degrees Final    Diagnosis 07/22/2021 Normal sinus rhythmNormal ECGWhen compared with ECG of 18-AUG-2017 09:07,Vent. rate has increased BY  24 BPMNonspecific T wave abnormality now evident in Inferior leadsNonspecific T wave abnormality now evident in Anterior leadsConfirmed by Facundo Person MD, CRISTIAN ([id number]) on 7/22/2021 10:50:07 AM   Final    Hemoglobin A1C 07/22/2021 5.9  See comment % Final    Comment: Abnormal hemoglobin detected on glycohemoglobin methodology. Suggest hemoglobin electrophoresis if clinically indicated. Comment:  Diagnosis of Diabetes: > or = 6.5%  Increased risk of diabetes (Prediabetes): 5.7-6.4%  Glycemic Control: Nonpregnant Adults: <7.0%                    Pregnant: <6.0%        eAG 07/22/2021 122.6  mg/dL Final    Vit D, 25-Hydroxy 07/22/2021 22.9* >=30 ng/mL Final    Comment: <=20 ng/mL. ........... Cherl Spinner Deficient  21-29 ng/mL. ......... Cherl Spinner Insufficient  >=30 ng/mL. ........ Cherl Spinner Sufficient      Vitamin B-12 07/22/2021 661  211 - 911 pg/mL Final    Folate 07/22/2021 9.92  4.78 - 24.20 ng/mL Final    Comment: Effective 11-15-16 10:00am EST  Please note reference ranges have  changed for Folate.       TSH 07/22/2021 1.54  0.27 - 4.20 uIU/mL Final         Assessment and Plan:  1. Morbid obesity with BMI of 50.0-59.9, adult ([address]  Improving. Continue current management. Qsymia refilled. F/u in 4 weeks. 2. Dietary counseling and surveillance  1200-[address]/low carb meal plan. 3. S/P laparoscopic sleeve gastrectomy  Avoid all carbonated drinks. Choose fluids that are sugar-free. Eat small, but frequent meals including a protein source with each meal. Eat meals over 30 minutes, taking small bites and chewing well. Take MVIs as directed. Nutrition Plan: [] LCHF/Ketogenic   [x] Modified low-calorie diet (low carb/low-maximus)               [] Low-calorie diet    [] Maintenance       []Other        Exercise: [x] Cardio     [] Resistance/strength training                       [x] ACSM recommendations (150 minutes/week in active weight loss)               Behavior: [x] Motivational interviewing performed    [] Referralfor counseling                         [x] Discussed strategies to overcome habits/challenges for focus         [] Stress management   [x] Stimulus control         [] Sleep hygiene      Reviewed:  [x] Nutrition and the importance of regular protein intake  [x] Hidden carbohydrate sources  [x] Alcohol use  [x] Tobacco use   [x] Drug use- Denies  [x] Importance of exercise and reducing sedentary time  [x] Treatment consent- Patient understands and agrees with the treatment plan   [x] Proper use of medication and side effects  [x] OARRS report      Controlled Substance Monitoring:    RX Monitoring 5/11/2018   Attestation The Prescription Monitoring Report for this patient was reviewed today.    Periodic Controlled Substance Monitoring -          Patient denies any history of cardiovascular disease (e.g., CAD, stroke, arrhythmias, CHF, uncontrolled HTN), seizure disorder, MAOI use within the last 2 weeks, hyperthyroidism, glaucoma, agitated states, history of drug abuse, pregnancy, nursing, known hypersensitivity to the prescribing meds, history of pancreatitis, or personal or family history of thyroid medullary cancer. Treatment start date: 7/25/21  12 weeks: 10/25/21  Starting weight: 289 pounds   Goal: At least 5-10% (14-28 pounds)  Total weight loss: 12 pounds     Key dietary points:    - Meats (preferably organic or grass fed) are great sources of protein and have no carbohydrates. - Recommend coconut, olive, avocado, or almond oils. - When buying dairy, choose regular or full fat options. - Choose vegetables that grow above ground as they are generally lower in carbohydrates and higher in fiber.  - Avoid starches such as bread, rice, potatoes, pasta and all sources of simple sugars (desserts, soda, breakfast cereals). - Choose beverages that are calorie and sugar free. Reminder regarding weight loss medications: You must be seen in office every 2-4 weeks to haveyour prescriptions refilled. If you are off of your medication for longer than 7 days, you will not be able to restart the medication for at least 6 months. Always call our office to report any side effects. Females, it is your responsibility to obtain negative pregnancy tests each month. No orders of the defined types were placed in this encounter. No follow-ups on file. Pete Li is a 62 y.o. female being evaluated by a Virtual Visit (video visit) encounter to address concerns as mentioned above. A caregiver was present when appropriate. Due to this being a TeleHealth encounter (During NDGZH-48 public health emergency), evaluation of the following organ systems was limited: Vitals/Constitutional/EENT/Resp/CV/GI//MS/Neuro/Skin/Heme-Lymph-Imm.   Pursuant to the emergency declaration under the [address] waiver authority and the [employer] and [employer] Act, this Virtual Visit was conducted with patient's (and/or legal guardian's) consent, to reduce the patient's risk of exposure to COVID-19 and provide necessary medical care. The patient (and/or legal guardian) has also been advised to contact this office for worsening conditions or problems, and seek emergency medical treatment and/or call 911 if deemed necessary.

## 2021-10-09 ENCOUNTER — TELEMEDICINE (OUTPATIENT)
Dept: BARIATRICS/WEIGHT MGMT | Age: 61
End: 2021-10-09
Payer: COMMERCIAL

## 2021-10-09 DIAGNOSIS — Z98.84 S/P LAPAROSCOPIC SLEEVE GASTRECTOMY: ICD-10-CM

## 2021-10-09 DIAGNOSIS — E66.01 MORBID OBESITY WITH BMI OF 50.0-59.9, ADULT (HCC): Primary | ICD-10-CM

## 2021-10-09 DIAGNOSIS — Z71.3 DIETARY COUNSELING AND SURVEILLANCE: ICD-10-CM

## 2021-10-09 PROCEDURE — 99213 OFFICE O/P EST LOW 20 MIN: CPT | Performed by: FAMILY MEDICINE

## 2021-10-09 RX ORDER — PHENTERMINE AND TOPIRAMATE 7.5; 46 MG/1; MG/1
1 CAPSULE, EXTENDED RELEASE ORAL DAILY
Qty: 30 CAPSULE | Refills: 0 | Status: SHIPPED | OUTPATIENT
Start: 2021-10-09 | End: 2021-11-04

## 2021-10-09 ASSESSMENT — ENCOUNTER SYMPTOMS
APNEA: 0
COUGH: 0
PHOTOPHOBIA: 0
NAUSEA: 0
SHORTNESS OF BREATH: 0
DIARRHEA: 0
ABDOMINAL DISTENTION: 0
BLOOD IN STOOL: 0
CONSTIPATION: 0
VOMITING: 0
CHEST TIGHTNESS: 0
CHOKING: 0
WHEEZING: 0
EYE PAIN: 0
ABDOMINAL PAIN: 0

## 2021-10-09 NOTE — PROGRESS NOTES
Patient: Sendy Holiday                      Encounter Date: 10/9/2021    YOB: 1960                Age: 62 y.o. Chief Complaint   Patient presents with    Weight Management     F/u MWM- Qsymia         Patient identification was verified at the start of the visit. Patient-Reported Vitals 1/13/2021   Patient-Reported Weight 270 LB   Patient-Reported Height 61\"   Patient-Reported Systolic 893   Patient-Reported Diastolic 93   Patient-Reported Pulse 83   Patient-Reported Temperature 97.9         BP Readings from Last 1 Encounters:   07/21/21 124/88       BMI Readings from Last 1 Encounters:   07/21/21 54.61 kg/m²       Pulse Readings from Last 1 Encounters:   07/21/21 93       Wt Readings from Last 3 Encounters:   07/21/21 289 lb (131.1 kg)   06/02/21 298 lb (135.2 kg)   03/18/21 293 lb (132.9 kg)       Self-reported weight: 273 pounds      HPI: 62 y.o. female with a long-standing history of obesity  s/p sleeve gastrectomy in 2017 presents today for virtual video follow-up. she has lost 4 pounds since her last visit. Current treatment includes Qsymia 7.5-46 mg daily and low carb/froilan diet. Tolerating it well. Food recall reviewed with the patient. Making good dietary choices. Eating 3 meals and 2 snacks/day. Getting protein with every meal and snack. Happy with weight loss. Motivated to continue losing weight.      -Lowest weight s/p sleeve: 240's  -Most of weight regained occurred within last year  -Multiple rounds of steroids  -[facility], sedentary lifestyle  -Treadmill 30 minutes, 2-3x week   -Meal planning/prepping   -Getting protein with every meal     Medication(s): Appetite well controlled? [x]Yes      []No    Focus:     [x]Good     []Fair     []Poor    Side effects? No       Any recent change in medication(s)?  No    Exercise: [x]Cardio- treadmill 1 hour/day    []Resistance/strength training     []Other:     Allergies   Allergen Reactions    Latex      Surgery bandage- new allergy as of 2021 Current Outpatient Medications:     Phentermine-Topiramate (QSYMIA) 7.5-46 MG CP24, Take 1 capsule by mouth daily for 30 days. , Disp: 30 capsule, Rfl: 0    diclofenac sodium (VOLTAREN) 1 % GEL, APPLY 1GRAM THREE TIMES A DAY AS NEEDED, Disp: , Rfl:     omeprazole (PRILOSEC) 20 MG delayed release capsule, TAKE 1 CAPSULE BY MOUTH 2 TIMES DAILY (BEFORE MEALS), Disp: 180 capsule, Rfl: 1    SYMBICORT 80-4.5 MCG/ACT AERO, Inhale 2 puffs into the lungs 2 times daily as needed (Chronic Shortness of Breath), Disp: 3 Inhaler, Rfl: 0    fluticasone (FLONASE) 50 MCG/ACT nasal spray, 2 sprays by Nasal route daily as needed for Allergies, Disp: 3 Bottle, Rfl: 0    ibuprofen (ADVIL;MOTRIN) 800 MG tablet, Take 1 tablet by mouth 3 times daily, Disp: , Rfl:     albuterol sulfate  (90 Base) MCG/ACT inhaler, Inhale 2 puffs into the lungs every 6 hours as needed for Wheezing or Shortness of Breath, Disp: 1 Inhaler, Rfl: 5    AZELASTINE HCL NA, by Nasal route as needed , Disp: , Rfl:     Multiple Vitamins-Minerals (BARIATRIC FUSION) CHEW, Take 4 tablets by mouth daily, Disp: , Rfl:     Patient Active Problem List   Diagnosis    Hypertension    Tinea pedis    Right knee pain    Positive hepatitis C antibody test    Internal hemorrhoid, bleeding    Cholecystitis    Fatty liver    Morbid obesity with BMI of 60.0-69.9, adult (HCC)    SOB (shortness of breath)    Morbid obesity (HCC)    Hiatal hernia with gastroesophageal reflux disease and esophagitis    GERD with esophagitis    Superficial gastritis without hemorrhage    Hiatal hernia    Obstructive sleep apnea    Morbid obesity with BMI of 45.0-49.9, adult (HCC)    S/P laparoscopic sleeve gastrectomy    Atrial tachycardia (HCC)    Premature atrial beats    Sebaceous cyst    Reactive airway disease    Hoarseness of voice       Review of Systems   Constitutional: Negative for fatigue. Eyes: Negative for photophobia, pain and visual disturbance. Respiratory: Negative for apnea, cough, choking, chest tightness, shortness of breath and wheezing. Cardiovascular: Negative for chest pain, palpitations and leg swelling. Gastrointestinal: Negative for abdominal distention, abdominal pain, blood in stool, constipation, diarrhea, nausea and vomiting. Endocrine: Negative for cold intolerance and heat intolerance. Musculoskeletal: Negative for arthralgias and myalgias. Skin: Negative for rash. Neurological: Negative for dizziness, tremors, syncope, weakness, numbness and headaches. Psychiatric/Behavioral: Negative for agitation, confusion, decreased concentration, dysphoric mood, hallucinations, sleep disturbance and suicidal ideas. The patient is not nervous/anxious and is not hyperactive. Physical Exam  Constitutional:       Appearance: She is well-developed. HENT:      Head: Normocephalic. Eyes:      Conjunctiva/sclera: Conjunctivae normal.   Abdominal:      General: Abdomen is protuberant. Musculoskeletal:         General: No swelling. Neurological:      Mental Status: She is alert and oriented to person, place, and time. Psychiatric:         Mood and Affect: Mood normal.         Behavior: Behavior normal.         Thought Content:  Thought content normal.         Judgment: Judgment normal.         Hospital Outpatient Visit on 07/22/2021   Component Date Value Ref Range Status    Sodium 07/22/2021 138  136 - 145 mmol/L Final    Potassium 07/22/2021 3.5  3.5 - 5.1 mmol/L Final    Chloride 07/22/2021 103  99 - 110 mmol/L Final    CO2 07/22/2021 25  21 - 32 mmol/L Final    Anion Gap 07/22/2021 10  3 - 16 Final    Glucose 07/22/2021 90  70 - 99 mg/dL Final    BUN 07/22/2021 6* 7 - 20 mg/dL Final    CREATININE 07/22/2021 0.8  0.6 - 1.2 mg/dL Final    GFR Non- 07/22/2021 >60  >60 Final    Comment: >60 mL/min/1.73m2 EGFR, calc. for ages 25 and older using the  MDRD formula (not corrected for weight), is valid for ng/mL.......... Algis Broaden Insufficient  >=30 ng/mL. ........ Algis Broaden Sufficient      Vitamin B-12 07/22/2021 661  211 - 911 pg/mL Final    Folate 07/22/2021 9.92  4.78 - 24.20 ng/mL Final    Comment: Effective 11-15-16 10:00am EST  Please note reference ranges have  changed for Folate.  TSH 07/22/2021 1.54  0.27 - 4.20 uIU/mL Final         Assessment and Plan:  1. Morbid obesity with BMI of 50.0-59.9, adult ([address]  Improving. Continue current management. Qsymia refilled. F/u 4 weeks. Weigh-in at office before next visit  Will set new weight loss goal at next visit. 2. Dietary counseling and surveillance  Low carb/froilan meal plan. 3. S/P laparoscopic sleeve gastrectomy  Avoid all carbonated drinks. Choose fluids that are sugar-free. Eat small, but frequent meals including a protein source with each meal. Eat meals over 30 minutes, taking small bites and chewing well. Take MVIs as directed.           Nutrition Plan: [] LCHF/Ketogenic   [x] Modified low-calorie diet (low carb/low-froilan)               [] Low-calorie diet    [] Maintenance       []Other        Exercise: [x] Cardio     [x] Resistance/strength training                       [x] ACSM recommendations (150 minutes/week in active weight loss)               Behavior: [x] Motivational interviewing performed    [] Referralfor counseling                         [x] Discussed strategies to overcome habits/challenges for focus         [] Stress management   [x] Stimulus control         [] Sleep hygiene      Reviewed:  [x] Nutrition and the importance of regular protein intake  [x] Hidden carbohydrate sources  [x] Alcohol use  [x] Tobacco use   [x] Drug use- Denies  [x] Importance of exercise and reducingsedentary time  [x] Treatment consent- Patient understands and agrees with the treatment plan   [x] Proper use of medication and side effects  [x] OARRS report      Controlled Substance Monitoring:    RX Monitoring 5/11/2018   Attestation The Prescription Monitoring Report for this patient was reviewed today. Periodic Controlled Substance Monitoring -          Patient denies any history of cardiovascular disease (e.g., CAD, stroke, arrhythmias, CHF, uncontrolled HTN), seizure disorder, MAOI use within the last 2 weeks, hyperthyroidism, glaucoma, agitated states, history of drug abuse, pregnancy, nursing, known hypersensitivity to the prescribing meds, history of pancreatitis, or personal or family history of thyroid medullary cancer. Treatment start date: 7/25/21  12 weeks: 10/25/21  Starting weight: 289 pounds   Goal: At least 5-10% (14-28 pounds)- Goal met  Total weight loss: 16 pounds     Key dietary points:    - Meats (preferably organic or grass fed) are great sources of protein and have no carbohydrates. - Recommend coconut, olive, avocado, or almond oils. - When buying dairy, choose regular or full fat options. - Choose vegetables that grow above ground as they are generally lower in carbohydrates and higher in fiber.  - Avoid starches such as bread, rice, potatoes, pasta and all sources of simple sugars (desserts, soda, breakfast cereals). - Choose beverages that are calorie and sugar free. Reminder regarding weight loss medications: You must be seen in office every 2-4 weeks to haveyour prescriptions refilled. If you are off of your medication for longer than 7 days, you will not be able to restart the medication for at least 6 months. Always call our office to report any side effects. Females, it is your responsibility to obtain negative pregnancy tests each month. No orders of the defined types were placed in this encounter. No follow-ups on file. Kenny Fisher is a 62 y.o. female being evaluated by a Virtual Visit (video visit) encounter to address concerns as mentioned above. A caregiver was present when appropriate.  Due to this being a TeleHealth encounter (During GQBOG-60 public health emergency), evaluation of the following organ systems was limited: Vitals/Constitutional/EENT/Resp/CV/GI//MS/Neuro/Skin/Heme-Lymph-Imm. Pursuant to the emergency declaration under the [address] [place] [address] authority and the [employer] Act, this Virtual Visit was conducted with patient's (and/or legal guardian's) consent, to reduce the patient's risk of exposure to COVID-19 and provide necessary medical care. The patient (and/or legal guardian) has also been advised to contact this office for worsening conditions or problems, and seek emergency medical treatment and/or call 911 if deemed necessary.

## 2021-11-01 ENCOUNTER — TELEPHONE (OUTPATIENT)
Dept: INTERNAL MEDICINE CLINIC | Age: 61
End: 2021-11-01

## 2021-11-04 ENCOUNTER — TELEMEDICINE (OUTPATIENT)
Dept: BARIATRICS/WEIGHT MGMT | Age: 61
End: 2021-11-04
Payer: COMMERCIAL

## 2021-11-04 DIAGNOSIS — E66.01 MORBID OBESITY WITH BMI OF 50.0-59.9, ADULT (HCC): Primary | ICD-10-CM

## 2021-11-04 DIAGNOSIS — Z71.3 DIETARY COUNSELING AND SURVEILLANCE: ICD-10-CM

## 2021-11-04 PROCEDURE — 99213 OFFICE O/P EST LOW 20 MIN: CPT | Performed by: FAMILY MEDICINE

## 2021-11-04 RX ORDER — PHENTERMINE AND TOPIRAMATE 7.5; 46 MG/1; MG/1
1 CAPSULE, EXTENDED RELEASE ORAL DAILY
Qty: 30 CAPSULE | Refills: 0 | Status: SHIPPED | OUTPATIENT
Start: 2021-11-04 | End: 2021-12-03

## 2021-11-04 ASSESSMENT — ENCOUNTER SYMPTOMS
WHEEZING: 0
APNEA: 0
CONSTIPATION: 0
BLOOD IN STOOL: 0
ABDOMINAL DISTENTION: 0
CHOKING: 0
EYE PAIN: 0
ABDOMINAL PAIN: 0
VOMITING: 0
SHORTNESS OF BREATH: 0
CHEST TIGHTNESS: 0
DIARRHEA: 0
NAUSEA: 0
COUGH: 0
PHOTOPHOBIA: 0

## 2021-11-04 NOTE — PROGRESS NOTES
Patient: Fior Mario                      Encounter Date: 11/4/2021    YOB: 1960                Age: 62 y.o. Chief Complaint   Patient presents with    Weight Management     F/u MWM- Qsymia         Patient identification was verified at the start of the visit. Patient-Reported Vitals 1/13/2021   Patient-Reported Weight 270 LB   Patient-Reported Height 61\"   Patient-Reported Systolic 670   Patient-Reported Diastolic 93   Patient-Reported Pulse 83   Patient-Reported Temperature 97.9         BP Readings from Last 1 Encounters:   07/21/21 124/88       BMI Readings from Last 1 Encounters:   07/21/21 54.61 kg/m²       Pulse Readings from Last 1 Encounters:   07/21/21 93       Wt Readings from Last 3 Encounters:   07/21/21 289 lb (131.1 kg)   06/02/21 298 lb (135.2 kg)   03/18/21 293 lb (132.9 kg)       Self-reported weight: 274 pounds      HPI: 61 y.o. female with a long-standing history of obesity s/p sleeve gastrectomy in 2017 presents today for virtual video follow-up. she has gained a pound since her last visit. Current treatment includes Qsymia 7.5-46 mg daily and low carb/froilan diet. Tolerating it well. Food recall reviewed with the patient.     -Working overtime   -Stressed  -Hasn't been focusing on her diet   -Snacking more frequently      -Lowest weight s/p sleeve: 240's  -Most of weight regained occurred within last year  -Multiple rounds of steroids  -[facility], sedentary lifestyle      Medication(s): Appetite well controlled? [x]Yes      []No    Focus:     []Good     []Fair     [x]Poor    Side effects? No        Any recent change in medication(s)? No    Exercise: []Cardio     []Resistance/strength training     [x]Other: None     Allergies   Allergen Reactions    Latex      Surgery bandage- new allergy as of 2021         Current Outpatient Medications:     Phentermine-Topiramate (QSYMIA) 7.5-46 MG CP24, Take 1 capsule by mouth daily for 30 days. , Disp: 30 capsule, Rfl: 0    diclofenac sodium (VOLTAREN) 1 % GEL, APPLY 1GRAM THREE TIMES A DAY AS NEEDED, Disp: , Rfl:     omeprazole (PRILOSEC) 20 MG delayed release capsule, TAKE 1 CAPSULE BY MOUTH 2 TIMES DAILY (BEFORE MEALS), Disp: 180 capsule, Rfl: 1    SYMBICORT 80-4.5 MCG/ACT AERO, Inhale 2 puffs into the lungs 2 times daily as needed (Chronic Shortness of Breath), Disp: 3 Inhaler, Rfl: 0    fluticasone (FLONASE) 50 MCG/ACT nasal spray, 2 sprays by Nasal route daily as needed for Allergies, Disp: 3 Bottle, Rfl: 0    ibuprofen (ADVIL;MOTRIN) 800 MG tablet, Take 1 tablet by mouth 3 times daily, Disp: , Rfl:     albuterol sulfate  (90 Base) MCG/ACT inhaler, Inhale 2 puffs into the lungs every 6 hours as needed for Wheezing or Shortness of Breath, Disp: 1 Inhaler, Rfl: 5    AZELASTINE HCL NA, by Nasal route as needed , Disp: , Rfl:     Multiple Vitamins-Minerals (BARIATRIC FUSION) CHEW, Take 4 tablets by mouth daily, Disp: , Rfl:     Patient Active Problem List   Diagnosis    Hypertension    Tinea pedis    Right knee pain    Positive hepatitis C antibody test    Internal hemorrhoid, bleeding    Cholecystitis    Fatty liver    Morbid obesity with BMI of 60.0-69.9, adult (HCC)    SOB (shortness of breath)    Morbid obesity (HCC)    Hiatal hernia with gastroesophageal reflux disease and esophagitis    GERD with esophagitis    Superficial gastritis without hemorrhage    Hiatal hernia    Obstructive sleep apnea    Morbid obesity with BMI of 45.0-49.9, adult (HCC)    S/P laparoscopic sleeve gastrectomy    Atrial tachycardia (HCC)    Premature atrial beats    Sebaceous cyst    Reactive airway disease    Hoarseness of voice       Review of Systems   Constitutional: Negative for fatigue. Eyes: Negative for photophobia, pain and visual disturbance. Respiratory: Negative for apnea, cough, choking, chest tightness, shortness of breath and wheezing. Cardiovascular: Negative for chest pain, palpitations and leg swelling. Gastrointestinal: Negative for abdominal distention, abdominal pain, blood in stool, constipation, diarrhea, nausea and vomiting. Endocrine: Negative for cold intolerance and heat intolerance. Musculoskeletal: Negative for arthralgias and myalgias. Skin: Negative for rash. Neurological: Negative for dizziness, tremors, syncope, weakness, numbness and headaches. Psychiatric/Behavioral: Negative for agitation, confusion, decreased concentration, dysphoric mood, hallucinations, sleep disturbance and suicidal ideas. The patient is not nervous/anxious and is not hyperactive. Physical Exam  Constitutional:       Appearance: She is well-developed. HENT:      Head: Normocephalic. Eyes:      Conjunctiva/sclera: Conjunctivae normal.   Abdominal:      General: Abdomen is protuberant. Musculoskeletal:         General: No swelling. Neurological:      Mental Status: She is alert and oriented to person, place, and time. Psychiatric:         Mood and Affect: Mood normal.         Behavior: Behavior normal.         Thought Content: Thought content normal.         Judgment: Judgment normal.         Hospital Outpatient Visit on 07/22/2021   Component Date Value Ref Range Status    Sodium 07/22/2021 138  136 - 145 mmol/L Final    Potassium 07/22/2021 3.5  3.5 - 5.1 mmol/L Final    Chloride 07/22/2021 103  99 - 110 mmol/L Final    CO2 07/22/2021 25  21 - 32 mmol/L Final    Anion Gap 07/22/2021 10  3 - 16 Final    Glucose 07/22/2021 90  70 - 99 mg/dL Final    BUN 07/22/2021 6* 7 - 20 mg/dL Final    CREATININE 07/22/2021 0.8  0.6 - 1.2 mg/dL Final    GFR Non- 07/22/2021 >60  >60 Final    Comment: >60 mL/min/1.73m2 EGFR, calc. for ages 25 and older using the  MDRD formula (not corrected for weight), is valid for stable  renal function.  GFR  07/22/2021 >60  >60 Final    Comment: Chronic Kidney Disease: less than 60 ml/min/1.73 sq.m.           Kidney Failure: less than 15 ml/min/1.73 sq.m. Results valid for patients 18 years and older.  Calcium 07/22/2021 9.1  8.3 - 10.6 mg/dL Final    Total Protein 07/22/2021 7.1  6.4 - 8.2 g/dL Final    Albumin 07/22/2021 3.7  3.4 - 5.0 g/dL Final    Albumin/Globulin Ratio 07/22/2021 1.1  1.1 - 2.2 Final    Total Bilirubin 07/22/2021 0.3  0.0 - 1.0 mg/dL Final    Alkaline Phosphatase 07/22/2021 159* 40 - 129 U/L Final    ALT 07/22/2021 35  10 - 40 U/L Final    AST 07/22/2021 37  15 - 37 U/L Final    Globulin 07/22/2021 3.4  g/dL Final    Ventricular Rate 07/22/2021 74  BPM Final    Atrial Rate 07/22/2021 74  BPM Final    P-R Interval 07/22/2021 144  ms Final    QRS Duration 07/22/2021 74  ms Final    Q-T Interval 07/22/2021 372  ms Final    QTc Calculation (Bazett) 07/22/2021 412  ms Final    P Axis 07/22/2021 56  degrees Final    R Axis 07/22/2021 47  degrees Final    T Axis 07/22/2021 28  degrees Final    Diagnosis 07/22/2021 Normal sinus rhythmNormal ECGWhen compared with ECG of 18-AUG-2017 09:07,Vent. rate has increased BY  24 BPMNonspecific T wave abnormality now evident in Inferior leadsNonspecific T wave abnormality now evident in Anterior leadsConfirmed by CRISTIAN Collins MD ([id number]) on 7/22/2021 10:50:07 AM   Final    Hemoglobin A1C 07/22/2021 5.9  See comment % Final    Comment: Abnormal hemoglobin detected on glycohemoglobin methodology. Suggest hemoglobin electrophoresis if clinically indicated. Comment:  Diagnosis of Diabetes: > or = 6.5%  Increased risk of diabetes (Prediabetes): 5.7-6.4%  Glycemic Control: Nonpregnant Adults: <7.0%                    Pregnant: <6.0%        eAG 07/22/2021 122.6  mg/dL Final    Vit D, 25-Hydroxy 07/22/2021 22.9* >=30 ng/mL Final    Comment: <=20 ng/mL. ........... [employer] Deficient  21-29 ng/mL. ......... [employer] Insufficient  >=30 ng/mL. ........ [employer] Sufficient      Vitamin B-12 07/22/2021 661  211 - 911 pg/mL Final    Folate 07/22/2021 9.92  4.78 - 24.20 ng/mL Final    Comment: Effective 11-15-16 10:00am EST  Please note reference ranges have  changed for Folate.  TSH 07/22/2021 1.54  0.27 - 4.20 uIU/mL Final         Assessment and Plan:  1. Morbid obesity with BMI of 50.0-59.9, adult ([address]  Met the 12-week weight loss goal.  New weight loss goal set. Reinforced meal plan as prescribed. Stress management. Stimulus control. F/u 4 weeks. - Phentermine-Topiramate (QSYMIA) 7.5-46 MG CP24; Take 1 capsule by mouth daily for 30 days. Dispense: 30 capsule; Refill: 0    2. Dietary counseling and surveillance  [id number]/low carb meal plan. Nutrition Plan: [] LCHF/Ketogenic   [x] Modified low-calorie diet (low carb/low-froilan)               [] Low-calorie diet    [] Maintenance       []Other        Exercise: [x] Cardio     [x] Resistance/strength training                       [x] ACSM recommendations (150 minutes/week in active weight loss)               Behavior: [x] Motivational interviewing performed    [] Referralfor counseling                         [x] Discussed strategies to overcome habits/challenges for focus         [] Stress management   [x] Stimulus control         [] Sleep hygiene      Reviewed:  [x] Nutrition and the importance of regular protein intake  [x] Hidden carbohydrate sources  [x] Alcohol use  [x] Tobacco use   [x] Drug use- Denies  [x] Importance of exercise and reducing sedentary time  [x] Treatment consent- Patient understands and agrees with the treatment plan   [x] Proper use of medication and side effects  [x] OARRS report      Controlled Substance Monitoring:    RX Monitoring 5/11/2018   Attestation The Prescription Monitoring Report for this patient was reviewed today.    Periodic Controlled Substance Monitoring -          Patient denies any history of cardiovascular disease (e.g., CAD, stroke, arrhythmias, CHF, uncontrolled HTN), seizure disorder, MAOI use within the last 2 weeks, hyperthyroidism, glaucoma, agitated states, history of drug abuse, pregnancy, nursing, known hypersensitivity to the prescribing meds, history of pancreatitis, or personal or family history of thyroid medullary cancer. Treatment start date: 7/25/21  12 weeks: 10/25/21  Starting weight: 289 pounds   Goal: At least 5-10% (14-28 pounds)- Goal met  Total weight loss: 15 pounds     New goal: 5% of 274 pounds (13.5 pounds) by 2/5/22    Key dietary points:    - Meats (preferably organic or grass fed) are great sources of protein and have no carbohydrates. - Recommend coconut, olive, avocado, or almond oils. - When buying dairy, choose regular or full fat options. - Choose vegetables that grow above ground as they are generally lower in carbohydrates and higher in fiber.  - Avoid starches such as bread, rice, potatoes, pasta and all sources of simple sugars (desserts, soda, breakfast cereals). - Choose beverages that are calorie and sugar free. Reminder regarding weight loss medications: You must be seen in office every 2-4 weeks to haveyour prescriptions refilled. If you are off of your medication for longer than 7 days, you will not be able to restart the medication for at least 6 months. Always call our office to report any side effects. Females, it is your responsibility to obtain negative pregnancy tests each month. No orders of the defined types were placed in this encounter. No follow-ups on file. Michelle Reed is a 62 y.o. female being evaluated by a Virtual Visit (video visit) encounter to address concerns as mentioned above. A caregiver was present when appropriate. Due to this being a TeleHealth encounter (During UVZA-89 public health emergency), evaluation of the following organ systems was limited: Vitals/Constitutional/EENT/Resp/CV/GI//MS/Neuro/Skin/Heme-Lymph-Imm.   Pursuant to the emergency declaration under the [facility] waiver authority and the [employer] App[employer]ations Act, this Virtual Visit was conducted with patient's (and/or legal guardian's) consent, to reduce the patient's risk of exposure to COVID-19 and provide necessary medical care. The patient (and/or legal guardian) has also been advised to contact this office for worsening conditions or problems, and seek emergency medical treatment and/or call 911 if deemed necessary.

## 2021-12-03 ENCOUNTER — TELEMEDICINE (OUTPATIENT)
Dept: BARIATRICS/WEIGHT MGMT | Age: 61
End: 2021-12-03
Payer: COMMERCIAL

## 2021-12-03 DIAGNOSIS — Z71.3 DIETARY COUNSELING AND SURVEILLANCE: ICD-10-CM

## 2021-12-03 DIAGNOSIS — E66.01 MORBID OBESITY WITH BMI OF 50.0-59.9, ADULT (HCC): Primary | ICD-10-CM

## 2021-12-03 PROCEDURE — 99213 OFFICE O/P EST LOW 20 MIN: CPT | Performed by: FAMILY MEDICINE

## 2021-12-03 RX ORDER — PHENTERMINE AND TOPIRAMATE 7.5; 46 MG/1; MG/1
1 CAPSULE, EXTENDED RELEASE ORAL DAILY
Qty: 30 CAPSULE | Refills: 0 | Status: SHIPPED | OUTPATIENT
Start: 2021-12-03 | End: 2021-12-30

## 2021-12-03 ASSESSMENT — ENCOUNTER SYMPTOMS
WHEEZING: 0
BLOOD IN STOOL: 0
ABDOMINAL PAIN: 0
SHORTNESS OF BREATH: 0
CHEST TIGHTNESS: 0
COUGH: 0
CONSTIPATION: 0
CHOKING: 0
NAUSEA: 0
VOMITING: 0
EYE PAIN: 0
APNEA: 0
PHOTOPHOBIA: 0
ABDOMINAL DISTENTION: 0
DIARRHEA: 0

## 2021-12-03 NOTE — PROGRESS NOTES
Patient: Sendy Holiday                      Encounter Date: 12/3/2021    YOB: 1960                Age: 62 y.o. Chief Complaint   Patient presents with    Weight Management     F/u MWM- Qsymia         Patient identification was verified at the start of the visit. Patient-Reported Vitals 1/13/2021   Patient-Reported Weight 270 LB   Patient-Reported Height 61\"   Patient-Reported Systolic 600   Patient-Reported Diastolic 93   Patient-Reported Pulse 83   Patient-Reported Temperature 97.9         BP Readings from Last 1 Encounters:   07/21/21 124/88       BMI Readings from Last 1 Encounters:   07/21/21 54.61 kg/m²       Pulse Readings from Last 1 Encounters:   07/21/21 93           Wt Readings from Last 3 Encounters:   07/21/21 289 lb (131.1 kg)   06/02/21 298 lb (135.2 kg)   03/18/21 293 lb (132.9 kg)         Self-reported weight: 272 pounds (12/3)    HPI: 61 y.o. female with a long-standing history of obesity s/p sleeve gastrectomy in 2017 presents today for virtual video follow-up. she has lost 2 pounds since her last visit. Current treatment includes Qsymia 7.5-46 mg daily and low carb/froilan diet. Tolerating it well. Food recall reviewed with the patient.     Top size has dropped from 20-->16 since starting tx        -Lowest weight s/p sleeve: 240's  -Most of weight regained occurred within last year  -Multiple rounds of steroids  -[facility], sedentary lifestyle       Allergies   Allergen Reactions    Latex      Surgery bandage- new allergy as of 2021         Current Outpatient Medications:     Phentermine-Topiramate (QSYMIA) 7.5-46 MG CP24, Take 1 capsule by mouth daily for 30 days. , Disp: 30 capsule, Rfl: 0    diclofenac sodium (VOLTAREN) 1 % GEL, APPLY 1GRAM THREE TIMES A DAY AS NEEDED, Disp: , Rfl:     omeprazole (PRILOSEC) 20 MG delayed release capsule, TAKE 1 CAPSULE BY MOUTH 2 TIMES DAILY (BEFORE MEALS), Disp: 180 capsule, Rfl: 1    SYMBICORT 80-4.5 MCG/ACT AERO, Inhale 2 puffs into the lungs 2 times daily as needed (Chronic Shortness of Breath), Disp: 3 Inhaler, Rfl: 0    fluticasone (FLONASE) 50 MCG/ACT nasal spray, 2 sprays by Nasal route daily as needed for Allergies, Disp: 3 Bottle, Rfl: 0    ibuprofen (ADVIL;MOTRIN) 800 MG tablet, Take 1 tablet by mouth 3 times daily, Disp: , Rfl:     albuterol sulfate  (90 Base) MCG/ACT inhaler, Inhale 2 puffs into the lungs every 6 hours as needed for Wheezing or Shortness of Breath, Disp: 1 Inhaler, Rfl: 5    AZELASTINE HCL NA, by Nasal route as needed , Disp: , Rfl:     Multiple Vitamins-Minerals (BARIATRIC FUSION) CHEW, Take 4 tablets by mouth daily, Disp: , Rfl:     Patient Active Problem List   Diagnosis    Hypertension    Tinea pedis    Right knee pain    Positive hepatitis C antibody test    Internal hemorrhoid, bleeding    Cholecystitis    Fatty liver    Morbid obesity with BMI of 60.0-69.9, adult (HCC)    SOB (shortness of breath)    Morbid obesity (HCC)    Hiatal hernia with gastroesophageal reflux disease and esophagitis    GERD with esophagitis    Superficial gastritis without hemorrhage    Hiatal hernia    Obstructive sleep apnea    Morbid obesity with BMI of 45.0-49.9, adult (HCC)    S/P laparoscopic sleeve gastrectomy    Atrial tachycardia (HCC)    Premature atrial beats    Sebaceous cyst    Reactive airway disease    Hoarseness of voice       Review of Systems   Constitutional: Negative for fatigue. Eyes: Negative for photophobia, pain and visual disturbance. Respiratory: Negative for apnea, cough, choking, chest tightness, shortness of breath and wheezing. Cardiovascular: Negative for chest pain, palpitations and leg swelling. Gastrointestinal: Negative for abdominal distention, abdominal pain, blood in stool, constipation, diarrhea, nausea and vomiting. Endocrine: Negative for cold intolerance and heat intolerance. Musculoskeletal: Negative for arthralgias and myalgias.    Skin: Negative for rash.   Neurological: Negative for dizziness, tremors, syncope, weakness, numbness and headaches. Psychiatric/Behavioral: Negative for agitation, confusion, decreased concentration, dysphoric mood, hallucinations, sleep disturbance and suicidal ideas. The patient is not nervous/anxious and is not hyperactive. Physical Exam  Constitutional:       Appearance: She is well-developed. HENT:      Head: Normocephalic. Eyes:      Conjunctiva/sclera: Conjunctivae normal.   Abdominal:      General: Abdomen is protuberant. Musculoskeletal:         General: No swelling. Neurological:      Mental Status: She is alert and oriented to person, place, and time. Psychiatric:         Mood and Affect: Mood normal.         Behavior: Behavior normal.         Thought Content: Thought content normal.         Judgment: Judgment normal.         Hospital Outpatient Visit on 07/22/2021   Component Date Value Ref Range Status    Sodium 07/22/2021 138  136 - 145 mmol/L Final    Potassium 07/22/2021 3.5  3.5 - 5.1 mmol/L Final    Chloride 07/22/2021 103  99 - 110 mmol/L Final    CO2 07/22/2021 25  21 - 32 mmol/L Final    Anion Gap 07/22/2021 10  3 - 16 Final    Glucose 07/22/2021 90  70 - 99 mg/dL Final    BUN 07/22/2021 6* 7 - 20 mg/dL Final    CREATININE 07/22/2021 0.8  0.6 - 1.2 mg/dL Final    GFR Non- 07/22/2021 >60  >60 Final    Comment: >60 mL/min/1.73m2 EGFR, calc. for ages 25 and older using the  MDRD formula (not corrected for weight), is valid for stable  renal function.  GFR  07/22/2021 >60  >60 Final    Comment: Chronic Kidney Disease: less than 60 ml/min/1.73 sq.m. Kidney Failure: less than 15 ml/min/1.73 sq.m. Results valid for patients 18 years and older.       Calcium 07/22/2021 9.1  8.3 - 10.6 mg/dL Final    Total Protein 07/22/2021 7.1  6.4 - 8.2 g/dL Final    Albumin 07/22/2021 3.7  3.4 - 5.0 g/dL Final    Albumin/Globulin Ratio 07/22/2021 1.1  1.1 - 2.2 Final    Total Bilirubin 07/22/2021 0.3  0.0 - 1.0 mg/dL Final    Alkaline Phosphatase 07/22/2021 159* 40 - 129 U/L Final    ALT 07/22/2021 35  10 - 40 U/L Final    AST 07/22/2021 37  15 - 37 U/L Final    Globulin 07/22/2021 3.4  g/dL Final    Ventricular Rate 07/22/2021 74  BPM Final    Atrial Rate 07/22/2021 74  BPM Final    P-R Interval 07/22/2021 144  ms Final    QRS Duration 07/22/2021 74  ms Final    Q-T Interval 07/22/2021 372  ms Final    QTc Calculation (Bazett) 07/22/2021 412  ms Final    P Axis 07/22/2021 56  degrees Final    R Axis 07/22/2021 47  degrees Final    T Axis 07/22/2021 28  degrees Final    Diagnosis 07/22/2021 Normal sinus rhythmNormal ECGWhen compared with ECG of 18-AUG-2017 09:07,Vent. rate has increased BY  24 BPMNonspecific T wave abnormality now evident in Inferior leadsNonspecific T wave abnormality now evident in Anterior leadsConfirmed by George Nelson MD, [place] ([id number]) on 7/22/2021 10:50:07 AM   Final    Hemoglobin A1C 07/22/2021 5.9  See comment % Final    Comment: Abnormal hemoglobin detected on glycohemoglobin methodology. Suggest hemoglobin electrophoresis if clinically indicated. Comment:  Diagnosis of Diabetes: > or = 6.5%  Increased risk of diabetes (Prediabetes): 5.7-6.4%  Glycemic Control: Nonpregnant Adults: <7.0%                    Pregnant: <6.0%        eAG 07/22/2021 122.6  mg/dL Final    Vit D, 25-Hydroxy 07/22/2021 22.9* >=30 ng/mL Final    Comment: <=20 ng/mL. ........... [employer] Deficient  21-29 ng/mL. ......... Ori [employer] Insufficient  >=30 ng/mL. ........ [employer] Sufficient      Vitamin B-12 07/22/2021 661  211 - 911 pg/mL Final    Folate 07/22/2021 9.92  4.78 - 24.20 ng/mL Final    Comment: Effective 11-15-16 10:00am EST  Please note reference ranges have  changed for Folate.  TSH 07/22/2021 1.54  0.27 - 4.20 uIU/mL Final         Assessment and Plan:  1. Morbid obesity with BMI of 50.0-59.9, adult ([facility] [address]  Improving. Continue current management. F/u 4 weeks.      - Phentermine-Topiramate (QSYMIA) 7.5-46 MG CP24; Take 1 capsule by mouth daily for 30 days. Dispense: 30 capsule; Refill: 0    2. Dietary counseling and surveillance  Low carb/froilan meal plan. Nutrition Plan: [] LCHF/Ketogenic   [x] Modified low-calorie diet (low carb/low-froilan)               [] Low-calorie diet    [] Maintenance       []Other        Exercise: [x] Cardio     [] Resistance/strength training                       [x] ACSM recommendations (150 minutes/week in active weight loss)               Behavior: [x] Motivational interviewing performed    [] Referralfor counseling                         [x] Discussed strategies to overcome habits/challenges for focus         [] Stress management   [x] Stimulus control         [] Sleep hygiene      Reviewed:  [x] Nutrition and the importance of regular protein intake  [x] Hidden carbohydrate sources  [x] Alcohol use  [x] Tobacco use   [x] Drug use- Denies  [x] Importance of exercise and reducing sedentary time  [x] Treatment consent- Patient understands and agrees with the treatment plan   [x] Proper use of medication and side effects  [x] OARRS report      Controlled Substance Monitoring:    RX Monitoring 5/11/2018   Attestation The Prescription Monitoring Report for this patient was reviewed today. Periodic Controlled Substance Monitoring -          Patient denies any history of cardiovascular disease (e.g., CAD, stroke, arrhythmias, CHF, uncontrolled HTN), seizure disorder, MAOI use within the last 2 weeks, hyperthyroidism, glaucoma, agitated states, history of drug abuse, pregnancy, nursing, known hypersensitivity to the prescribing meds, history of pancreatitis, or personal or family history of thyroid medullary cancer. New goal: 5% of 274 pounds (13.5 pounds) by 2/5/22  Total weight loss: 2 pounds       Key dietary points:    - Meats (preferably organic or grass fed) are great sources of protein and have no carbohydrates.   - Recommend coconut, olive, avocado, or almond oils. - When buying dairy, choose regular or full fat options. - Choose vegetables that grow above ground as they are generally lower in carbohydrates and higher in fiber.  - Avoid starches such as bread, rice, potatoes, pasta and all sources of simple sugars (desserts, soda, breakfast cereals). - Choose beverages that are calorie and sugar free. Reminder regarding weight loss medications: You must be seen in office every 2-4 weeks to haveyour prescriptions refilled. If you are off of your medication for longer than 7 days, you will not be able to restart the medication for at least 6 months. Always call our office to report any side effects. Females, it is your responsibility to obtain negative pregnancy tests each month. No orders of the defined types were placed in this encounter. No follow-ups on file. Sandie Byrnes is a 62 y.o. female being evaluated by a Virtual Visit (video visit) encounter to address concerns as mentioned above. A caregiver was present when appropriate. Due to this being a TeleHealth encounter ([facility]), evaluation of the following organ systems was limited: Vitals/Constitutional/EENT/Resp/CV/GI//MS/Neuro/Skin/Heme-Lymph-Imm. Pursuant to the emergency declaration under the [address] [place] [address], [address] [place] [address] authority and the [employer] General Act, this Virtual Visit was conducted with patient's (and/or legal guardian's) consent, to reduce the patient's risk of exposure to COVID-19 and provide necessary medical care. The patient (and/or legal guardian) has also been advised to contact this office for worsening conditions or problems, and seek emergency medical treatment and/or call 911 if deemed necessary.

## 2021-12-30 ENCOUNTER — TELEMEDICINE (OUTPATIENT)
Dept: BARIATRICS/WEIGHT MGMT | Age: 61
End: 2021-12-30
Payer: COMMERCIAL

## 2021-12-30 DIAGNOSIS — E66.01 MORBID OBESITY WITH BMI OF 50.0-59.9, ADULT (HCC): Primary | ICD-10-CM

## 2021-12-30 DIAGNOSIS — Z71.3 DIETARY COUNSELING AND SURVEILLANCE: ICD-10-CM

## 2021-12-30 PROCEDURE — 99213 OFFICE O/P EST LOW 20 MIN: CPT | Performed by: FAMILY MEDICINE

## 2021-12-30 RX ORDER — PHENTERMINE AND TOPIRAMATE 7.5; 46 MG/1; MG/1
1 CAPSULE, EXTENDED RELEASE ORAL DAILY
Qty: 30 CAPSULE | Refills: 0 | Status: SHIPPED | OUTPATIENT
Start: 2021-12-30 | End: 2022-01-19 | Stop reason: ALTCHOICE

## 2021-12-30 ASSESSMENT — ENCOUNTER SYMPTOMS
VOMITING: 0
CHOKING: 0
APNEA: 0
DIARRHEA: 0
EYE PAIN: 0
SHORTNESS OF BREATH: 0
COUGH: 0
WHEEZING: 0
CONSTIPATION: 0
PHOTOPHOBIA: 0
CHEST TIGHTNESS: 0
BLOOD IN STOOL: 0
NAUSEA: 0
ABDOMINAL PAIN: 0
ABDOMINAL DISTENTION: 0

## 2021-12-30 NOTE — PROGRESS NOTES
Patient: Jeanie Hill                      Encounter Date: 12/30/2021    YOB: 1960                Age: 62 y.o. Chief Complaint   Patient presents with    Weight Management     F/u MWM- Qsymia         Patient identification was verified at the start of the visit. Patient-Reported Vitals 1/13/2021   Patient-Reported Weight 270 LB   Patient-Reported Height 61\"   Patient-Reported Systolic 786   Patient-Reported Diastolic 93   Patient-Reported Pulse 83   Patient-Reported Temperature 97.9         BP Readings from Last 1 Encounters:   07/21/21 124/88       BMI Readings from Last 1 Encounters:   07/21/21 54.61 kg/m²       Pulse Readings from Last 1 Encounters:   07/21/21 93       Self-reported weight: 270 pounds (12/30)     HPI: 61 y.o. female with a long-standing history of obesity s/p sleeve gastrectomy in 2017 presents today for virtual video follow-up. she has lost 2 pounds since her last visit. Current treatment includes Qsymia 7.5-46 mg daily and low carb/froilan diet. Tolerating it well. Food recall reviewed with the patient.              -Lowest weight s/p sleeve: 240's  -Most of weight regained occurred within last year  -Multiple rounds of steroids  -[facility], sedentary lifestyle    Medication(s): Appetite well controlled? [x]Yes      []No    Focus:     [x]Good     []Fair     []Poor    Side effects? No     Any recent change in medication(s)? No    Exercise: [x]Cardio- 2x/week     []Resistance/strength training     []Other:     Allergies   Allergen Reactions    Latex      Surgery bandage- new allergy as of 2021         Current Outpatient Medications:     Phentermine-Topiramate (QSYMIA) 7.5-46 MG CP24, Take 1 capsule by mouth daily for 30 days. , Disp: 30 capsule, Rfl: 0    diclofenac sodium (VOLTAREN) 1 % GEL, APPLY 1GRAM THREE TIMES A DAY AS NEEDED, Disp: , Rfl:     omeprazole (PRILOSEC) 20 MG delayed release capsule, TAKE 1 CAPSULE BY MOUTH 2 TIMES DAILY (BEFORE MEALS), Disp: 180 capsule, Rfl: 1    SYMBICORT 80-4.5 MCG/ACT AERO, Inhale 2 puffs into the lungs 2 times daily as needed (Chronic Shortness of Breath), Disp: 3 Inhaler, Rfl: 0    fluticasone (FLONASE) 50 MCG/ACT nasal spray, 2 sprays by Nasal route daily as needed for Allergies, Disp: 3 Bottle, Rfl: 0    ibuprofen (ADVIL;MOTRIN) 800 MG tablet, Take 1 tablet by mouth 3 times daily, Disp: , Rfl:     albuterol sulfate  (90 Base) MCG/ACT inhaler, Inhale 2 puffs into the lungs every 6 hours as needed for Wheezing or Shortness of Breath, Disp: 1 Inhaler, Rfl: 5    AZELASTINE HCL NA, by Nasal route as needed , Disp: , Rfl:     Multiple Vitamins-Minerals (BARIATRIC FUSION) CHEW, Take 4 tablets by mouth daily, Disp: , Rfl:     Patient Active Problem List   Diagnosis    Hypertension    Tinea pedis    Right knee pain    Positive hepatitis C antibody test    Internal hemorrhoid, bleeding    Cholecystitis    Fatty liver    Morbid obesity with BMI of 60.0-69.9, adult (HCC)    SOB (shortness of breath)    Morbid obesity (HCC)    Hiatal hernia with gastroesophageal reflux disease and esophagitis    GERD with esophagitis    Superficial gastritis without hemorrhage    Hiatal hernia    Obstructive sleep apnea    Morbid obesity with BMI of 45.0-49.9, adult (HCC)    S/P laparoscopic sleeve gastrectomy    Atrial tachycardia (HCC)    Premature atrial beats    Sebaceous cyst    Reactive airway disease    Hoarseness of voice       Review of Systems   Constitutional: Negative for fatigue. Eyes: Negative for photophobia, pain and visual disturbance. Respiratory: Negative for apnea, cough, choking, chest tightness, shortness of breath and wheezing. Cardiovascular: Negative for chest pain, palpitations and leg swelling. Gastrointestinal: Negative for abdominal distention, abdominal pain, blood in stool, constipation, diarrhea, nausea and vomiting. Endocrine: Negative for cold intolerance and heat intolerance. Musculoskeletal: Negative for arthralgias and myalgias. Skin: Negative for rash. Neurological: Negative for dizziness, tremors, syncope, weakness, numbness and headaches. Psychiatric/Behavioral: Negative for agitation, confusion, decreased concentration, dysphoric mood, hallucinations, sleep disturbance and suicidal ideas. The patient is not nervous/anxious and is not hyperactive. Physical Exam  Constitutional:       Appearance: She is well-developed. HENT:      Head: Normocephalic. Eyes:      Conjunctiva/sclera: Conjunctivae normal.   Abdominal:      General: Abdomen is protuberant. Musculoskeletal:         General: No swelling. Neurological:      Mental Status: She is alert and oriented to person, place, and time. Psychiatric:         Mood and Affect: Mood normal.         Behavior: Behavior normal.         Thought Content: Thought content normal.         Judgment: Judgment normal.         Hospital Outpatient Visit on 07/22/2021   Component Date Value Ref Range Status    Sodium 07/22/2021 138  136 - 145 mmol/L Final    Potassium 07/22/2021 3.5  3.5 - 5.1 mmol/L Final    Chloride 07/22/2021 103  99 - 110 mmol/L Final    CO2 07/22/2021 25  21 - 32 mmol/L Final    Anion Gap 07/22/2021 10  3 - 16 Final    Glucose 07/22/2021 90  70 - 99 mg/dL Final    BUN 07/22/2021 6* 7 - 20 mg/dL Final    CREATININE 07/22/2021 0.8  0.6 - 1.2 mg/dL Final    GFR Non- 07/22/2021 >60  >60 Final    Comment: >60 mL/min/1.73m2 EGFR, calc. for ages 25 and older using the  MDRD formula (not corrected for weight), is valid for stable  renal function.  GFR  07/22/2021 >60  >60 Final    Comment: Chronic Kidney Disease: less than 60 ml/min/1.73 sq.m. Kidney Failure: less than 15 ml/min/1.73 sq.m. Results valid for patients 18 years and older.       Calcium 07/22/2021 9.1  8.3 - 10.6 mg/dL Final    Total Protein 07/22/2021 7.1  6.4 - 8.2 g/dL Final    Albumin 07/22/2021 3.7  3.4 - 5.0 g/dL Final    Albumin/Globulin Ratio 07/22/2021 1.1  1.1 - 2.2 Final    Total Bilirubin 07/22/2021 0.3  0.0 - 1.0 mg/dL Final    Alkaline Phosphatase 07/22/2021 159* 40 - 129 U/L Final    ALT 07/22/2021 35  10 - 40 U/L Final    AST 07/22/2021 37  15 - 37 U/L Final    Globulin 07/22/2021 3.4  g/dL Final    Ventricular Rate 07/22/2021 74  BPM Final    Atrial Rate 07/22/2021 74  BPM Final    P-R Interval 07/22/2021 144  ms Final    QRS Duration 07/22/2021 74  ms Final    Q-T Interval 07/22/2021 372  ms Final    QTc Calculation (Bazett) 07/22/2021 412  ms Final    P Axis 07/22/2021 56  degrees Final    R Axis 07/22/2021 47  degrees Final    T Axis 07/22/2021 28  degrees Final    Diagnosis 07/22/2021 Normal sinus rhythmNormal ECGWhen compared with ECG of 18-AUG-2017 09:07,Vent. rate has increased BY  24 BPMNonspecific T wave abnormality now evident in Inferior leadsNonspecific T wave abnormality now evident in Anterior leadsConfirmed by Regla Yin MD, CRISTIAN ([id number]) on 7/22/2021 10:50:07 AM   Final    Hemoglobin A1C 07/22/2021 5.9  See comment % Final    Comment: Abnormal hemoglobin detected on glycohemoglobin methodology. Suggest hemoglobin electrophoresis if clinically indicated. Comment:  Diagnosis of Diabetes: > or = 6.5%  Increased risk of diabetes (Prediabetes): 5.7-6.4%  Glycemic Control: Nonpregnant Adults: <7.0%                    Pregnant: <6.0%        eAG 07/22/2021 122.6  mg/dL Final    Vit D, 25-Hydroxy 07/22/2021 22.9* >=30 ng/mL Final    Comment: <=20 ng/mL. ........... Lawernce Roughen Deficient  21-29 ng/mL. ......... Lawernce Roughen Insufficient  >=30 ng/mL. ........ Lawernce Tyrone Sufficient      Vitamin B-12 07/22/2021 661  211 - 911 pg/mL Final    Folate 07/22/2021 9.92  4.78 - 24.20 ng/mL Final    Comment: Effective 11-15-16 10:00am EST  Please note reference ranges have  changed for Folate.  TSH 07/22/2021 1.54  0.27 - 4.20 uIU/mL Final         Assessment and Plan:  1.  Morbid obesity with BMI of 50. 0-59.9, adult [facility])  Improving. Continue current management. Reinforced low carb/froilan diet. Office weigh-in before next visit. F/u 4 weeks. - Phentermine-Topiramate (QSYMIA) 7.5-46 MG CP24; Take 1 capsule by mouth daily for 30 days. Dispense: 30 capsule; Refill: 0    2. Dietary counseling and surveillance  Low carb/froilan meal plan. Nutrition Plan: [] LCHF/Ketogenic   [x] Modified low-calorie diet (low carb/low-froilan)               [] Low-calorie diet    [] Maintenance       []Other        Exercise: [x] Cardio     [x] Resistance/strength training                       [x] ACSM recommendations (150 minutes/week in active weight loss)               Behavior: [x] Motivational interviewing performed    [] Referralfor counseling                         [x] Discussed strategies to overcome habits/challenges for focus         [] Stress management   [x] Stimulus control         [] Sleep hygiene      Reviewed:  [x] Nutrition and the importance of regular protein intake  [x] Hidden carbohydrate sources  [x] Alcohol use  [x] Tobacco use   [x] Drug use- Denies  [x] Importance of exercise and reducinsedentary time  [x] Treatment consent- Patient understands and agrees with the treatment plan   [x] Proper use of medication and side effects  [x] OARRS report      Controlled Substance Monitoring:    RX Monitoring 5/11/2018   Attestation The Prescription Monitoring Report for this patient was reviewed today. Periodic Controlled Substance Monitoring -          Patient denies any history of cardiovascular disease (e.g., CAD, stroke, arrhythmias, CHF, uncontrolled HTN), seizure disorder, MAOI use within the last 2 weeks, hyperthyroidism, glaucoma, agitated states, history of drug abuse, pregnancy, nursing, known hypersensitivity to the prescribing meds, history of pancreatitis, or personal or family history of thyroid medullary cancer.         New goal: 5% of 274 pounds (13.5 pounds) by 2/5/22  Total weight loss: 4 pounds 19 pound weight loss since 7/25    Key dietary points:    - Meats (preferably organic or grass fed) are great sources of protein and have no carbohydrates. - Recommend coconut, olive, avocado, or almond oils. - When buying dairy, choose regular or full fat options. - Choose vegetables that grow above ground as they are generally lower in carbohydrates and higher in fiber.  - Avoid starches such as bread, rice, potatoes, pasta and all sources of simple sugars (desserts, soda, breakfast cereals). - Choose beverages that are calorie and sugar free. Reminder regarding weight loss medications: You must be seen in office every 2-4 weeks to haveyour prescriptions refilled. If you are off of your medication for longer than 7 days, you will not be able to restart the medication for at least 6 months. Always call our office to report any side effects. Females, it is your responsibility to obtain negative pregnancy tests each month. No orders of the defined types were placed in this encounter. No follow-ups on file. Alisha King is a 62 y.o. female being evaluated by a Virtual Visit (video visit) encounter to address concerns as mentioned above. A caregiver was present when appropriate. Due to this being a TeleHealth encounter (During DXJBN-14 public health emergency), evaluation of the following organ systems was limited: Vitals/Constitutional/EENT/Resp/CV/GI//MS/Neuro/Skin/Heme-Lymph-Imm. Pursuant to the emergency declaration under the [facility], [address] [place] [address] authority and the [employer] General Act, this Virtual Visit was conducted with patient's (and/or legal guardian's) consent, to reduce the patient's risk of exposure to COVID-19 and provide necessary medical care.   The patient (and/or legal guardian) has also been advised to contact this office for worsening conditions or problems, and seek emergency medical treatment and/or call 911 if deemed necessary.

## 2022-01-05 ENCOUNTER — HOSPITAL ENCOUNTER (OUTPATIENT)
Dept: MAMMOGRAPHY | Age: 62
Discharge: HOME | End: 2022-01-05
Payer: COMMERCIAL

## 2022-01-05 VITALS — BODY MASS INDEX: 50.6 KG/M2 | HEIGHT: 61 IN | WEIGHT: 268 LBS

## 2022-01-05 DIAGNOSIS — Z12.31 VISIT FOR SCREENING MAMMOGRAM: ICD-10-CM

## 2022-01-05 PROCEDURE — 77063 BREAST TOMOSYNTHESIS BI: CPT

## 2022-01-17 ENCOUNTER — TELEPHONE (OUTPATIENT)
Dept: BARIATRICS/WEIGHT MGMT | Age: 62
End: 2022-01-17

## 2022-01-17 NOTE — TELEPHONE ENCOUNTER
Pt calling in, currently on qsymia. Pt stated about a month ago, she noticed that she has been getting headaches and not been able to sleep. She wasn't for sure if that was due to qsymia or not. But recently pt had missed a dose and noticed her headache was gone. Pt not sure if she should continue to take. I let her know that I would put in a message to provider, Pt is on 7.25 dose.

## 2022-01-19 ENCOUNTER — TELEMEDICINE (OUTPATIENT)
Dept: BARIATRICS/WEIGHT MGMT | Age: 62
End: 2022-01-19
Payer: COMMERCIAL

## 2022-01-19 DIAGNOSIS — Z71.3 DIETARY COUNSELING AND SURVEILLANCE: ICD-10-CM

## 2022-01-19 DIAGNOSIS — E66.01 MORBID OBESITY WITH BMI OF 50.0-59.9, ADULT (HCC): Primary | ICD-10-CM

## 2022-01-19 DIAGNOSIS — Z98.84 S/P LAPAROSCOPIC SLEEVE GASTRECTOMY: ICD-10-CM

## 2022-01-19 PROCEDURE — 99214 OFFICE O/P EST MOD 30 MIN: CPT | Performed by: FAMILY MEDICINE

## 2022-01-19 ASSESSMENT — ENCOUNTER SYMPTOMS
APNEA: 0
PHOTOPHOBIA: 0
CHEST TIGHTNESS: 0
EYE PAIN: 0
DIARRHEA: 0
ABDOMINAL PAIN: 0
SHORTNESS OF BREATH: 0
COUGH: 0
WHEEZING: 0
ABDOMINAL DISTENTION: 0
BLOOD IN STOOL: 0
NAUSEA: 0
VOMITING: 0
CONSTIPATION: 0
CHOKING: 0

## 2022-01-19 NOTE — PROGRESS NOTES
Contrave Prescription (titration dose) called into [facility]. Pt notified. Advised to contact pharmacy to verify information. Pt is to call our office once medication is received via mail to schedule  2wk f/u appt with Dr Ori Lunsford.

## 2022-01-19 NOTE — TELEPHONE ENCOUNTER
She can stop the treatment. If she would like to switch to a different tx, please have her schedule an appointment so that I can discuss her options. Thank you.

## 2022-01-19 NOTE — PROGRESS NOTES
Patient: Lakshmi Sport                      Encounter Date: 1/19/2022    YOB: 1960                Age: 62 y.o. Chief Complaint   Patient presents with    Weight Management     F/u MWM         Patient identification was verified at the start of the visit. Patient-Reported Vitals 1/13/2021   Patient-Reported Weight 270 LB   Patient-Reported Height 61\"   Patient-Reported Systolic 901   Patient-Reported Diastolic 93   Patient-Reported Pulse 83   Patient-Reported Temperature 97.9         BP Readings from Last 1 Encounters:   07/21/21 124/88       BMI Readings from Last 1 Encounters:   01/05/22 50.64 kg/m²       Pulse Readings from Last 1 Encounters:   07/21/21 93        Self-reported weight: 267 pounds     HPI: 62 y.o. female with a long-standing history of obesity s/p sleeve gastrectomy in 2017 presents today for virtual video follow-up. she has lost 3 pounds since her last visit. Current treatment includes low carb/froilan diet and Qsymia. Started having headaches and insomnia recently. Stopped Qsymia for a few days and symptoms went away. Interested in switching to a different aom. Medication(s): Appetite well controlled? [x]Yes      []No    Focus:     [x]Good     []Fair     []Poor    Side effects? Headaches and insomnia         Any recent change in medication(s)?  No     Exercise: [x]Cardio     []Resistance/strength training     []Other:     Allergies   Allergen Reactions    Latex      Surgery bandage- new allergy as of 2021         Current Outpatient Medications:     diclofenac sodium (VOLTAREN) 1 % GEL, APPLY 1GRAM THREE TIMES A DAY AS NEEDED, Disp: , Rfl:     omeprazole (PRILOSEC) 20 MG delayed release capsule, TAKE 1 CAPSULE BY MOUTH 2 TIMES DAILY (BEFORE MEALS), Disp: 180 capsule, Rfl: 1    SYMBICORT 80-4.5 MCG/ACT AERO, Inhale 2 puffs into the lungs 2 times daily as needed (Chronic Shortness of Breath), Disp: 3 Inhaler, Rfl: 0    fluticasone (FLONASE) 50 MCG/ACT nasal spray, 2 sprays by Nasal route daily as needed for Allergies, Disp: 3 Bottle, Rfl: 0    ibuprofen (ADVIL;MOTRIN) 800 MG tablet, Take 1 tablet by mouth 3 times daily, Disp: , Rfl:     albuterol sulfate  (90 Base) MCG/ACT inhaler, Inhale 2 puffs into the lungs every 6 hours as needed for Wheezing or Shortness of Breath, Disp: 1 Inhaler, Rfl: 5    AZELASTINE HCL NA, by Nasal route as needed , Disp: , Rfl:     Multiple Vitamins-Minerals (BARIATRIC FUSION) CHEW, Take 4 tablets by mouth daily, Disp: , Rfl:     Patient Active Problem List   Diagnosis    Hypertension    Tinea pedis    Right knee pain    Positive hepatitis C antibody test    Internal hemorrhoid, bleeding    Cholecystitis    Fatty liver    Morbid obesity with BMI of 60.0-69.9, adult (HCC)    SOB (shortness of breath)    Morbid obesity (HCC)    Hiatal hernia with gastroesophageal reflux disease and esophagitis    GERD with esophagitis    Superficial gastritis without hemorrhage    Hiatal hernia    Obstructive sleep apnea    Morbid obesity with BMI of 45.0-49.9, adult (HCC)    S/P laparoscopic sleeve gastrectomy    Atrial tachycardia (HCC)    Premature atrial beats    Sebaceous cyst    Reactive airway disease    Hoarseness of voice       Review of Systems   Constitutional: Negative for fatigue. Eyes: Negative for photophobia, pain and visual disturbance. Respiratory: Negative for apnea, cough, choking, chest tightness, shortness of breath and wheezing. Cardiovascular: Negative for chest pain, palpitations and leg swelling. Gastrointestinal: Negative for abdominal distention, abdominal pain, blood in stool, constipation, diarrhea, nausea and vomiting. Endocrine: Negative for cold intolerance and heat intolerance. Musculoskeletal: Negative for arthralgias and myalgias. Skin: Negative for rash. Neurological: Negative for dizziness, tremors, syncope, weakness, numbness and headaches.    Psychiatric/Behavioral: Negative for agitation, confusion, decreased concentration, dysphoric mood, hallucinations, sleep disturbance and suicidal ideas. The patient is not nervous/anxious and is not hyperactive. Physical Exam  Constitutional:       Appearance: She is well-developed. HENT:      Head: Normocephalic. Eyes:      Conjunctiva/sclera: Conjunctivae normal.   Abdominal:      General: Abdomen is protuberant. Musculoskeletal:         General: No swelling. Neurological:      Mental Status: She is alert and oriented to person, place, and time. Psychiatric:         Mood and Affect: Mood normal.         Behavior: Behavior normal.         Thought Content: Thought content normal.         Judgment: Judgment normal.         Hospital Outpatient Visit on 07/22/2021   Component Date Value Ref Range Status    Sodium 07/22/2021 138  136 - 145 mmol/L Final    Potassium 07/22/2021 3.5  3.5 - 5.1 mmol/L Final    Chloride 07/22/2021 103  99 - 110 mmol/L Final    CO2 07/22/2021 25  21 - 32 mmol/L Final    Anion Gap 07/22/2021 10  3 - 16 Final    Glucose 07/22/2021 90  70 - 99 mg/dL Final    BUN 07/22/2021 6* 7 - 20 mg/dL Final    CREATININE 07/22/2021 0.8  0.6 - 1.2 mg/dL Final    GFR Non- 07/22/2021 >60  >60 Final    Comment: >60 mL/min/1.73m2 EGFR, calc. for ages 25 and older using the  MDRD formula (not corrected for weight), is valid for stable  renal function.  GFR  07/22/2021 >60  >60 Final    Comment: Chronic Kidney Disease: less than 60 ml/min/1.73 sq.m. Kidney Failure: less than 15 ml/min/1.73 sq.m. Results valid for patients 18 years and older.       Calcium 07/22/2021 9.1  8.3 - 10.6 mg/dL Final    Total Protein 07/22/2021 7.1  6.4 - 8.2 g/dL Final    Albumin 07/22/2021 3.7  3.4 - 5.0 g/dL Final    Albumin/Globulin Ratio 07/22/2021 1.1  1.1 - 2.2 Final    Total Bilirubin 07/22/2021 0.3  0.0 - 1.0 mg/dL Final    Alkaline Phosphatase 07/22/2021 159* 40 - 129 U/L Final    ALT 07/22/2021 35  10 - 40 U/L Final    AST 07/22/2021 37  15 - 37 U/L Final    Globulin 07/22/2021 3.4  g/dL Final    Ventricular Rate 07/22/2021 74  BPM Final    Atrial Rate 07/22/2021 74  BPM Final    P-R Interval 07/22/2021 144  ms Final    QRS Duration 07/22/2021 74  ms Final    Q-T Interval 07/22/2021 372  ms Final    QTc Calculation (Bazett) 07/22/2021 412  ms Final    P Axis 07/22/2021 56  degrees Final    R Axis 07/22/2021 47  degrees Final    T Axis 07/22/2021 28  degrees Final    Diagnosis 07/22/2021 Normal sinus rhythmNormal ECGWhen compared with ECG of 18-AUG-2017 09:07,Vent. rate has increased BY  24 BPMNonspecific T wave abnormality now evident in Inferior leadsNonspecific T wave abnormality now evident in Anterior leadsConfirmed by Emili Negrete MD, CRISTIAN ([id number]) on 7/22/2021 10:50:07 AM   Final    Hemoglobin A1C 07/22/2021 5.9  See comment % Final    Comment: Abnormal hemoglobin detected on glycohemoglobin methodology. Suggest hemoglobin electrophoresis if clinically indicated. Comment:  Diagnosis of Diabetes: > or = 6.5%  Increased risk of diabetes (Prediabetes): 5.7-6.4%  Glycemic Control: Nonpregnant Adults: <7.0%                    Pregnant: <6.0%        eAG 07/22/2021 122.6  mg/dL Final    Vit D, 25-Hydroxy 07/22/2021 22.9* >=30 ng/mL Final    Comment: <=20 ng/mL. ........... Roslynn Mutton Deficient  21-29 ng/mL. ......... Roslynn Mutton Insufficient  >=30 ng/mL. ........ Roslynn Mutton Sufficient      Vitamin B-12 07/22/2021 661  211 - 911 pg/mL Final    Folate 07/22/2021 9.92  4.78 - 24.20 ng/mL Final    Comment: Effective 11-15-16 10:00am EST  Please note reference ranges have  changed for Folate.  TSH 07/22/2021 1.54  0.27 - 4.20 uIU/mL Final         Assessment and Plan:  1. Morbid obesity with BMI of 50.0-59.9, adult ([address]    D/c Qsymia due to side effects. Discussed risks, benefits and alternatives of Contrave. Patient meets BMI criteria.  she denies MAOI use within the past 14 days, is not on any opioids, and does not have a seizure disorder. Start Contrave 8/90 mg. Use as directed. F/u in 2 weeks before titrating up to 3 pills a day. Explained to patient that I will monitor her weight loss every 12 weeks. Goal is to lose at least 5% of her body weight. Counseled patient on proper use and potential side effects including nausea/vomiting, constipation, headache, dizziness, insomnia, xerostomia, diarrhea, anxiety, increased blood pressure, flushing, fatigue, tremors, irritability, rash and/or palpitations. she understands that it is her responsibility to make sure that she does not run out of medications and to follow up to her appointments every 24 weeks as recommended. Heavily counseled on the importance of therapeutic lifestyle changes through diet and exercise. Patient is responsible for keeping his monthly appointments. Failure to comply with hermonthly visits will result in discontinuing Contrave. Patient advised to report any side effects. 2. Dietary counseling and surveillance  1200-Maximus/low carb meal plan. 3. S/P laparoscopic sleeve gastrectomy  Avoid all carbonated drinks. Choose fluids that are sugar-free. Eat small, but frequent meals including a protein source with each meal. Eat meals over 30 minutes, taking small bites and chewing well. Take MVIs as directed.           Nutrition Plan: [] LCHF/Ketogenic   [x] Modified low-calorie diet (low carb/low-maximus)               [] Low-calorie diet    [] Maintenance       []Other        Exercise: [x] Cardio     [x] Resistance/strength training                       [x] ACSM recommendations (150 minutes/week in active weight loss)               Behavior: [x] Motivational interviewing performed    [] Referralfor counseling                         [x] Discussed strategies to overcome habits/challenges for focus         [] Stress management   [x] Stimulus control         [] Sleep hygiene      Reviewed:  [x] Nutrition and the importance of regular protein intake  [x] Hidden carbohydrate sources  [x] Alcohol use  [x] Tobacco use   [x] Drug use- Denies  [x] Importance of exercise and reducingsedentary time  [x] Treatment consent- Patient understands and agrees with the treatment plan   [x] Proper use of medication and side effects  [x] OARRS report      Controlled Substance Monitoring:    RX Monitoring 5/11/2018   Attestation The Prescription Monitoring Report for this patient was reviewed today. Periodic Controlled Substance Monitoring -          Patient denies any history of cardiovascular disease (e.g., CAD, stroke, arrhythmias, CHF, uncontrolled HTN), seizure disorder, MAOI use within the last 2 weeks, hyperthyroidism, glaucoma, agitated states, history of drug abuse, pregnancy, nursing, known hypersensitivity to the prescribing meds, history of pancreatitis, or personal or family history of thyroid medullary cancer. Treatment start date: 1/22/22  12 weeks from start of therapeutic dose: 5/22/22  Starting weight: 267 pounds    Goal: At least 5% (13 pounds)    Key dietary points:    - Meats (preferably organic or grass fed) are great sources of protein and have no carbohydrates. - Recommend coconut, olive, avocado, or almond oils. - When buying dairy, choose regular or full fat options. - Choose vegetables that grow above ground as they are generally lower in carbohydrates and higher in fiber.  - Avoid starches such as bread, rice, potatoes, pasta and all sources of simple sugars (desserts, soda, breakfast cereals). - Choose beverages that are calorie and sugar free. Reminder regarding weight loss medications: You must be seen in office every 2-4 weeks to haveyour prescriptions refilled. If you are off of your medication for longer than 7 days, you will not be able to restart the medication for at least 6 months. Always call our office to report any side effects.     Females, it is your responsibility to obtain negative pregnancy tests each month.    No orders of the defined types were placed in this encounter. Return in about 2 weeks (around 2/2/2022) for MWM, meds. Annamae Kussmaul is a 62 y.o. female being evaluated by a Virtual Visit (video visit) encounter to address concerns as mentioned above. A caregiver was present when appropriate. Due to this being a TeleHealth encounter (During XWEDL-60 public health emergency), evaluation of the following organ systems was limited: Vitals/Constitutional/EENT/Resp/CV/GI//MS/Neuro/Skin/Heme-Lymph-Imm. Pursuant to the emergency declaration under the [facility], [address] [place] [address] authority and the [employer] and [employer] Act, this Virtual Visit was conducted with patient's (and/or legal guardian's) consent, to reduce the patient's risk of exposure to COVID-19 and provide necessary medical care. The patient (and/or legal guardian) has also been advised to contact this office for worsening conditions or problems, and seek emergency medical treatment and/or call 911 if deemed necessary.

## 2022-01-24 VITALS — BODY MASS INDEX: 52.72 KG/M2 | WEIGHT: 279 LBS

## 2022-02-05 ENCOUNTER — TELEMEDICINE (OUTPATIENT)
Dept: BARIATRICS/WEIGHT MGMT | Age: 62
End: 2022-02-05
Payer: COMMERCIAL

## 2022-02-05 DIAGNOSIS — E66.01 MORBID OBESITY WITH BMI OF 50.0-59.9, ADULT (HCC): Primary | ICD-10-CM

## 2022-02-05 DIAGNOSIS — Z71.3 DIETARY COUNSELING AND SURVEILLANCE: ICD-10-CM

## 2022-02-05 PROCEDURE — 99213 OFFICE O/P EST LOW 20 MIN: CPT | Performed by: FAMILY MEDICINE

## 2022-02-05 ASSESSMENT — ENCOUNTER SYMPTOMS
EYE PAIN: 0
PHOTOPHOBIA: 0
WHEEZING: 0
APNEA: 0
CHEST TIGHTNESS: 0
CHOKING: 0
VOMITING: 0
COUGH: 0
ABDOMINAL PAIN: 0
DIARRHEA: 0
ABDOMINAL DISTENTION: 0
CONSTIPATION: 0
BLOOD IN STOOL: 0
SHORTNESS OF BREATH: 0
NAUSEA: 0

## 2022-02-05 NOTE — PROGRESS NOTES
Patient: Antelmo Alexander                      Encounter Date: 2/5/2022    YOB: 1960                Age: 62 y.o. Chief Complaint   Patient presents with    Weight Management     F/u MWM- Contrave         Patient identification was verified at the start of the visit. Patient-Reported Vitals 2/5/2022   Patient-Reported Weight 270   Patient-Reported Height 5'1   Patient-Reported Systolic 169   Patient-Reported Diastolic 71   Patient-Reported Pulse 93   Patient-Reported Temperature 97.8         BP Readings from Last 1 Encounters:   07/21/21 124/88       BMI Readings from Last 1 Encounters:   01/24/22 52.72 kg/m²       Pulse Readings from Last 1 Encounters:   07/21/21 93       Self-reported weight: 270 pounds      HPI: 62 y.o. female with a long-standing history of obesity s/p sleeve gastrectomy in 2017 presents today for virtual video follow-up. She has gained 3 pounds since her last visit. Current tx includes Contrave and low carb/froilan diet. She is up to 2 pills/day. Tolerating it well. Has been under a lot of stress. Hasn't been exercising.      Medication(s): Appetite well controlled? [x]? Yes      []? No                          Focus:     [x]? Good     []? Fair     []? Poor                          Side effects? No         Any recent change in medication(s)? No      Exercise: []? Cardio     []? Resistance/strength training     [x]? Other: None     Allergies   Allergen Reactions    Latex      Surgery bandage- new allergy as of 2021         Current Outpatient Medications:     diclofenac sodium (VOLTAREN) 1 % GEL, APPLY 1GRAM THREE TIMES A DAY AS NEEDED, Disp: , Rfl:     omeprazole (PRILOSEC) 20 MG delayed release capsule, TAKE 1 CAPSULE BY MOUTH 2 TIMES DAILY (BEFORE MEALS), Disp: 180 capsule, Rfl: 1    SYMBICORT 80-4.5 MCG/ACT AERO, Inhale 2 puffs into the lungs 2 times daily as needed (Chronic Shortness of Breath), Disp: 3 Inhaler, Rfl: 0    fluticasone (FLONASE) 50 MCG/ACT nasal spray, 2 sprays by Nasal route daily as needed for Allergies, Disp: 3 Bottle, Rfl: 0    ibuprofen (ADVIL;MOTRIN) 800 MG tablet, Take 1 tablet by mouth 3 times daily, Disp: , Rfl:     albuterol sulfate  (90 Base) MCG/ACT inhaler, Inhale 2 puffs into the lungs every 6 hours as needed for Wheezing or Shortness of Breath, Disp: 1 Inhaler, Rfl: 5    AZELASTINE HCL NA, by Nasal route as needed , Disp: , Rfl:     Multiple Vitamins-Minerals (BARIATRIC FUSION) CHEW, Take 4 tablets by mouth daily, Disp: , Rfl:     Patient Active Problem List   Diagnosis    Hypertension    Tinea pedis    Right knee pain    Positive hepatitis C antibody test    Internal hemorrhoid, bleeding    Cholecystitis    Fatty liver    Morbid obesity with BMI of 60.0-69.9, adult (HCC)    SOB (shortness of breath)    Morbid obesity (HCC)    Hiatal hernia with gastroesophageal reflux disease and esophagitis    GERD with esophagitis    Superficial gastritis without hemorrhage    Hiatal hernia    Obstructive sleep apnea    Morbid obesity with BMI of 45.0-49.9, adult (HCC)    S/P laparoscopic sleeve gastrectomy    Atrial tachycardia (HCC)    Premature atrial beats    Sebaceous cyst    Reactive airway disease    Hoarseness of voice       Review of Systems   Constitutional: Negative for fatigue. Eyes: Negative for photophobia, pain and visual disturbance. Respiratory: Negative for apnea, cough, choking, chest tightness, shortness of breath and wheezing. Cardiovascular: Negative for chest pain, palpitations and leg swelling. Gastrointestinal: Negative for abdominal distention, abdominal pain, blood in stool, constipation, diarrhea, nausea and vomiting. Endocrine: Negative for cold intolerance and heat intolerance. Musculoskeletal: Negative for arthralgias and myalgias. Skin: Negative for rash. Neurological: Negative for dizziness, tremors, syncope, weakness, numbness and headaches.    Psychiatric/Behavioral: Negative for agitation, confusion, decreased concentration, dysphoric mood, hallucinations, sleep disturbance and suicidal ideas. The patient is not nervous/anxious and is not hyperactive. Physical Exam  Constitutional:       Appearance: She is well-developed. HENT:      Head: Normocephalic. Eyes:      Conjunctiva/sclera: Conjunctivae normal.   Abdominal:      General: Abdomen is protuberant. Musculoskeletal:         General: No swelling. Neurological:      Mental Status: She is alert and oriented to person, place, and time. Psychiatric:         Mood and Affect: Mood normal.         Behavior: Behavior normal.         Thought Content: Thought content normal.         Judgment: Judgment normal.         Hospital Outpatient Visit on 07/22/2021   Component Date Value Ref Range Status    Sodium 07/22/2021 138  136 - 145 mmol/L Final    Potassium 07/22/2021 3.5  3.5 - 5.1 mmol/L Final    Chloride 07/22/2021 103  99 - 110 mmol/L Final    CO2 07/22/2021 25  21 - 32 mmol/L Final    Anion Gap 07/22/2021 10  3 - 16 Final    Glucose 07/22/2021 90  70 - 99 mg/dL Final    BUN 07/22/2021 6* 7 - 20 mg/dL Final    CREATININE 07/22/2021 0.8  0.6 - 1.2 mg/dL Final    GFR Non- 07/22/2021 >60  >60 Final    Comment: >60 mL/min/1.73m2 EGFR, calc. for ages 25 and older using the  MDRD formula (not corrected for weight), is valid for stable  renal function.  GFR  07/22/2021 >60  >60 Final    Comment: Chronic Kidney Disease: less than 60 ml/min/1.73 sq.m. Kidney Failure: less than 15 ml/min/1.73 sq.m. Results valid for patients 18 years and older.       Calcium 07/22/2021 9.1  8.3 - 10.6 mg/dL Final    Total Protein 07/22/2021 7.1  6.4 - 8.2 g/dL Final    Albumin 07/22/2021 3.7  3.4 - 5.0 g/dL Final    Albumin/Globulin Ratio 07/22/2021 1.1  1.1 - 2.2 Final    Total Bilirubin 07/22/2021 0.3  0.0 - 1.0 mg/dL Final    Alkaline Phosphatase 07/22/2021 159* 40 - 129 U/L Final    ALT 07/22/2021 35  10 - 40 U/L Final    AST 07/22/2021 37  15 - 37 U/L Final    Globulin 07/22/2021 3.4  g/dL Final    Ventricular Rate 07/22/2021 74  BPM Final    Atrial Rate 07/22/2021 74  BPM Final    P-R Interval 07/22/2021 144  ms Final    QRS Duration 07/22/2021 74  ms Final    Q-T Interval 07/22/2021 372  ms Final    QTc Calculation (Bazett) 07/22/2021 412  ms Final    P Axis 07/22/2021 56  degrees Final    R Axis 07/22/2021 47  degrees Final    T Axis 07/22/2021 28  degrees Final    Diagnosis 07/22/2021 Normal sinus rhythmNormal ECGWhen compared with ECG of 18-AUG-2017 09:07,Vent. rate has increased BY  24 BPMNonspecific T wave abnormality now evident in Inferior leadsNonspecific T wave abnormality now evident in Anterior leadsConfirmed by Marcus Chaney MD, CRISTIAN ([id number]) on 7/22/2021 10:50:07 AM   Final    Hemoglobin A1C 07/22/2021 5.9  See comment % Final    Comment: Abnormal hemoglobin detected on glycohemoglobin methodology. Suggest hemoglobin electrophoresis if clinically indicated. Comment:  Diagnosis of Diabetes: > or = 6.5%  Increased risk of diabetes (Prediabetes): 5.7-6.4%  Glycemic Control: Nonpregnant Adults: <7.0%                    Pregnant: <6.0%        eAG 07/22/2021 122.6  mg/dL Final    Vit D, 25-Hydroxy 07/22/2021 22.9* >=30 ng/mL Final    Comment: <=20 ng/mL. ........... Rikki Cheng Deficient  21-29 ng/mL. ......... Rikki Cheng Insufficient  >=30 ng/mL. ........ Rikki Cheng Sufficient      Vitamin B-12 07/22/2021 661  211 - 911 pg/mL Final    Folate 07/22/2021 9.92  4.78 - 24.20 ng/mL Final    Comment: Effective 11-15-16 10:00am EST  Please note reference ranges have  changed for Folate.  TSH 07/22/2021 1.54  0.27 - 4.20 uIU/mL Final         Assessment and Plan:  1. Morbid obesity with BMI of 50.0-59.9, adult ([address]  Gaining weight. Continue Contrave. Reviewed dietary guidelines. Increase exercise. Stress management. F/u 2 weeks. 2. Dietary counseling and surveillance  Low carb/froilan meal plan. Nutrition Plan: [] LCHF/Ketogenic   [x] Modified low-calorie diet (low carb/low-froilan)               [] Low-calorie diet    [] Maintenance       []Other        Exercise: [x] Cardio     [x] Resistance/strength training                       [x] ACSM recommendations (150 minutes/week in active weight loss)               Behavior: [x] Motivational interviewing performed    [] Referralfor counseling                         [x] Discussed strategies to overcome habits/challenges for focus         [] Stress management   [x] Stimulus control         [] Sleep hygiene      Reviewed:  [x] Nutrition and the importance of regular protein intake  [x] Hidden carbohydrate sources  [x] Alcohol use  [x] Tobacco use   [x] Drug use- Denies  [x] Importance of exercise and reducing sedentary time  [x] Treatment consent- Patient understands and agrees with the treatment plan   [x] Proper use of medication and side effects      Controlled Substance Monitoring:    RX Monitoring 5/11/2018   Attestation The Prescription Monitoring Report for this patient was reviewed today. Periodic Controlled Substance Monitoring -            Treatment start date: 1/22/22  12 weeks from start of therapeutic dose: 5/22/22  Starting weight: 267 pounds    Goal: At least 5% (13 pounds)  Total weight loss: +3 pounds     Key dietary points:    - Meats (preferably organic or grass fed) are great sources of protein and have no carbohydrates. - Recommend coconut, olive, avocado, or almond oils. - When buying dairy, choose regular or full fat options. - Choose vegetables that grow above ground as they are generally lower in carbohydrates and higher in fiber.  - Avoid starches such as bread, rice, potatoes, pasta and all sources of simple sugars (desserts, soda, breakfast cereals). - Choose beverages that are calorie and sugar free. Reminder regarding weight loss medications: You must be seen in office every 2-4 weeks to haveyour prescriptions refilled. If you are off of your medication for longer than 7 days, you will not be able to restart the medication for at least 6 months. Always call our office to report any side effects. Females, it is your responsibility to obtain negative pregnancy tests each month. No orders of the defined types were placed in this encounter. No follow-ups on file. Aisha Mixon is a 62 y.o. female being evaluated by a Virtual Visit (video visit) encounter to address concerns as mentioned above. A caregiver was present when appropriate. Due to this being a TeleHealth encounter (During PADIT-60 public health emergency), evaluation of the following organ systems was limited: Vitals/Constitutional/EENT/Resp/CV/GI//MS/Neuro/Skin/Heme-Lymph-Imm. Pursuant to the emergency declaration under the [address] [place] [address], [address] [place] [address] authority and the [employer] and [employer] Act, this Virtual Visit was conducted with patient's (and/or legal guardian's) consent, to reduce the patient's risk of exposure to COVID-19 and provide necessary medical care. The patient (and/or legal guardian) has also been advised to contact this office for worsening conditions or problems, and seek emergency medical treatment and/or call 911 if deemed necessary.

## 2022-02-19 ENCOUNTER — TELEMEDICINE (OUTPATIENT)
Dept: BARIATRICS/WEIGHT MGMT | Age: 62
End: 2022-02-19
Payer: COMMERCIAL

## 2022-02-19 DIAGNOSIS — E66.01 MORBID OBESITY WITH BMI OF 50.0-59.9, ADULT (HCC): Primary | ICD-10-CM

## 2022-02-19 DIAGNOSIS — Z71.3 DIETARY COUNSELING AND SURVEILLANCE: ICD-10-CM

## 2022-02-19 PROCEDURE — 99214 OFFICE O/P EST MOD 30 MIN: CPT | Performed by: FAMILY MEDICINE

## 2022-02-19 ASSESSMENT — ENCOUNTER SYMPTOMS
CHOKING: 0
SHORTNESS OF BREATH: 0
NAUSEA: 0
BLOOD IN STOOL: 0
VOMITING: 0
WHEEZING: 0
APNEA: 0
DIARRHEA: 0
CONSTIPATION: 0
EYE PAIN: 0
PHOTOPHOBIA: 0
COUGH: 0
ABDOMINAL PAIN: 0
CHEST TIGHTNESS: 0
ABDOMINAL DISTENTION: 0

## 2022-02-19 NOTE — PROGRESS NOTES
Patient: Melvia Snellen                      Encounter Date: 2/19/2022    YOB: 1960                Age: 62 y.o. Chief Complaint   Patient presents with    Weight Management     F/u MWM- Contrave         Patient identification was verified at the start of the visit. Patient-Reported Vitals 2/19/2022   Patient-Reported Weight 271p   Patient-Reported Height -   Patient-Reported Systolic 803   Patient-Reported Diastolic 69   Patient-Reported Pulse 69   Patient-Reported Temperature 97.8   Patient-Reported SpO2 Na   Patient-Reported Peak Flow Na         BP Readings from Last 1 Encounters:   07/21/21 124/88       BMI Readings from Last 1 Encounters:   01/24/22 52.72 kg/m²       Pulse Readings from Last 1 Encounters:   07/21/21 93       Self-reported weight: 271 pounds      HPI: 61 y.o. female with a long-standing history of obesity s/p sleeve gastrectomy in 2017 presents today for virtual video follow-up. She has gained 1 pound since her last visit. Current tx includes Contrave and low carb/froilan diet. She is up to 4 pills/day. Doesn't feel like it's helping with appetite/cravings.      Medication(s): Appetite well controlled?     [x]? ? Yes      []? ? No                          Focus:     [x]? ? Good     []? ? Fair     []? ? Poor                          Side effects? Fatigue         Any recent change in medication(s)? No      Exercise: []??Cardio     []? ? Resistance/strength training     [x]? ? Other: None     Allergies   Allergen Reactions    Latex      Surgery bandage- new allergy as of 2021         Current Outpatient Medications:     diclofenac sodium (VOLTAREN) 1 % GEL, APPLY 1GRAM THREE TIMES A DAY AS NEEDED, Disp: , Rfl:     omeprazole (PRILOSEC) 20 MG delayed release capsule, TAKE 1 CAPSULE BY MOUTH 2 TIMES DAILY (BEFORE MEALS), Disp: 180 capsule, Rfl: 1    SYMBICORT 80-4.5 MCG/ACT AERO, Inhale 2 puffs into the lungs 2 times daily as needed (Chronic Shortness of Breath), Disp: 3 Inhaler, Rfl: 0   fluticasone (FLONASE) 50 MCG/ACT nasal spray, 2 sprays by Nasal route daily as needed for Allergies, Disp: 3 Bottle, Rfl: 0    ibuprofen (ADVIL;MOTRIN) 800 MG tablet, Take 1 tablet by mouth 3 times daily, Disp: , Rfl:     albuterol sulfate  (90 Base) MCG/ACT inhaler, Inhale 2 puffs into the lungs every 6 hours as needed for Wheezing or Shortness of Breath, Disp: 1 Inhaler, Rfl: 5    AZELASTINE HCL NA, by Nasal route as needed , Disp: , Rfl:     Multiple Vitamins-Minerals (BARIATRIC FUSION) CHEW, Take 4 tablets by mouth daily, Disp: , Rfl:     Patient Active Problem List   Diagnosis    Hypertension    Tinea pedis    Right knee pain    Positive hepatitis C antibody test    Internal hemorrhoid, bleeding    Cholecystitis    Fatty liver    Morbid obesity with BMI of 60.0-69.9, adult (HCC)    SOB (shortness of breath)    Morbid obesity (HCC)    Hiatal hernia with gastroesophageal reflux disease and esophagitis    GERD with esophagitis    Superficial gastritis without hemorrhage    Hiatal hernia    Obstructive sleep apnea    Morbid obesity with BMI of 45.0-49.9, adult (HCC)    S/P laparoscopic sleeve gastrectomy    Atrial tachycardia (HCC)    Premature atrial beats    Sebaceous cyst    Reactive airway disease    Hoarseness of voice       Review of Systems   Constitutional: Negative for fatigue. Eyes: Negative for photophobia, pain and visual disturbance. Respiratory: Negative for apnea, cough, choking, chest tightness, shortness of breath and wheezing. Cardiovascular: Negative for chest pain, palpitations and leg swelling. Gastrointestinal: Negative for abdominal distention, abdominal pain, blood in stool, constipation, diarrhea, nausea and vomiting. Endocrine: Negative for cold intolerance and heat intolerance. Musculoskeletal: Negative for arthralgias and myalgias. Skin: Negative for rash.    Neurological: Negative for dizziness, tremors, syncope, weakness, numbness and headaches. Psychiatric/Behavioral: Negative for agitation, confusion, decreased concentration, dysphoric mood, hallucinations, sleep disturbance and suicidal ideas. The patient is not nervous/anxious and is not hyperactive. Physical Exam  Constitutional:       Appearance: She is well-developed. HENT:      Head: Normocephalic. Eyes:      Conjunctiva/sclera: Conjunctivae normal.   Abdominal:      General: Abdomen is protuberant. Musculoskeletal:         General: No swelling. Neurological:      Mental Status: She is alert and oriented to person, place, and time. Psychiatric:         Mood and Affect: Mood normal.         Behavior: Behavior normal.         Thought Content: Thought content normal.         Judgment: Judgment normal.         Hospital Outpatient Visit on 07/22/2021   Component Date Value Ref Range Status    Sodium 07/22/2021 138  136 - 145 mmol/L Final    Potassium 07/22/2021 3.5  3.5 - 5.1 mmol/L Final    Chloride 07/22/2021 103  99 - 110 mmol/L Final    CO2 07/22/2021 25  21 - 32 mmol/L Final    Anion Gap 07/22/2021 10  3 - 16 Final    Glucose 07/22/2021 90  70 - 99 mg/dL Final    BUN 07/22/2021 6* 7 - 20 mg/dL Final    CREATININE 07/22/2021 0.8  0.6 - 1.2 mg/dL Final    GFR Non- 07/22/2021 >60  >60 Final    Comment: >60 mL/min/1.73m2 EGFR, calc. for ages 25 and older using the  MDRD formula (not corrected for weight), is valid for stable  renal function.  GFR  07/22/2021 >60  >60 Final    Comment: Chronic Kidney Disease: less than 60 ml/min/1.73 sq.m. Kidney Failure: less than 15 ml/min/1.73 sq.m. Results valid for patients 18 years and older.       Calcium 07/22/2021 9.1  8.3 - 10.6 mg/dL Final    Total Protein 07/22/2021 7.1  6.4 - 8.2 g/dL Final    Albumin 07/22/2021 3.7  3.4 - 5.0 g/dL Final    Albumin/Globulin Ratio 07/22/2021 1.1  1.1 - 2.2 Final    Total Bilirubin 07/22/2021 0.3  0.0 - 1.0 mg/dL Final    Alkaline Phosphatase 07/22/2021 159* 40 - 129 U/L Final    ALT 07/22/2021 35  10 - 40 U/L Final    AST 07/22/2021 37  15 - 37 U/L Final    Globulin 07/22/2021 3.4  g/dL Final    Ventricular Rate 07/22/2021 74  BPM Final    Atrial Rate 07/22/2021 74  BPM Final    P-R Interval 07/22/2021 144  ms Final    QRS Duration 07/22/2021 74  ms Final    Q-T Interval 07/22/2021 372  ms Final    QTc Calculation (Bazett) 07/22/2021 412  ms Final    P Axis 07/22/2021 56  degrees Final    R Axis 07/22/2021 47  degrees Final    T Axis 07/22/2021 28  degrees Final    Diagnosis 07/22/2021 Normal sinus rhythmNormal ECGWhen compared with ECG of 18-AUG-2017 09:07,Vent. rate has increased BY  24 BPMNonspecific T wave abnormality now evident in Inferior leadsNonspecific T wave abnormality now evident in Anterior leadsConfirmed by Laxmi Landeros MD, CRISTIAN ([id number]) on 7/22/2021 10:50:07 AM   Final    Hemoglobin A1C 07/22/2021 5.9  See comment % Final    Comment: Abnormal hemoglobin detected on glycohemoglobin methodology. Suggest hemoglobin electrophoresis if clinically indicated. Comment:  Diagnosis of Diabetes: > or = 6.5%  Increased risk of diabetes (Prediabetes): 5.7-6.4%  Glycemic Control: Nonpregnant Adults: <7.0%                    Pregnant: <6.0%        eAG 07/22/2021 122.6  mg/dL Final    Vit D, 25-Hydroxy 07/22/2021 22.9* >=30 ng/mL Final    Comment: <=20 ng/mL. ........... [facility] Deficient  21-29 ng/mL. ......... [facility] Insufficient  >=30 ng/mL. ........ [facility] Sufficient      Vitamin B-12 07/22/2021 661  211 - 911 pg/mL Final    Folate 07/22/2021 9.92  4.78 - 24.20 ng/mL Final    Comment: Effective 11-15-16 10:00am EST  Please note reference ranges have  changed for Folate.  TSH 07/22/2021 1.54  0.27 - 4.20 uIU/mL Final         Assessment and Plan:  1. Morbid obesity with BMI of 50.0-59.9, adult ([facility] [address]  Gaining weight. D/c Contrave.    Reviewed alternative tx options including Randeen De La Cruz, or Optifast.  She is interested in continuing with lifestyle management for now. May consider Optifast down the line. She will  samples from the office. F/u 4 weeks. 2. Dietary counseling and surveillance  1200-Maximus/low carb meal plan. Nutrition Plan: [] LCHF/Ketogenic   [x] Modified low-calorie diet (low carb/low-maximus)               [] Low-calorie diet    [] Maintenance       []Other        Exercise: [x] Cardio     [] Resistance/strength training                       [x] ACSM recommendations (150 minutes/week in active weight loss)               Behavior: [x] Motivational interviewing performed    [] Referralfor counseling                         [x] Discussed strategies to overcome habits/challenges for focus         [] Stress management   [x] Stimulus control         [] Sleep hygiene      Reviewed:  [x] Nutrition and the importance of regular protein intake  [x] Hidden carbohydrate sources  [x] Alcohol use  [x] Tobacco use   [x] Drug use- Denies  [x] Importance of exercise and reducing sedentary time  [x] Treatment consent- Patient understands and agrees with the treatment plan   [x] Proper use of medication and side effects        Treatment start date: 1/22/22  12 weeks from start of therapeutic dose: 5/22/22  Starting weight: 267 pounds    Goal: At least 5% (13 pounds)  Total weight loss: +4 pounds     Key dietary points:    - Meats (preferably organic or grass fed) are great sources of protein and have no carbohydrates. - Recommend coconut, olive, avocado, or almond oils. - When buying dairy, choose regular or full fat options. - Choose vegetables that grow above ground as they are generally lower in carbohydrates and higher in fiber.  - Avoid starches such as bread, rice, potatoes, pasta and all sources of simple sugars (desserts, soda, breakfast cereals). - Choose beverages that are calorie and sugar free. Reminder regarding weight loss medications:     You must be seen in office every 2-4 weeks to haveyour prescriptions refilled. If you are off of your medication for longer than 7 days, you will not be able to restart the medication for at least 6 months. Always call our office to report any side effects. No orders of the defined types were placed in this encounter. No follow-ups on file. Dre Forbes is a 62 y.o. female being evaluated by a Virtual Visit (video visit) encounter to address concerns as mentioned above. A caregiver was present when appropriate. Due to this being a TeleHealth encounter (During NREFB-00 public health emergency), evaluation of the following organ systems was limited: Vitals/Constitutional/EENT/Resp/CV/GI//MS/Neuro/Skin/Heme-Lymph-Imm. Pursuant to the emergency declaration under the [address] [place] [address], [address] [place] [address] authority and the [employer] Act, this Virtual Visit was conducted with patient's (and/or legal guardian's) consent, to reduce the patient's risk of exposure to COVID-19 and provide necessary medical care. The patient (and/or legal guardian) has also been advised to contact this office for worsening conditions or problems, and seek emergency medical treatment and/or call 911 if deemed necessary.

## 2022-03-02 ENCOUNTER — TELEMEDICINE (OUTPATIENT)
Dept: BARIATRICS/WEIGHT MGMT | Age: 62
End: 2022-03-02
Payer: COMMERCIAL

## 2022-03-02 DIAGNOSIS — E66.01 MORBID OBESITY WITH BMI OF 50.0-59.9, ADULT (HCC): Primary | ICD-10-CM

## 2022-03-02 DIAGNOSIS — Z71.3 DIETARY COUNSELING AND SURVEILLANCE: ICD-10-CM

## 2022-03-02 PROCEDURE — 99214 OFFICE O/P EST MOD 30 MIN: CPT | Performed by: FAMILY MEDICINE

## 2022-03-02 NOTE — PROGRESS NOTES
Patient: Melvia Snellen                      Encounter Date: 3/2/2022    YOB: 1960               Age: 62 y.o. Chief Complaint   Patient presents with    Weight Management     F/u MWM       Patient identification was verified at the start of the visit. Patient-Reported Vitals 3/1/2022   Patient-Reported Weight 270   Patient-Reported Height 5'1''   Patient-Reported Systolic 443   Patient-Reported Diastolic 72   Patient-Reported Pulse 78   Patient-Reported Temperature 98.2   Patient-Reported SpO2 Na   Patient-Reported Peak Flow Na         BP Readings from Last 1 Encounters:   07/21/21 124/88       BMI Readings from Last 1 Encounters:   01/24/22 52.72 kg/m²       Pulse Readings from Last 1 Encounters:   07/21/21 93            Self-reported weight: 271 pounds      HPI: 61 y.o. female with a long-standing history of obesity s/p sleeve gastrectomy in 2017 presents today for virtual video follow-up. She has gained 1 pound since her last visit. Current tx includes Contrave and low carb/froilan diet. She is up to 4 pills/day. Doesn't feel like it's helping with appetite/cravings.      Medication(s): Appetite well controlled?     [x]? ? ? Yes      []? ? ? No                          Focus:     [x]? ?? Good     []? ? ? Fair     []? ?? Poor                          Side effects? Fatigue         Any recent change in medication(s)? No      Exercise: []? ??Cardio     []? ? ? Resistance/strength training     [x]? ? ? Other: None     Allergies   Allergen Reactions    Latex      Surgery bandage- new allergy as of 2021         Current Outpatient Medications:     diclofenac sodium (VOLTAREN) 1 % GEL, APPLY 1GRAM THREE TIMES A DAY AS NEEDED, Disp: , Rfl:     omeprazole (PRILOSEC) 20 MG delayed release capsule, TAKE 1 CAPSULE BY MOUTH 2 TIMES DAILY (BEFORE MEALS), Disp: 180 capsule, Rfl: 1    SYMBICORT 80-4.5 MCG/ACT AERO, Inhale 2 puffs into the lungs 2 times daily as needed (Chronic Shortness of Breath), Disp: 3 Inhaler, Rfl: 0   fluticasone (FLONASE) 50 MCG/ACT nasal spray, 2 sprays by Nasal route daily as needed for Allergies, Disp: 3 Bottle, Rfl: 0    ibuprofen (ADVIL;MOTRIN) 800 MG tablet, Take 1 tablet by mouth 3 times daily, Disp: , Rfl:     albuterol sulfate  (90 Base) MCG/ACT inhaler, Inhale 2 puffs into the lungs every 6 hours as needed for Wheezing or Shortness of Breath, Disp: 1 Inhaler, Rfl: 5    AZELASTINE HCL NA, by Nasal route as needed , Disp: , Rfl:     Multiple Vitamins-Minerals (BARIATRIC FUSION) CHEW, Take 4 tablets by mouth daily, Disp: , Rfl:     Patient Active Problem List   Diagnosis    Hypertension    Tinea pedis    Right knee pain    Positive hepatitis C antibody test    Internal hemorrhoid, bleeding    Cholecystitis    Fatty liver    Morbid obesity with BMI of 60.0-69.9, adult (HCC)    SOB (shortness of breath)    Morbid obesity (HCC)    Hiatal hernia with gastroesophageal reflux disease and esophagitis    GERD with esophagitis    Superficial gastritis without hemorrhage    Hiatal hernia    Obstructive sleep apnea    Morbid obesity with BMI of 45.0-49.9, adult (HCC)    S/P laparoscopic sleeve gastrectomy    Atrial tachycardia (HCC)    Premature atrial beats    Sebaceous cyst    Reactive airway disease    Hoarseness of voice       Review of Systems   Constitutional: Negative for fatigue. Eyes: Negative for photophobia, pain and visual disturbance. Respiratory: Negative for apnea, cough, choking, chest tightness, shortness of breath and wheezing. Cardiovascular: Negative for chest pain, palpitations and leg swelling. Gastrointestinal: Negative for abdominal distention, abdominal pain, blood in stool, constipation, diarrhea, nausea and vomiting. Endocrine: Negative for cold intolerance and heat intolerance. Musculoskeletal: Negative for arthralgias and myalgias. Skin: Negative for rash.    Neurological: Negative for dizziness, tremors, syncope, weakness, numbness and Phosphatase 07/22/2021 159* 40 - 129 U/L Final    ALT 07/22/2021 35  10 - 40 U/L Final    AST 07/22/2021 37  15 - 37 U/L Final    Globulin 07/22/2021 3.4  g/dL Final    Ventricular Rate 07/22/2021 74  BPM Final    Atrial Rate 07/22/2021 74  BPM Final    P-R Interval 07/22/2021 144  ms Final    QRS Duration 07/22/2021 74  ms Final    Q-T Interval 07/22/2021 372  ms Final    QTc Calculation (Bazett) 07/22/2021 412  ms Final    P Axis 07/22/2021 56  degrees Final    R Axis 07/22/2021 47  degrees Final    T Axis 07/22/2021 28  degrees Final    Diagnosis 07/22/2021 Normal sinus rhythmNormal ECGWhen compared with ECG of 18-AUG-2017 09:07,Vent. rate has increased BY  24 BPMNonspecific T wave abnormality now evident in Inferior leadsNonspecific T wave abnormality now evident in Anterior leadsConfirmed by Guillermo James MD, CRISTIAN ([id number]) on 7/22/2021 10:50:07 AM   Final    Hemoglobin A1C 07/22/2021 5.9  See comment % Final    Comment: Abnormal hemoglobin detected on glycohemoglobin methodology. Suggest hemoglobin electrophoresis if clinically indicated. Comment:  Diagnosis of Diabetes: > or = 6.5%  Increased risk of diabetes (Prediabetes): 5.7-6.4%  Glycemic Control: Nonpregnant Adults: <7.0%                    Pregnant: <6.0%        eAG 07/22/2021 122.6  mg/dL Final    Vit D, 25-Hydroxy 07/22/2021 22.9* >=30 ng/mL Final    Comment: <=20 ng/mL. ........... Reji Neal Deficient  21-29 ng/mL. ......... Reji Neal Insufficient  >=30 ng/mL. ........ Reji Neal Sufficient      Vitamin B-12 07/22/2021 661  211 - 911 pg/mL Final    Folate 07/22/2021 9.92  4.78 - 24.20 ng/mL Final    Comment: Effective 11-15-16 10:00am EST  Please note reference ranges have  changed for Folate.  TSH 07/22/2021 1.54  0.27 - 4.20 uIU/mL Final         Assessment and Plan:  1. Morbid obesity with BMI of 50.0-59.9, adult (HCC)  Start Optifast 1100-Calorie meal plan with 4 meal replacements and 1 low carb/froilan meal daily.     Meal plan provided and reviewed in detail with patient. Counseled on proper use and potential side effects. Follow-up in 2 weeks. Report any side effects.     - TSH with Reflex; Future  - Vitamin B12 & Folate; Future  - Vitamin D 25 Hydroxy; Future  - Comprehensive Metabolic Panel; Future  - Hemoglobin A1C; Future  - CBC; Future  - Lipid Panel; Future    2. Dietary counseling and surveillance  Optifast 1100-Maximus meal plan. Nutrition plan: Optifast    Exercise: [x]Cardio     []Resistance/strength training                       [x]ACSM recommendations (150 minutes/week in active weight loss)                              Behavior: [x]Motivational interviewing performed    [] Referral for counseling                         [x] Discussed strategies to overcome habits/challenges for focus         [] Stress management   [x] Stimulus control                    [] Sleep hygiene    Reviewed:  [x] Nutrition and the importance of regularprotein intake  [x] Hidden carbohydrate sources  [x] Alcohol use  [x] Tobacco use   [x] Importance of exercise and reducing sedentary time            Orders Placed This Encounter   Procedures    TSH with Reflex     Standing Status:   Future     Standing Expiration Date:   3/2/2023    Vitamin B12 & Folate     Standing Status:   Future     Standing Expiration Date:   3/2/2023    Vitamin D 25 Hydroxy     Standing Status:   Future     Standing Expiration Date:   3/2/2023    Comprehensive Metabolic Panel     Standing Status:   Future     Standing Expiration Date:   3/2/2023    Hemoglobin A1C     Standing Status:   Future     Standing Expiration Date:   3/2/2023    CBC     Standing Status:   Future     Standing Expiration Date:   3/2/2023    Lipid Panel     Standing Status:   Future     Standing Expiration Date:   3/2/2023     Order Specific Question:   Is Patient Fasting?/# of Hours     Answer:   8 hours       No follow-ups on file.     Nash Orellana is a 62 y.o. female being evaluated by a Virtual Visit (video visit)

## 2022-03-03 ENCOUNTER — TELEPHONE (OUTPATIENT)
Dept: BARIATRICS/WEIGHT MGMT | Age: 62
End: 2022-03-03

## 2022-03-03 NOTE — TELEPHONE ENCOUNTER
Called pt reviewed/emailed Optifast 4:1 plan. Discussed following sleeve guidelines/balancing portions. Pt voiced understanding.

## 2022-03-04 VITALS — WEIGHT: 282 LBS | HEIGHT: 61 IN | BODY MASS INDEX: 53.24 KG/M2

## 2022-03-04 DIAGNOSIS — E66.01 MORBID OBESITY WITH BMI OF 50.0-59.9, ADULT (HCC): ICD-10-CM

## 2022-03-04 LAB
A/G RATIO: 1.6 (ref 1.1–2.2)
ALBUMIN SERPL-MCNC: 4.4 G/DL (ref 3.4–5)
ALP BLD-CCNC: 184 U/L (ref 40–129)
ALT SERPL-CCNC: 25 U/L (ref 10–40)
ANION GAP SERPL CALCULATED.3IONS-SCNC: 13 MMOL/L (ref 3–16)
AST SERPL-CCNC: 22 U/L (ref 15–37)
BILIRUB SERPL-MCNC: 0.4 MG/DL (ref 0–1)
BUN BLDV-MCNC: 9 MG/DL (ref 7–20)
CALCIUM SERPL-MCNC: 9.7 MG/DL (ref 8.3–10.6)
CHLORIDE BLD-SCNC: 103 MMOL/L (ref 99–110)
CHOLESTEROL, TOTAL: 165 MG/DL (ref 0–199)
CO2: 23 MMOL/L (ref 21–32)
CREAT SERPL-MCNC: 0.8 MG/DL (ref 0.6–1.2)
FOLATE: 11.01 NG/ML (ref 4.78–24.2)
GFR AFRICAN AMERICAN: >60
GFR NON-AFRICAN AMERICAN: >60
GLUCOSE BLD-MCNC: 90 MG/DL (ref 70–99)
HCT VFR BLD CALC: 35.8 % (ref 36–48)
HDLC SERPL-MCNC: 65 MG/DL (ref 40–60)
HEMOGLOBIN: 11.8 G/DL (ref 12–16)
LDL CHOLESTEROL CALCULATED: 86 MG/DL
MCH RBC QN AUTO: 25.8 PG (ref 26–34)
MCHC RBC AUTO-ENTMCNC: 32.9 G/DL (ref 31–36)
MCV RBC AUTO: 78.3 FL (ref 80–100)
PDW BLD-RTO: 15.8 % (ref 12.4–15.4)
PLATELET # BLD: 230 K/UL (ref 135–450)
PMV BLD AUTO: 8.5 FL (ref 5–10.5)
POTASSIUM SERPL-SCNC: 4.3 MMOL/L (ref 3.5–5.1)
RBC # BLD: 4.57 M/UL (ref 4–5.2)
SODIUM BLD-SCNC: 139 MMOL/L (ref 136–145)
TOTAL PROTEIN: 7.2 G/DL (ref 6.4–8.2)
TRIGL SERPL-MCNC: 71 MG/DL (ref 0–150)
TSH REFLEX: 2.19 UIU/ML (ref 0.27–4.2)
VITAMIN B-12: 303 PG/ML (ref 211–911)
VITAMIN D 25-HYDROXY: 23.9 NG/ML
VLDLC SERPL CALC-MCNC: 14 MG/DL
WBC # BLD: 4.5 K/UL (ref 4–11)

## 2022-03-05 LAB
ESTIMATED AVERAGE GLUCOSE: 116.9 MG/DL
HBA1C MFR BLD: 5.7 %

## 2022-03-24 ENCOUNTER — TELEPHONE (OUTPATIENT)
Dept: BARIATRICS/WEIGHT MGMT | Age: 62
End: 2022-03-24

## 2022-03-24 ENCOUNTER — TELEMEDICINE (OUTPATIENT)
Dept: BARIATRICS/WEIGHT MGMT | Age: 62
End: 2022-03-24
Payer: COMMERCIAL

## 2022-03-24 DIAGNOSIS — Z71.3 DIETARY COUNSELING AND SURVEILLANCE: ICD-10-CM

## 2022-03-24 DIAGNOSIS — E66.01 MORBID OBESITY WITH BMI OF 50.0-59.9, ADULT (HCC): Primary | ICD-10-CM

## 2022-03-24 PROCEDURE — 99213 OFFICE O/P EST LOW 20 MIN: CPT | Performed by: FAMILY MEDICINE

## 2022-03-24 NOTE — PROGRESS NOTES
Patient: Karri Hernandez                      Encounter Date: 3/24/2022    YOB: 1960               Age: 62 y.o. Chief Complaint   Patient presents with    Weight Management     F/u MWM       Patient identification was verified at the start of the visit. Patient-Reported Vitals 3/31/2022   Patient-Reported Weight -   Patient-Reported Height -   Patient-Reported Systolic -   Patient-Reported Diastolic -   Patient-Reported Pulse -   Patient-Reported Temperature 97.5   Patient-Reported SpO2 -   Patient-Reported Peak Flow -         BP Readings from Last 1 Encounters:   07/21/21 124/88       BMI Readings from Last 1 Encounters:   03/04/22 53.28 kg/m²       Pulse Readings from Last 1 Encounters:   07/21/21 93       Wt Readings from Last 3 Encounters:   03/04/22 282 lb (127.9 kg)   01/24/22 279 lb (126.6 kg)   01/05/22 268 lb (121.6 kg)        Self-reported weight: 282 pounds (3/4)-->275 pounds (3/24)    HPI: 62 y.o. female with a long-standing history of obesity s/p sleeve gastrectomy in 2017 presents today for a virtual video follow-up. She has lost 7 pounds since her last visit. Current treatment includes Optifast 4:1 diet. No issues. Tolerating it well. Happy with weight loss.           Exercise: []Cardio     []Resistance/strength training     [x]Other: No intentional exercise, but trying to be physically active     Allergies   Allergen Reactions    Latex      Surgery bandage- new allergy as of 2021         Current Outpatient Medications:     [START ON 4/4/2022] azithromycin (ZITHROMAX) 250 MG tablet, Take 1 tablet by mouth See Admin Instructions for 5 days FILL 4/4 500mg on day 1 followed by 250mg on days 2 - 5, Disp: 6 tablet, Rfl: 0    lidocaine viscous hcl (XYLOCAINE) 2 % SOLN solution, Take 10 mLs by mouth 4 times daily as needed for Irritation or Pain, Disp: 200 mL, Rfl: 0    omeprazole (PRILOSEC) 20 MG delayed release capsule, TAKE 1 CAPSULE BY MOUTH 2 TIMES DAILY (BEFORE MEALS), Disp: 180 capsule, Rfl: 1    SYMBICORT 80-4.5 MCG/ACT AERO, Inhale 2 puffs into the lungs 2 times daily as needed (Chronic Shortness of Breath), Disp: 3 Inhaler, Rfl: 0    fluticasone (FLONASE) 50 MCG/ACT nasal spray, 2 sprays by Nasal route daily as needed for Allergies, Disp: 3 Bottle, Rfl: 0    albuterol sulfate  (90 Base) MCG/ACT inhaler, Inhale 2 puffs into the lungs every 6 hours as needed for Wheezing or Shortness of Breath, Disp: 1 Inhaler, Rfl: 5    AZELASTINE HCL NA, by Nasal route as needed , Disp: , Rfl:     Multiple Vitamins-Minerals (BARIATRIC FUSION) CHEW, Take 4 tablets by mouth daily, Disp: , Rfl:     Patient Active Problem List   Diagnosis    Hypertension    Tinea pedis    Right knee pain    Positive hepatitis C antibody test    Internal hemorrhoid, bleeding    Cholecystitis    Fatty liver    Morbid obesity with BMI of 60.0-69.9, adult (HCC)    SOB (shortness of breath)    Morbid obesity (HCC)    Hiatal hernia with gastroesophageal reflux disease and esophagitis    GERD with esophagitis    Superficial gastritis without hemorrhage    Hiatal hernia    Obstructive sleep apnea    Morbid obesity with BMI of 45.0-49.9, adult (HCC)    S/P laparoscopic sleeve gastrectomy    Atrial tachycardia (HCC)    Premature atrial beats    Sebaceous cyst    Reactive airway disease    Hoarseness of voice       Review of Systems   Constitutional: Negative for fatigue. Eyes: Negative for photophobia, pain and visual disturbance. Respiratory: Negative for apnea, cough, choking, chest tightness, shortness of breath and wheezing. Cardiovascular: Negative for chest pain, palpitations and leg swelling. Gastrointestinal: Negative for abdominal distention, abdominal pain, blood in stool, constipation, diarrhea, nausea and vomiting. Endocrine: Negative for cold intolerance and heat intolerance. Musculoskeletal: Negative for arthralgias and myalgias. Skin: Negative for rash. Neurological: Negative for dizziness, tremors, syncope, weakness, numbness and headaches. Psychiatric/Behavioral: Negative for agitation, confusion, decreased concentration, dysphoric mood, hallucinations, sleep disturbance and suicidal ideas. The patient is not nervous/anxious and is not hyperactive. Physical Exam  Constitutional:       Appearance: She is well-developed. HENT:      Head: Normocephalic. Eyes:      Conjunctiva/sclera: Conjunctivae normal.   Abdominal:      General: Abdomen is protuberant. Musculoskeletal:         General: No swelling. Neurological:      Mental Status: She is alert and oriented to person, place, and time. Psychiatric:         Mood and Affect: Mood normal.         Behavior: Behavior normal.         Thought Content: Thought content normal.         Judgment: Judgment normal.         Orders Only on 03/04/2022   Component Date Value Ref Range Status    Cholesterol, Total 03/04/2022 165  0 - 199 mg/dL Final    Triglycerides 03/04/2022 71  0 - 150 mg/dL Final    HDL 03/04/2022 65* 40 - 60 mg/dL Final    LDL Calculated 03/04/2022 86  <100 mg/dL Final    VLDL Cholesterol Calculated 03/04/2022 14  Not Established mg/dL Final    WBC 03/04/2022 4.5  4.0 - 11.0 K/uL Final    RBC 03/04/2022 4.57  4.00 - 5.20 M/uL Final    Hemoglobin 03/04/2022 11.8* 12.0 - 16.0 g/dL Final    Hematocrit 03/04/2022 35.8* 36.0 - 48.0 % Final    MCV 03/04/2022 78.3* 80.0 - 100.0 fL Final    MCH 03/04/2022 25.8* 26.0 - 34.0 pg Final    MCHC 03/04/2022 32.9  31.0 - 36.0 g/dL Final    RDW 03/04/2022 15.8* 12.4 - 15.4 % Final    Platelets 89/85/1616 230  135 - 450 K/uL Final    MPV 03/04/2022 8.5  5.0 - 10.5 fL Final    Hemoglobin A1C 03/04/2022 5.7  See comment % Final    Comment: Abnormal hemoglobin detected on glycohemoglobin methodology. Suggest hemoglobin electrophoresis if clinically indicated.   Comment:  Diagnosis of Diabetes: > or = 6.5%  Increased risk of diabetes 2. Dietary counseling and surveillance  [facility] 4:1 meal plan. Exercise: [x]Cardio     [x]Resistance/strength training                       [x]ACSM recommendations (150 minutes/week in active weight loss)                              Behavior: [x]Motivational interviewing performed    [] Referral for counseling                         [x] Discussed strategies to overcome habits/challenges for focus         [] Stress management   [x] Stimulus control                    [] Sleep hygiene    Reviewed:  [x] Nutrition and the importance of regularprotein intake  [x] Hidden carbohydrate sources  [x] Alcohol use  [x] Tobacco use   [x] Importance of exercise and reducing sedentary time      Treatment start date: 3/3/22  Starting weight: 283 pounds   Total weight loss: 7 pounds    No orders of the defined types were placed in this encounter. No follow-ups on file. Stephan Segovia is a 62 y.o. female being evaluated by a Virtual Visit (video visit) encounter to address concerns as mentioned above. A caregiver was present when appropriate. Due to this being a TeleHealth encounter (During CEUWY-76 public health emergency), evaluation of the following organ systems was limited: Vitals/Constitutional/EENT/Resp/CV/GI//MS/Neuro/Skin/Heme-Lymph-Imm. Pursuant to the emergency declaration under the [address] [place] [address] authority and the [employer] and [employer] Act, this Virtual Visit was conducted with patient's (and/or legal guardian's) consent, to reduce the patient's risk of exposure to COVID-19 and provide necessary medical care. The patient (and/or legal guardian) has also been advised to contact this office for worsening conditions or problems, and seek emergency medical treatment and/or call 911 if deemed necessary.          Services were provided through a video synchronous discussion virtually to substitute for in-person clinic visit. Patient and provider were located at their individual homes. --Petr Coe MD on 4/1/2022 at 3:27 PM    An electronic signature was used to authenticate this note. Mingo Miles

## 2022-03-29 ASSESSMENT — ENCOUNTER SYMPTOMS
NAUSEA: 0
ABDOMINAL PAIN: 0
CHEST TIGHTNESS: 0
WHEEZING: 0
APNEA: 0
CHOKING: 0
DIARRHEA: 0
BLOOD IN STOOL: 0
PHOTOPHOBIA: 0
ABDOMINAL DISTENTION: 0
VOMITING: 0
CONSTIPATION: 0
COUGH: 0
EYE PAIN: 0
SHORTNESS OF BREATH: 0

## 2022-03-31 ENCOUNTER — TELEMEDICINE (OUTPATIENT)
Dept: FAMILY MEDICINE CLINIC | Age: 62
End: 2022-03-31
Payer: COMMERCIAL

## 2022-03-31 DIAGNOSIS — J02.9 ACUTE PHARYNGITIS, UNSPECIFIED ETIOLOGY: Primary | ICD-10-CM

## 2022-03-31 PROCEDURE — 99213 OFFICE O/P EST LOW 20 MIN: CPT | Performed by: NURSE PRACTITIONER

## 2022-03-31 RX ORDER — LIDOCAINE HYDROCHLORIDE 20 MG/ML
10 SOLUTION OROPHARYNGEAL 4 TIMES DAILY PRN
Qty: 200 ML | Refills: 0 | Status: SHIPPED | OUTPATIENT
Start: 2022-03-31 | End: 2022-04-05

## 2022-03-31 RX ORDER — AZITHROMYCIN 250 MG/1
250 TABLET, FILM COATED ORAL SEE ADMIN INSTRUCTIONS
Qty: 6 TABLET | Refills: 0 | Status: SHIPPED | OUTPATIENT
Start: 2022-04-04 | End: 2022-04-09

## 2022-03-31 ASSESSMENT — ENCOUNTER SYMPTOMS
WHEEZING: 0
CHEST TIGHTNESS: 0
SORE THROAT: 1
SHORTNESS OF BREATH: 0
STRIDOR: 0
COUGH: 1

## 2022-03-31 ASSESSMENT — PATIENT HEALTH QUESTIONNAIRE - PHQ9
SUM OF ALL RESPONSES TO PHQ QUESTIONS 1-9: 0
SUM OF ALL RESPONSES TO PHQ9 QUESTIONS 1 & 2: 0
1. LITTLE INTEREST OR PLEASURE IN DOING THINGS: 0
2. FEELING DOWN, DEPRESSED OR HOPELESS: 0

## 2022-03-31 NOTE — PROGRESS NOTES
Moriah Crane (:  1960) is a Established patient, here for evaluation of the following:      Subjective   HPI     PT C/O  SORE THROAT FELT RAW SINCE YESTERDAY -  SHE IS CURRENTLY DOING OPTIFAST SHAKES THEY ARE THICK AND IRRITATED HER THROAT SHE TRIED HOT TEA - BROTH - BURNING TEARING SENSATION  HURTS TO SWALLOW NO EAR PAIN NO FEVERS - SHE IS USING CEPACOL - RATES THE PAIN 7/10 HAS A SLIGHT COUGH THAT COMES AND GOES FOR THE LAST WEEK  NO SOB - OR WHEEZING NOTED  Review of Systems   Constitutional: Negative for fever. HENT: Positive for sore throat. Negative for ear pain. Respiratory: Positive for cough. Negative for chest tightness, shortness of breath, wheezing and stridor.            Objective   Patient-Reported Vitals  No data recorded     Physical Exam  [INSTRUCTIONS:  \"[x]\" Indicates a positive item  \"[]\" Indicates a negative item  -- DELETE ALL ITEMS NOT EXAMINED]    Constitutional: [x] Appears well-developed and well-nourished [x] No apparent distress      [] Abnormal -     Mental status: [x] Alert and awake  [x] Oriented to person/place/time [x] Able to follow commands    [] Abnormal -     Eyes:   EOM    [x]  Normal    [] Abnormal -   Sclera  [x]  Normal    [] Abnormal -          Discharge [x]  None visible   [] Abnormal -     HENT: [x] Normocephalic, atraumatic  [] Abnormal -   [x] Mouth/Throat: Mucous membranes are moist    External Ears [x] Normal  [] Abnormal -    Neck: [x] No visualized mass [] Abnormal -     Pulmonary/Chest: [x] Respiratory effort normal   [x] No visualized signs of difficulty breathing or respiratory distress        [] Abnormal -      Musculoskeletal:   [x] Normal gait with no signs of ataxia         [x] Normal range of motion of neck        [] Abnormal -     Neurological:        [x] No Facial Asymmetry (Cranial nerve 7 motor function) (limited exam due to video visit)          [x] No gaze palsy        [] Abnormal -          Skin:        [x] No significant exanthematous lesions or discoloration noted on facial skin         [] Abnormal -            Psychiatric:       [x] Normal Affect [] Abnormal -        [x] No Hallucinations    Other pertinent observable physical exam findings:-    Tracy Brown was seen today for cough. Diagnoses and all orders for this visit:    Acute pharyngitis, unspecified etiology  -     azithromycin (ZITHROMAX) 250 MG tablet; Take 1 tablet by mouth See Admin Instructions for 5 days FILL 4/4 500mg on day 1 followed by 250mg on days 2 - 5  -     lidocaine viscous hcl (XYLOCAINE) 2 % SOLN solution;  Take 10 mLs by mouth 4 times daily as needed for Irritation or Pain SE DW PT-  RISK OF CHOKING DUE TO DECREASED GAG REFLEX DW PT AS WELL  IF PERSISTENT INSTRUCTED TO  SCRIPT 4/4                   --MARINA Long - CNP

## 2022-03-31 NOTE — PATIENT INSTRUCTIONS
Instructions for Respiratory Infections (SAVE THIS SHEET)   For the first 7-14 days of symptoms follow instructions below, even before being seen in the office or even during treatment with antibiotics, until symptom free. 1. Water: Drink 1 ounce of water for every 2 pounds of body weight for adults need 64 Ounces of water per day. This will loosen mucus in the head and chest & improve the weak feeling of dehydration, allow the body to get germ fighting resources to the infection. Half can be juice or sugar free Crystal Light. Don't count drinks with caffeine or carbonation. Infants can have Pedialyte liquid or freezer pops. Avoid salt if you have high Blood Pressure, swelling in the feet or ankles or have heart problems. 2. Humidity: Humidify the air to 35-50% ( or until the windows fog over slightly). Can use a humidifier, vaporizer, boil water on the stove or put a coffee can full of water on the heater vents. This will loosen mucus from infections and allergies. 3. Sleep: Get 8-10 hours a night and rest during the evening after work or school. If you have trouble sleeping, adults can take Melatonin 3mg up to 3 tabs at bedtime ( not for children or pregnant women). If Mono is suspected then sleep during 9PM to 9AM time span (if possible.)   4. Cough: Take cough medicines with Guaifenesin ( to loosen chest or head congestion) and Dextromethorphan ( to decrease excess cough). Robitussin D.M. Syrup every 4-6 hrs or Mucinex D.M. Pills twice a day. Use the pediatric formulations for children over 6 months making sure they are alcohol & sugar free for children, pregnant women, and diabetics. 5. Pain And Fevers: Take Acetaminophen ( Tylenol) for fevers, aches, and headaches. 2-500 mg every 8 hours for adults. Appropriate doses at bedtime for children may help them sleep better. If pregnant take 1 -500 mg. (Tylenol) every 8 hours as needed.  Ibuprofen may be used if not pregnant, but should be given with food to avoid nausea. Avoid Ibuprofen if you have high blood pressure, CHF, or kidney problems. 6.Gargle: Gargle in the back of the throat with the head tilted back and to the sides with a strong mouthwash ( Listerine or Scope) after meals and at bedtime at least 4 -5 times a day. This helps kill bacteria and viruses in the back of the throat and will shorten the duration and decrease the severity of your symptoms: sore throat, cough, ear popping,/ear pain, and possibly dizziness. 7. Smoking: Avoid smoking or exposure to second hand smoke. 8. Zinc: Zinc lozenges such as Cold Minh, or Basic will help shorten the duration and lessen symptoms such as sore throat, cough, nasal congestion, runny nose, and post nasal drip. Use 1 lozenge every 2-4 hours ( after meals if stomach is sensitive). Children can use 10-15 mg. Or less 3-4 times a day or Zinc lollypops. In pregnancy limit to 50-60 mg. A day for 7 days as prenatals have Zinc also. With diarrhea use zinc pills 50 mg 1/2 to 1 pill 2x/day. 9. Vitamins: Vitamin C 500 mg. With breakfast and dinner. Children and pregnant women should drink citrus juices. This speeds healing and strengthens immune system. 10. Chest Symptoms: Vicks Vapor rub to the chest at bedtime. 11. Decongestants: Avoid all decongestants and antihistamine cold preparations in children. Try all of the above starting with day 1 of symptoms. If Strep throat symptoms appear call to be seen in the office as soon as possible and don't gargle on that day. Newborns, infants, or anyone with earaches or influenza may need to be seen quickly. Adults with fevers over 103 degrees or shortness of breath should call the office immediately.    n.

## 2022-04-01 ASSESSMENT — ENCOUNTER SYMPTOMS
WHEEZING: 0
VOMITING: 0
NAUSEA: 0
ABDOMINAL DISTENTION: 0
BLOOD IN STOOL: 0
CHOKING: 0
COUGH: 0
APNEA: 0
PHOTOPHOBIA: 0
DIARRHEA: 0
SHORTNESS OF BREATH: 0
CONSTIPATION: 0
EYE PAIN: 0
CHEST TIGHTNESS: 0
ABDOMINAL PAIN: 0

## 2022-04-22 ENCOUNTER — TELEMEDICINE (OUTPATIENT)
Dept: BARIATRICS/WEIGHT MGMT | Age: 62
End: 2022-04-22
Payer: COMMERCIAL

## 2022-04-22 DIAGNOSIS — E66.01 MORBID OBESITY WITH BMI OF 50.0-59.9, ADULT (HCC): Primary | ICD-10-CM

## 2022-04-22 DIAGNOSIS — Z71.3 DIETARY COUNSELING AND SURVEILLANCE: ICD-10-CM

## 2022-04-22 PROCEDURE — 99213 OFFICE O/P EST LOW 20 MIN: CPT | Performed by: FAMILY MEDICINE

## 2022-04-22 ASSESSMENT — ENCOUNTER SYMPTOMS
COUGH: 0
DIARRHEA: 0
ABDOMINAL PAIN: 0
EYE PAIN: 0
VOMITING: 0
CONSTIPATION: 0
PHOTOPHOBIA: 0
APNEA: 0
SHORTNESS OF BREATH: 0
CHEST TIGHTNESS: 0
CHOKING: 0
WHEEZING: 0
BLOOD IN STOOL: 0
NAUSEA: 0
ABDOMINAL DISTENTION: 0

## 2022-04-22 NOTE — PROGRESS NOTES
Patient: Jose Stroud                      Encounter Date: 4/22/2022    YOB: 1960               Age: 62 y.o. Chief Complaint   Patient presents with    Weight Management     F/u MWM       Patient identification was verified at the start of the visit. Patient-Reported Vitals 4/22/2022   Patient-Reported Weight 277   Patient-Reported Height 5'1\"   Patient-Reported Systolic 73   Patient-Reported Diastolic 59   Patient-Reported Pulse 62   Patient-Reported Temperature Na   Patient-Reported SpO2 Na   Patient-Reported Peak Flow Na         BP Readings from Last 1 Encounters:   07/21/21 124/88       BMI Readings from Last 1 Encounters:   03/04/22 53.28 kg/m²       Pulse Readings from Last 1 Encounters:   07/21/21 93       Self-reported weight: 277 pounds (4/22)     HPI: 62 y.o. female with a long-standing history of obesity s/p sleeve gastrectomy in 2017 presents today for a virtual video follow-up. She has gained a pound since her last visit. Has been off of Optifast for the past month. Was sick for 2 weeks and then got busy with traveling. Would like to discontinue Optifast for a month and try to work on following the prescribed 1200-Maximus/low carb meal plan.       Exercise: []? Cardio     []? Resistance/strength training     [x]? Other: No intentional exercise, but trying to be physically active     Allergies   Allergen Reactions    Latex      Surgery bandage- new allergy as of 2021         Current Outpatient Medications:     omeprazole (PRILOSEC) 20 MG delayed release capsule, TAKE 1 CAPSULE BY MOUTH 2 TIMES DAILY (BEFORE MEALS), Disp: 180 capsule, Rfl: 1    SYMBICORT 80-4.5 MCG/ACT AERO, Inhale 2 puffs into the lungs 2 times daily as needed (Chronic Shortness of Breath), Disp: 3 Inhaler, Rfl: 0    fluticasone (FLONASE) 50 MCG/ACT nasal spray, 2 sprays by Nasal route daily as needed for Allergies, Disp: 3 Bottle, Rfl: 0    albuterol sulfate  (90 Base) MCG/ACT inhaler, Inhale 2 puffs into the lungs every 6 hours as needed for Wheezing or Shortness of Breath, Disp: 1 Inhaler, Rfl: 5    AZELASTINE HCL NA, by Nasal route as needed , Disp: , Rfl:     Multiple Vitamins-Minerals (BARIATRIC FUSION) CHEW, Take 4 tablets by mouth daily, Disp: , Rfl:     Patient Active Problem List   Diagnosis    Hypertension    Tinea pedis    Right knee pain    Positive hepatitis C antibody test    Internal hemorrhoid, bleeding    Cholecystitis    Fatty liver    Morbid obesity with BMI of 60.0-69.9, adult (HCC)    SOB (shortness of breath)    Morbid obesity ([facility] [address]    Hiatal hernia with gastroesophageal reflux disease and esophagitis    GERD with esophagitis    Superficial gastritis without hemorrhage    Hiatal hernia    Obstructive sleep apnea    Morbid obesity with BMI of 45.0-49.9, adult (HCC)    S/P laparoscopic sleeve gastrectomy    Atrial tachycardia (HCC)    Premature atrial beats    Sebaceous cyst    Reactive airway disease    Hoarseness of voice       Review of Systems   Constitutional: Negative for fatigue. Eyes: Negative for photophobia, pain and visual disturbance. Respiratory: Negative for apnea, cough, choking, chest tightness, shortness of breath and wheezing. Cardiovascular: Negative for chest pain, palpitations and leg swelling. Gastrointestinal: Negative for abdominal distention, abdominal pain, blood in stool, constipation, diarrhea, nausea and vomiting. Endocrine: Negative for cold intolerance and heat intolerance. Musculoskeletal: Negative for arthralgias and myalgias. Skin: Negative for rash. Neurological: Negative for dizziness, tremors, syncope, weakness, numbness and headaches. Psychiatric/Behavioral: Negative for agitation, confusion, decreased concentration, dysphoric mood, hallucinations, sleep disturbance and suicidal ideas. The patient is not nervous/anxious and is not hyperactive.         Physical Exam  Constitutional:       Appearance: She is well-developed. HENT:      Head: Normocephalic. Eyes:      Conjunctiva/sclera: Conjunctivae normal.   Abdominal:      General: Abdomen is protuberant. Musculoskeletal:         General: No swelling. Neurological:      Mental Status: She is alert and oriented to person, place, and time. Psychiatric:         Mood and Affect: Mood normal.         Behavior: Behavior normal.         Thought Content: Thought content normal.         Judgment: Judgment normal.         Orders Only on 03/04/2022   Component Date Value Ref Range Status    Cholesterol, Total 03/04/2022 165  0 - 199 mg/dL Final    Triglycerides 03/04/2022 71  0 - 150 mg/dL Final    HDL 03/04/2022 65* 40 - 60 mg/dL Final    LDL Calculated 03/04/2022 86  <100 mg/dL Final    VLDL Cholesterol Calculated 03/04/2022 14  Not Established mg/dL Final    WBC 03/04/2022 4.5  4.0 - 11.0 K/uL Final    RBC 03/04/2022 4.57  4.00 - 5.20 M/uL Final    Hemoglobin 03/04/2022 11.8* 12.0 - 16.0 g/dL Final    Hematocrit 03/04/2022 35.8* 36.0 - 48.0 % Final    MCV 03/04/2022 78.3* 80.0 - 100.0 fL Final    MCH 03/04/2022 25.8* 26.0 - 34.0 pg Final    MCHC 03/04/2022 32.9  31.0 - 36.0 g/dL Final    RDW 03/04/2022 15.8* 12.4 - 15.4 % Final    Platelets 11/16/5290 230  135 - 450 K/uL Final    MPV 03/04/2022 8.5  5.0 - 10.5 fL Final    Hemoglobin A1C 03/04/2022 5.7  See comment % Final    Comment: Abnormal hemoglobin detected on glycohemoglobin methodology. Suggest hemoglobin electrophoresis if clinically indicated.   Comment:  Diagnosis of Diabetes: > or = 6.5%  Increased risk of diabetes (Prediabetes): 5.7-6.4%  Glycemic Control: Nonpregnant Adults: <7.0%                    Pregnant: <6.0%        eAG 03/04/2022 116.9  mg/dL Final    Sodium 03/04/2022 139  136 - 145 mmol/L Final    Potassium 03/04/2022 4.3  3.5 - 5.1 mmol/L Final    Chloride 03/04/2022 103  99 - 110 mmol/L Final    CO2 03/04/2022 23  21 - 32 mmol/L Final    Anion Gap 03/04/2022 13  3 - 16 Final    Glucose 03/04/2022 90  70 - 99 mg/dL Final    BUN 03/04/2022 9  7 - 20 mg/dL Final    CREATININE 03/04/2022 0.8  0.6 - 1.2 mg/dL Final    GFR Non- 03/04/2022 >60  >60 Final    Comment: >60 mL/min/1.73m2 EGFR, calc. for ages 25 and older using the  MDRD formula (not corrected for weight), is valid for stable  renal function.  GFR  03/04/2022 >60  >60 Final    Comment: Chronic Kidney Disease: less than 60 ml/min/1.73 sq.m. Kidney Failure: less than 15 ml/min/1.73 sq.m. Results valid for patients 18 years and older.  Calcium 03/04/2022 9.7  8.3 - 10.6 mg/dL Final    Total Protein 03/04/2022 7.2  6.4 - 8.2 g/dL Final    Albumin 03/04/2022 4.4  3.4 - 5.0 g/dL Final    Albumin/Globulin Ratio 03/04/2022 1.6  1.1 - 2.2 Final    Total Bilirubin 03/04/2022 0.4  0.0 - 1.0 mg/dL Final    Alkaline Phosphatase 03/04/2022 184* 40 - 129 U/L Final    ALT 03/04/2022 25  10 - 40 U/L Final    AST 03/04/2022 22  15 - 37 U/L Final    Vit D, 25-Hydroxy 03/04/2022 23.9* >=30 ng/mL Final    Comment: <=20 ng/mL. ........... Balaji Sage Deficient  21-29 ng/mL. ......... Balaji Sage Insufficient  >=30 ng/mL. ........ Balaji Sage Sufficient      Vitamin B-12 03/04/2022 303  211 - 911 pg/mL Final    Folate 03/04/2022 11.01  4.78 - 24.20 ng/mL Final    Comment: Effective 11-15-16 10:00am EST  Please note reference ranges have  changed for Folate.  TSH 03/04/2022 2.19  0.27 - 4.20 uIU/mL Final         Assessment and Plan:  1. Morbid obesity with BMI of 50.0-59.9, adult (HCC)  Stable. D/c Optifast.  Resume 1200-Maximus/low carb meal plan. Increase exercise. F/u prn.     2. Dietary counseling and surveillance  As above.        Nutrition plan: [] LCHF/Ketogenic   [x] Modified low-calorie diet (low carb/low-maximus)               [] Low-calorie diet    []Maintenance       []Other    Exercise: [x]Cardio     [x]Resistance/strength training                       [x]ACSM recommendations (150 minutes/week in active weight loss)                              Behavior: [x]Motivational interviewing performed    [] Referral for counseling                         [x] Discussed strategies to overcome habits/challenges for focus         [] Stress management   [x] Stimulus control                    [] Sleep hygiene    Reviewed:  [x] Nutrition and the importance of regularprotein intake  [x] Hidden carbohydrate sources  [x] Alcohol use  [x] Tobacco use   [x] Importance of exercise and reducing sedentary time        Treatment start date: 3/3/22  Starting weight: 283 pounds   Total weight loss: 6 pounds    No orders of the defined types were placed in this encounter. No follow-ups on file. Ne Sotelo is a 62 y.o. female being evaluated by a Virtual Visit (video visit) encounter to address concerns as mentioned above. A caregiver was present when appropriate. Due to this being a TeleHealth encounter (During YJVMD-39 public health emergency), evaluation of the following organ systems was limited: Vitals/Constitutional/EENT/Resp/CV/GI//MS/Neuro/Skin/Heme-Lymph-Imm. Pursuant to the emergency declaration under the [address] [place] [address] authority and the [employer] and [employer] Act, this Virtual Visit was conducted with patient's (and/or legal guardian's) consent, to reduce the patient's risk of exposure to COVID-19 and provide necessary medical care. The patient (and/or legal guardian) has also been advised to contact this office for worsening conditions or problems, and seek emergency medical treatment and/or call 911 if deemed necessary. Services were provided through a video synchronous discussion virtually to substitute for in-person clinic visit. Patient and provider were located at their individual homes. --Gisell Quezada MD on 4/22/2022 at 12:33 PM    An electronic signature was used to authenticate this note.

## 2022-05-27 ENCOUNTER — OFFICE VISIT (OUTPATIENT)
Dept: GYNECOLOGY | Age: 62
End: 2022-05-27
Payer: COMMERCIAL

## 2022-05-27 VITALS
HEART RATE: 62 BPM | WEIGHT: 290 LBS | BODY MASS INDEX: 54.8 KG/M2 | DIASTOLIC BLOOD PRESSURE: 84 MMHG | SYSTOLIC BLOOD PRESSURE: 153 MMHG

## 2022-05-27 DIAGNOSIS — R10.2 PELVIC PAIN: Primary | ICD-10-CM

## 2022-05-27 DIAGNOSIS — Z01.419 WELL WOMAN EXAM WITH ROUTINE GYNECOLOGICAL EXAM: ICD-10-CM

## 2022-05-27 LAB
BILIRUBIN, POC: NEGATIVE
BLOOD URINE, POC: NEGATIVE
CLARITY, POC: CLEAR
COLOR, POC: YELLOW
GLUCOSE URINE, POC: NEGATIVE
KETONES, POC: NORMAL
LEUKOCYTE EST, POC: NEGATIVE
NITRITE, POC: NEGATIVE
PH, POC: 6.5
PROTEIN, POC: NEGATIVE
SPECIFIC GRAVITY, POC: 1.01
UROBILINOGEN, POC: 0.2

## 2022-05-27 PROCEDURE — 81002 URINALYSIS NONAUTO W/O SCOPE: CPT | Performed by: OBSTETRICS & GYNECOLOGY

## 2022-05-27 PROCEDURE — 99386 PREV VISIT NEW AGE 40-64: CPT | Performed by: OBSTETRICS & GYNECOLOGY

## 2022-05-27 NOTE — PROGRESS NOTES
Subjective:      Patient ID: Austyn Chi is a 62 y.o. female. HPI  Pt is here for annual gyn exam.  She notes a two week h/o lower abd cramping. States it feels like she's about to start her period but she had a hyst and bso 10 years ago. No pap since. H/o IBS. UA neg. States it doesn't feel bowel related. Had normal colonoscopy 1 year ago. H/o gastric sleeve, lost 60 lbs. Review of Systems Pertinent review of systems items discussed above. All others systems items not discussed above were negative. Objective:   Physical Exam  Constitutional:       Appearance: She is well-developed. HENT:      Head: Normocephalic and atraumatic. Neck:      Thyroid: No thyromegaly. Trachea: No tracheal deviation. Cardiovascular:      Rate and Rhythm: Normal rate and regular rhythm. Heart sounds: Normal heart sounds. No murmur heard. Pulmonary:      Effort: Pulmonary effort is normal. No respiratory distress. Breath sounds: Normal breath sounds. No wheezing or rales. Chest:   Breasts:      Right: No mass, nipple discharge or skin change. Left: No mass, nipple discharge or skin change. Abdominal:      General: There is no distension. Palpations: Abdomen is soft. There is no mass. Tenderness: There is no abdominal tenderness. There is no rebound. Genitourinary:     Labia:         Right: No lesion. Left: No lesion. Vagina: Normal. No foreign body. No vaginal discharge. Adnexa:         Right: No mass or tenderness. Left: No mass or tenderness. Rectum: Normal. Guaiac result negative. No mass or external hemorrhoid. Comments: Pap performed. Musculoskeletal:         General: Normal range of motion. Lymphadenopathy:      Cervical: No cervical adenopathy. Neurological:      Mental Status: She is alert and oriented to person, place, and time. Assessment:   Normal gyn exam, pelvic cramping     Plan:   Pelvic US.   Call with results.   If normal then she may need referral to GI.  F/u annual gyn exam.       Kristal ARAIZA MD

## 2022-05-31 ENCOUNTER — HOSPITAL ENCOUNTER (OUTPATIENT)
Dept: ULTRASOUND IMAGING | Age: 62
Discharge: HOME | End: 2022-05-31
Payer: COMMERCIAL

## 2022-05-31 PROCEDURE — 76856 US EXAM PELVIC COMPLETE: CPT

## 2022-06-01 ENCOUNTER — OFFICE VISIT (OUTPATIENT)
Dept: FAMILY MEDICINE CLINIC | Age: 62
End: 2022-06-01
Payer: COMMERCIAL

## 2022-06-01 VITALS
RESPIRATION RATE: 20 BRPM | BODY MASS INDEX: 53.81 KG/M2 | HEART RATE: 81 BPM | WEIGHT: 285 LBS | TEMPERATURE: 98.3 F | HEIGHT: 61 IN | SYSTOLIC BLOOD PRESSURE: 144 MMHG | OXYGEN SATURATION: 96 % | DIASTOLIC BLOOD PRESSURE: 88 MMHG

## 2022-06-01 DIAGNOSIS — E66.01 CLASS 3 SEVERE OBESITY WITH SERIOUS COMORBIDITY AND BODY MASS INDEX (BMI) OF 50.0 TO 59.9 IN ADULT, UNSPECIFIED OBESITY TYPE (HCC): Primary | ICD-10-CM

## 2022-06-01 DIAGNOSIS — E55.9 VITAMIN D DEFICIENCY: ICD-10-CM

## 2022-06-01 DIAGNOSIS — R03.0 ELEVATED BP WITHOUT DIAGNOSIS OF HYPERTENSION: ICD-10-CM

## 2022-06-01 DIAGNOSIS — D64.9 MILD ANEMIA: ICD-10-CM

## 2022-06-01 PROCEDURE — 99214 OFFICE O/P EST MOD 30 MIN: CPT | Performed by: NURSE PRACTITIONER

## 2022-06-01 RX ORDER — CHLORTHALIDONE 25 MG/1
25 TABLET ORAL DAILY
Qty: 60 TABLET | Refills: 0 | Status: SHIPPED | OUTPATIENT
Start: 2022-06-01 | End: 2022-07-27

## 2022-06-01 RX ORDER — PHENTERMINE AND TOPIRAMATE 7.5; 46 MG/1; MG/1
1 CAPSULE, EXTENDED RELEASE ORAL DAILY
Qty: 60 CAPSULE | Refills: 0 | Status: SHIPPED | OUTPATIENT
Start: 2022-06-01 | End: 2022-07-31

## 2022-06-01 NOTE — PATIENT INSTRUCTIONS
Patient Education        High Blood Pressure: Care Instructions  Overview     It's normal for blood pressure to go up and down throughout the day. But if it stays up, you have high blood pressure. Another name for high blood pressure ishypertension. Despite what a lot of people think, high blood pressure usually doesn't cause headaches or make you feel dizzy or lightheaded. It usually has no symptoms. But it does increase your risk of stroke, heart attack, and other problems. You and your doctor will talk about your risks of these problems based on yourblood pressure. Your doctor will give you a goal for your blood pressure. Your goal will bebased on your health and your age. Lifestyle changes, such as eating healthy and being active, are always important to help lower blood pressure. You might also take medicine to reachyour blood pressure goal.  Follow-up care is a key part of your treatment and safety. Be sure to make and go to all appointments, and call your doctor if you are having problems. It's also a good idea to know your test results and keep alist of the medicines you take. How can you care for yourself at home? Medical treatment   If you stop taking your medicine, your blood pressure will go back up. You may take one or more types of medicine to lower your blood pressure. Be safe with medicines. Take your medicine exactly as prescribed. Call your doctor if you think you are having a problem with your medicine.  Talk to your doctor before you start taking aspirin every day. Aspirin can help certain people lower their risk of a heart attack or stroke. But taking aspirin isn't right for everyone, because it can cause serious bleeding.  See your doctor regularly. You may need to see the doctor more often at first or until your blood pressure comes down.  If you are taking blood pressure medicine, talk to your doctor before you take decongestants or anti-inflammatory medicine, such as ibuprofen. Some of these medicines can raise blood pressure.  Learn how to check your blood pressure at home. Lifestyle changes   Stay at a healthy weight. This is especially important if you put on weight around the waist. Losing even 10 pounds can help you lower your blood pressure.  If your doctor recommends it, get more exercise. Walking is a good choice. Bit by bit, increase the amount you walk every day. Try for at least 30 minutes on most days of the week. You also may want to swim, bike, or do other activities.  Avoid or limit alcohol. Talk to your doctor about whether you can drink any alcohol.  Try to limit how much sodium you eat to less than 2,300 milligrams (mg) a day. Your doctor may ask you to try to eat less than 1,500 mg a day.  Eat plenty of fruits (such as bananas and oranges), vegetables, legumes, whole grains, and low-fat dairy products.  Lower the amount of saturated fat in your diet. Saturated fat is found in animal products such as milk, cheese, and meat. Limiting these foods may help you lose weight and also lower your risk for heart disease.  Do not smoke. Smoking increases your risk for heart attack and stroke. If you need help quitting, talk to your doctor about stop-smoking programs and medicines. These can increase your chances of quitting for good. When should you call for help? Call 911  anytime you think you may need emergency care. This may mean having symptoms that suggest that your blood pressure is causing a serious heart or blood vessel problem. Your blood pressure may be over 180/120. For example, call 911 if:     You have symptoms of a heart attack. These may include:  ? Chest pain or pressure, or a strange feeling in the chest.  ? Sweating. ? Shortness of breath. ? Nausea or vomiting. ? Pain, pressure, or a strange feeling in the back, neck, jaw, or upper belly or in one or both shoulders or arms. ? Lightheadedness or sudden weakness.   ? A fast or irregular heartbeat.      You have symptoms of a stroke. These may include:  ? Sudden numbness, tingling, weakness, or loss of movement in your face, arm, or leg, especially on only one side of your body. ? Sudden vision changes. ? Sudden trouble speaking. ? Sudden confusion or trouble understanding simple statements. ? Sudden problems with walking or balance. ? A sudden, severe headache that is different from past headaches.      You have severe back or belly pain. Do not wait until your blood pressure comes down on its own. Get help right away. Call your doctor now or seek immediate care if:     Your blood pressure is much higher than normal (such as 180/120 or higher), but you don't have symptoms.      You think high blood pressure is causing symptoms, such as:  ? Severe headache.  ? Blurry vision. Watch closely for changes in your health, and be sure to contact your doctor if:     Your blood pressure measures higher than your doctor recommends at least 2 times. That means the top number is higher or the bottom number is higher, or both.      You think you may be having side effects from your blood pressure medicine. Where can you learn more? Go to https://[employer]. org and sign in to your [employer] account. Enter N564 in the [employer] box to learn more about \"High Blood Pressure: Care Instructions. \"     If you do not have an account, please click on the \"Sign Up Now\" link. Current as of: January 10, 2022               Content Version: 13.2  © 5342-5750 [employer]. Care instructions adapted under license by [employer]. If you have questions about a medical condition or this instruction, always ask your healthcare professional. [place] [address] any warranty or liability for your use of this information.

## 2022-06-01 NOTE — PROGRESS NOTES
Danish Moore (:  1960) is a 62 y.o. female,Established patient, here for evaluation of the following chief complaint(s):    Hypertension (her b/p was 178/80 at the GYN office last week)      SUBJECTIVE/OBJECTIVE:  HPI  Her b/p was elevated at the gyn office  178/80  Rechecked 153/84  She was going to the Bariatric doctor and gained 20 pounds since she stopped  She was on the Qysmia in the past this worked well she lost 28 pounds  She feels like the weight gain has caused her b/p to increase she does check at home 140/160 - DBP 50's    Review of Systems   Cardiovascular: Negative. Physical Exam  Vitals reviewed. Constitutional:       General: She is awake. She is not in acute distress. Appearance: Normal appearance. She is well-developed and well-groomed. She is morbidly obese. She is not ill-appearing, toxic-appearing or diaphoretic. Cardiovascular:      Rate and Rhythm: Normal rate and regular rhythm. Heart sounds: Normal heart sounds, S1 normal and S2 normal. Heart sounds not distant. No murmur heard. No systolic murmur is present. No diastolic murmur is present. Pulmonary:      Effort: Pulmonary effort is normal.      Breath sounds: Normal breath sounds and air entry. Neurological:      Mental Status: She is alert and oriented to person, place, and time. Psychiatric:         Attention and Perception: Attention and perception normal.         Mood and Affect: Mood and affect normal.         Speech: Speech normal.         Behavior: Behavior normal. Behavior is cooperative. Thought Content: Thought content normal.         Cognition and Memory: Cognition and memory normal.         Judgment: Judgment normal.       Tereza Javier was seen today for hypertension.     Diagnoses and all orders for this visit:    Class 3 severe obesity with serious comorbidity and body mass index (BMI) of 50.0 to 59.9 in adult, unspecified obesity type (HCC)  -     Phentermine-Topiramate (QSYMIA) 7.5-46 MG CP24; Take 1 capsule by mouth daily for 60 days. ENCOURAGED TO INCREASE CARDIO ACTIVITY TO AT LEAST  30 MIN3-4 X WEEKLY   AS WELL AS LIMITING CALORIES TO [id number] DAILY  FOLLOW UP IN TWO MONTHS    Elevated BP without diagnosis of hypertension  chlorthalidone (HYGROTON) 25 MG tablet; Take 1 tablet by mouth daily  Reduce sodium intake to less than 1500 mg daily  Include 5-7 servings of fruits and vegetables daily  Reduce alcohol use to 0 drinks daily  Reduce weight to ideal if overweight. 30 minutes of physical activity daily  ENCOURAGED TO MONITOR B/P AT HOME GOAL 150/90 OR LESS IF TRENDING UP  ADVISED TO INFORM ME  FOLLOW UP IN TWO MONTHS      LABS DRAWN AT BARIATRIC OFFICE REVIEWED WITH PT    Vitamin D deficiency  ENCOURAGED TO TAKE  VIT D 500-1000 IU DAILY  Mild anemia  MVI ENCOURAGED  AS WELL AS INCREASING INTAKE OF GREEN LEAFY VEGETABLES        An electronic signature was used to authenticate this note.     --Gracie Fernandez, APRN - CNP

## 2022-06-07 ENCOUNTER — TELEPHONE (OUTPATIENT)
Dept: ADMINISTRATIVE | Age: 62
End: 2022-06-07

## 2022-06-16 ENCOUNTER — OFFICE VISIT (OUTPATIENT)
Dept: FAMILY MEDICINE CLINIC | Age: 62
End: 2022-06-16
Payer: COMMERCIAL

## 2022-06-16 VITALS
SYSTOLIC BLOOD PRESSURE: 132 MMHG | BODY MASS INDEX: 53.24 KG/M2 | OXYGEN SATURATION: 98 % | HEIGHT: 61 IN | HEART RATE: 79 BPM | WEIGHT: 282 LBS | DIASTOLIC BLOOD PRESSURE: 86 MMHG

## 2022-06-16 DIAGNOSIS — M54.42 ACUTE BILATERAL LOW BACK PAIN WITH BILATERAL SCIATICA: Primary | ICD-10-CM

## 2022-06-16 DIAGNOSIS — M54.41 ACUTE BILATERAL LOW BACK PAIN WITH BILATERAL SCIATICA: Primary | ICD-10-CM

## 2022-06-16 PROCEDURE — 99213 OFFICE O/P EST LOW 20 MIN: CPT | Performed by: NURSE PRACTITIONER

## 2022-06-16 PROCEDURE — 96372 THER/PROPH/DIAG INJ SC/IM: CPT | Performed by: NURSE PRACTITIONER

## 2022-06-16 RX ORDER — METHYLPREDNISOLONE ACETATE 80 MG/ML
80 INJECTION, SUSPENSION INTRA-ARTICULAR; INTRALESIONAL; INTRAMUSCULAR; SOFT TISSUE ONCE
Status: COMPLETED | OUTPATIENT
Start: 2022-06-16 | End: 2022-06-16

## 2022-06-16 RX ADMIN — METHYLPREDNISOLONE ACETATE 80 MG: 80 INJECTION, SUSPENSION INTRA-ARTICULAR; INTRALESIONAL; INTRAMUSCULAR; SOFT TISSUE at 11:13

## 2022-06-16 ASSESSMENT — ENCOUNTER SYMPTOMS
WHEEZING: 0
BACK PAIN: 1
SHORTNESS OF BREATH: 0

## 2022-06-16 NOTE — PROGRESS NOTES
Lang Mccurdy (:  1960) is a 65 y.o. female,Established patient, here for evaluation of the following chief complaint(s):  Back Pain (SCIATICA NERVE PAIN, WAS GIVEN STEROID INJECTION AND HELPED FOR A WHOLE YEAR )      ASSESSMENT/PLAN:  1. Acute bilateral low back pain with bilateral sciatica  -Patient has low back pain with sciatica. This responded Solu-Cortef and Depo-Medrol last year. Administering the same injections today. The patient was educated on the medication use as well as side effects. Follow-up as needed. -     hydrocortisone sodium succinate PF (SOLU-CORTEF) injection 100 mg; 100 mg, IntraMUSCular, ONCE, 1 dose, On Thu 22 at 0930SITE: [facility] NDC#  6374-8896-24 LOT#  [id number] EXP DATE:  2024 VERIFIED BY:LYNNE  -     methylPREDNISolone acetate (DEPO-MEDROL) injection 80 mg; 80 mg, IntraMUSCular, ONCE, 1 dose, On Thu 22 at 0930SITE:  [facility] NDC#  [id number] LOT#  [id number] EXP DATE:  2023 VERIFIED BY:LYNNE      Return if symptoms worsen or fail to improve. SUBJECTIVE/OBJECTIVE:  EUSEBIO Olivier presents today for recurrent low back pain with sciatica. She had been seen for this a year ago. At that time, she received an injection of Solu-Cortef and Depo-Medrol which fairly immediately alleviated symptoms. She states that this worked for about a year. No specific injury. She is inquiring about this injection again. She denies any numbness or tingling. States that the sciatica is going down both of her legs. Pain in her lower back. Denies any bowel or urinary incontinence. Florencio Olivier is tearful today. She states that her father passed away yesterday. He was in hospice. She states that he was enrolled in an approximately a week ago. Things went well faster than she was expecting. She is still trying to cope at this time. Review of Systems   Constitutional: Negative for chills and fever. Respiratory: Negative for shortness of breath and wheezing. Cardiovascular: Negative for chest pain, palpitations and leg swelling. Musculoskeletal: Positive for back pain and myalgias. Negative for arthralgias, gait problem, joint swelling, neck pain and neck stiffness. Neurological: Negative for dizziness, weakness, light-headedness, numbness and headaches. Bilateral sciatica   Psychiatric/Behavioral: Negative for decreased concentration, dysphoric mood, self-injury, sleep disturbance and suicidal ideas. The patient is not nervous/anxious. Physical Exam  Constitutional:       General: She is not in acute distress. Appearance: Normal appearance. She is obese. Cardiovascular:      Pulses: Normal pulses. Heart sounds: Normal heart sounds. No murmur heard. No gallop. Pulmonary:      Effort: Pulmonary effort is normal.      Breath sounds: Normal breath sounds. No wheezing. Musculoskeletal:         General: Tenderness present. No swelling. Normal range of motion. Comments: Diffuse low back tenderness   Neurological:      Mental Status: She is alert and oriented to person, place, and time. Psychiatric:         Mood and Affect: Mood normal.         Behavior: Behavior normal.         Thought Content: Thought content normal.         Judgment: Judgment normal.               This dictation was generated by voice recognition computer software. Although all attempts are made to edit the dictation for accuracy, there may be errors in the transcription that are not intended. An electronic signature was used to authenticate this note.     --MARINA Elam - CNP

## 2022-06-16 NOTE — PROGRESS NOTES
Administrations This Visit     hydrocortisone sodium succinate PF (SOLU-CORTEF) injection 100 mg     Admin Date  06/16/2022  11:11 Action  Given Dose  100 mg Route  IntraMUSCular Site  Dorsogluteal Right Administered By  Lakshmi Mcmahan MA    Ordering Provider: MARINA Gillette CNP    NDC: 8846-8788-45    Lot#: [id number]    : Sheela Stokes. Patient Supplied?: No    Comments: SITE: [facility]  NDC#  3388-8422-63  DRI#  [id number]  EXP DATE:  01/01/2024  VERIFIED BY:LYNNE          methylPREDNISolone acetate (DEPO-MEDROL) injection 80 mg     Admin Date  06/16/2022  11:13 Action  Given Dose  80 mg Route  IntraMUSCular Site  Dorsogluteal Right Administered By  Lakshmi Mcmahan MA    Ordering Provider: MARINA Gillette CNP    NDC: 8691-1627-44    Lot#: [id number]    : Sheela Stokes.     Patient Supplied?: No    Comments: SITE:  [facility]  GXX#  [id number]  SNO#  [id number]  EXP DATE:  09/01/2023  VERIFIED BY:LYNNE

## 2022-06-23 DIAGNOSIS — R03.0 ELEVATED BP WITHOUT DIAGNOSIS OF HYPERTENSION: ICD-10-CM

## 2022-06-23 RX ORDER — CHLORTHALIDONE 25 MG/1
TABLET ORAL
Qty: 30 TABLET | Refills: 1 | OUTPATIENT
Start: 2022-06-23

## 2022-07-27 DIAGNOSIS — R03.0 ELEVATED BP WITHOUT DIAGNOSIS OF HYPERTENSION: ICD-10-CM

## 2022-07-27 RX ORDER — CHLORTHALIDONE 25 MG/1
TABLET ORAL
Qty: 30 TABLET | Refills: 1 | Status: SHIPPED | OUTPATIENT
Start: 2022-07-27 | End: 2022-09-06 | Stop reason: ALTCHOICE

## 2022-08-12 ENCOUNTER — OFFICE VISIT (OUTPATIENT)
Dept: DERMATOLOGY | Age: 62
End: 2022-08-12
Payer: COMMERCIAL

## 2022-08-12 DIAGNOSIS — L65.8 TRACTION ALOPECIA: ICD-10-CM

## 2022-08-12 DIAGNOSIS — L65.8 FEMALE PATTERN HAIR LOSS: ICD-10-CM

## 2022-08-12 DIAGNOSIS — D23.62 DERMATOFIBROMA OF LEFT SHOULDER: ICD-10-CM

## 2022-08-12 DIAGNOSIS — L81.1 MELASMA: Primary | ICD-10-CM

## 2022-08-12 DIAGNOSIS — D22.5 MULTIPLE BENIGN MELANOCYTIC NEVI OF UPPER EXTREMITY, LOWER EXTREMITY, AND TRUNK: ICD-10-CM

## 2022-08-12 DIAGNOSIS — D22.70 MULTIPLE BENIGN MELANOCYTIC NEVI OF UPPER EXTREMITY, LOWER EXTREMITY, AND TRUNK: ICD-10-CM

## 2022-08-12 DIAGNOSIS — D22.60 MULTIPLE BENIGN MELANOCYTIC NEVI OF UPPER EXTREMITY, LOWER EXTREMITY, AND TRUNK: ICD-10-CM

## 2022-08-12 PROCEDURE — 99204 OFFICE O/P NEW MOD 45 MIN: CPT | Performed by: INTERNAL MEDICINE

## 2022-08-12 NOTE — PROGRESS NOTES
[facility]  Mai Kidd MD  [phone]    Date of Visit: 8/12/2022    Marc Dancer is a 65 y.o. female who presents for skin lesion and hair loss. New patient. Chief Complaint:   Chief Complaint   Patient presents with    Skin Exam     Spot on L side of face and discuss hair loss         History of Present Illness:    Concern: Dark spot on L face  Duration: 5 months  Symptoms: Itchy sometimes  Previous treatments:   -Sunscreen when outdoors  Effect of trmt: Not improved  Other hx: Was more red/inflamed in past    Concerns: Hair loss  Duration: Subtle hair loss for many months/years, but more noticeable hair loss on L temporal scalp  Sx: No itch, pain in affected areas of scalp  Previous treatments: None  Other history: wears hair in holly, but tries to have them not be tight or cause much tension. Denies using heat on hair or other harsh hair care practices     Review of Systems:  Gen: Feels well, good sense of health. Skin: No new or changing moles, no history of keloids or hypertrophic scars. Heme: No abnormal bruising or bleeding. Past Medical History, Family History, Surgical History, Medications and Allergies reviewed.     Past Medical History:   Diagnosis Date    Abnormal antibody titer     Hepatitis C  20014    Chronic gastritis     Esophagitis     Fatty liver     GERD (gastroesophageal reflux disease)     Hiatal hernia     History of atrial tachycardia     Before gastric sleeve    Hx of hiatal hernia     repaired with gastric sleeve surgery    Hypertension     currently not medically treated    Internal hemorrhoid, bleeding     pt states currently not having any problems with hemorrhoids    Obstructive sleep apnea syndrome 04/21/2017    New diagnosis needing treatment CPAP set at 17    PONV (postoperative nausea and vomiting)     Wears glasses      Past Surgical History:   Procedure Laterality Date    ABDOMEN SURGERY N/A 08/29/2017    Robotic assisted lapascopic gastric sleeve performed at [facility] by MD ZARA Gaston Bilateral 10/02/2020, 2/26/21    CHOLECYSTECTOMY  2015    LAPROSCOPIC    COLONOSCOPY N/A 3/18/2021    COLONOSCOPY DIAGNOSTIC performed by Cassandra Leary MD at [address], COLON, DIAGNOSTIC      [address] (CERVIX STATUS UNKNOWN)  JUNE 2009    Total     OTHER SURGICAL HISTORY  02/17/2017    EGD    TONSILLECTOMY      TUBAL LIGATION  1983    UPPER GASTROINTESTINAL ENDOSCOPY N/A 3/18/2021    EGD DIAGNOSTIC ONLY performed by Cassandra Leary MD at [address] [facility]  2002       Allergies   Allergen Reactions    Latex      Surgery bandage- new allergy as of 2021     Outpatient Medications Marked as Taking for the 8/12/22 encounter (Office Visit) with Marcianne Mcardle, MD   Medication Sig Dispense Refill    chlorthalidone (HYGROTON) 25 MG tablet TAKE 1 TABLET BY MOUTH EVERY DAY 30 tablet 1    fluticasone (FLONASE) 50 MCG/ACT nasal spray 2 sprays by Nasal route daily as needed for Allergies 3 Bottle 0    albuterol sulfate  (90 Base) MCG/ACT inhaler Inhale 2 puffs into the lungs every 6 hours as needed for Wheezing or Shortness of Breath 1 Inhaler 5    AZELASTINE HCL NA by Nasal route as needed       Multiple Vitamins-Minerals (BARIATRIC FUSION) CHEW Take 4 tablets by mouth daily       Social History: Has 2 children, works from home. Dad recently passed away in June    Physical Examination     Full body skin exam was conducted to include the scalp, face, lips/teeth, lids/conjunctiva, ears, neck, chest, abdomen, back, right and left hands and forearms, right and left leg and feet and was normal with the following exceptions:   -Hyperpigmented reticulated patches symmetrically distributed on bilateral malar cheeks  -Decreased hair density on left greater than right temporal scalp with preservation of hair follicles.   Also subtle increased size of hair part  - Few trunk and bilateral upper and lower extremities, there are multiple well-circumscribed, homogenous tan to brown macules and papules including a homogenous brown macule on R plantar foot  -Firm brown 4mm papule on L shoulder  - Toenails/fingernails painted       Assessment and Plan     1. Melasma, face--not controlled  - Discussed diagnosis, etiology, typical course, and treatment options  - Start tretinoin 0.025% cream nightly (reviewed instructions for use)  - Educated on paradoxical skin darkening of hydroquinoine if overused  - Stressed importance of DAILY strict sun protection SPF >50, broad spectrum  - Sunscreen handout given    Other options - hydroquinone, Belo Intensive Whitening cr (tranexamic acid + kojic acid) bid, Glytone wash qd    2. 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 Multiple benign melanocytic nevi of upper extremity, lower extremity, and trunk  - Benign, reassurance  - Counseled on importance of daily sun protection (Broad spectrum, SPF >30), monthly self skin exams  - Reviewed ABCDEs of melanoma  - Sun screen hand out provided    4. Dermatofibroma of left shoulder  -Asymptomatic  -Benign, reassurance     Return in about 3 months (around 11/12/2022).     Twila Espinosa MD

## 2022-08-12 NOTE — PATIENT INSTRUCTIONS
Thank you for visiting [facility] today! Please follow the instructions below as we discussed in clinic:      Start using Rogaine (minoxidil) to affected hair loss area. Use 5%  Avoid wearing hair in holly and using heat to hair for next several months  Start a pea-sized amount of tretinoin to entire face nightly   Continue sunscreen with SPF > 50, broad spectrum    RETINOIDS AND RELATED PRODUCTS    Retin-A/ Retin-A Micro - Tretinoin  Differin - Adapalene  Tazorac - Tazarotene    This topical medication helps treat and prevent acne. Your acne may become worse before it gets better. Use only a pea size amount for the entire face. Do NOT use only for spot treatment. Stop all other toners, cleansers, scrubs, and acne products that are not prescribed by your doctor, as this may make your skin more dry. WHEN TO USE:  1) Week 1 use only Monday and Friday  2) Week 2 use every other day  3) Week 3 use every day only if you are able to tolerate the medication. Otherwise continue using every other day. HOW TO USE:   *Apply to clean, dry face (ex. Wash your face, brush your teeth, then apply the medication after face is dry  [employer] your face with a MILD soap such as Cerave Hydrating Facial Cleanser, Cetaphil Daily Facial Cleanser, or Neutrogena Extra Gentle Cleanser  *Avoid applying to upper/lower eyelids and neck. You may apply a thin layer of Aquaphor or Vaseline to lower eyelids and lip as a barrier before applying the retinoid. *Use a non-comedogenic moisturizer (Cetaphil, Cerave, Neutrogena) in the morning and / or night if dryness occurs. You can put this on before or after the retinoid    WHAT TO EXPECT  *You can expect some peeling / flaking during the first 4-6 weeks of use, but your skin should not be painful. If significant irritation occurs, use the medication less often (ex. every other day until skin adjusts)  *Stop the medication if you become pregnant or are planning pregnancy.    *Be cautious with waxing (upper lip / evebrows) when on these products - skin may be more sensitive. You may need to discontinue them a week before these procedures. *Be patient. It may take as long as 3 months before you notice improvement! Sun Protection Tips    Apply a broad spectrum water resistant sunscreen with an SPF of at least 30 to exposed areas of the skin. Dont forget the ears and lips! Remember to reapply sunscreen about every 2 hours and after swimming or sweating. Wear sun protective clothing. Swim shirts (AKA, rash guards) are a great idea and negate the need to reapply sunscreen in those areas.      Seek the shade whenever possible especially between the hours of 10 am and 4 pm when the suns rays are the strongest.     Avoid tanning beds

## 2022-08-15 ENCOUNTER — OFFICE VISIT (OUTPATIENT)
Dept: FAMILY MEDICINE CLINIC | Age: 62
End: 2022-08-15
Payer: COMMERCIAL

## 2022-08-15 VITALS
RESPIRATION RATE: 20 BRPM | TEMPERATURE: 98.6 F | WEIGHT: 286.6 LBS | SYSTOLIC BLOOD PRESSURE: 160 MMHG | DIASTOLIC BLOOD PRESSURE: 90 MMHG | BODY MASS INDEX: 54.11 KG/M2 | HEIGHT: 61 IN | HEART RATE: 68 BPM | OXYGEN SATURATION: 96 %

## 2022-08-15 DIAGNOSIS — Z86.79 HISTORY OF ATRIAL TACHYCARDIA: ICD-10-CM

## 2022-08-15 DIAGNOSIS — I10 UNCONTROLLED STAGE 2 HYPERTENSION: Primary | ICD-10-CM

## 2022-08-15 DIAGNOSIS — E66.01 CLASS 3 SEVERE OBESITY WITH SERIOUS COMORBIDITY AND BODY MASS INDEX (BMI) OF 50.0 TO 59.9 IN ADULT, UNSPECIFIED OBESITY TYPE (HCC): ICD-10-CM

## 2022-08-15 PROCEDURE — 99214 OFFICE O/P EST MOD 30 MIN: CPT | Performed by: NURSE PRACTITIONER

## 2022-08-15 RX ORDER — TIRZEPATIDE 2.5 MG/.5ML
2.5 INJECTION, SOLUTION SUBCUTANEOUS WEEKLY
Qty: 2 ML | Refills: 0 | Status: SHIPPED | OUTPATIENT
Start: 2022-08-15 | End: 2022-09-06 | Stop reason: DRUGHIGH

## 2022-08-15 RX ORDER — METOPROLOL TARTRATE AND HYDROCHLOROTHIAZIDE 50; 25 MG/1; MG/1
1 TABLET ORAL DAILY
Qty: 30 TABLET | Refills: 0 | Status: SHIPPED | OUTPATIENT
Start: 2022-08-15 | End: 2022-09-06 | Stop reason: SDUPTHER

## 2022-08-15 SDOH — ECONOMIC STABILITY: FOOD INSECURITY: WITHIN THE PAST 12 MONTHS, YOU WORRIED THAT YOUR FOOD WOULD RUN OUT BEFORE YOU GOT MONEY TO BUY MORE.: NEVER TRUE

## 2022-08-15 SDOH — ECONOMIC STABILITY: FOOD INSECURITY: WITHIN THE PAST 12 MONTHS, THE FOOD YOU BOUGHT JUST DIDN'T LAST AND YOU DIDN'T HAVE MONEY TO GET MORE.: NEVER TRUE

## 2022-08-15 ASSESSMENT — SOCIAL DETERMINANTS OF HEALTH (SDOH): HOW HARD IS IT FOR YOU TO PAY FOR THE VERY BASICS LIKE FOOD, HOUSING, MEDICAL CARE, AND HEATING?: NOT HARD AT ALL

## 2022-08-15 NOTE — TELEPHONE ENCOUNTER
Pt was called @ [phone]  Regarding medication Tretinoin denied by insurance  Pt states:   -will use [employer] coupon to  medication   medication Ana Paula Meek [phone]

## 2022-08-15 NOTE — PROGRESS NOTES
Valentino Nanas was seen today for hypertension. Diagnoses and all orders for this visit:    Uncontrolled stage 2 hypertension  -     metoprolol-hydroCHLOROthiazide (LOPRESSOR HCT) 50-25 MG per tablet; Take 1 tablet by mouth in the morning. Chlorthalidone discontinued   Continue monitoring b/p goal 140/90 or less  Reduce sodium intake to less than 1500 mg daily  Include 5-7 servings of fruits and vegetables daily  Reduce weight to ideal if overweight  30 minutes of physical activity daily   Follow up in one month  Class 3 severe obesity with serious comorbidity and body mass index (BMI) of 50.0 to 59.9 in adult, unspecified obesity type (HCC)  -     Tirzepatide (MOUNJARO) 2.5 MG/0.5ML SOPN SC injection; Inject 0.5 mLs into the skin once a week se dw pt  ENCOURAGED TO INCREASE CARDIO ACTIVITY TO AT LEAST  30 MIN3-4 X WEEKLY    Follow up in one weel           An electronic signature was used to authenticate this note.     --Swati Adrian, APRN - CNP

## 2022-08-30 DIAGNOSIS — E66.01 CLASS 3 SEVERE OBESITY WITH SERIOUS COMORBIDITY AND BODY MASS INDEX (BMI) OF 50.0 TO 59.9 IN ADULT, UNSPECIFIED OBESITY TYPE (HCC): ICD-10-CM

## 2022-08-30 NOTE — TELEPHONE ENCOUNTER
LAST VISIT 08/15/2022 WITH SYL, NEXT VISIT 09/06/2022 WITH ANDREW PERDOMO CNP.   [address] [place] [address]

## 2022-09-06 ENCOUNTER — OFFICE VISIT (OUTPATIENT)
Dept: FAMILY MEDICINE CLINIC | Age: 62
End: 2022-09-06
Payer: COMMERCIAL

## 2022-09-06 VITALS
OXYGEN SATURATION: 97 % | WEIGHT: 284 LBS | BODY MASS INDEX: 53.62 KG/M2 | SYSTOLIC BLOOD PRESSURE: 132 MMHG | DIASTOLIC BLOOD PRESSURE: 84 MMHG | HEART RATE: 70 BPM | HEIGHT: 61 IN

## 2022-09-06 DIAGNOSIS — E66.01 MORBID OBESITY WITH BMI OF 50.0-59.9, ADULT (HCC): ICD-10-CM

## 2022-09-06 DIAGNOSIS — I10 PRIMARY HYPERTENSION: Primary | ICD-10-CM

## 2022-09-06 DIAGNOSIS — R73.03 PREDIABETES: ICD-10-CM

## 2022-09-06 PROCEDURE — 99214 OFFICE O/P EST MOD 30 MIN: CPT | Performed by: NURSE PRACTITIONER

## 2022-09-06 RX ORDER — METOPROLOL TARTRATE AND HYDROCHLOROTHIAZIDE 50; 25 MG/1; MG/1
TABLET ORAL
Qty: 30 TABLET | OUTPATIENT
Start: 2022-09-06

## 2022-09-06 RX ORDER — TIRZEPATIDE 5 MG/.5ML
5 INJECTION, SOLUTION SUBCUTANEOUS WEEKLY
Qty: 2 ML | Refills: 2 | Status: SHIPPED | OUTPATIENT
Start: 2022-09-06

## 2022-09-06 RX ORDER — METOPROLOL TARTRATE AND HYDROCHLOROTHIAZIDE 50; 25 MG/1; MG/1
1 TABLET ORAL DAILY
Qty: 90 TABLET | Refills: 0 | Status: SHIPPED | OUTPATIENT
Start: 2022-09-06 | End: 2022-09-08 | Stop reason: SDUPTHER

## 2022-09-06 RX ORDER — TIRZEPATIDE 2.5 MG/.5ML
2.5 INJECTION, SOLUTION SUBCUTANEOUS WEEKLY
Qty: 2 ML | OUTPATIENT
Start: 2022-09-06 | End: 2022-10-06

## 2022-09-06 NOTE — TELEPHONE ENCOUNTER
metoprolol-hydroCHLOROthiazide (LOPRESSOR HCT) 50-25 MG per tablet 90 tablet 0 9/6/2022 12/5/2022    Sig - Route:  Take 1 tablet by mouth daily - Oral    Sent to pharmacy as: Metoprolol-hydroCHLOROthiazide 50-25 MG Oral Tablet (LOPRESSOR HCT)    E-Prescribing Status: Receipt confirmed by pharmacy (9/6/2022 10:02 AM EDT)

## 2022-09-06 NOTE — PROGRESS NOTES
Susu Huber (:  1960) is a 65 y.o. female,Established patient, here for evaluation of the following chief complaint(s):    Weight Loss (WEIGHT LOSS FOLLOW UP, 4 COMPLETED SHOTS )      SUBJECTIVE/OBJECTIVE:  HPI  ONE MONTH WEIGHT LOSS- B/P CHECK    CAREN HAS CHECKED HER B/P DAILY AND IT HAS BEEN ABOUT THE SAME AS IN OFFICE SHE DENIES ANY CHEST PAIN - PALPITATIONS SWELLING OF FEET OR ANKLES      OBESITY  HAS A BIT OF NAUSEA WITH THE MOUNJARO FILLED   SHE HAS NOT HAD ANY ISSUES GIVING HERSELF THE INJECTION  HAS NOTICED A DECREASE IN HER APPETITE  SHE IS USING A 1200 CALORIE DIET  HAD LEFT OVER MEDI SHAKES THAT SHE IS USING  NO REGULAR EXERCISE      Review of Systems   Cardiovascular: Negative. Physical Exam  Neck:      Thyroid: No thyromegaly. Cardiovascular:      Rate and Rhythm: Normal rate and regular rhythm. Heart sounds: Normal heart sounds, S1 normal and S2 normal. Heart sounds not distant. No murmur heard. No systolic murmur is present. No diastolic murmur is present. No friction rub. No gallop. No S3 or S4 sounds. Pulmonary:      Effort: Pulmonary effort is normal.      Breath sounds: Normal breath sounds and air entry. Musculoskeletal:      Right lower leg: No edema. Left lower leg: No edema. Psychiatric:         Attention and Perception: Attention and perception normal.         Mood and Affect: Mood and affect normal.         Speech: Speech normal.         Behavior: Behavior normal.         Thought Content: Thought content normal.         Cognition and Memory: Cognition and memory normal.     Ziggy Chung was seen today for weight loss. Diagnoses and all orders for this visit:    Primary hypertension  -     metoprolol-hydroCHLOROthiazide (LOPRESSOR HCT) 50-25 MG per tablet;  Take 1 tablet by mouth daily  ENCOURAGED TO CONTINUE MONITORING  B/P   Reduce sodium intake to less than 1500 mg daily  Include 5-7 servings of fruits and vegetables daily  Reduce weight to

## 2022-09-08 RX ORDER — METOPROLOL TARTRATE AND HYDROCHLOROTHIAZIDE 50; 25 MG/1; MG/1
TABLET ORAL
Qty: 30 TABLET | OUTPATIENT
Start: 2022-09-08

## 2022-09-08 NOTE — TELEPHONE ENCOUNTER
metoprolol-hydroCHLOROthiazide (LOPRESSOR HCT) 50-25 MG per tablet 90 tablet 0 9/6/2022 12/5/2022    Sig - Route:  Take 1 tablet by mouth daily - Oral    Sent to pharmacy as: Metoprolol-hydroCHLOROthiazide 50-25 MG Oral Tablet (LOPRESSOR HCT)    E-Prescribing Status: Receipt confirmed by pharmacy (9/6/2022 06:35 AM EDT)    Tristan BELLO

## 2022-09-09 RX ORDER — METOPROLOL TARTRATE AND HYDROCHLOROTHIAZIDE 50; 25 MG/1; MG/1
1 TABLET ORAL DAILY
Qty: 90 TABLET | Refills: 0 | Status: SHIPPED | OUTPATIENT
Start: 2022-09-09 | End: 2022-12-08

## 2022-11-28 ENCOUNTER — OFFICE VISIT (OUTPATIENT)
Dept: FAMILY MEDICINE CLINIC | Age: 62
End: 2022-11-28
Payer: COMMERCIAL

## 2022-11-28 VITALS — DIASTOLIC BLOOD PRESSURE: 70 MMHG | OXYGEN SATURATION: 98 % | HEART RATE: 58 BPM | SYSTOLIC BLOOD PRESSURE: 104 MMHG

## 2022-11-28 DIAGNOSIS — M54.50 ACUTE RIGHT-SIDED LOW BACK PAIN WITHOUT SCIATICA: Primary | ICD-10-CM

## 2022-11-28 PROCEDURE — 99213 OFFICE O/P EST LOW 20 MIN: CPT | Performed by: NURSE PRACTITIONER

## 2022-11-28 PROCEDURE — 96372 THER/PROPH/DIAG INJ SC/IM: CPT | Performed by: NURSE PRACTITIONER

## 2022-11-28 PROCEDURE — 3078F DIAST BP <80 MM HG: CPT | Performed by: NURSE PRACTITIONER

## 2022-11-28 PROCEDURE — 3074F SYST BP LT 130 MM HG: CPT | Performed by: NURSE PRACTITIONER

## 2022-11-28 RX ORDER — METHYLPREDNISOLONE ACETATE 80 MG/ML
80 INJECTION, SUSPENSION INTRA-ARTICULAR; INTRALESIONAL; INTRAMUSCULAR; SOFT TISSUE ONCE
Status: COMPLETED | OUTPATIENT
Start: 2022-11-28 | End: 2022-11-28

## 2022-11-28 RX ADMIN — METHYLPREDNISOLONE ACETATE 80 MG: 80 INJECTION, SUSPENSION INTRA-ARTICULAR; INTRALESIONAL; INTRAMUSCULAR; SOFT TISSUE at 13:03

## 2022-11-28 ASSESSMENT — ENCOUNTER SYMPTOMS: BACK PAIN: 1

## 2022-11-28 NOTE — PROGRESS NOTES
Stan Callahan (:  1960) is a 65 y.o. female,Established patient, here for evaluation of the following chief complaint(s):    No chief complaint on file. SUBJECTIVE/OBJECTIVE:  HPI  Samantha Kolb c/o  right sided low back pain that radiates into her buttocks since 10/22 she thought it was due to moving the pain is getting worse aching throbbing pain she rates the pain /10  she has tired Naprosyn and tylenol that has not helped much  Review of Systems   Musculoskeletal:  Positive for back pain. Physical Exam  Vitals reviewed. Constitutional:       General: She is awake. She is not in acute distress. Appearance: Normal appearance. She is morbidly obese. She is not ill-appearing, toxic-appearing or diaphoretic. Neurological:      Mental Status: She is alert and oriented to person, place, and time. Psychiatric:         Attention and Perception: Attention and perception normal.         Mood and Affect: Mood and affect normal.         Speech: Speech normal.         Behavior: Behavior normal. Behavior is cooperative. Thought Content: Thought content normal.         Cognition and Memory: Cognition and memory normal.         Judgment: Judgment normal.       Samantha Kolb was seen today for back pain. Diagnoses and all orders for this visit:    Acute right-sided low back pain with bilateral sciatica  Depo medrol 80 mg IM x 1  Apply cold compresses intermittently as needed. As pain recedes, begin normal activities slowly as tolerated. Call if symptoms persist.   Exercises reviewed with pt- instructed to do at least four times weekly  Consider PT if no improvement noted  Instructed to get flu vaccine in two weeks        An electronic signature was used to authenticate this note.     --Gabriel Sams, MARINA - CNP

## 2022-11-30 DIAGNOSIS — E66.01 MORBID OBESITY WITH BMI OF 50.0-59.9, ADULT (HCC): ICD-10-CM

## 2022-11-30 DIAGNOSIS — R73.03 PREDIABETES: ICD-10-CM

## 2022-11-30 RX ORDER — TIRZEPATIDE 5 MG/.5ML
5 INJECTION, SOLUTION SUBCUTANEOUS WEEKLY
Qty: 2 ML | Refills: 0 | Status: SHIPPED | OUTPATIENT
Start: 2022-11-30

## 2022-12-05 DIAGNOSIS — E66.01 MORBID OBESITY WITH BMI OF 50.0-59.9, ADULT (HCC): ICD-10-CM

## 2022-12-05 DIAGNOSIS — R73.03 PREDIABETES: ICD-10-CM

## 2022-12-05 RX ORDER — TIRZEPATIDE 5 MG/.5ML
5 INJECTION, SOLUTION SUBCUTANEOUS WEEKLY
Qty: 2 ML | Refills: 4 | Status: SHIPPED | OUTPATIENT
Start: 2023-01-02 | End: 2023-01-30

## 2023-01-03 RX ORDER — METOPROLOL TARTRATE AND HYDROCHLOROTHIAZIDE 50; 25 MG/1; MG/1
TABLET ORAL
Qty: 90 TABLET | Refills: 0 | Status: SHIPPED | OUTPATIENT
Start: 2023-01-03

## 2023-02-17 RX ORDER — TIRZEPATIDE 7.5 MG/.5ML
7.5 INJECTION, SOLUTION SUBCUTANEOUS WEEKLY
Qty: 2 ML | Refills: 4 | Status: SHIPPED | OUTPATIENT
Start: 2023-02-17 | End: 2023-02-17 | Stop reason: CLARIF

## 2023-02-27 DIAGNOSIS — E66.01 CLASS 3 SEVERE OBESITY WITHOUT SERIOUS COMORBIDITY WITH BODY MASS INDEX (BMI) OF 50.0 TO 59.9 IN ADULT, UNSPECIFIED OBESITY TYPE (HCC): Primary | ICD-10-CM

## 2023-02-27 RX ORDER — SEMAGLUTIDE 2.4 MG/.75ML
2.4 INJECTION, SOLUTION SUBCUTANEOUS
Qty: 2 ML | Refills: 0 | Status: SHIPPED | OUTPATIENT
Start: 2023-02-27 | End: 2023-03-27

## 2023-04-11 DIAGNOSIS — E66.01 MORBID OBESITY WITH BMI OF 50.0-59.9, ADULT (HCC): Primary | ICD-10-CM

## 2023-04-11 RX ORDER — TIRZEPATIDE 10 MG/.5ML
10 INJECTION, SOLUTION SUBCUTANEOUS WEEKLY
Qty: 2 ML | Refills: 4 | Status: SHIPPED | OUTPATIENT
Start: 2023-04-11 | End: 2023-05-18

## 2023-04-23 NOTE — TELEPHONE ENCOUNTER
LV 6/16/22 WITH TR NV NONE  chlorthalidone (HYGROTON) 25 MG tablet 60 tablet 0 6/1/2022 7/31/2022    Sig - Route:  Take 1 tablet by mouth daily - Oral    Sent to pharmacy as: Chlorthalidone 25 MG Oral Tablet (HYGROTON)    E-Prescribing Status: Receipt confirmed by pharmacy (6/1/2022  9:55 AM EDT)    REFILL TOO SOON No

## 2023-05-18 ENCOUNTER — OFFICE VISIT (OUTPATIENT)
Dept: FAMILY MEDICINE CLINIC | Age: 63
End: 2023-05-18
Payer: COMMERCIAL

## 2023-05-18 VITALS
SYSTOLIC BLOOD PRESSURE: 108 MMHG | WEIGHT: 282 LBS | HEART RATE: 68 BPM | BODY MASS INDEX: 53.24 KG/M2 | OXYGEN SATURATION: 99 % | DIASTOLIC BLOOD PRESSURE: 84 MMHG | HEIGHT: 61 IN

## 2023-05-18 DIAGNOSIS — I47.1 ATRIAL TACHYCARDIA (HCC): ICD-10-CM

## 2023-05-18 DIAGNOSIS — R60.9 DEPENDENT EDEMA: ICD-10-CM

## 2023-05-18 DIAGNOSIS — R53.83 FATIGUE, UNSPECIFIED TYPE: Primary | ICD-10-CM

## 2023-05-18 DIAGNOSIS — M79.661 RIGHT CALF PAIN: ICD-10-CM

## 2023-05-18 LAB
D DIMER: 0.72 UG/ML FEU (ref 0–0.6)
TSH SERPL DL<=0.005 MIU/L-ACNC: 1.57 UIU/ML (ref 0.27–4.2)

## 2023-05-18 PROCEDURE — 99214 OFFICE O/P EST MOD 30 MIN: CPT | Performed by: NURSE PRACTITIONER

## 2023-05-18 PROCEDURE — 3074F SYST BP LT 130 MM HG: CPT | Performed by: NURSE PRACTITIONER

## 2023-05-18 PROCEDURE — 3079F DIAST BP 80-89 MM HG: CPT | Performed by: NURSE PRACTITIONER

## 2023-05-18 RX ORDER — FUROSEMIDE 20 MG/1
TABLET ORAL
Qty: 60 TABLET | Refills: 3 | Status: SHIPPED | OUTPATIENT
Start: 2023-05-18

## 2023-05-18 SDOH — ECONOMIC STABILITY: INCOME INSECURITY: HOW HARD IS IT FOR YOU TO PAY FOR THE VERY BASICS LIKE FOOD, HOUSING, MEDICAL CARE, AND HEATING?: NOT HARD AT ALL

## 2023-05-18 SDOH — ECONOMIC STABILITY: FOOD INSECURITY: WITHIN THE PAST 12 MONTHS, THE FOOD YOU BOUGHT JUST DIDN'T LAST AND YOU DIDN'T HAVE MONEY TO GET MORE.: NEVER TRUE

## 2023-05-18 SDOH — ECONOMIC STABILITY: HOUSING INSECURITY
IN THE LAST 12 MONTHS, WAS THERE A TIME WHEN YOU DID NOT HAVE A STEADY PLACE TO SLEEP OR SLEPT IN A SHELTER (INCLUDING NOW)?: NO

## 2023-05-18 SDOH — ECONOMIC STABILITY: FOOD INSECURITY: WITHIN THE PAST 12 MONTHS, YOU WORRIED THAT YOUR FOOD WOULD RUN OUT BEFORE YOU GOT MONEY TO BUY MORE.: NEVER TRUE

## 2023-05-18 ASSESSMENT — PATIENT HEALTH QUESTIONNAIRE - PHQ9
SUM OF ALL RESPONSES TO PHQ QUESTIONS 1-9: 0
SUM OF ALL RESPONSES TO PHQ9 QUESTIONS 1 & 2: 0
SUM OF ALL RESPONSES TO PHQ QUESTIONS 1-9: 0
SUM OF ALL RESPONSES TO PHQ QUESTIONS 1-9: 0
1. LITTLE INTEREST OR PLEASURE IN DOING THINGS: 0
2. FEELING DOWN, DEPRESSED OR HOPELESS: 0
SUM OF ALL RESPONSES TO PHQ QUESTIONS 1-9: 0

## 2023-05-18 ASSESSMENT — ENCOUNTER SYMPTOMS: RESPIRATORY NEGATIVE: 1

## 2023-05-18 NOTE — PATIENT INSTRUCTIONS
You may receive a survey regarding the care you received during your visit. Your input is valuable to us. We encourage you to complete and return your survey. We hope you will choose us in the future for your healthcare needs. GENERAL OFFICE POLICIES      Telephone Calls: Messages will be answered within 1-2 business days, unless the provider is out of the office. If it is urgent a covering provider will answer. (this does not include Medication refills). MyChart: We recommend all patients sign up for [employer]. Through this portal you can see your lab results, request refills, schedule appointments, pay your bill and send messages to the office. [employer] messages will be answered within 1-2 business days unless the provider is out of the office. For urgent matters, please call the office. Appointments:  All appointments must be scheduled. We ask all patients to schedule their next follow up appointment before they leave the office to make sure you will be able to be seen before you run out of medications. 24 hours notice is required to cancel or reschedule an appointment to avoid being marked as a no show. You may be dismissed from the practice after 3 no shows. LATE for Appointment: If you are 15 or more minutes late for your appointment, you may be asked to reschedule. MA/LAB APPTS: Must be scheduled, cannot accept walk in lab visits. We only draw labs for patients established in our office. We only do injections for medications ordered by our office. Acute Sick Visits:  Nothing other than acute complaint will be addressed at this visit. TRADITIONAL MEDICARE  DOES NOT COVER PHYSICALS  MEDICARE WELLNESS VISITS: These are NOT physicals but the free annual visit offered by Medicare to discuss wellness issues. Medication refills, checkups, etc. will not be addressed during this visit.   Medication Refills: Refills are handled electronically so please contact your pharmacy for medication refills

## 2023-05-18 NOTE — PROGRESS NOTES
Roxanne Wilson (:  1960) is a 65 y.o. female,Established patient, here for evaluation of the following chief complaint(s):    Leg Swelling (Leg swelling, cramping. 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 Respiratory: Negative. Cardiovascular:  Positive for leg swelling. Negative for chest pain and palpitations. Physical Exam  Vitals reviewed. Constitutional:       General: She is awake. She is not in acute distress. Appearance: Normal appearance. She is well-developed and well-groomed. She is morbidly obese. She is not ill-appearing, toxic-appearing or diaphoretic. Cardiovascular:      Rate and Rhythm: Normal rate and regular rhythm. Heart sounds: Normal heart sounds, S1 normal and S2 normal. Heart sounds not distant. No murmur heard. No systolic murmur is present. No diastolic murmur is present. Pulmonary:      Effort: Pulmonary effort is normal.      Breath sounds: Normal breath sounds and air entry. Musculoskeletal:      Right lower leg: No edema. Left lower leg: No edema. Neurological:      Mental Status: She is alert and oriented to person, place, and time.    Psychiatric:         Attention and Perception: Attention and perception normal.         Mood and Affect: Mood and affect normal.         Speech: Speech normal.         Behavior: Behavior normal.

## 2023-05-19 ENCOUNTER — TELEPHONE (OUTPATIENT)
Dept: FAMILY MEDICINE CLINIC | Age: 63
End: 2023-05-19

## 2023-05-19 ENCOUNTER — HOSPITAL ENCOUNTER (OUTPATIENT)
Dept: VASCULAR LAB | Age: 63
Discharge: HOME | End: 2023-05-19
Payer: COMMERCIAL

## 2023-05-19 DIAGNOSIS — M79.661 RIGHT CALF PAIN: ICD-10-CM

## 2023-05-19 PROCEDURE — 93971 EXTREMITY STUDY: CPT

## 2023-05-19 NOTE — TELEPHONE ENCOUNTER
Cannot tell based off note. We're going to go with daily as needed. Please call the pharmacy and tell them this. I am also going to route to Frannie in case she wants it to be something else.

## 2023-05-19 NOTE — TELEPHONE ENCOUNTER
Pharmacy is calling because the need more information on the directions on the medication FUROSEMIDE 20 MG TABLET - NEEDS TO KNOW FREQUENCY AND MAX PER DAY. PLEASE GIVE THEM A CALL BACK.

## 2023-07-13 DIAGNOSIS — R60.9 DEPENDENT EDEMA: ICD-10-CM

## 2023-07-13 RX ORDER — METOPROLOL TARTRATE AND HYDROCHLOROTHIAZIDE 50; 25 MG/1; MG/1
1 TABLET ORAL DAILY
Qty: 90 TABLET | Refills: 0 | Status: SHIPPED | OUTPATIENT
Start: 2023-07-13

## 2023-07-13 RX ORDER — FUROSEMIDE 20 MG/1
TABLET ORAL
Qty: 60 TABLET | Refills: 3 | OUTPATIENT
Start: 2023-07-13

## 2023-07-27 ENCOUNTER — TELEPHONE (OUTPATIENT)
Dept: FAMILY MEDICINE CLINIC | Age: 63
End: 2023-07-27

## 2023-07-27 NOTE — TELEPHONE ENCOUNTER
Submitted PA for [facility]  Via [facility] Key: [id number] STATUS: PENDING. Follow up done daily; if no response in three days we will refax for status check. If another three days goes by with no response we will call the insurance for status.

## 2023-07-27 NOTE — PATIENT INSTRUCTIONS

## 2023-07-28 ENCOUNTER — OFFICE VISIT (OUTPATIENT)
Dept: FAMILY MEDICINE CLINIC | Age: 63
End: 2023-07-28
Payer: COMMERCIAL

## 2023-07-28 VITALS
SYSTOLIC BLOOD PRESSURE: 110 MMHG | HEART RATE: 68 BPM | HEIGHT: 61 IN | WEIGHT: 278 LBS | BODY MASS INDEX: 52.49 KG/M2 | DIASTOLIC BLOOD PRESSURE: 80 MMHG

## 2023-07-28 DIAGNOSIS — R73.03 PREDIABETES: Primary | ICD-10-CM

## 2023-07-28 DIAGNOSIS — R53.82 CHRONIC FATIGUE: ICD-10-CM

## 2023-07-28 DIAGNOSIS — R11.0 NAUSEA: ICD-10-CM

## 2023-07-28 LAB — HBA1C MFR BLD: 5.1 %

## 2023-07-28 PROCEDURE — 3079F DIAST BP 80-89 MM HG: CPT

## 2023-07-28 PROCEDURE — 3074F SYST BP LT 130 MM HG: CPT

## 2023-07-28 PROCEDURE — 99214 OFFICE O/P EST MOD 30 MIN: CPT

## 2023-07-28 PROCEDURE — 83037 HB GLYCOSYLATED A1C HOME DEV: CPT

## 2023-07-28 RX ORDER — TIRZEPATIDE 7.5 MG/.5ML
INJECTION, SOLUTION SUBCUTANEOUS
COMMUNITY
Start: 2023-06-27 | End: 2023-07-28

## 2023-07-28 RX ORDER — HYDROCODONE BITARTRATE AND ACETAMINOPHEN 5; 325 MG/1; MG/1
1 TABLET ORAL EVERY 6 HOURS PRN
COMMUNITY
Start: 2023-04-20

## 2023-07-28 RX ORDER — TIRZEPATIDE 5 MG/.5ML
5 INJECTION, SOLUTION SUBCUTANEOUS WEEKLY
Qty: 4 ADJUSTABLE DOSE PRE-FILLED PEN SYRINGE | Refills: 2 | Status: SHIPPED | OUTPATIENT
Start: 2023-07-28

## 2023-07-28 RX ORDER — ONDANSETRON 4 MG/1
4 TABLET, ORALLY DISINTEGRATING ORAL 3 TIMES DAILY PRN
Qty: 21 TABLET | Refills: 1 | Status: SHIPPED | OUTPATIENT
Start: 2023-07-28

## 2023-07-28 ASSESSMENT — ENCOUNTER SYMPTOMS
ABDOMINAL PAIN: 0
WHEEZING: 0
VOMITING: 0
ABDOMINAL DISTENTION: 0
NAUSEA: 1
COLOR CHANGE: 0
DIARRHEA: 0
SHORTNESS OF BREATH: 0
CHEST TIGHTNESS: 0
RECTAL PAIN: 0
BLOOD IN STOOL: 0
TROUBLE SWALLOWING: 0
BACK PAIN: 0
SORE THROAT: 0
ANAL BLEEDING: 0
COUGH: 0
EYE REDNESS: 0
EYE PAIN: 0
SINUS PAIN: 0
EYE DISCHARGE: 0
SINUS PRESSURE: 0
CONSTIPATION: 0

## 2023-07-28 NOTE — PROGRESS NOTES
appetite change and fever. Negative for activity change, chills and fatigue. HENT:  Negative for drooling, ear discharge, ear pain, hearing loss, mouth sores, sinus pressure, sinus pain, sore throat and trouble swallowing. Eyes:  Negative for pain, discharge, redness and visual disturbance. Respiratory:  Negative for cough, chest tightness, shortness of breath and wheezing. Cardiovascular:  Negative for chest pain, palpitations and leg swelling. Gastrointestinal:  Positive for nausea. Negative for abdominal distention, abdominal pain, anal bleeding, blood in stool, constipation, diarrhea, rectal pain and vomiting. Endocrine: Positive for polydipsia and polyuria. Negative for polyphagia. Genitourinary:  Negative for decreased urine volume, difficulty urinating, dysuria, flank pain, frequency, hematuria and urgency. Musculoskeletal:  Negative for back pain and myalgias. Skin:  Negative for color change, pallor and rash. Neurological:  Positive for headaches. Negative for dizziness, syncope, weakness, light-headedness and numbness. Psychiatric/Behavioral:  Negative for confusion, dysphoric mood and sleep disturbance. The patient is not nervous/anxious. Objective   Physical Exam  Vitals and nursing note reviewed. Constitutional:       General: She is not in acute distress. Appearance: Normal appearance. She is morbidly obese. She is not ill-appearing or diaphoretic. HENT:      Head: Normocephalic and atraumatic. Right Ear: Tympanic membrane, ear canal and external ear normal. There is no impacted cerumen. Left Ear: Tympanic membrane, ear canal and external ear normal. There is no impacted cerumen. Nose: Nose normal. No congestion or rhinorrhea. Mouth/Throat:      Mouth: Mucous membranes are moist.      Pharynx: Oropharynx is clear. Eyes:      General: No scleral icterus. Extraocular Movements: Extraocular movements intact.       Conjunctiva/sclera:

## 2023-07-28 NOTE — TELEPHONE ENCOUNTER
Let pt know mounjaro was denied -because she is not a diabetic  and the weight loss is not an indicated use

## 2023-08-01 ENCOUNTER — TELEPHONE (OUTPATIENT)
Dept: ADMINISTRATIVE | Age: 63
End: 2023-08-01

## 2023-08-01 NOTE — TELEPHONE ENCOUNTER
Submitted PA for MACHELLE LOPES  Via [facility] (Key: [id number] STATUS: PENDING. Follow up done daily; if no response in three days we will refax for status check. If another three days goes by with no response we will call the insurance for status.

## 2023-08-02 NOTE — TELEPHONE ENCOUNTER
DENIED. LETTER ATTACHED. If this requires a response please respond to the pool. [facility] [place]). Please advise patient thank you.

## 2023-08-02 NOTE — TELEPHONE ENCOUNTER
CALLED AND SPOKE TO PATIENT AND ADVISED ON Abraham Hopper. SHE SAID THAT HER FRIEND AND HER ARE ON THE SAME INSURANCE AND SHE WAS GETTING A DISCOUNT CARD FOR THIS SHE THOUGHT. I ADVISED PATIENT THAT SHE COULD GO ONLINE TO [employer] AND TRY TO GET A [employer]INGS CARD AND TAKE IT TO PHARMACY TO SEE IF SHE CAN STILL PICK THIS UP. SHE WILL CALL BACK IF THIS DOES NOT WORK.  SC

## 2023-08-03 NOTE — TELEPHONE ENCOUNTER
We can do Adipex, a controlled medication/stimulant, which requires monthly follow-ups.     --Álvaro Broderick, APRN - CNP

## 2023-08-03 NOTE — TELEPHONE ENCOUNTER
She was unable to get a coupon for the medication. What can we do now? Please give her a call back.      Tisha [address] - phone no. [phone]

## 2023-08-03 NOTE — TELEPHONE ENCOUNTER
CALLED AND SPOKE TO PATIENT AND ADVISED ON ROBBIEKE NOTE. PATIENT SPENT TIME ON THE PHONE TRYING TO FIGURE OUT WHAT TO DO. SHE SAID SHE DID ADIPEX IN THE PAST AND IT DID NOT WORK SO WELL. SHE SAID THAT SHE FOUND A COUPON AND WANTED TO TRY OZEMPIC. I DID WARN HER THAT ALL THESE MEDICATIONS SHE MAY HAVE A HARD TIME BECAUSE SHE IS NOT DIABETIC AND THESE INJECTABLES ARE NOT COVERED UNLESS PATIENT HAS THAT DIAGNOSIS. PATIENT STILL WANTS TO TRY THE OZEMPIC TO SEE IF HER COUPON WORKS, SHE SAID IF NOT, SHE WILL THEN MAYBE TRY THE ADIPEX AGAIN.  SC

## 2023-08-04 DIAGNOSIS — E66.01 CLASS 3 SEVERE OBESITY DUE TO EXCESS CALORIES WITH SERIOUS COMORBIDITY AND BODY MASS INDEX (BMI) OF 50.0 TO 59.9 IN ADULT (HCC): ICD-10-CM

## 2023-08-04 DIAGNOSIS — R76.8 POSITIVE HEPATITIS C ANTIBODY TEST: Primary | ICD-10-CM

## 2023-08-04 RX ORDER — SEMAGLUTIDE 1.34 MG/ML
1 INJECTION, SOLUTION SUBCUTANEOUS WEEKLY
Qty: 3 ML | Refills: 2 | Status: SHIPPED | OUTPATIENT
Start: 2023-08-04

## 2023-08-04 NOTE — TELEPHONE ENCOUNTER
Ozempic sent. Non medication weight loss habits: Drink at least 64 oz of water a day with goal of 80 oz, sleep more than 6 hours but less than 10 hours nightly, exercise at least 150 minutes weekly, and limit diet to approximately 2,000-calories a day. In the diet, limit fatty processed food, increase healthy fats/HDLs (such as cod, salmon, tuna, walnuts or fish oil supplements to boost HDL levels (good cholesterol)), ensure enough fiber via 5 servings of fruits and vegetables daily, and limit toxins (alcohol, smoking, etc).      --Esha Stockton, APRN - CNP

## 2023-08-06 ENCOUNTER — HOSPITAL ENCOUNTER (EMERGENCY)
Age: 63
Discharge: HOME | End: 2023-08-06
Payer: COMMERCIAL

## 2023-08-06 VITALS
WEIGHT: 279 LBS | RESPIRATION RATE: 16 BRPM | OXYGEN SATURATION: 98 % | TEMPERATURE: 97.7 F | BODY MASS INDEX: 52.67 KG/M2 | HEART RATE: 68 BPM | DIASTOLIC BLOOD PRESSURE: 89 MMHG | SYSTOLIC BLOOD PRESSURE: 141 MMHG | HEIGHT: 61 IN

## 2023-08-06 DIAGNOSIS — M46.1 SACROILIITIS (HCC): Primary | ICD-10-CM

## 2023-08-06 PROCEDURE — 6370000000 HC RX 637 (ALT 250 FOR IP)

## 2023-08-06 PROCEDURE — 99284 EMERGENCY DEPT VISIT MOD MDM: CPT

## 2023-08-06 PROCEDURE — 96372 THER/PROPH/DIAG INJ SC/IM: CPT

## 2023-08-06 PROCEDURE — 6360000002 HC RX W HCPCS

## 2023-08-06 RX ORDER — LIDOCAINE 4 G/G
1 PATCH TOPICAL DAILY
Status: DISCONTINUED | OUTPATIENT
Start: 2023-08-06 | End: 2023-08-06 | Stop reason: HOSPADM

## 2023-08-06 RX ORDER — LIDOCAINE 50 MG/G
1 PATCH TOPICAL DAILY
Qty: 30 PATCH | Refills: 0 | Status: SHIPPED | OUTPATIENT
Start: 2023-08-06

## 2023-08-06 RX ORDER — KETOROLAC TROMETHAMINE 30 MG/ML
15 INJECTION, SOLUTION INTRAMUSCULAR; INTRAVENOUS ONCE
Status: COMPLETED | OUTPATIENT
Start: 2023-08-06 | End: 2023-08-06

## 2023-08-06 RX ORDER — METHOCARBAMOL 500 MG/1
750 TABLET, FILM COATED ORAL ONCE
Status: COMPLETED | OUTPATIENT
Start: 2023-08-06 | End: 2023-08-06

## 2023-08-06 RX ORDER — METHYLPREDNISOLONE 4 MG/1
TABLET ORAL
Qty: 1 KIT | Refills: 0 | Status: SHIPPED | OUTPATIENT
Start: 2023-08-06 | End: 2023-08-12

## 2023-08-06 RX ORDER — METHOCARBAMOL 750 MG/1
750 TABLET, FILM COATED ORAL 4 TIMES DAILY
Qty: 56 TABLET | Refills: 0 | Status: SHIPPED | OUTPATIENT
Start: 2023-08-06 | End: 2023-08-20

## 2023-08-06 RX ORDER — ACETAMINOPHEN 500 MG
1000 TABLET ORAL
Status: COMPLETED | OUTPATIENT
Start: 2023-08-06 | End: 2023-08-06

## 2023-08-06 RX ADMIN — KETOROLAC TROMETHAMINE 15 MG: 30 INJECTION, SOLUTION INTRAMUSCULAR; INTRAVENOUS at 09:45

## 2023-08-06 RX ADMIN — ACETAMINOPHEN 1000 MG: 500 TABLET ORAL at 09:42

## 2023-08-06 RX ADMIN — METHOCARBAMOL 750 MG: 500 TABLET ORAL at 09:42

## 2023-08-06 ASSESSMENT — ENCOUNTER SYMPTOMS
ABDOMINAL PAIN: 0
DIARRHEA: 0
COUGH: 0
EYE PAIN: 0
EYE ITCHING: 0
NAUSEA: 0
WHEEZING: 0
CONSTIPATION: 0
SORE THROAT: 0
SHORTNESS OF BREATH: 0
EYE DISCHARGE: 0
RHINORRHEA: 0
BACK PAIN: 0

## 2023-08-06 ASSESSMENT — PAIN DESCRIPTION - LOCATION
LOCATION: BACK
LOCATION: BACK

## 2023-08-06 ASSESSMENT — PAIN SCALES - GENERAL
PAINLEVEL_OUTOF10: 9
PAINLEVEL_OUTOF10: 9

## 2023-08-06 ASSESSMENT — PAIN DESCRIPTION - DESCRIPTORS
DESCRIPTORS: ACHING
DESCRIPTORS: ACHING

## 2023-08-06 ASSESSMENT — PAIN DESCRIPTION - ORIENTATION
ORIENTATION: LOWER
ORIENTATION: LOWER

## 2023-08-06 ASSESSMENT — PAIN - FUNCTIONAL ASSESSMENT: PAIN_FUNCTIONAL_ASSESSMENT: 0-10

## 2023-08-06 NOTE — TELEPHONE ENCOUNTER
PT WAS TESTED FOR COVID ON 1/13 SHE ALSO DID AN APPT WITH SETH AND SHE IS STILL WANTING ANSWERS AS TO WHY SHE FEELS WHY THE WAY SHE DOES. SHE IS STILL VERY TIRED MOST OF HER FLU SYMPTOMS ARE GONE. SHE STATED  WAS GOING TO CALL HER ONCE COVID RESULTS WERE BACK.  Rivas Bajwa [___] : [unfilled] [LVEF ___%] : LVEF [unfilled]% [de-identified] : 3/11/22 normal/rhythm anterior vesicular block [de-identified] : 5/7/19 calcium score 2526 which is severely elevated

## 2023-08-06 NOTE — DISCHARGE INSTRUCTIONS
Nathaniel Ring, you were seen in the emergency department for pain in your lower back. Based on the location and your symptoms I suspect that you have inflammation of your sacroiliac joint. I have attached some exercises that you should do at home. For treatment of your symptoms, I recommend continuing taking ibuprofen 800 mg 3 times a day. However I would recommend only doing this on a schedule for 2 weeks maximum as it can irritate your stomach. Also recommend taking with food. I also want you to take 1000 mg of Tylenol 3 times a day, on a schedule. I have given you prescriptions for topical remedies as well including numbing patches and topical anti-inflammatories. Lastly, I have given you prescription for a Medrol Dosepak, which is a steroid to decrease inflammation in the joint. Please follow-up with your PCP. If you do not have improvement of the pain you may benefit from physical therapy. If you experience any weakness of your legs, numbness in your private area, loss of control of your bowel or bladder, development of fever, inability to walk, I would like you to return to the emergency department for reevaluation.

## 2023-08-06 NOTE — ED PROVIDER NOTES
[facility] ENCOUNTER          PHYSICIAN ASSISTANT NOTE       Date of evaluation: 8/6/2023    Chief Complaint     Back Pain (About two weeks ago started with pain in lower back/tailbone region, on Thursday started to get much worse and not with limited mobility, -injury)      History of Present Illness     Dejuan Ha is a 59 y.o. female with a history of chronic gastritis, GERD, HTN, YADIRA, hepatitis C who presents to the emergency department with chief complaint of low back pain. The patient actually states that it feels like it is in her tailbone region, more on the right side as well. The patient states that the pain started 2 weeks ago. She stated that it was mild and controlled with ibuprofen initially but continued to get worse. She states that she works from home and sits for most of the day. She states that when she tries to get up is when she has worsening of the pain in her lower back. She does state it is constant and feels like a jabbing sensation. It is to the point where she has been holding onto something in order to get up due to the pain. She denies any specific inciting injury or event. She does not have any pain radiating into her legs. Denies any numbness, saddle anesthesia, bowel or bladder incontinence/retention, fevers or chills, history of IVDU or malignancy. She has never had pain like this before. She is still able to walk. She is not diabetic    Review of Systems     Review of Systems   Constitutional:  Negative for chills, fatigue and fever. HENT:  Negative for congestion, rhinorrhea and sore throat. Eyes:  Negative for pain, discharge and itching. Respiratory:  Negative for cough, shortness of breath and wheezing. Cardiovascular:  Negative for chest pain, palpitations and leg swelling. Gastrointestinal:  Negative for abdominal pain, constipation, diarrhea and nausea. Genitourinary:  Negative for dysuria, hematuria and urgency.

## 2023-08-08 ENCOUNTER — TELEPHONE (OUTPATIENT)
Dept: ADMINISTRATIVE | Age: 63
End: 2023-08-08

## 2023-08-08 NOTE — TELEPHONE ENCOUNTER
Submitted PA for [facility]  Via [facility] Key: [id number] STATUS: PENDING. Follow up done daily; if no response in three days we will refax for status check. If another three days goes by with no response we will call the insurance for status.

## 2023-08-10 NOTE — TELEPHONE ENCOUNTER
DENIED. LETTER ATTACHED    If this requires a response please respond to the pool. [facility] [place]). Please advise patient thank you.

## 2023-08-14 ENCOUNTER — TELEPHONE (OUTPATIENT)
Dept: FAMILY MEDICINE CLINIC | Age: 63
End: 2023-08-14

## 2023-08-14 NOTE — TELEPHONE ENCOUNTER
Jesus Ledezma  Patient Appointment Cancel Request Pool 47 minutes ago (9:24 AM)       Appointment canceled for Jesus Ledezma ([phone])  Visit Type: ED FOLLOW UP  Date        Time      Length    Provider                  Department  10/2/2023   11:00 AM  20 mins.   0                         [facility]     Reason for Cancellation: Other     Patient Comments: I selected the wrong date I need to get in as soon as possible

## 2023-08-14 NOTE — TELEPHONE ENCOUNTER
----- Message from Jace Wallace sent at 8/14/2023  9:58 AM EDT -----  Subject: Appointment Request    Reason for Call: Established Patient Appointment needed: Routine ED Follow   Up Visit    QUESTIONS    Reason for appointment request? No appointments available during search     Additional Information for Provider? Pt was in Ed on August 6,2023 for   back pain. And needs a Ed follow up appt. Pt is still having the back   pain.  Please contact her for appt.  ---------------------------------------------------------------------------  --------------  Nichelle YO  [phone]; OK to leave message on voicemail  ---------------------------------------------------------------------------  --------------  SCRIPT ANSWERS

## 2023-08-16 ENCOUNTER — TELEPHONE (OUTPATIENT)
Dept: ORTHOPEDIC SURGERY | Age: 63
End: 2023-08-16

## 2023-08-16 NOTE — TELEPHONE ENCOUNTER
Submitted PA for Dorothy 1MG/0.5ML auto-injectors  Via [facility] Key: [id number] STATUS: PENDING. Follow up done daily; if no response in three days we will refax for status check. If another three days goes by with no response we will call the insurance for status.

## 2023-08-17 NOTE — TELEPHONE ENCOUNTER
Received APPROVAL for Wegovy 1MG/0.5ML auto-injectors from 08/16/23 to 03/13/24; approval letter attached. If this requires a response please respond to the pool ( [phone]). Thank you please advise patient.

## 2023-08-25 ENCOUNTER — OFFICE VISIT (OUTPATIENT)
Dept: FAMILY MEDICINE CLINIC | Age: 63
End: 2023-08-25
Payer: COMMERCIAL

## 2023-08-25 VITALS
HEART RATE: 66 BPM | BODY MASS INDEX: 50.6 KG/M2 | SYSTOLIC BLOOD PRESSURE: 134 MMHG | DIASTOLIC BLOOD PRESSURE: 86 MMHG | WEIGHT: 268 LBS | HEIGHT: 61 IN | OXYGEN SATURATION: 97 %

## 2023-08-25 DIAGNOSIS — R15.9 INCONTINENCE OF FECES, UNSPECIFIED FECAL INCONTINENCE TYPE: ICD-10-CM

## 2023-08-25 DIAGNOSIS — M54.40 BILATERAL LOW BACK PAIN WITH SCIATICA, SCIATICA LATERALITY UNSPECIFIED, UNSPECIFIED CHRONICITY: Primary | ICD-10-CM

## 2023-08-25 PROCEDURE — 3079F DIAST BP 80-89 MM HG: CPT | Performed by: NURSE PRACTITIONER

## 2023-08-25 PROCEDURE — 3075F SYST BP GE 130 - 139MM HG: CPT | Performed by: NURSE PRACTITIONER

## 2023-08-25 PROCEDURE — 99214 OFFICE O/P EST MOD 30 MIN: CPT | Performed by: NURSE PRACTITIONER

## 2023-08-25 RX ORDER — METHOCARBAMOL 750 MG/1
750 TABLET, FILM COATED ORAL 4 TIMES DAILY
Qty: 120 TABLET | Refills: 0 | Status: SHIPPED | OUTPATIENT
Start: 2023-08-25 | End: 2023-09-24

## 2023-08-25 ASSESSMENT — ENCOUNTER SYMPTOMS
BOWEL INCONTINENCE: 1
ABDOMINAL PAIN: 1
BACK PAIN: 1
COUGH: 0

## 2023-08-25 NOTE — PROGRESS NOTES
Armand Ross (:  1960) is a 59 y.o. female,Established patient, here for evaluation of the following chief complaint(s):  Follow-Up from Hospital ([address] )       ASSESSMENT/PLAN:  1. Bilateral low back pain with sciatica, sciatica laterality unspecified, unspecified chronicity  -     MRI LUMBAR SPINE WO CONTRAST; Future  -     methocarbamol (ROBAXIN-750) 750 MG tablet; Take 1 tablet by mouth 4 times daily, Disp-120 tablet, R-0Normal  -     [facility]  2. Incontinence of feces, unspecified fecal incontinence type  -     MRI LUMBAR SPINE WO CONTRAST; Future    No medication changes. Ordered MRI and provided more robaxin, especially at bedtime. Return if symptoms worsen or fail to improve. Subjective   SUBJECTIVE/OBJECTIVE:  2023 seen in ER for back pain, provided medications  Pain has not resolved  Steroid helped and once completed, the pain returned  She was having to brace in front of her to get up, now just needs arm rests to help up  She is having sciatica every couple of days  No imaging of her back  No injury, came on all of a sudden  Back injury in , upper back injury, not lower back    Back Pain  This is a recurrent problem. The current episode started more than 1 month ago. The problem occurs intermittently. The problem has been waxing and waning since onset. Associated symptoms include abdominal pain (today), bowel incontinence (off and on, 3 times since ), headaches and weakness. Pertinent negatives include no bladder incontinence, dysuria, fever, numbness, paresthesias or tingling. Risk factors include obesity, menopause and sedentary lifestyle. Review of Systems   Constitutional:  Positive for activity change. Negative for appetite change, chills, diaphoresis, fatigue and fever. Respiratory:  Negative for cough.     Gastrointestinal:  Positive for abdominal pain

## 2023-08-25 NOTE — PATIENT INSTRUCTIONS
You may receive a survey regarding the care you received during your visit. Your input is valuable to us. We encourage you to complete and return your survey. We hope you will choose us in the future for your healthcare needs. GENERAL OFFICE POLICIES      Telephone Calls: Messages will be answered within 1-2 business days, unless the provider is out of the office. If it is urgent a covering provider will answer. (this does not include Medication refills). MyChart: We recommend all patients sign up for [employer]. Through this portal you can see your lab results, request refills, schedule appointments, pay your bill and send messages to the office. [employer] messages will be answered within 1-2 business days unless the provider is out of the office. For urgent matters, please call the office. Appointments:  All appointments must be scheduled. We ask all patients to schedule their next follow up appointment before they leave the office to make sure you will be able to be seen before you run out of medications. 24 hours notice is required to cancel or reschedule an appointment to avoid being marked as a no show. You may be dismissed from the practice after 3 no shows. LATE for Appointment: If you are 15 or more minutes late for your appointment, you may be asked to reschedule. MA/LAB APPTS: Must be scheduled, cannot accept walk in lab visits. We only draw labs for patients established in our office. We only do injections for medications ordered by our office. Acute Sick Visits:  Nothing other than acute complaint will be addressed at this visit. TRADITIONAL MEDICARE  DOES NOT COVER PHYSICALS  MEDICARE WELLNESS VISITS: These are NOT physicals but the free annual visit offered by Medicare to discuss wellness issues. Medication refills, checkups, etc. will not be addressed during this visit.   Medication Refills: Refills are handled electronically so please contact your pharmacy for medication

## 2023-08-30 ENCOUNTER — PATIENT MESSAGE (OUTPATIENT)
Dept: FAMILY MEDICINE CLINIC | Age: 63
End: 2023-08-30

## 2023-08-30 NOTE — TELEPHONE ENCOUNTER
From: Jesus Ledezma  To: Liz Toney  Sent: 8/30/2023 7:44 AM EDT  Subject: Covid question     Good Morning,   My Son has Covid he came home sick from [place] I had been house setting for him however we were in the same room for about a half hour he later yesterday got tested and tested positive should I do anything proactive

## 2023-09-02 ENCOUNTER — HOSPITAL ENCOUNTER (OUTPATIENT)
Dept: MRI IMAGING | Age: 63
Discharge: HOME | End: 2023-09-02
Payer: COMMERCIAL

## 2023-09-02 DIAGNOSIS — R15.9 INCONTINENCE OF FECES, UNSPECIFIED FECAL INCONTINENCE TYPE: ICD-10-CM

## 2023-09-02 DIAGNOSIS — M54.40 BILATERAL LOW BACK PAIN WITH SCIATICA, SCIATICA LATERALITY UNSPECIFIED, UNSPECIFIED CHRONICITY: ICD-10-CM

## 2023-09-02 PROCEDURE — 72148 MRI LUMBAR SPINE W/O DYE: CPT

## 2023-09-06 ENCOUNTER — TELEPHONE (OUTPATIENT)
Dept: FAMILY MEDICINE CLINIC | Age: 63
End: 2023-09-06

## 2023-09-06 DIAGNOSIS — M54.40 BILATERAL LOW BACK PAIN WITH SCIATICA, SCIATICA LATERALITY UNSPECIFIED, UNSPECIFIED CHRONICITY: Primary | ICD-10-CM

## 2023-09-06 NOTE — TELEPHONE ENCOUNTER
Pt can see her results of her MRI. She wants to know if you have reviewed this and please call her with what it means.

## 2023-09-07 ENCOUNTER — HOSPITAL ENCOUNTER (OUTPATIENT)
Dept: PHYSICAL THERAPY | Age: 63
Setting detail: THERAPIES SERIES
Discharge: HOME | End: 2023-09-07
Payer: COMMERCIAL

## 2023-09-07 PROCEDURE — 97110 THERAPEUTIC EXERCISES: CPT

## 2023-09-07 PROCEDURE — 97161 PT EVAL LOW COMPLEX 20 MIN: CPT

## 2023-09-07 PROCEDURE — 97112 NEUROMUSCULAR REEDUCATION: CPT

## 2023-09-07 NOTE — FLOWSHEET NOTE
improvement in movement, function, and ADLs as indicated by Functional Deficits. [] Progressing: [] Met: [] Not Met: [] Adjusted     Long Term Goals: To be achieved in: 6-8 weeks  1. Disability index score of 10-15% improvement on LEATHA to assist with reaching prior level of function. [] Progressing: [] Met: [] Not Met: [] Adjusted  2. Patient will demonstrate increased AROM to WNL, good LS mobility, good hip ROM to allow for proper joint functioning as indicated by patients Functional Deficits. [] Progressing: [] Met: [] Not Met: [] Adjusted  3. Patient will demonstrate an increase in Strength to good proximal hip and core activation to allow for proper functional mobility as indicated by patients Functional Deficits. [] Progressing: [] Met: [] Not Met: [] Adjusted  4. Patient will return to functional activities without increased symptoms or restriction. [] Progressing: [] Met: [] Not Met: [] Adjusted  5. Patient to report a 40-50% overall improvement. [] Progressing: [] Met: [] Not Met: [] Adjusted             Overall Progression Towards Functional goals/ Treatment Progress Update:  [] Patient is progressing as expected towards functional goals listed. [] Progression is slowed due to complexities/Impairments listed. [] Progression has been slowed due to co-morbidities. [x] Plan just implemented, too soon to assess goals progression <30days   [] Goals require adjustment due to lack of progress  [] Patient is not progressing as expected and requires additional follow up with physician  [] Other    Prognosis for POC: [x] Good [] Fair  [] Poor      Patient requires continued skilled intervention: [x] Yes  [] No    Treatment/Activity Tolerance:  [] Patient able to complete treatment  [] Patient limited by fatigue  [] Patient limited by pain     [] Patient limited by other medical complications  [] Other: Plan to hold PT until patient sees neurologist, then begin follow-up tx.     Patient

## 2023-09-07 NOTE — PLAN OF CARE
lumbar lordosis     Bandages/Dressings/Incisions: N/A     Gait: (include devices/WB status): tentative gait secondary to LBP with decreased trunk motion                          [x] Patient history, allergies, meds reviewed. Medical chart reviewed. See intake form. Review Of Systems (ROS):  [x]Performed Review of systems (Integumentary, CardioPulmonary, Neurological) by intake and observation. Intake form has been scanned into medical record. Patient has been instructed to contact their primary care physician regarding ROS issues if not already being addressed at this time.        Co-morbidities/Complexities (which will affect course of rehabilitation):   []None              Arthritic conditions   []Rheumatoid arthritis (M05.9)  []Osteoarthritis (M19.91)    Cardiovascular conditions   [x]Hypertension (I10)  []Hyperlipidemia (E78.5)  []Angina pectoris (I20)  []Atherosclerosis (I70)    Musculoskeletal conditions   []Disc pathology   []Congenital spine pathologies   []Prior surgical intervention  []Osteoporosis (M81.8)  []Osteopenia (M85.8)   Endocrine conditions   []Hypothyroid (E03.9)  []Hyperthyroid Gastrointestinal conditions   []Constipation (C84.43)    Metabolic conditions   []Morbid obesity (E66.01)  []Diabetes type 1(E10.65) or 2 (E11.65)   []Neuropathy (G60.9)      Pulmonary conditions   []Asthma (J45)  []Coughing   []COPD (J44.9)    Psychological Disorders  []Anxiety (F41.9)  []Depression (F32.9)   []Other:    [x]Other:   bowel and bladder changes and LOB         Barriers to/and or personal factors that will affect rehab potential:              []Age  []Sex              []Motivation/Lack of Motivation                        []Co-Morbidities              []Cognitive Function, education/learning barriers              []Environmental, home barriers              []profession/work barriers  []past PT/medical experience  []other:  Justification:      Falls Risk Assessment (30 days):   [x] Falls Risk assessed

## 2023-09-08 ENCOUNTER — TELEPHONE (OUTPATIENT)
Dept: FAMILY MEDICINE CLINIC | Age: 63
End: 2023-09-08

## 2023-09-08 NOTE — TELEPHONE ENCOUNTER
Patient has left several messages for Dr. Aishwarya Sierra, but no one is returning her calls. What can we do for her? Please give her a call back. This is concerning her lower back.

## 2023-09-18 ENCOUNTER — TELEPHONE (OUTPATIENT)
Dept: FAMILY MEDICINE CLINIC | Age: 63
End: 2023-09-18

## 2023-09-20 ENCOUNTER — OFFICE VISIT (OUTPATIENT)
Dept: FAMILY MEDICINE CLINIC | Age: 63
End: 2023-09-20
Payer: COMMERCIAL

## 2023-09-20 VITALS
OXYGEN SATURATION: 100 % | HEIGHT: 61 IN | DIASTOLIC BLOOD PRESSURE: 84 MMHG | BODY MASS INDEX: 50.41 KG/M2 | RESPIRATION RATE: 16 BRPM | WEIGHT: 267 LBS | SYSTOLIC BLOOD PRESSURE: 122 MMHG | HEART RATE: 60 BPM

## 2023-09-20 DIAGNOSIS — Z23 NEED FOR VACCINATION: ICD-10-CM

## 2023-09-20 DIAGNOSIS — M54.41 CHRONIC BILATERAL LOW BACK PAIN WITH BILATERAL SCIATICA: Primary | ICD-10-CM

## 2023-09-20 DIAGNOSIS — M54.42 CHRONIC BILATERAL LOW BACK PAIN WITH BILATERAL SCIATICA: Primary | ICD-10-CM

## 2023-09-20 DIAGNOSIS — D17.79 EPIDURAL LIPOMATOSIS: ICD-10-CM

## 2023-09-20 DIAGNOSIS — G89.29 CHRONIC BILATERAL LOW BACK PAIN WITH BILATERAL SCIATICA: Primary | ICD-10-CM

## 2023-09-20 PROCEDURE — 90674 CCIIV4 VAC NO PRSV 0.5 ML IM: CPT | Performed by: NURSE PRACTITIONER

## 2023-09-20 PROCEDURE — 99214 OFFICE O/P EST MOD 30 MIN: CPT | Performed by: NURSE PRACTITIONER

## 2023-09-20 PROCEDURE — 3074F SYST BP LT 130 MM HG: CPT | Performed by: NURSE PRACTITIONER

## 2023-09-20 PROCEDURE — 3079F DIAST BP 80-89 MM HG: CPT | Performed by: NURSE PRACTITIONER

## 2023-09-20 PROCEDURE — 90472 IMMUNIZATION ADMIN EACH ADD: CPT | Performed by: NURSE PRACTITIONER

## 2023-09-20 PROCEDURE — 90471 IMMUNIZATION ADMIN: CPT | Performed by: NURSE PRACTITIONER

## 2023-09-20 PROCEDURE — 90715 TDAP VACCINE 7 YRS/> IM: CPT | Performed by: NURSE PRACTITIONER

## 2023-09-20 ASSESSMENT — ENCOUNTER SYMPTOMS
EYE REDNESS: 0
VOMITING: 0
SINUS PRESSURE: 0
DIARRHEA: 0
ABDOMINAL PAIN: 0
NAUSEA: 0
SINUS PAIN: 0
COUGH: 0
ABDOMINAL DISTENTION: 0
SHORTNESS OF BREATH: 0
EYE DISCHARGE: 0
SORE THROAT: 0
BACK PAIN: 1
WHEEZING: 0
EYE PAIN: 0

## 2023-09-20 NOTE — PATIENT INSTRUCTIONS

## 2023-09-20 NOTE — PROGRESS NOTES
Shadi Borja (:  1960) is a 59 y.o. female,Established patient, here for evaluation of the following chief complaint(s):  Back Pain (FMLA PPW for back pain)      ASSESSMENT/PLAN:  1. Chronic bilateral low back pain with bilateral sciatica  -     AFL - Dinesh Rivera MD, Neurosurgery (Spine, Brain Tumor), [facility]  2. Epidural lipomatosis  -     JAVAN Rivera MD, Neurosurgery (Spine, Brain Tumor), [facility]  3. Need for vaccination  -     Influenza, FLUCELVAX, (age 10 mo+), IM, PF, 0.5 mL  -     Tdap, BOOSTRIX, (age 8 yrs+), IM    -The patient is having significant uncontrolled back pain at this time. Visit visibly uncomfortable in office today. She did not have a good experience with [facility] neurosurgery. Discussed options. Will refer to [address] [place] [address] for further evaluation management. Already had MRI completed which showed epidural lipomatosis and narrowing of the cauda equina at L5-S1. This was reviewed by myself and discussed with the patient.  -The patient is appropriate for continuous FMLA until seen by neurosurgery. We will allow for leave for an additional month in order to get a second opinion. Will need to follow-up in a month if Aleve needs to be continued. Also due for annual physical with fasting labs. Based upon evaluation of neurosurgery, may benefit from pain management for [facility] which have been recommended by [facility] neurosurgery.  -Tdap and influenza vaccines completed today. Return in about 1 month (around 10/20/2023) for Annual Physical/Fasting Labs. SUBJECTIVE/OBJECTIVE:  EUSEBIO  Gordon Ortiz presents today for follow-up of lower back pain with sciatica. She is inquiring about FMLA. Since last being seen, she did see neurosurgery. Had a bad experience with them. Felt very dismissed. Did not feel that a plan developed and that she was properly evaluated. She is inquiring about seeing another neurosurgeon for a second opinion.   MRI on

## 2023-10-10 ENCOUNTER — HOSPITAL ENCOUNTER (OUTPATIENT)
Dept: PHYSICAL THERAPY | Age: 63
Setting detail: THERAPIES SERIES
Discharge: HOME | End: 2023-10-10
Payer: COMMERCIAL

## 2023-10-10 PROCEDURE — 97140 MANUAL THERAPY 1/> REGIONS: CPT

## 2023-10-10 PROCEDURE — 97110 THERAPEUTIC EXERCISES: CPT

## 2023-10-10 PROCEDURE — 97112 NEUROMUSCULAR REEDUCATION: CPT

## 2023-10-10 NOTE — PLAN OF CARE
[facility] and [address] [place] [address] [address],  [facility], [address] 201 [facility]  200 [facility], [address] [place] [address] Avenue  Phone: [phone]  Fax: [phone]  Physical Therapy Re-Certification Plan of Care    Dear MARINA Perry*  ,    We had the pleasure of treating the following patient for physical therapy services at [facility]. A summary of our findings can be found in the updated assessment below. This includes our plan of care. If you have any questions or concerns regarding these findings, please do not hesitate to contact me at the office phone number checked above. Thank you for the referral.     Physician Signature:________________________________Date:__________________  By signing above (or electronic signature), therapist's plan is approved by physician      Functional Outcome: LEATHA = 66%  Pushpa Hogan 1960 continues to present with functional deficits in ROM, joint mobility, strength symmetry, flexibility, endurance of strength, and muscle activation  limiting ability with walking on even ground, transitions between positions, lifting items, ADLs, and light home activity . During therapy this date, patient required verbal cueing, muscle facilitation, progression of exercises and program, and manual interventions for exercise progression, improving proper muscle recruitment and activation/motor control patterns, modulating pain, increasing ROM, and improving postural awareness. Patient will continue to benefit from ongoing evaluation and advanced clinical decision from a Physical Therapist to improve pain control, ROM, muscle strength, neuromuscular control, balance and proprioception, functional mobility, ADL status, and fine motor control to safely return to PLOF, ADLs/IADLs, and work/work related tasks without symptoms or restrictions.     Overall Response to

## 2023-10-18 ENCOUNTER — HOSPITAL ENCOUNTER (OUTPATIENT)
Dept: PHYSICAL THERAPY | Age: 63
Setting detail: THERAPIES SERIES
End: 2023-10-18
Payer: COMMERCIAL

## 2023-10-18 ENCOUNTER — HOSPITAL ENCOUNTER (OUTPATIENT)
Dept: MRI IMAGING | Age: 63
Discharge: HOME | End: 2023-10-18
Payer: COMMERCIAL

## 2023-10-18 DIAGNOSIS — R32 URINARY INCONTINENCE, UNSPECIFIED TYPE: ICD-10-CM

## 2023-10-18 PROCEDURE — 72146 MRI CHEST SPINE W/O DYE: CPT

## 2023-10-24 ENCOUNTER — HOSPITAL ENCOUNTER (OUTPATIENT)
Dept: PHYSICAL THERAPY | Age: 63
Setting detail: THERAPIES SERIES
Discharge: HOME | End: 2023-10-24
Payer: COMMERCIAL

## 2023-10-24 PROCEDURE — 97140 MANUAL THERAPY 1/> REGIONS: CPT

## 2023-10-24 PROCEDURE — 97110 THERAPEUTIC EXERCISES: CPT

## 2023-10-24 PROCEDURE — 97112 NEUROMUSCULAR REEDUCATION: CPT

## 2023-10-24 NOTE — FLOWSHEET NOTE
[facility] and 400 [facility] [address],  [facility], [address]  200 [facility], [address] [place] [address] Avenue  Phone: [phone]  Fax: [phone]    Physical Therapy Daily Treatment Note  Date:  10/24/2023    Patient Name:  Jamari Do    :  1960  MRN: [id number]  Restrictions/Precautions:    Medical/Treatment Diagnosis Information:  Bilateral low back pain with sciatica, sciatica laterality unspecified, unspecified chronicity [M54.40]  Treatment Diagnosis: PT TX DX: LBP M54. 50  Insurance/Certification information:  PT Insurance Information: [employer] [place] PPO: $0 copay, 80% paid by insurance, 20% billed, 60 visits  Physician Information:  MARINA Richey*    Has the plan of care been signed (Y/N):        []  Yes  [x]  No     Date of Patient follow up with Physician: seeing a neurologist      Is this a Progress Report:     []  Yes  [x]  No        If Yes:  Date Range for reporting period:  Beginnin23  Ending    Progress report will be due (10 Rx or 30 days whichever is less):        Recertification will be due (POC Duration  / 90 days whichever is less): 23         Visit # Insurance Allowable Auth Required   3 60 [x]  Yes []  No        OUTCOME MEASURE DATE SCORE   LEATHA 23 22/50=44%   LEATHA 10/10/23 33/50=66%     Latex Allergy:  [x]NO      []YES  Preferred Language for Healthcare:   [x]English       []other:      Pain level:  6/10 LB     SUBJECTIVE:  Pt states low back pain is not good today. States she had the flu for a week and also went back to work yesterday, so she is having a lot more soreness today. States she has not been able to do her HEP much due to illness.      OBJECTIVE: See eval  Observation:   Test measurements:    ROM   Comments   Trunk flexion To toes Pain    Trunk extension       Trunk R sidebend To knees Pain at L   Trunk L sidebend To knees  Pain at R   Trunk R rotation       Trunk L rotation

## 2023-10-30 ENCOUNTER — HOSPITAL ENCOUNTER (OUTPATIENT)
Dept: PHYSICAL THERAPY | Age: 63
Setting detail: THERAPIES SERIES
Discharge: HOME | End: 2023-10-30
Payer: COMMERCIAL

## 2023-10-30 PROCEDURE — 97112 NEUROMUSCULAR REEDUCATION: CPT

## 2023-10-30 PROCEDURE — 97140 MANUAL THERAPY 1/> REGIONS: CPT

## 2023-10-30 PROCEDURE — 97110 THERAPEUTIC EXERCISES: CPT

## 2023-10-30 NOTE — FLOWSHEET NOTE
able to complete treatment  [] Patient limited by fatigue  [] Patient limited by pain     [] Patient limited by other medical complications  [x] Other: Pt difficulty with lumbar flexion not improved after PT tx; pain levels remain the same. Sees neurologist next week with possible injection. Instructed patient to increased HEP to at least 1 x a day now that she is recovered from illness. Would benefit from continued skilled care to assist with pain reduction and return to PLOF. Patient education:      Prognosis: [] Good [x] Fair  [] Poor    Patient Requires Follow-up: [x] Yes  [] No    PLAN: See eval  [x] Continue per plan of care [] Alter current plan (see comments)  [] Plan of care initiated [] Hold pending MD visit [] Discharge      Electronically signed by: Any Toribio PT    *If patient does not return for further follow ups after this date. Please consider this as the patients discharge from physical therapy.

## 2023-11-03 ENCOUNTER — TELEPHONE (OUTPATIENT)
Dept: FAMILY MEDICINE CLINIC | Age: 63
End: 2023-11-03

## 2023-11-03 RX ORDER — METOPROLOL TARTRATE AND HYDROCHLOROTHIAZIDE 50; 25 MG/1; MG/1
1 TABLET ORAL DAILY
Qty: 90 TABLET | Refills: 0 | Status: SHIPPED | OUTPATIENT
Start: 2023-11-03

## 2023-11-09 ENCOUNTER — OFFICE VISIT (OUTPATIENT)
Dept: FAMILY MEDICINE CLINIC | Age: 63
End: 2023-11-09
Payer: COMMERCIAL

## 2023-11-09 VITALS
BODY MASS INDEX: 50.6 KG/M2 | SYSTOLIC BLOOD PRESSURE: 128 MMHG | HEART RATE: 61 BPM | DIASTOLIC BLOOD PRESSURE: 82 MMHG | OXYGEN SATURATION: 99 % | HEIGHT: 61 IN | WEIGHT: 268 LBS

## 2023-11-09 DIAGNOSIS — M54.41 CHRONIC BILATERAL LOW BACK PAIN WITH BILATERAL SCIATICA: ICD-10-CM

## 2023-11-09 DIAGNOSIS — R60.9 DEPENDENT EDEMA: ICD-10-CM

## 2023-11-09 DIAGNOSIS — D64.9 ANEMIA, UNSPECIFIED TYPE: ICD-10-CM

## 2023-11-09 DIAGNOSIS — Z00.00 ENCOUNTER FOR WELL ADULT EXAM WITHOUT ABNORMAL FINDINGS: Primary | ICD-10-CM

## 2023-11-09 DIAGNOSIS — I10 PRIMARY HYPERTENSION: ICD-10-CM

## 2023-11-09 DIAGNOSIS — M54.42 CHRONIC BILATERAL LOW BACK PAIN WITH BILATERAL SCIATICA: ICD-10-CM

## 2023-11-09 DIAGNOSIS — G89.29 CHRONIC BILATERAL LOW BACK PAIN WITH BILATERAL SCIATICA: ICD-10-CM

## 2023-11-09 DIAGNOSIS — R73.03 PREDIABETES: ICD-10-CM

## 2023-11-09 PROCEDURE — 36415 COLL VENOUS BLD VENIPUNCTURE: CPT | Performed by: NURSE PRACTITIONER

## 2023-11-09 PROCEDURE — 3079F DIAST BP 80-89 MM HG: CPT | Performed by: NURSE PRACTITIONER

## 2023-11-09 PROCEDURE — 99396 PREV VISIT EST AGE 40-64: CPT | Performed by: NURSE PRACTITIONER

## 2023-11-09 PROCEDURE — 3074F SYST BP LT 130 MM HG: CPT | Performed by: NURSE PRACTITIONER

## 2023-11-09 RX ORDER — FUROSEMIDE 20 MG/1
TABLET ORAL
Qty: 180 TABLET | Refills: 1 | Status: SHIPPED | OUTPATIENT
Start: 2023-11-09

## 2023-11-09 RX ORDER — SEMAGLUTIDE 1.7 MG/.75ML
1.7 INJECTION, SOLUTION SUBCUTANEOUS
Qty: 9 ML | Refills: 0 | Status: SHIPPED | OUTPATIENT
Start: 2023-11-09

## 2023-11-09 RX ORDER — LIDOCAINE 50 MG/G
1 PATCH TOPICAL DAILY
Qty: 30 PATCH | Refills: 0 | Status: SHIPPED | OUTPATIENT
Start: 2023-11-09

## 2023-11-09 ASSESSMENT — ENCOUNTER SYMPTOMS
SINUS PRESSURE: 0
NAUSEA: 0
ABDOMINAL DISTENTION: 0
SINUS PAIN: 0
EYE DISCHARGE: 0
EYE PAIN: 0
VOMITING: 0
BACK PAIN: 1
DIARRHEA: 0
ABDOMINAL PAIN: 0
EYE REDNESS: 0
WHEEZING: 0
SHORTNESS OF BREATH: 0
SORE THROAT: 0
COUGH: 0

## 2023-11-09 NOTE — PROGRESS NOTES
Fasting labs drawn RA/mv  1 sst  1 lav
121.6 kg (268 lb)   09/20/23 121.1 kg (267 lb)   08/25/23 121.6 kg (268 lb)       Waist Circumference  There were no vitals filed for this visit. Physical Exam  Constitutional:       General: She is not in acute distress. Appearance: Normal appearance. She is obese. HENT:      Head: Normocephalic and atraumatic. Right Ear: Tympanic membrane, ear canal and external ear normal.      Left Ear: Tympanic membrane, ear canal and external ear normal.      Mouth/Throat:      Mouth: Mucous membranes are moist.   Eyes:      Extraocular Movements: Extraocular movements intact. Conjunctiva/sclera: Conjunctivae normal.      Pupils: Pupils are equal, round, and reactive to light. Neck:      Thyroid: No thyromegaly. Vascular: No carotid bruit. Cardiovascular:      Rate and Rhythm: Normal rate and regular rhythm. Pulses: Normal pulses. Heart sounds: Normal heart sounds. No murmur heard. No gallop. Pulmonary:      Effort: Pulmonary effort is normal.      Breath sounds: Normal breath sounds. No wheezing. Abdominal:      General: Bowel sounds are normal.      Palpations: Abdomen is soft. Tenderness: There is no abdominal tenderness. There is no guarding or rebound. Musculoskeletal:         General: Tenderness present. No swelling. Normal range of motion. Cervical back: Normal range of motion and neck supple. Lymphadenopathy:      Cervical: No cervical adenopathy. Skin:     General: Skin is warm and dry. Capillary Refill: Capillary refill takes less than 2 seconds. Neurological:      Mental Status: She is alert and oriented to person, place, and time. Psychiatric:         Mood and Affect: Mood normal.         Behavior: Behavior normal.         Thought Content: Thought content normal.         Judgment: Judgment normal.         Assessment   Plan   1.  Encounter for well adult exam without abnormal findings  -Aside from back, normal exam today  -Fasting labs  -Continue

## 2023-11-10 LAB
ALBUMIN SERPL-MCNC: 4.1 G/DL (ref 3.4–5)
ALBUMIN/GLOB SERPL: 1.5 {RATIO} (ref 1.1–2.2)
ALP SERPL-CCNC: 167 U/L (ref 40–129)
ALT SERPL-CCNC: 21 U/L (ref 10–40)
ANION GAP SERPL CALCULATED.3IONS-SCNC: 12 MMOL/L (ref 3–16)
AST SERPL-CCNC: 20 U/L (ref 15–37)
BASOPHILS # BLD: 0.1 K/UL (ref 0–0.2)
BASOPHILS NFR BLD: 1 %
BILIRUB SERPL-MCNC: 0.4 MG/DL (ref 0–1)
BUN SERPL-MCNC: 13 MG/DL (ref 7–20)
CALCIUM SERPL-MCNC: 9.5 MG/DL (ref 8.3–10.6)
CHLORIDE SERPL-SCNC: 97 MMOL/L (ref 99–110)
CHOLEST SERPL-MCNC: 176 MG/DL (ref 0–199)
CO2 SERPL-SCNC: 28 MMOL/L (ref 21–32)
CREAT SERPL-MCNC: 0.8 MG/DL (ref 0.6–1.2)
DACRYOCYTES BLD QL SMEAR: ABNORMAL
DEPRECATED RDW RBC AUTO: 16.4 % (ref 12.4–15.4)
EOSINOPHIL # BLD: 0.2 K/UL (ref 0–0.6)
EOSINOPHIL NFR BLD: 4 %
EST. AVERAGE GLUCOSE BLD GHB EST-MCNC: 114 MG/DL
GFR SERPLBLD CREATININE-BSD FMLA CKD-EPI: >60 ML/MIN/{1.73_M2}
GLUCOSE SERPL-MCNC: 88 MG/DL (ref 70–99)
HBA1C MFR BLD: 5.6 %
HCT VFR BLD AUTO: 35.8 % (ref 36–48)
HDLC SERPL-MCNC: 66 MG/DL (ref 40–60)
HGB BLD-MCNC: 11.9 G/DL (ref 12–16)
LDLC SERPL CALC-MCNC: 95 MG/DL
LYMPHOCYTES # BLD: 3.1 K/UL (ref 1–5.1)
LYMPHOCYTES NFR BLD: 61 %
MCH RBC QN AUTO: 26.4 PG (ref 26–34)
MCHC RBC AUTO-ENTMCNC: 33.3 G/DL (ref 31–36)
MCV RBC AUTO: 79.3 FL (ref 80–100)
MONOCYTES # BLD: 0.3 K/UL (ref 0–1.3)
MONOCYTES NFR BLD: 5 %
NEUTROPHILS # BLD: 1.5 K/UL (ref 1.7–7.7)
NEUTROPHILS NFR BLD: 29 %
OVALOCYTES BLD QL SMEAR: ABNORMAL
PLATELET # BLD AUTO: 238 K/UL (ref 135–450)
PMV BLD AUTO: 8.9 FL (ref 5–10.5)
POIKILOCYTOSIS BLD QL SMEAR: ABNORMAL
POTASSIUM SERPL-SCNC: 4.2 MMOL/L (ref 3.5–5.1)
PROT SERPL-MCNC: 6.9 G/DL (ref 6.4–8.2)
RBC # BLD AUTO: 4.52 M/UL (ref 4–5.2)
SLIDE REVIEW: ABNORMAL
SODIUM SERPL-SCNC: 137 MMOL/L (ref 136–145)
TRIGL SERPL-MCNC: 73 MG/DL (ref 0–150)
VLDLC SERPL CALC-MCNC: 15 MG/DL
WBC # BLD AUTO: 5.1 K/UL (ref 4–11)

## 2024-01-04 RX ORDER — METOPROLOL TARTRATE AND HYDROCHLOROTHIAZIDE 50; 25 MG/1; MG/1
1 TABLET ORAL DAILY
Qty: 90 TABLET | Refills: 0 | Status: SHIPPED | OUTPATIENT
Start: 2024-01-04

## 2024-01-18 ENCOUNTER — OFFICE VISIT (OUTPATIENT)
Dept: FAMILY MEDICINE CLINIC | Age: 64
End: 2024-01-18
Payer: COMMERCIAL

## 2024-01-18 VITALS
SYSTOLIC BLOOD PRESSURE: 128 MMHG | DIASTOLIC BLOOD PRESSURE: 82 MMHG | BODY MASS INDEX: 47.01 KG/M2 | WEIGHT: 249 LBS | HEIGHT: 61 IN | OXYGEN SATURATION: 97 % | HEART RATE: 86 BPM

## 2024-01-18 DIAGNOSIS — H69.91 EUSTACHIAN TUBE DISORDER, RIGHT: ICD-10-CM

## 2024-01-18 DIAGNOSIS — M54.42 CHRONIC BILATERAL LOW BACK PAIN WITH BILATERAL SCIATICA: ICD-10-CM

## 2024-01-18 DIAGNOSIS — M25.561 ACUTE PAIN OF RIGHT KNEE: ICD-10-CM

## 2024-01-18 DIAGNOSIS — M54.41 CHRONIC BILATERAL LOW BACK PAIN WITH BILATERAL SCIATICA: ICD-10-CM

## 2024-01-18 DIAGNOSIS — E66.01 MORBID OBESITY WITH BMI OF 45.0-49.9, ADULT (HCC): Primary | ICD-10-CM

## 2024-01-18 DIAGNOSIS — J01.00 ACUTE NON-RECURRENT MAXILLARY SINUSITIS: ICD-10-CM

## 2024-01-18 DIAGNOSIS — G89.29 CHRONIC BILATERAL LOW BACK PAIN WITH BILATERAL SCIATICA: ICD-10-CM

## 2024-01-18 PROCEDURE — 3074F SYST BP LT 130 MM HG: CPT | Performed by: NURSE PRACTITIONER

## 2024-01-18 PROCEDURE — 3079F DIAST BP 80-89 MM HG: CPT | Performed by: NURSE PRACTITIONER

## 2024-01-18 PROCEDURE — 99213 OFFICE O/P EST LOW 20 MIN: CPT | Performed by: NURSE PRACTITIONER

## 2024-01-18 RX ORDER — AZELASTINE 1 MG/ML
2 SPRAY, METERED NASAL 2 TIMES DAILY
Qty: 60 ML | Refills: 0 | Status: SHIPPED | OUTPATIENT
Start: 2024-01-18

## 2024-01-18 RX ORDER — SEMAGLUTIDE 2.4 MG/.75ML
2.4 INJECTION, SOLUTION SUBCUTANEOUS
Qty: 9 ML | Refills: 0 | Status: SHIPPED | OUTPATIENT
Start: 2024-01-18

## 2024-01-18 ASSESSMENT — ENCOUNTER SYMPTOMS
COUGH: 0
SINUS PRESSURE: 1
SHORTNESS OF BREATH: 0
SORE THROAT: 0
WHEEZING: 0
RHINORRHEA: 1
SINUS PAIN: 1

## 2024-01-18 ASSESSMENT — PATIENT HEALTH QUESTIONNAIRE - PHQ9
SUM OF ALL RESPONSES TO PHQ9 QUESTIONS 1 & 2: 0
SUM OF ALL RESPONSES TO PHQ QUESTIONS 1-9: 0
2. FEELING DOWN, DEPRESSED OR HOPELESS: 0
1. LITTLE INTEREST OR PLEASURE IN DOING THINGS: 0
SUM OF ALL RESPONSES TO PHQ QUESTIONS 1-9: 0

## 2024-01-18 NOTE — PROGRESS NOTES
bilaterally     Nose: Congestion present. No rhinorrhea.      Mouth/Throat:      Mouth: Mucous membranes are moist.      Pharynx: Oropharynx is clear. No oropharyngeal exudate or posterior oropharyngeal erythema.   Pulmonary:      Effort: Pulmonary effort is normal.   Musculoskeletal:         General: Swelling and tenderness present. Normal range of motion.      Comments: Mobile and tender nodule to the right knee over the patella   Neurological:      Mental Status: She is alert and oriented to person, place, and time.   Psychiatric:         Mood and Affect: Mood normal.         Behavior: Behavior normal.         Thought Content: Thought content normal.         Judgment: Judgment normal.               This dictation was generated by voice recognition computer software.  Although all attempts are made to edit the dictation for accuracy, there may be errors in the transcription that are not intended.    An electronic signature was used to authenticate this note.    --MARINA Galvan - CNP

## 2024-02-02 ENCOUNTER — APPOINTMENT (OUTPATIENT)
Dept: CT IMAGING | Age: 64
End: 2024-02-02
Payer: COMMERCIAL

## 2024-02-02 ENCOUNTER — TELEPHONE (OUTPATIENT)
Dept: FAMILY MEDICINE CLINIC | Age: 64
End: 2024-02-02

## 2024-02-02 ENCOUNTER — HOSPITAL ENCOUNTER (EMERGENCY)
Age: 64
Discharge: HOME | End: 2024-02-02
Attending: EMERGENCY MEDICINE
Payer: COMMERCIAL

## 2024-02-02 VITALS
SYSTOLIC BLOOD PRESSURE: 124 MMHG | WEIGHT: 250 LBS | OXYGEN SATURATION: 98 % | HEIGHT: 61 IN | BODY MASS INDEX: 47.2 KG/M2 | DIASTOLIC BLOOD PRESSURE: 75 MMHG | HEART RATE: 77 BPM | TEMPERATURE: 97.6 F | RESPIRATION RATE: 18 BRPM

## 2024-02-02 DIAGNOSIS — S39.012A STRAIN OF LUMBAR REGION, INITIAL ENCOUNTER: ICD-10-CM

## 2024-02-02 DIAGNOSIS — R10.9 FLANK PAIN: Primary | ICD-10-CM

## 2024-02-02 LAB
ALBUMIN SERPL-MCNC: 3.7 G/DL (ref 3.4–5)
ALP SERPL-CCNC: 128 U/L (ref 40–129)
ALT SERPL-CCNC: 48 U/L (ref 10–40)
ANION GAP SERPL CALCULATED.3IONS-SCNC: 10 MMOL/L (ref 3–16)
AST SERPL-CCNC: 42 U/L (ref 15–37)
BASOPHILS # BLD: 0 K/UL (ref 0–0.2)
BASOPHILS NFR BLD: 0.2 %
BILIRUB DIRECT SERPL-MCNC: <0.2 MG/DL (ref 0–0.3)
BILIRUB INDIRECT SERPL-MCNC: ABNORMAL MG/DL (ref 0–1)
BILIRUB SERPL-MCNC: 0.8 MG/DL (ref 0–1)
BILIRUB UR QL STRIP.AUTO: NEGATIVE
BUN SERPL-MCNC: 11 MG/DL (ref 7–20)
CALCIUM SERPL-MCNC: 10 MG/DL (ref 8.3–10.6)
CHLORIDE SERPL-SCNC: 100 MMOL/L (ref 99–110)
CLARITY UR: CLEAR
CO2 SERPL-SCNC: 28 MMOL/L (ref 21–32)
COLOR UR: ABNORMAL
CREAT SERPL-MCNC: 1 MG/DL (ref 0.6–1.2)
DEPRECATED RDW RBC AUTO: 17.6 % (ref 12.4–15.4)
EOSINOPHIL # BLD: 0 K/UL (ref 0–0.6)
EOSINOPHIL NFR BLD: 0.5 %
EPI CELLS #/AREA URNS HPF: ABNORMAL /HPF (ref 0–5)
GFR SERPLBLD CREATININE-BSD FMLA CKD-EPI: >60 ML/MIN/{1.73_M2}
GLUCOSE SERPL-MCNC: 100 MG/DL (ref 70–99)
GLUCOSE UR STRIP.AUTO-MCNC: NEGATIVE MG/DL
HCT VFR BLD AUTO: 38.2 % (ref 36–48)
HGB BLD-MCNC: 12.7 G/DL (ref 12–16)
HGB UR QL STRIP.AUTO: NEGATIVE
KETONES UR STRIP.AUTO-MCNC: NEGATIVE MG/DL
LEUKOCYTE ESTERASE UR QL STRIP.AUTO: NEGATIVE
LIPASE SERPL-CCNC: 49 U/L (ref 13–60)
LYMPHOCYTES # BLD: 2.5 K/UL (ref 1–5.1)
LYMPHOCYTES NFR BLD: 24.8 %
MCH RBC QN AUTO: 26.9 PG (ref 26–34)
MCHC RBC AUTO-ENTMCNC: 33.3 G/DL (ref 31–36)
MCV RBC AUTO: 80.6 FL (ref 80–100)
MONOCYTES # BLD: 0.7 K/UL (ref 0–1.3)
MONOCYTES NFR BLD: 7 %
NEUTROPHILS # BLD: 6.9 K/UL (ref 1.7–7.7)
NEUTROPHILS NFR BLD: 67.5 %
NITRITE UR QL STRIP.AUTO: NEGATIVE
PH UR STRIP.AUTO: 7 [PH] (ref 5–8)
PLATELET # BLD AUTO: 227 K/UL (ref 135–450)
PMV BLD AUTO: 8.2 FL (ref 5–10.5)
POTASSIUM SERPL-SCNC: 3.8 MMOL/L (ref 3.5–5.1)
PROT SERPL-MCNC: 6.9 G/DL (ref 6.4–8.2)
PROT UR STRIP.AUTO-MCNC: 100 MG/DL
RBC # BLD AUTO: 4.75 M/UL (ref 4–5.2)
RBC #/AREA URNS HPF: ABNORMAL /HPF (ref 0–4)
SODIUM SERPL-SCNC: 138 MMOL/L (ref 136–145)
SP GR UR STRIP.AUTO: 1.02 (ref 1–1.03)
UA COMPLETE W REFLEX CULTURE PNL UR: ABNORMAL
UA DIPSTICK W REFLEX MICRO PNL UR: YES
URN SPEC COLLECT METH UR: ABNORMAL
UROBILINOGEN UR STRIP-ACNC: 0.2 E.U./DL
WBC # BLD AUTO: 10.2 K/UL (ref 4–11)
WBC #/AREA URNS HPF: ABNORMAL /HPF (ref 0–5)

## 2024-02-02 PROCEDURE — 6360000002 HC RX W HCPCS: Performed by: PHYSICIAN ASSISTANT

## 2024-02-02 PROCEDURE — 6370000000 HC RX 637 (ALT 250 FOR IP): Performed by: PHYSICIAN ASSISTANT

## 2024-02-02 PROCEDURE — 99285 EMERGENCY DEPT VISIT HI MDM: CPT

## 2024-02-02 PROCEDURE — 6360000004 HC RX CONTRAST MEDICATION: Performed by: PHYSICIAN ASSISTANT

## 2024-02-02 PROCEDURE — 2580000003 HC RX 258: Performed by: PHYSICIAN ASSISTANT

## 2024-02-02 PROCEDURE — 80076 HEPATIC FUNCTION PANEL: CPT

## 2024-02-02 PROCEDURE — 96376 TX/PRO/DX INJ SAME DRUG ADON: CPT

## 2024-02-02 PROCEDURE — 81001 URINALYSIS AUTO W/SCOPE: CPT

## 2024-02-02 PROCEDURE — 85025 COMPLETE CBC W/AUTO DIFF WBC: CPT

## 2024-02-02 PROCEDURE — 96374 THER/PROPH/DIAG INJ IV PUSH: CPT

## 2024-02-02 PROCEDURE — 96372 THER/PROPH/DIAG INJ SC/IM: CPT

## 2024-02-02 PROCEDURE — 80048 BASIC METABOLIC PNL TOTAL CA: CPT

## 2024-02-02 PROCEDURE — 96375 TX/PRO/DX INJ NEW DRUG ADDON: CPT

## 2024-02-02 PROCEDURE — 6360000002 HC RX W HCPCS: Performed by: EMERGENCY MEDICINE

## 2024-02-02 PROCEDURE — 74177 CT ABD & PELVIS W/CONTRAST: CPT

## 2024-02-02 PROCEDURE — 96361 HYDRATE IV INFUSION ADD-ON: CPT

## 2024-02-02 PROCEDURE — 83690 ASSAY OF LIPASE: CPT

## 2024-02-02 RX ORDER — MORPHINE SULFATE 4 MG/ML
4 INJECTION INTRAVENOUS ONCE
Status: COMPLETED | OUTPATIENT
Start: 2024-02-02 | End: 2024-02-02

## 2024-02-02 RX ORDER — PROMETHAZINE HYDROCHLORIDE 25 MG/ML
25 INJECTION, SOLUTION INTRAMUSCULAR; INTRAVENOUS ONCE
Status: COMPLETED | OUTPATIENT
Start: 2024-02-02 | End: 2024-02-02

## 2024-02-02 RX ORDER — LIDOCAINE 4 G/G
1 PATCH TOPICAL DAILY
Status: DISCONTINUED | OUTPATIENT
Start: 2024-02-02 | End: 2024-02-02 | Stop reason: HOSPADM

## 2024-02-02 RX ORDER — ONDANSETRON 2 MG/ML
4 INJECTION INTRAMUSCULAR; INTRAVENOUS ONCE
Status: COMPLETED | OUTPATIENT
Start: 2024-02-02 | End: 2024-02-02

## 2024-02-02 RX ORDER — 0.9 % SODIUM CHLORIDE 0.9 %
1000 INTRAVENOUS SOLUTION INTRAVENOUS ONCE
Status: COMPLETED | OUTPATIENT
Start: 2024-02-02 | End: 2024-02-02

## 2024-02-02 RX ADMIN — IOPAMIDOL 75 ML: 755 INJECTION, SOLUTION INTRAVENOUS at 14:28

## 2024-02-02 RX ADMIN — ONDANSETRON 4 MG: 2 INJECTION INTRAMUSCULAR; INTRAVENOUS at 14:48

## 2024-02-02 RX ADMIN — PROMETHAZINE HYDROCHLORIDE 25 MG: 25 INJECTION INTRAMUSCULAR; INTRAVENOUS at 15:20

## 2024-02-02 RX ADMIN — SODIUM CHLORIDE 1000 ML: 9 INJECTION, SOLUTION INTRAVENOUS at 13:12

## 2024-02-02 RX ADMIN — ONDANSETRON 4 MG: 2 INJECTION INTRAMUSCULAR; INTRAVENOUS at 13:12

## 2024-02-02 RX ADMIN — MORPHINE SULFATE 4 MG: 4 INJECTION INTRAVENOUS at 13:12

## 2024-02-02 ASSESSMENT — PAIN SCALES - GENERAL
PAINLEVEL_OUTOF10: 0
PAINLEVEL_OUTOF10: 8
PAINLEVEL_OUTOF10: 0
PAINLEVEL_OUTOF10: 8

## 2024-02-02 ASSESSMENT — ENCOUNTER SYMPTOMS
SHORTNESS OF BREATH: 0
VOMITING: 1
NAUSEA: 1
BACK PAIN: 1
ABDOMINAL PAIN: 0
COUGH: 0
DIARRHEA: 0

## 2024-02-02 ASSESSMENT — PAIN DESCRIPTION - LOCATION: LOCATION: BACK;ABDOMEN

## 2024-02-02 ASSESSMENT — PAIN DESCRIPTION - ORIENTATION: ORIENTATION: LEFT

## 2024-02-02 NOTE — ED PROVIDER NOTES
ED Attending Attestation Note     Date of evaluation: 2/23/2023    This patient was seen by the advance practice provider.  I have seen and examined the patient, agree with the workup, evaluation, management and diagnosis. The care plan has been discussed.      Patient History     Chief Complaint: Back Pain (Pt c/o left flank pain that radiates to the left side of her abd that started a few weeks ago.), Abdominal Pain, and Flank Pain      HPI: Daniella Webb is a 63 y.o. who presents to the Emergency Department with complaints of left-sided back and abdominal pain, with progression to persistent vomiting over the last several days.  Patient states that she has a history of somewhat chronic back pain, and had a recent radiofrequency ablation of her spine in December, but had been generally doing well.  Starting about 1 week ago, she began to have pain that was in the left low back, which then began to radiate into the left lower quadrant of the abdomen.  In the last 3 days this is progressed to nausea and vomiting, and she has been unable to keep down any food or fluids for the last 2 to 3 days.  She denies any fevers or chills.  Denies any urinary symptoms.  Denies any diarrhea.  She feels that her current pain is similar to the pain that she recalls having on the right side when she had to have her gallbladder removed.    She has a past medical history of Abnormal antibody titer, Chronic gastritis, Esophagitis, Fatty liver, GERD (gastroesophageal reflux disease), Hiatal hernia, History of atrial tachycardia, Hx of hiatal hernia, Hypertension, Internal hemorrhoid, bleeding, Obstructive sleep apnea syndrome, PONV (postoperative nausea and vomiting), and Wears glasses.    She has a past surgical history that includes Hysterectomy (JUNE 2009); Hemorrhoid surgery (2001); Uterine fibroid embolization (2002); Tonsillectomy; Tubal ligation (1983); Endoscopy, colon, diagnostic; Cholecystectomy (2015); other surgical

## 2024-02-02 NOTE — ED PROVIDER NOTES
[facility]  EMERGENCY DEPARTMENT ENCOUNTER          PHYSICIAN ASSISTANT NOTE       Date of evaluation: 2/2/2024    Chief Complaint     Back Pain (Pt c/o left flank pain that radiates to the left side of her abd that started a few weeks ago.), Abdominal Pain, and Flank Pain      History of Present Illness     Daniella Webb is a 63 y.o. female with a past medical hx of gastritis, GERD, HTN, here with left flank pain which has progressed to left lower quadrant and left inguinal pain over the last week.  She is also developed nausea and vomiting.  She denies prior similar symptoms.  She has been having bowel movements with most recent bowel movement yesterday.  She notes some increased urination that is mild but otherwise denies urinary symptoms, bowel changes, fevers, pain elsewhere, cough or shortness of breath.  She denies loss of bowel or bladder control, numbness or tingling.    Review of Systems     Review of Systems   Constitutional:  Negative for chills and fever.   Respiratory:  Negative for cough and shortness of breath.    Cardiovascular:  Negative for chest pain.   Gastrointestinal:  Positive for nausea and vomiting. Negative for abdominal pain and diarrhea.   Genitourinary:  Positive for flank pain. Negative for dysuria and hematuria.   Musculoskeletal:  Positive for back pain.   Neurological:  Negative for headaches.       Past Medical, Surgical, Family, and Social History     She has a past medical history of Abnormal antibody titer, Chronic gastritis, Esophagitis, Fatty liver, GERD (gastroesophageal reflux disease), Hiatal hernia, History of atrial tachycardia, Hx of hiatal hernia, Hypertension, Internal hemorrhoid, bleeding, Obstructive sleep apnea syndrome, PONV (postoperative nausea and vomiting), and Wears glasses.  She has a past surgical history that includes Hysterectomy (JUNE 2009); Hemorrhoid surgery (2001); Uterine fibroid embolization (2002); Tonsillectomy; Tubal ligation (1983);  Exam  Constitutional:       Appearance: Normal appearance.      Comments: On exam she is well-appearing and in mild distress secondary to left flank pain.    HENT:      Head: Normocephalic and atraumatic.   Cardiovascular:      Rate and Rhythm: Normal rate and regular rhythm.      Pulses: Normal pulses.      Heart sounds: Normal heart sounds. No murmur heard.     No friction rub.   Pulmonary:      Effort: Pulmonary effort is normal.   Musculoskeletal:         General: Normal range of motion.      Cervical back: Normal range of motion.      Comments:  Patient has CVA tenderness on the left.  Patient has mild tenderness to palpation of the left lower quadrant and suprapubic region without guarding or rigidity.  No rebound tenderness.  No masses palpated.  Patient is able to ambulate with stability.   Skin:     General: Skin is warm and dry.   Neurological:      Mental Status: She is alert and oriented to person, place, and time.   Psychiatric:         Mood and Affect: Mood normal.         Behavior: Behavior normal.         Diagnostic Results     RADIOLOGY:  No orders to display       LABS:   Results for orders placed or performed during the hospital encounter of 02/02/24   Urinalysis with Reflex to Culture    Specimen: Urine, clean catch   Result Value Ref Range    Urine Type NotGiven        ED BEDSIDE ULTRASOUND:  No results found.    RECENT VITALS:  BP: (!) 155/120, Temp: 97.6 °F (36.4 °C), Pulse: 100, Respirations: 18, SpO2: 100 %     Procedures         ED Course     Nursing Notes, Past Medical Hx,Past Surgical Hx, Social Hx, Allergies, and Family Hx were reviewed.         The patient was given the following medications:  No orders of the defined types were placed in this encounter.      CONSULTS:  None    MEDICAL DECISION MAKING / ASSESSMENT / PLAN     Daniella MISA Jon is a 63 y.o. female with a past medical hx of gastritis, GERD, HTN, here with left flank pain which has progressed to left lower quadrant and left

## 2024-02-02 NOTE — DISCHARGE INSTRUCTIONS
As we discussed you can take over-the-counter pain medication as directed as needed for discomfort.  You can also take your Robaxin and Zofran for pain and nausea respectively.  Avoid strenuous activity until symptoms improve.  Return to the emergency department with loss of bowel or bladder control, numbness or tingling in your groin, fevers, blood in your stool or urine, intolerable pain or other concerning symptoms.

## 2024-02-02 NOTE — TELEPHONE ENCOUNTER
Pt is throwing up Bile.  This has been going on for three days.  She also has back pain.  Pt called her surgeon and they said it has nothing to do with her surgery.  She had surgery Dec 21.-ablation to lower back.  Her urine has been a darker yellow but nothing that makes her think she has an issue.  She needed to call her PCP or go to the ED.    What do you suggest she do?

## 2024-04-02 ENCOUNTER — TELEPHONE (OUTPATIENT)
Dept: FAMILY MEDICINE CLINIC | Age: 64
End: 2024-04-02

## 2024-04-02 DIAGNOSIS — E66.01 MORBID OBESITY WITH BMI OF 45.0-49.9, ADULT (HCC): ICD-10-CM

## 2024-04-02 RX ORDER — SEMAGLUTIDE 2.4 MG/.75ML
2.4 INJECTION, SOLUTION SUBCUTANEOUS
Qty: 9 ML | Refills: 0 | Status: SHIPPED | OUTPATIENT
Start: 2024-04-02

## 2024-04-03 ENCOUNTER — TELEPHONE (OUTPATIENT)
Dept: ADMINISTRATIVE | Age: 64
End: 2024-04-03

## 2024-04-03 NOTE — TELEPHONE ENCOUNTER
Submitted PA for Wegovy 2.4MG/0.75ML auto-injectors  Via CMM Key: [id number]  STATUS: PENDING.    Follow up done daily; if no decision with in three days we will refax.  If another three days goes by with no decision will call the insurance for status.

## 2024-04-04 NOTE — TELEPHONE ENCOUNTER
PA states the medication was denied because the pt has not had improved results. Pt states she has lost almost 50 lbs. Would this be appropriate to appeal?

## 2024-04-05 NOTE — TELEPHONE ENCOUNTER
As long as everything is chart documented, such as her beginning and end weight I can appeal it. I just have to be able to show them proof. They won't just take my word.

## 2024-04-09 RX ORDER — METOPROLOL TARTRATE AND HYDROCHLOROTHIAZIDE 50; 25 MG/1; MG/1
1 TABLET ORAL DAILY
Qty: 90 TABLET | Refills: 0 | Status: SHIPPED | OUTPATIENT
Start: 2024-04-09

## 2024-04-09 NOTE — TELEPHONE ENCOUNTER
RE Submitted PA for Wegovy 2.4MG/0.75ML auto-injectors  Via CMM Key: [id number] STATUS: PENDING.    Follow up done daily; if no decision with in three days we will refax.  If another three days goes by with no decision will call the insurance for status.

## 2024-04-16 ENCOUNTER — OFFICE VISIT (OUTPATIENT)
Dept: FAMILY MEDICINE CLINIC | Age: 64
End: 2024-04-16
Payer: COMMERCIAL

## 2024-04-16 VITALS
DIASTOLIC BLOOD PRESSURE: 84 MMHG | HEART RATE: 60 BPM | BODY MASS INDEX: 45.69 KG/M2 | OXYGEN SATURATION: 99 % | HEIGHT: 61 IN | RESPIRATION RATE: 16 BRPM | SYSTOLIC BLOOD PRESSURE: 130 MMHG | WEIGHT: 242 LBS

## 2024-04-16 DIAGNOSIS — D17.23 LIPOMA OF RIGHT LOWER EXTREMITY: ICD-10-CM

## 2024-04-16 DIAGNOSIS — E66.01 CLASS 3 SEVERE OBESITY WITH SERIOUS COMORBIDITY AND BODY MASS INDEX (BMI) OF 45.0 TO 49.9 IN ADULT, UNSPECIFIED OBESITY TYPE (HCC): ICD-10-CM

## 2024-04-16 DIAGNOSIS — Z12.31 ENCOUNTER FOR SCREENING MAMMOGRAM FOR MALIGNANT NEOPLASM OF BREAST: Primary | ICD-10-CM

## 2024-04-16 DIAGNOSIS — Z29.11 NEED FOR RSV IMMUNIZATION: ICD-10-CM

## 2024-04-16 DIAGNOSIS — I10 PRIMARY HYPERTENSION: Chronic | ICD-10-CM

## 2024-04-16 PROCEDURE — 3075F SYST BP GE 130 - 139MM HG: CPT | Performed by: NURSE PRACTITIONER

## 2024-04-16 PROCEDURE — 99214 OFFICE O/P EST MOD 30 MIN: CPT | Performed by: NURSE PRACTITIONER

## 2024-04-16 PROCEDURE — 3079F DIAST BP 80-89 MM HG: CPT | Performed by: NURSE PRACTITIONER

## 2024-04-16 RX ORDER — METOPROLOL TARTRATE AND HYDROCHLOROTHIAZIDE 50; 25 MG/1; MG/1
1 TABLET ORAL DAILY
Qty: 90 TABLET | Refills: 0 | Status: SHIPPED | OUTPATIENT
Start: 2024-07-07 | End: 2024-10-05

## 2024-04-16 NOTE — TELEPHONE ENCOUNTER
I just received some clinical questions on this this morning. I completed clinicals and weight flow sheet and sent back to them. Still PENDING.

## 2024-04-16 NOTE — PATIENT INSTRUCTIONS

## 2024-04-16 NOTE — PROGRESS NOTES
Caren Webb (:  1960) is a 63 y.o. female,Established patient, here for evaluation of the following chief complaint(s):    Hypertension (ROUTINE FOLLOW UP) and Weight Loss      SUBJECTIVE/OBJECTIVE:  HPI    CAREN HAS DONE REALLY WELL ON THE WEGOVY SINCE  SHE IS DOWN 26 LBS  HAS NOT HAD ANY ISSUES WITH INITIAL 268 - TODAY'S WEIGHT 242  GOAL WEIGHT   180 LBS  RECENTLY  HAD BACK SURGERY ABLATION  - NO STRENUOUS EXERCISE AS RECOMMEND BY NEUROSURGEON - SHE IS CHAIR EXERCISE  BETTER DIET LESS RED MEATS- MORE FRUITS AND VEGETABLE  SLIM FAST  FOR  QUICK SNACK SHE HAS MADE LIFE CHANGES  SHE DID NOT HAVE ANY MEDICATION FORM  TO AUGUST AND MAINTAINED HER WEIGHT AT THAT TIME  WOULD LIKE TO CONTINUE WEGOVY SINCE SHE IS DOING WELL    Hypertension: Patient here for follow-up of elevated blood pressure. She is exercising and is adherent to low salt diet.  Blood pressure is well controlled at home. Cardiac symptoms none. Patient denies chest pain, chest pressure/discomfort, claudication, dyspnea, exertional chest pressure/discomfort, fatigue, irregular heart beat, lower extremity edema, near-syncope, orthopnea, palpitations, paroxysmal nocturnal dyspnea, syncope, and tachypnea.  Cardiovascular risk factors: advanced age (older than 55 for men, 65 for women), hypertension, and obesity (BMI >= 30 kg/m2). Use of agents associated with hypertension: none. History of target organ damage: none.   Review of Systems   Constitutional: Negative.    Cardiovascular: Negative.        Physical Exam  Constitutional:       General: She is awake. She is not in acute distress.     Appearance: Normal appearance. She is well-developed and well-groomed. She is morbidly obese.   Neurological:      Mental Status: She is alert and oriented to person, place, and time.   Psychiatric:         Attention and Perception: Attention and perception normal.         Mood and Affect: Mood and affect normal.         Speech: Speech normal.

## 2024-04-22 ENCOUNTER — INITIAL CONSULT (OUTPATIENT)
Dept: SURGERY | Age: 64
End: 2024-04-22

## 2024-04-22 ENCOUNTER — PREP FOR PROCEDURE (OUTPATIENT)
Dept: SURGERY | Age: 64
End: 2024-04-22

## 2024-04-22 VITALS
BODY MASS INDEX: 45.73 KG/M2 | DIASTOLIC BLOOD PRESSURE: 82 MMHG | SYSTOLIC BLOOD PRESSURE: 131 MMHG | HEART RATE: 80 BPM | WEIGHT: 242 LBS

## 2024-04-22 DIAGNOSIS — M67.40 GANGLION OF JOINT: ICD-10-CM

## 2024-04-22 DIAGNOSIS — M67.40 GANGLION CYST: Primary | ICD-10-CM

## 2024-04-22 PROCEDURE — 99024 POSTOP FOLLOW-UP VISIT: CPT | Performed by: SURGERY

## 2024-04-22 RX ORDER — SODIUM CHLORIDE 0.9 % (FLUSH) 0.9 %
5-40 SYRINGE (ML) INJECTION EVERY 12 HOURS SCHEDULED
Status: CANCELLED | OUTPATIENT
Start: 2024-04-22

## 2024-04-22 RX ORDER — SODIUM CHLORIDE 0.9 % (FLUSH) 0.9 %
5-40 SYRINGE (ML) INJECTION PRN
Status: CANCELLED | OUTPATIENT
Start: 2024-04-22

## 2024-04-22 RX ORDER — SODIUM CHLORIDE 9 MG/ML
INJECTION, SOLUTION INTRAVENOUS PRN
Status: CANCELLED | OUTPATIENT
Start: 2024-04-22

## 2024-04-22 NOTE — PATIENT INSTRUCTIONS
4/24/24 arrive at 850a for cyst removal. Nothing to eat or drink after mindnight. Will need a  to get home.

## 2024-04-22 NOTE — PROGRESS NOTES
Daniella Webb (:  1960) is a 63 y.o. female,New patient, here for evaluation of the following chief complaint(s):  Surgical Consult (RLE lipoma)      Assessment & Plan   1. Ganglion cyst    Removal in OR    The risks, benefits and alternatives to the planned procedure were discussed. Patient expressed an understanding and is willing to proceed.         Subjective   HPI  3 cm cystic structure on right knee. Mobile laterally but not longitudinally. Suspect ganglion cyst but may just be a fixed sebaceous cyst. She has noted increased size and now pain with movement or direct pressure. Recommend removal in OR under local. The risks, benefits and alternatives to the planned procedure were discussed. Patient expressed an understanding and is willing to proceed.    Review of Systems       Objective   Physical Exam       Electronically signed by NORY BENSON MD on 2024 at 9:57 AM        An electronic signature was used to authenticate this note.    --NORY BENSON MD

## 2024-04-24 ENCOUNTER — HOSPITAL ENCOUNTER (OUTPATIENT)
Age: 64
Setting detail: OUTPATIENT SURGERY
Discharge: HOME | End: 2024-04-24
Attending: SURGERY | Admitting: SURGERY
Payer: COMMERCIAL

## 2024-04-24 VITALS
BODY MASS INDEX: 44.29 KG/M2 | WEIGHT: 234.57 LBS | RESPIRATION RATE: 17 BRPM | HEIGHT: 61 IN | HEART RATE: 61 BPM | TEMPERATURE: 97.7 F | OXYGEN SATURATION: 100 % | DIASTOLIC BLOOD PRESSURE: 87 MMHG | SYSTOLIC BLOOD PRESSURE: 130 MMHG

## 2024-04-24 DIAGNOSIS — M67.40 GANGLION OF JOINT: ICD-10-CM

## 2024-04-24 PROCEDURE — 2709999900 HC NON-CHARGEABLE SUPPLY: Performed by: SURGERY

## 2024-04-24 PROCEDURE — 2500000003 HC RX 250 WO HCPCS: Performed by: SURGERY

## 2024-04-24 PROCEDURE — A4217 STERILE WATER/SALINE, 500 ML: HCPCS | Performed by: SURGERY

## 2024-04-24 PROCEDURE — 2580000003 HC RX 258: Performed by: SURGERY

## 2024-04-24 PROCEDURE — 27630 REMOVAL OF TENDON LESION: CPT | Performed by: SURGERY

## 2024-04-24 PROCEDURE — 3600000002 HC SURGERY LEVEL 2 BASE: Performed by: SURGERY

## 2024-04-24 PROCEDURE — 3600000012 HC SURGERY LEVEL 2 ADDTL 15MIN: Performed by: SURGERY

## 2024-04-24 PROCEDURE — 88305 TISSUE EXAM BY PATHOLOGIST: CPT

## 2024-04-24 PROCEDURE — 7100000010 HC PHASE II RECOVERY - FIRST 15 MIN: Performed by: SURGERY

## 2024-04-24 PROCEDURE — 6360000002 HC RX W HCPCS: Performed by: SURGERY

## 2024-04-24 RX ORDER — SODIUM CHLORIDE 0.9 % (FLUSH) 0.9 %
5-40 SYRINGE (ML) INJECTION EVERY 12 HOURS SCHEDULED
Status: DISCONTINUED | OUTPATIENT
Start: 2024-04-24 | End: 2024-04-24 | Stop reason: HOSPADM

## 2024-04-24 RX ORDER — SODIUM CHLORIDE 9 MG/ML
INJECTION, SOLUTION INTRAVENOUS PRN
Status: DISCONTINUED | OUTPATIENT
Start: 2024-04-24 | End: 2024-04-24 | Stop reason: HOSPADM

## 2024-04-24 RX ORDER — TRAMADOL HYDROCHLORIDE 50 MG/1
50 TABLET ORAL EVERY 6 HOURS PRN
Qty: 12 TABLET | Refills: 0 | Status: SHIPPED | OUTPATIENT
Start: 2024-04-24 | End: 2024-04-27

## 2024-04-24 RX ORDER — MAGNESIUM HYDROXIDE 1200 MG/15ML
LIQUID ORAL CONTINUOUS PRN
Status: COMPLETED | OUTPATIENT
Start: 2024-04-24 | End: 2024-04-24

## 2024-04-24 RX ORDER — SODIUM CHLORIDE 0.9 % (FLUSH) 0.9 %
5-40 SYRINGE (ML) INJECTION PRN
Status: DISCONTINUED | OUTPATIENT
Start: 2024-04-24 | End: 2024-04-24 | Stop reason: HOSPADM

## 2024-04-24 ASSESSMENT — PAIN SCALES - GENERAL: PAINLEVEL_OUTOF10: 0

## 2024-04-24 ASSESSMENT — PAIN - FUNCTIONAL ASSESSMENT
PAIN_FUNCTIONAL_ASSESSMENT: NONE - DENIES PAIN
PAIN_FUNCTIONAL_ASSESSMENT: 0-10

## 2024-04-24 NOTE — OR NURSING
Report given to Phase II nurse Keisha. All questions answered. Pts vitals stable throughout procedure. Pt denies pain.     Electronically signed by Alayna Wang RN on 4/24/2024 at 10:02 AM

## 2024-04-24 NOTE — DISCHARGE INSTRUCTIONS
Follow up in 2-3 weeks as needed  Call [phone] for an appointment  Remove dressings in 3-4 days  Ok to shower in AM  OK to drive if you are not taking any pain medication.

## 2024-04-24 NOTE — H&P
PATIENT NAME: Daniella Webb   YOB: 1960    ADMISSION DATE: 4/24/2024  8:40 AM      TODAY'S DATE: 4/24/2024    HPI  3 cm cystic structure on right knee. Mobile laterally but not longitudinally. Suspect ganglion cyst but may just be a fixed sebaceous cyst. She has noted increased size and now pain with movement or direct pressure. Recommend removal in OR under local. The risks, benefits and alternatives to the planned procedure were discussed. Patient expressed an understanding and is willing to proceed.       Past Medical History:        Diagnosis Date    Abnormal antibody titer     Hepatitis C  20014    Chronic gastritis     Esophagitis     Fatty liver     GERD (gastroesophageal reflux disease)     Hiatal hernia     History of atrial tachycardia     Before gastric sleeve    Hx of hiatal hernia     repaired with gastric sleeve surgery    Hypertension     currently not medically treated    Internal hemorrhoid, bleeding     pt states currently not having any problems with hemorrhoids    Obstructive sleep apnea syndrome 04/21/2017    New diagnosis needing treatment CPAP set at 17    PONV (postoperative nausea and vomiting)     Wears glasses        Past Surgical History:        Procedure Laterality Date    ABDOMEN SURGERY N/A 08/29/2017    Robotic assisted lapascopic gastric sleeve performed at [facility] by MD ZARA Lake    BUNIONECTPROSPER Bilateral 10/02/2020, 2/26/21    CHOLECYSTECTOMY  2015    LAPROSCOPIC    COLONOSCOPY N/A 03/18/2021    COLONOSCOPY DIAGNOSTIC performed by Louisa Freitas MD at [facility] ENDOSCOPY    ENDOSCOPY, COLON, DIAGNOSTIC      HEMORRHOID SURGERY  2001    HYSTERECTOMY (CERVIX STATUS UNKNOWN)  06/2009    Total     OTHER SURGICAL HISTORY  02/17/2017    EGD    RADIOFREQUENCY ABLATION  12/21/2023    TONSILLECTOMY      TUBAL LIGATION  1983    UPPER GASTROINTESTINAL ENDOSCOPY N/A 03/18/2021    EGD DIAGNOSTIC ONLY performed by Louisa Freitas MD at [facility] ENDOSCOPY    UTERINE FIBROID

## 2024-04-24 NOTE — PROGRESS NOTES
[facility] Pre-Admission Testing Electronic Communication Worksheet for OR/ENDO Procedures        Patient: Daniella Webb    DOS: 4/24    Transportation Confirmed: [x] YES    []  NO    History and Physical:  [] YES    []  NO  [x] N/A  If yes, please list doctor or Urgent Care and date of H&P:     Additional Clearance(Cardiac, Pulmonary, etc):  [] YES    [x]  NO    Pre-Admission Testing Visit:  [] YES    [x]  NO If no, do labs/testing need to be done DOS?  [] YES    [x]  NO    Medication Reconciliation Complete:  [x] YES    []  NO        Additional Notes:  Sleep apnea-does not use CPAP  Semalutide hasn't been taken since 4/11 and aware to not take it till after the procedure      Interview Complete: [x] YES    []  NO          Mary Bronson RN  12:47 PM  
Pt dressed. Sitting in chair at bedside, tolerating po snack, states is ready to leave. Discharge discussed with patient and sister, verbalized understanding. Pt handed script for tramadol. Pt ambulated to car with sister, no signs of distress noted at time of discharge to car, gait steady. Pt declined wheelchair.  
Pt to phase 2 from OR. Pt a/o x4, denies pain or nausea at this time, vss. Dressing to right knee clean dry and intact. Sister called to bedside. Script at bedside per Dr bonilla at bedside, pt states will take home with her. Updated on plan of care. No signs of distress noted at this time, pt given po snack, getting dressed.  
polish on your fingers or toes      For your safety, please do not wear any jewelry or body piercing's on the day of surgery.   All jewelry must be removed.      If you have dentures, they will be removed before going to operating room.    For your convenience, we will provide you with a container.    If you wear contact lenses or glasses, they will be removed, please bring a case for them.     If you have a living will and a durable power of  for healthcare, please bring in a copy.     As part of our patient safety program to minimize surgical site infections, we ask you to do the following:    Please notify your surgeon if you develop any illness between         now and the  day of your surgery.    This includes a cough, cold, fever, sore throat, nausea,         or vomiting, and diarrhea, etc.   Please notify your surgeon if you experience dizziness, shortness         of breath or blurred vision between now and the time of your surgery.      Do not shave your operative site 96 hours prior to surgery.   For face and neck surgery, men may use an electric razor 48 hours   prior to surgery.    You may shower the night before surgery or the morning of   your surgery with an antibacterial soap.    You will need to bring a photo ID and insurance card    [facility] has an onsite pharmacy, would you like to utilize our pharmacy     If you will be staying overnight and use a C-pap machine, please bring   your C-pap to hospital     Our goal is to provide you with excellent care, therefore, visitors will be limited to two(2) in the room at a time so that we may focus on providing this care for you.          Please contact pre-admission testing if you have any further questions.                 Marge Hinton phone number:  [phone]     [facility] fax number:  [phone]  Please note these are generalized instructions for all surgical cases, you may be provided with more specific instructions according to your

## 2024-04-24 NOTE — BRIEF OP NOTE
Brief Postoperative Note      Patient: Daniella Webb  YOB: 1960  MRN: [id number]    Date of Procedure: 4/24/2024    Pre-Op Diagnosis Codes:     * Ganglion of joint [M67.40] right knee    Post-Op Diagnosis: Same       Procedure(s):  RIGHT KNEE GANGLION CYST REMOVAL 1 cm    Surgeon(s):  Bonifacio Benson MD    Assistant:  Surgical Assistant: Mimi Barrios    Anesthesia: Local    Estimated Blood Loss (mL): less than 50     Complications: None    Specimens:   ID Type Source Tests Collected by Time Destination   A : A. Right Knee Ganglion cyst Tissue Tissue SURGICAL PATHOLOGY Bonifacio Benson MD 4/24/2024 0952        Implants:  * No implants in log *      Drains: * No LDAs found *    Findings:  Infection Present At Time Of Surgery (PATOS) (choose all levels that have infection present):  No infection present  Other Findings: no complications    Electronically signed by BONIFACIO BENSON MD on 4/24/2024 at 9:53 AM

## 2024-04-25 NOTE — OP NOTE
[facility]          [address] [place] [address]                            OPERATIVE REPORT      PATIENT NAME: CAREN MOREL              : 1960  MED REC NO: [id number]                      ROOM: OR  ACCOUNT NO: [id number]                       ADMIT DATE: 2024  PROVIDER: Nory Benson MD      DATE OF PROCEDURE:  2024    SURGEON:  Nory Benson MD    PREOPERATIVE DIAGNOSIS:  Cystic mass, right knee.    POSTOPERATIVE DIAGNOSIS:  Ganglion cyst, anterior right knee.    PROCEDURE:  Removal of ganglion cyst from right knee 1.5 cm.    SPECIMEN:  Ganglion cyst.    ESTIMATED BLOOD LOSS:  Less than 50 mL.    COMPLICATIONS:  None.    DISPOSITION:  To Recovery in stable condition.    INDICATION:  The patient is a 63-year-old female with an increasingly large and now painful nodule on the anterior right knee over the patellar tendon.  This has been present several months to years, but it is recently causing pain, especially with direct pressure.  On exam, it seemed most consistent with a ganglion cyst and after discussing the risks, benefits, and alternatives, she agreed to proceed with removal today.    PROCEDURE DESCRIPTION:  The patient was brought to the operating room, placed supine, and the right knee prepped and draped in a sterile fashion.  Local anesthetic was infused and a longitudinal incision made over a clearly palpable mass.  We were through the subcutaneous tissue and then a gelatinous-filled cystic nodule encountered.  This was dissected free from the surrounding subcutaneous tissue as well as the underlying tendon.  The specimen was then passed off.  Hemostasis was confirmed, and the wound closed with 3-0 and 4-0 Vicryl.  Dressings were applied, and the patient transferred to Recovery in good condition.          NORY BENSON MD      D:  2024 18:36:59     T:  2024 03:00:37     VENKAT/FLEX  Job #:

## 2024-04-29 ENCOUNTER — OFFICE VISIT (OUTPATIENT)
Dept: SURGERY | Age: 64
End: 2024-04-29

## 2024-04-29 VITALS — BODY MASS INDEX: 44.18 KG/M2 | HEIGHT: 61 IN | WEIGHT: 234 LBS

## 2024-04-29 DIAGNOSIS — M67.40 GANGLION OF JOINT: Primary | ICD-10-CM

## 2024-04-29 PROCEDURE — 99024 POSTOP FOLLOW-UP VISIT: CPT | Performed by: SURGERY

## 2024-04-29 NOTE — PROGRESS NOTES
Daniella Webb (:  1960) is a 63 y.o. female,Established patient, here for evaluation of the following chief complaint(s):  Post-Op Check (Site check)      Assessment & Plan   1. Ganglion of joint    Postop dermatitis   Topical benadryl       Subjective   HPI  Stable overall from removal of ganglion but has a blistering reaction around the incision. No open areas and this appears to a contact dermatitis from adhesive. Recommend topical benadryl. Follow up with me as needed    Review of Systems       Objective   Physical Exam       Electronically signed by NORY BENSON MD on 2024 at 3:18 PM        An electronic signature was used to authenticate this note.    --NORY BENSON MD

## 2024-05-01 ENCOUNTER — POST-OP TELEPHONE (OUTPATIENT)
Dept: SURGERY | Age: 64
End: 2024-05-01

## 2024-05-01 ENCOUNTER — TELEPHONE (OUTPATIENT)
Dept: SURGERY | Age: 64
End: 2024-05-01

## 2024-05-01 ENCOUNTER — HOSPITAL ENCOUNTER (EMERGENCY)
Age: 64
Discharge: HOME | End: 2024-05-01
Attending: EMERGENCY MEDICINE
Payer: COMMERCIAL

## 2024-05-01 VITALS
HEIGHT: 61 IN | DIASTOLIC BLOOD PRESSURE: 75 MMHG | BODY MASS INDEX: 45.05 KG/M2 | TEMPERATURE: 97.9 F | HEART RATE: 68 BPM | WEIGHT: 238.6 LBS | RESPIRATION RATE: 16 BRPM | SYSTOLIC BLOOD PRESSURE: 151 MMHG | OXYGEN SATURATION: 98 %

## 2024-05-01 DIAGNOSIS — L03.115 CELLULITIS OF RIGHT LOWER EXTREMITY: Primary | ICD-10-CM

## 2024-05-01 PROCEDURE — 6370000000 HC RX 637 (ALT 250 FOR IP): Performed by: EMERGENCY MEDICINE

## 2024-05-01 PROCEDURE — 99283 EMERGENCY DEPT VISIT LOW MDM: CPT

## 2024-05-01 RX ORDER — METHYLPREDNISOLONE 4 MG/1
4 TABLET ORAL NIGHTLY
Status: DISCONTINUED | OUTPATIENT
Start: 2024-05-03 | End: 2024-05-02 | Stop reason: HOSPADM

## 2024-05-01 RX ORDER — METHYLPREDNISOLONE 4 MG/1
4 TABLET ORAL
Status: DISCONTINUED | OUTPATIENT
Start: 2024-05-02 | End: 2024-05-02 | Stop reason: HOSPADM

## 2024-05-01 RX ORDER — CEPHALEXIN 500 MG/1
500 CAPSULE ORAL 4 TIMES DAILY
Qty: 28 CAPSULE | Refills: 0 | Status: SHIPPED | OUTPATIENT
Start: 2024-05-01 | End: 2024-05-08

## 2024-05-01 RX ORDER — METHYLPREDNISOLONE 4 MG/1
24 TABLET ORAL ONCE
Status: DISCONTINUED | OUTPATIENT
Start: 2024-05-01 | End: 2024-05-02 | Stop reason: HOSPADM

## 2024-05-01 RX ORDER — METHYLPREDNISOLONE 4 MG/1
8 TABLET ORAL NIGHTLY
Status: DISCONTINUED | OUTPATIENT
Start: 2024-05-02 | End: 2024-05-02 | Stop reason: HOSPADM

## 2024-05-01 RX ORDER — CEPHALEXIN 500 MG/1
500 CAPSULE ORAL ONCE
Status: COMPLETED | OUTPATIENT
Start: 2024-05-01 | End: 2024-05-01

## 2024-05-01 RX ADMIN — CEPHALEXIN 500 MG: 500 CAPSULE ORAL at 22:20

## 2024-05-01 NOTE — TELEPHONE ENCOUNTER
Patient reports her rash is worse. Its Further up her leg. She claims her blisters are leaking. She Denies fever/ chills. Not warm to touch but has swelling.  She mentioned that its becoming painful. The benadryl cream is not working.    If oral medication needed, please send to [facility] on [address].

## 2024-05-02 NOTE — DISCHARGE INSTRUCTIONS
Discharge home  Keflex  Follow-up with Dr. Bonifacio Soliman in the next 48 hours for wound recheck

## 2024-05-02 NOTE — ED PROVIDER NOTES
08/29/2017    Robotic assisted lapascopic gastric sleeve performed at [facility] by MD ZARA Lake    BUNIONECTOMY Bilateral 10/02/2020, 2/26/21    CHOLECYSTECTOMY  2015    LAPROSCOPIC    COLONOSCOPY N/A 03/18/2021    COLONOSCOPY DIAGNOSTIC performed by Louisa Freitas MD at [facility] ENDOSCOPY    ENDOSCOPY, COLON, DIAGNOSTIC      HEMORRHOID SURGERY  2001    HYSTERECTOMY (CERVIX STATUS UNKNOWN)  06/2009    Total     LEG BIOPSY EXCISION Right 4/24/2024    RIGHT KNEE GANGLION CYST REMOVAL performed by Bonifacio Soliman MD at [facility] OR    OTHER SURGICAL HISTORY  02/17/2017    EGD    RADIOFREQUENCY ABLATION  12/21/2023    TONSILLECTOMY      TUBAL LIGATION  1983    UPPER GASTROINTESTINAL ENDOSCOPY N/A 03/18/2021    EGD DIAGNOSTIC ONLY performed by Louisa Freitas MD at [facility] ENDOSCOPY    UTERINE FIBROID EMBOLIZATION  2002         CURRENT MEDICATIONS       Previous Medications    AZELASTINE (ASTELIN) 0.1 % NASAL SPRAY    2 sprays by Nasal route 2 times daily Use in each nostril as directed    DICLOFENAC SODIUM (VOLTAREN) 1 % GEL    Apply 4 g topically 4 times daily    FUROSEMIDE (LASIX) 20 MG TABLET    0.5- 1 tablet as for edema    HYDROCODONE-ACETAMINOPHEN (NORCO) 5-325 MG PER TABLET    Take 1 tablet by mouth every 6 hours as needed.    LIDOCAINE (LIDODERM) 5 %    Place 1 patch onto the skin daily 12 hours on, 12 hours off.    METOPROLOL-HYDROCHLOROTHIAZIDE (LOPRESSOR HCT) 50-25 MG PER TABLET    Take 1 tablet by mouth daily FILL 7/7/24    MULTIPLE VITAMINS-MINERALS (BARIATRIC FUSION) CHEW    Take 4 tablets by mouth daily    ONDANSETRON (ZOFRAN-ODT) 4 MG DISINTEGRATING TABLET    Take 1 tablet by mouth 3 times daily as needed for Nausea or Vomiting    SEMAGLUTIDE-WEIGHT MANAGEMENT (WEGOVY) 2.4 MG/0.75ML SOAJ SC INJECTION    Inject 2.4 mg into the skin every 7 days       ALLERGIES     Latex    FAMILY HISTORY       Family History   Problem Relation Age of Onset    High Blood Pressure Mother     Diabetes Father

## 2024-05-28 ENCOUNTER — OFFICE VISIT (OUTPATIENT)
Dept: FAMILY MEDICINE CLINIC | Age: 64
End: 2024-05-28
Payer: COMMERCIAL

## 2024-05-28 VITALS — BODY MASS INDEX: 43.8 KG/M2 | HEIGHT: 61 IN | OXYGEN SATURATION: 99 % | WEIGHT: 232 LBS | HEART RATE: 66 BPM

## 2024-05-28 DIAGNOSIS — E66.01 MORBID OBESITY WITH BMI OF 45.0-49.9, ADULT (HCC): ICD-10-CM

## 2024-05-28 DIAGNOSIS — F41.9 ANXIETY: Primary | ICD-10-CM

## 2024-05-28 DIAGNOSIS — I47.10 PAROXYSMAL SUPRAVENTRICULAR TACHYCARDIA (HCC): ICD-10-CM

## 2024-05-28 PROBLEM — I47.29 PVT (PAROXYSMAL VENTRICULAR TACHYCARDIA) (HCC): Status: ACTIVE | Noted: 2024-05-28

## 2024-05-28 PROBLEM — Z98.84 S/P LAPAROSCOPIC SLEEVE GASTRECTOMY: Status: RESOLVED | Noted: 2017-09-13 | Resolved: 2024-05-28

## 2024-05-28 PROBLEM — I47.29 PVT (PAROXYSMAL VENTRICULAR TACHYCARDIA) (HCC): Status: RESOLVED | Noted: 2024-05-28 | Resolved: 2024-05-28

## 2024-05-28 PROBLEM — I47.19 ATRIAL TACHYCARDIA (HCC): Status: RESOLVED | Noted: 2018-05-17 | Resolved: 2024-05-28

## 2024-05-28 PROBLEM — I47.20 PVT (PAROXYSMAL VENTRICULAR TACHYCARDIA): Status: ACTIVE | Noted: 2024-05-28

## 2024-05-28 PROBLEM — I47.20 PVT (PAROXYSMAL VENTRICULAR TACHYCARDIA): Status: RESOLVED | Noted: 2024-05-28 | Resolved: 2024-05-28

## 2024-05-28 PROCEDURE — 99213 OFFICE O/P EST LOW 20 MIN: CPT | Performed by: NURSE PRACTITIONER

## 2024-05-28 RX ORDER — HYDROXYZINE HYDROCHLORIDE 25 MG/1
25 TABLET, FILM COATED ORAL EVERY 8 HOURS PRN
Qty: 30 TABLET | Refills: 0 | Status: SHIPPED | OUTPATIENT
Start: 2024-05-28 | End: 2024-06-07

## 2024-05-28 RX ORDER — SEMAGLUTIDE 2.4 MG/.75ML
2.4 INJECTION, SOLUTION SUBCUTANEOUS
Qty: 9 ML | Refills: 3 | Status: SHIPPED | OUTPATIENT
Start: 2024-05-28

## 2024-05-28 SDOH — ECONOMIC STABILITY: FOOD INSECURITY: WITHIN THE PAST 12 MONTHS, YOU WORRIED THAT YOUR FOOD WOULD RUN OUT BEFORE YOU GOT MONEY TO BUY MORE.: NEVER TRUE

## 2024-05-28 SDOH — ECONOMIC STABILITY: FOOD INSECURITY: WITHIN THE PAST 12 MONTHS, THE FOOD YOU BOUGHT JUST DIDN'T LAST AND YOU DIDN'T HAVE MONEY TO GET MORE.: NEVER TRUE

## 2024-05-28 SDOH — ECONOMIC STABILITY: INCOME INSECURITY: HOW HARD IS IT FOR YOU TO PAY FOR THE VERY BASICS LIKE FOOD, HOUSING, MEDICAL CARE, AND HEATING?: NOT VERY HARD

## 2024-05-28 ASSESSMENT — ANXIETY QUESTIONNAIRES
6. BECOMING EASILY ANNOYED OR IRRITABLE: NEARLY EVERY DAY
GAD7 TOTAL SCORE: 19
IF YOU CHECKED OFF ANY PROBLEMS ON THIS QUESTIONNAIRE, HOW DIFFICULT HAVE THESE PROBLEMS MADE IT FOR YOU TO DO YOUR WORK, TAKE CARE OF THINGS AT HOME, OR GET ALONG WITH OTHER PEOPLE: SOMEWHAT DIFFICULT
4. TROUBLE RELAXING: NEARLY EVERY DAY
1. FEELING NERVOUS, ANXIOUS, OR ON EDGE: NEARLY EVERY DAY
5. BEING SO RESTLESS THAT IT IS HARD TO SIT STILL: NEARLY EVERY DAY
2. NOT BEING ABLE TO STOP OR CONTROL WORRYING: NEARLY EVERY DAY
7. FEELING AFRAID AS IF SOMETHING AWFUL MIGHT HAPPEN: SEVERAL DAYS

## 2024-05-28 NOTE — PROGRESS NOTES
tachycardia (HCC)  CONTINUE LOPRESSOR  Morbid obesity with BMI of 45.0-49.9, adult (HCC)  -     Semaglutide-Weight Management (WEGOVY) 2.4 MG/0.75ML SOAJ SC injection; Inject 2.4 mg into the skin every 7 days           An electronic signature was used to authenticate this note.    --Lizzie Holm, APRDARREN - CNP

## 2024-05-28 NOTE — PATIENT INSTRUCTIONS

## 2024-05-29 ENCOUNTER — PATIENT MESSAGE (OUTPATIENT)
Dept: FAMILY MEDICINE CLINIC | Age: 64
End: 2024-05-29

## 2024-05-29 NOTE — TELEPHONE ENCOUNTER
From: Daniella Webb  To: Lizzie Holm  Sent: 5/29/2024 12:42 PM EDT  Subject: Breast exam     I forgot to as for a referral for my mammogram I can't find the one I had

## 2024-05-31 ENCOUNTER — HOSPITAL ENCOUNTER (OUTPATIENT)
Dept: MAMMOGRAPHY | Age: 64
Discharge: HOME | End: 2024-05-31
Payer: COMMERCIAL

## 2024-05-31 VITALS — BODY MASS INDEX: 43.8 KG/M2 | HEIGHT: 61 IN | WEIGHT: 232 LBS

## 2024-05-31 DIAGNOSIS — Z12.31 ENCOUNTER FOR SCREENING MAMMOGRAM FOR MALIGNANT NEOPLASM OF BREAST: ICD-10-CM

## 2024-05-31 DIAGNOSIS — Z12.31 VISIT FOR SCREENING MAMMOGRAM: ICD-10-CM

## 2024-05-31 PROCEDURE — 77063 BREAST TOMOSYNTHESIS BI: CPT

## 2024-06-03 RX ORDER — HYDROCORTISONE 25 MG/G
CREAM TOPICAL
Qty: 28 G | Refills: 1 | Status: SHIPPED | OUTPATIENT
Start: 2024-06-03

## 2024-06-25 ENCOUNTER — TELEPHONE (OUTPATIENT)
Dept: BARIATRICS/WEIGHT MGMT | Age: 64
End: 2024-06-25

## 2024-06-25 NOTE — TELEPHONE ENCOUNTER
Pt calling in s/p sleeve/hh 2017. Stating that she feels that her hiatal hernia is coming back. Last image was in 2/2024. Would you like any new imaging before scheduling her follow up appt.     Please advise

## 2024-06-26 DIAGNOSIS — Z98.84 S/P LAPAROSCOPIC SLEEVE GASTRECTOMY: Primary | ICD-10-CM

## 2024-06-26 DIAGNOSIS — Z87.19 H/O HIATAL HERNIA: ICD-10-CM

## 2024-07-01 ENCOUNTER — HOSPITAL ENCOUNTER (OUTPATIENT)
Dept: GENERAL RADIOLOGY | Age: 64
Discharge: HOME | End: 2024-07-01
Attending: SURGERY
Payer: COMMERCIAL

## 2024-07-01 DIAGNOSIS — Z98.84 S/P LAPAROSCOPIC SLEEVE GASTRECTOMY: ICD-10-CM

## 2024-07-01 DIAGNOSIS — Z87.19 H/O HIATAL HERNIA: ICD-10-CM

## 2024-07-01 PROCEDURE — 74240 X-RAY XM UPR GI TRC 1CNTRST: CPT

## 2024-07-09 RX ORDER — METOPROLOL TARTRATE AND HYDROCHLOROTHIAZIDE 50; 25 MG/1; MG/1
1 TABLET ORAL DAILY
Qty: 90 TABLET | Refills: 0 | OUTPATIENT
Start: 2024-07-09

## 2024-07-24 ENCOUNTER — PREP FOR PROCEDURE (OUTPATIENT)
Dept: BARIATRICS/WEIGHT MGMT | Age: 64
End: 2024-07-24

## 2024-07-24 ENCOUNTER — TELEPHONE (OUTPATIENT)
Dept: FAMILY MEDICINE CLINIC | Age: 64
End: 2024-07-24

## 2024-07-24 ENCOUNTER — OFFICE VISIT (OUTPATIENT)
Dept: BARIATRICS/WEIGHT MGMT | Age: 64
End: 2024-07-24
Payer: COMMERCIAL

## 2024-07-24 ENCOUNTER — TELEPHONE (OUTPATIENT)
Dept: BARIATRICS/WEIGHT MGMT | Age: 64
End: 2024-07-24

## 2024-07-24 VITALS
WEIGHT: 226 LBS | DIASTOLIC BLOOD PRESSURE: 87 MMHG | HEART RATE: 71 BPM | BODY MASS INDEX: 42.67 KG/M2 | HEIGHT: 61 IN | SYSTOLIC BLOOD PRESSURE: 122 MMHG

## 2024-07-24 DIAGNOSIS — K21.00 HIATAL HERNIA WITH GERD AND ESOPHAGITIS: Primary | ICD-10-CM

## 2024-07-24 DIAGNOSIS — K44.9 HIATAL HERNIA WITH GERD AND ESOPHAGITIS: Primary | ICD-10-CM

## 2024-07-24 DIAGNOSIS — K44.9 ESOPHAGEAL HIATAL HERNIA: ICD-10-CM

## 2024-07-24 PROBLEM — K21.9 GASTROESOPHAGEAL REFLUX DISEASE: Status: ACTIVE | Noted: 2024-07-24

## 2024-07-24 PROCEDURE — 3079F DIAST BP 80-89 MM HG: CPT | Performed by: SURGERY

## 2024-07-24 PROCEDURE — 3074F SYST BP LT 130 MM HG: CPT | Performed by: SURGERY

## 2024-07-24 PROCEDURE — 99214 OFFICE O/P EST MOD 30 MIN: CPT | Performed by: SURGERY

## 2024-07-24 RX ORDER — HEPARIN SODIUM 5000 [USP'U]/ML
5000 INJECTION, SOLUTION INTRAVENOUS; SUBCUTANEOUS ONCE
Status: CANCELLED | OUTPATIENT
Start: 2024-08-01 | End: 2024-07-24

## 2024-07-24 RX ORDER — SODIUM CHLORIDE 9 MG/ML
INJECTION, SOLUTION INTRAVENOUS PRN
Status: CANCELLED | OUTPATIENT
Start: 2024-08-01

## 2024-07-24 RX ORDER — OMEPRAZOLE 40 MG/1
40 CAPSULE, DELAYED RELEASE ORAL
Qty: 90 CAPSULE | Refills: 1 | Status: SHIPPED | OUTPATIENT
Start: 2024-07-24

## 2024-07-24 RX ORDER — SODIUM CHLORIDE 0.9 % (FLUSH) 0.9 %
5-40 SYRINGE (ML) INJECTION PRN
Status: CANCELLED | OUTPATIENT
Start: 2024-08-01

## 2024-07-24 RX ORDER — SODIUM CHLORIDE 9 MG/ML
INJECTION, SOLUTION INTRAVENOUS CONTINUOUS
Status: CANCELLED | OUTPATIENT
Start: 2024-08-01

## 2024-07-24 RX ORDER — SODIUM CHLORIDE 0.9 % (FLUSH) 0.9 %
5-40 SYRINGE (ML) INJECTION EVERY 12 HOURS SCHEDULED
Status: CANCELLED | OUTPATIENT
Start: 2024-08-01

## 2024-07-24 NOTE — TELEPHONE ENCOUNTER
----- Message from Bassam Mejia sent at 7/24/2024 12:31 PM EDT -----  Regarding: [facility] Appointment Request  [facility] Appointment Request    Patient needs appointment for [facility] Appointment Type: Pre-Op Visit.    Patient Requested Dates(s): Anytime this week   Patient Requested Time: Anytime after 12 noon  Provider Name:  Lizzie TALON MORALES CNP    Reason for Appointment Request: Established Patient - Available appointments did not meet patient need  --------------------------------------------------------------------------------------------------------------------------    Relationship to Patient: Self     Call Back Information: OK to leave message on voicemail  Preferred Call Back Number: Phone [phone] (home)

## 2024-07-24 NOTE — TELEPHONE ENCOUNTER
NO APPOINTMENTS AVAILABLE  PRIOR TO PATIENT SURGERY. SPOKE TO CHARBEL RICHMOND  AND SHE RECOMMENDED ME GIVE THE NUMBER FOR [facility] ON [address]  SO SHE CAN CALL AND SCHEDULE BECAUSE THEY CAN DO PRE OP'S. CALLED AND SPOKE TO PATIENT AND ADVISED. SC

## 2024-07-24 NOTE — TELEPHONE ENCOUNTER
CAREN MOREL Patient    Member ID  [id number]  Date of Birth  1960  Gender  NA    Eligibility Status  A  Group Number  [id number]  Plan / Coverage Date  2024-01-01 - 9999-12-31    Transaction Type  Outpatient Authorization  Organization  [facility]  Payer  [facility]      Transaction ID: [id number]  Customer ID: [id number]  Transaction Date: 2024-07-24  ×    Based on information entered, no authorization is required.  No Authorization Required  Service Type  2 - Surgical  Place of Service  22 - [facility]  Service From - To Date  2024-08-01 - 2024-08-02    Quantity  1 Units    Level of Service  Elective    Diagnosis Code 1  K449 - Diaphragmatic hernia without obstruction or gangrene    Diagnosis Code 2  K219 - Gastro-esophageal reflux disease without esophagitis    Procedure Code 1  48061 - LAP PARAESOPH HER RPR W/MESH  Quantity  1 Units    Status  NO AUTH REQUIRED    Print

## 2024-07-26 NOTE — PROGRESS NOTES
7/26/2024 1023 AM:    LMOR REQUESTING PT TO RETURN CALL TO [facility] NEEDS H&P/TS    LMOR W/INSTRUCTIONS IF TAKING WEGOVY PER ANESTHESIA HOLD/STOP FOR 7 FULL DAYS PRIOR TO PROCEDURE AND PLEASE CALL [facility] FOR DATE OF LAST DOS/TS

## 2024-07-26 NOTE — PROGRESS NOTES
Dietary Assessment Note      Vitals:   Vitals:    24 1025   BP: 122/87   Pulse: 71   Weight: 102.5 kg (226 lb)   Height: 1.549 m (5' 1\")    Patient lost 72 lbs over 3 years, 1 month.    Total Weight Loss: 95 lbs    Labs reviewed: no new nutrition labs     Protein intake: Patient not tracking     Fluid intake: >64 oz/day    Multivitamin/mineral intake:  Bariatric all in one MVI gummy-fusion     Calcium intake: no    Other: B12    Exercise:  back pain last year in - pt had ablation on back; some weight lifting     Nutrition Assessment: 6 year 11 month post-op visit. Pt reports that she skips meals frequently due to not having much of an appetite.      Breakfast: slim fast protein shake or 1 piece of toast with 1 boiled egg  Snack: applesauce or cheese and crackers   Lunch: none most days or salad with turkey roll up   Snack: handful of vegetable straws or nuts   Dinner: eating more vegetarian options lately or meat with a vegetable and small amount of sweet potato    Snack: outshine popsicle or none      Fluids:  water-128 oz. Total-8 bottles/day @ 16 oz.; sometimes 1 cup of coffee few times/week    Amount able to eat per sittin cup total     Following  rule: Yes    Food Intolerances/issues: onions, turkey chili-red sauce     Client Concerns: vomiting yellow bile 3 times/week, feels like something stuck in throat       Goals:   Limit fluid intake to 100 oz./day  Increase eating frequency to 4 times/day-protein paired with produce  Increase MVI to 2 times/day and start calcium supplementation       Plan: F/U per provider     Tamia Jordan, RD, LD   
nostril as directed, Disp: 60 mL, Rfl: 0    furosemide (LASIX) 20 MG tablet, 0.5- 1 tablet as for edema, Disp: 180 tablet, Rfl: 1    lidocaine (LIDODERM) 5 %, Place 1 patch onto the skin daily 12 hours on, 12 hours off., Disp: 30 patch, Rfl: 0    diclofenac sodium (VOLTAREN) 1 % GEL, Apply 4 g topically 4 times daily, Disp: 100 g, Rfl: 0    HYDROcodone-acetaminophen (NORCO) 5-325 MG per tablet, Take 1 tablet by mouth every 6 hours as needed., Disp: , Rfl:     ondansetron (ZOFRAN-ODT) 4 MG disintegrating tablet, Take 1 tablet by mouth 3 times daily as needed for Nausea or Vomiting, Disp: 21 tablet, Rfl: 1    Multiple Vitamins-Minerals (BARIATRIC FUSION) CHEW, Take 4 tablets by mouth daily, Disp: , Rfl:     ROS  Review of Systems - History obtained from the patient  General ROS: negative  Psychological ROS: negative  Ophthalmic ROS: negative  Neurological ROS: negative  ENT ROS: negative  Allergy and Immunology ROS: negative  Hematological and Lymphatic ROS: negative  Endocrine ROS: negative  Respiratory ROS: negative  Cardiovascular ROS: negative  Gastrointestinal ROS: Heartburn  Genito-Urinary ROS: negative  Musculoskeletal ROS: negative   Skin ROS: negative        Physical Exam     Abdominal: Soft. Normal appearance and bowel sounds are normal. Patient exhibits no distension or tenderness. There is no rigidity, no rebound, no guarding and no CVA tenderness. Hernia confirmed negative in the ventral area.   Musculoskeletal: Normal range of motion. Patient exhibits no edema or tenderness.   Neurological: Patient is alert and oriented to person, place, and time.Patient has normal strength. Coordination and gait normal. GCS eye subscore is 4. GCS verbal subscore is 5. GCS motor subscore is 6.   Skin: Skin is warm and dry. No abrasion and no rash noted. Patient  is not diaphoretic. No cyanosis or erythema.   Psychiatric: Patient has a normal mood and affect.  speech is normal and behavior is normal. Cognition and memory

## 2024-07-26 NOTE — PROGRESS NOTES
7/26/24 1053: This writer spoke with Jenny at [facility] to request H&P, Labs and EKG tracing. Per Jenny blood work will not be resulted/reviewed until Monday 7/29/24, this writer requested that labwork be faxed to PAT ASAP. PAT fax given during phone call. - BDS

## 2024-07-26 NOTE — PROGRESS NOTES
[facility] PRE-SURGICAL TESTING INSTRUCTIONS                      PRIOR TO PROCEDURE DATE:    1. PLEASE FOLLOW ANY INSTRUCTIONS GIVEN TO YOU PER YOUR SURGEON.      2. Arrange for someone to drive you home and be with you for the first 24 hours after discharge for your safety after your procedure for which you received sedation. Ensure it is someone we can share information with regarding your discharge.     NOTE: At this time ONLY 2 ADULTS may accompany you   One person ENCOURAGED to stay at hospital entire time if outpatient surgery      3. You must contact your surgeon for instructions IF:  You are taking any blood thinners, aspirin, anti-inflammatory or vitamins.  There is a change in your physical condition such as a cold, fever, rash, cuts, sores, or any other infection, especially near your surgical site.    4. Do not drink alcohol the day before or day of your procedure.  Do not use any recreational marijuana at least 24 hours or street drugs (heroin, cocaine) at minimum 5 days prior to your procedure.     5. A Pre-Surgical History and Physical MUST be completed WITHIN 30 DAYS OR LESS prior to your procedure.by your Physician or an Urgent Care        THE DAY OF YOUR PROCEDURE:  1.  Follow instructions for ARRIVAL TIME as DIRECTED BY YOUR SURGEON.     2. Enter the MAIN entrance from [facility] and follow the signs to the free Parking Garage or  Parking (offered free of charge 7 am-5pm).      3. Enter the Main Entrance of the hospital (do not enter from the lower level of the parking garage). Upon entrance, check in with the  at the surgical information desk on your LEFT.   Bring your insurance card and photo ID to register      4. DO NOT EAT ANYTHING 8 hours prior to arrival for surgery.  You may have up to 8 ounces of water 4 hours prior to your arrival for surgery.   NOTE: ALL Gastric, Bariatric & Bowel surgery patients - you MUST follow your surgeon's instructions regarding  extreme drowsiness, changes in your vital signs or breathing patterns. Nausea, headache, muscle aches, or sore throat may also occur after anesthesia.  Your nurse will help you manage these potential side effects.    2. For comfort and safety, arrange to have someone at home with you for the first 24 hours after discharge.    3. You and your family will be given written instructions about your diet, activity, dressing care, medications, and return visits.     4. Once at home, should issues with nausea, pain, or bleeding occur, or should you notice any signs of infection, you should call your surgeon.    5. Always clean your hands before and after caring for your wound. Do not let your family touch your surgery site without cleaning their hands.     6. Narcotic pain medications can cause significant constipation.  You may want to add a stool softener to your postoperative medication schedule or speak to your surgeon on how best to manage this SIDE EFFECT.    SPECIAL INSTRUCTIONS: Pt instructed to bring case for eyeglasses. Pt states understanding of pre-op instructions.     Thank you for allowing us to care for you.  We strive to exceed your expectations in the delivery of care and service provided to you and your family.     If you need to contact the Pre-Admission Testing staff for any reason, please call us at [phone]    Instructions reviewed with patient during preadmission testing phone interview.  Lisa Britt RN.7/26/2024 .11:05 AM      ADDITIONAL EDUCATIONAL INFORMATION REVIEWED PER PHONE WITH YOU AND/OR YOUR FAMILY:  Yes Antibacterial Soap- Pt instructed to use Antibacterial Soap ( Dial or Safeguard) 2-3 times prior to surgery.

## 2024-07-31 ENCOUNTER — TELEPHONE (OUTPATIENT)
Dept: BARIATRICS/WEIGHT MGMT | Age: 64
End: 2024-07-31

## 2024-07-31 ENCOUNTER — ANESTHESIA EVENT (OUTPATIENT)
Dept: OPERATING ROOM | Age: 64
End: 2024-07-31
Payer: COMMERCIAL

## 2024-07-31 NOTE — TELEPHONE ENCOUNTER
Spoke with pt to confirm her 10 am arrival for her surgery tomorrow. Pt reminded to be NPO. Pt denies use of her ibuprofen greater than a week. Pt obtained H&P/in media.

## 2024-08-01 ENCOUNTER — ANESTHESIA (OUTPATIENT)
Dept: OPERATING ROOM | Age: 64
End: 2024-08-01
Payer: COMMERCIAL

## 2024-08-01 ENCOUNTER — HOSPITAL ENCOUNTER (INPATIENT)
Age: 64
LOS: 2 days | Discharge: HOME | End: 2024-08-03
Attending: SURGERY | Admitting: SURGERY
Payer: COMMERCIAL

## 2024-08-01 DIAGNOSIS — G89.18 ACUTE POSTOPERATIVE PAIN OF ABDOMEN: Primary | ICD-10-CM

## 2024-08-01 DIAGNOSIS — R10.9 ACUTE POSTOPERATIVE PAIN OF ABDOMEN: Primary | ICD-10-CM

## 2024-08-01 LAB — POTASSIUM SERPL-SCNC: 3.9 MMOL/L (ref 3.5–5.1)

## 2024-08-01 PROCEDURE — 2500000003 HC RX 250 WO HCPCS: Performed by: SURGERY

## 2024-08-01 PROCEDURE — 2580000003 HC RX 258: Performed by: ANESTHESIOLOGY

## 2024-08-01 PROCEDURE — 6360000002 HC RX W HCPCS: Performed by: STUDENT IN AN ORGANIZED HEALTH CARE EDUCATION/TRAINING PROGRAM

## 2024-08-01 PROCEDURE — 2500000003 HC RX 250 WO HCPCS

## 2024-08-01 PROCEDURE — 1200000000 HC SEMI PRIVATE

## 2024-08-01 PROCEDURE — 3600000019 HC SURGERY ROBOT ADDTL 15MIN: Performed by: SURGERY

## 2024-08-01 PROCEDURE — 84132 ASSAY OF SERUM POTASSIUM: CPT

## 2024-08-01 PROCEDURE — 6360000002 HC RX W HCPCS

## 2024-08-01 PROCEDURE — 3700000000 HC ANESTHESIA ATTENDED CARE: Performed by: SURGERY

## 2024-08-01 PROCEDURE — 2580000003 HC RX 258

## 2024-08-01 PROCEDURE — L0450 TLSO FLEX TRUNK/THOR PRE OTS: HCPCS | Performed by: SURGERY

## 2024-08-01 PROCEDURE — 6360000002 HC RX W HCPCS: Performed by: ANESTHESIOLOGY

## 2024-08-01 PROCEDURE — 6360000002 HC RX W HCPCS: Performed by: SURGERY

## 2024-08-01 PROCEDURE — 0BUT4JZ SUPPLEMENT DIAPHRAGM WITH SYNTHETIC SUBSTITUTE, PERCUTANEOUS ENDOSCOPIC APPROACH: ICD-10-PCS | Performed by: SURGERY

## 2024-08-01 PROCEDURE — C1781 MESH (IMPLANTABLE): HCPCS | Performed by: SURGERY

## 2024-08-01 PROCEDURE — 2700000000 HC OXYGEN THERAPY PER DAY

## 2024-08-01 PROCEDURE — 43282 LAP PARAESOPH HER RPR W/MESH: CPT | Performed by: SURGERY

## 2024-08-01 PROCEDURE — 94761 N-INVAS EAR/PLS OXIMETRY MLT: CPT

## 2024-08-01 PROCEDURE — 7100000000 HC PACU RECOVERY - FIRST 15 MIN: Performed by: SURGERY

## 2024-08-01 PROCEDURE — S2900 ROBOTIC SURGICAL SYSTEM: HCPCS | Performed by: SURGERY

## 2024-08-01 PROCEDURE — 8E0W4CZ ROBOTIC ASSISTED PROCEDURE OF TRUNK REGION, PERCUTANEOUS ENDOSCOPIC APPROACH: ICD-10-PCS | Performed by: SURGERY

## 2024-08-01 PROCEDURE — 2580000003 HC RX 258: Performed by: SURGERY

## 2024-08-01 PROCEDURE — 3600000009 HC SURGERY ROBOT BASE: Performed by: SURGERY

## 2024-08-01 PROCEDURE — 2709999900 HC NON-CHARGEABLE SUPPLY: Performed by: SURGERY

## 2024-08-01 PROCEDURE — 2720000010 HC SURG SUPPLY STERILE: Performed by: SURGERY

## 2024-08-01 PROCEDURE — 2580000003 HC RX 258: Performed by: STUDENT IN AN ORGANIZED HEALTH CARE EDUCATION/TRAINING PROGRAM

## 2024-08-01 PROCEDURE — 3700000001 HC ADD 15 MINUTES (ANESTHESIA): Performed by: SURGERY

## 2024-08-01 PROCEDURE — 7100000001 HC PACU RECOVERY - ADDTL 15 MIN: Performed by: SURGERY

## 2024-08-01 DEVICE — MESH SURG W7XL10CM SEPRA TECHNOLOGY RECT PHASIX: Type: IMPLANTABLE DEVICE | Site: ABDOMEN | Status: FUNCTIONAL

## 2024-08-01 RX ORDER — SODIUM CHLORIDE, SODIUM LACTATE, POTASSIUM CHLORIDE, CALCIUM CHLORIDE 600; 310; 30; 20 MG/100ML; MG/100ML; MG/100ML; MG/100ML
INJECTION, SOLUTION INTRAVENOUS CONTINUOUS
Status: DISCONTINUED | OUTPATIENT
Start: 2024-08-01 | End: 2024-08-01 | Stop reason: HOSPADM

## 2024-08-01 RX ORDER — HEPARIN SODIUM 5000 [USP'U]/ML
5000 INJECTION, SOLUTION INTRAVENOUS; SUBCUTANEOUS ONCE
Status: COMPLETED | OUTPATIENT
Start: 2024-08-01 | End: 2024-08-01

## 2024-08-01 RX ORDER — SODIUM CHLORIDE, SODIUM LACTATE, POTASSIUM CHLORIDE, CALCIUM CHLORIDE 600; 310; 30; 20 MG/100ML; MG/100ML; MG/100ML; MG/100ML
INJECTION, SOLUTION INTRAVENOUS CONTINUOUS
Status: DISCONTINUED | OUTPATIENT
Start: 2024-08-01 | End: 2024-08-03 | Stop reason: HOSPADM

## 2024-08-01 RX ORDER — SODIUM CHLORIDE 0.9 % (FLUSH) 0.9 %
5-40 SYRINGE (ML) INJECTION PRN
Status: DISCONTINUED | OUTPATIENT
Start: 2024-08-01 | End: 2024-08-01 | Stop reason: HOSPADM

## 2024-08-01 RX ORDER — NALOXONE HYDROCHLORIDE 0.4 MG/ML
INJECTION, SOLUTION INTRAMUSCULAR; INTRAVENOUS; SUBCUTANEOUS PRN
Status: DISCONTINUED | OUTPATIENT
Start: 2024-08-01 | End: 2024-08-01 | Stop reason: HOSPADM

## 2024-08-01 RX ORDER — METOPROLOL TARTRATE 1 MG/ML
2.5 INJECTION, SOLUTION INTRAVENOUS EVERY 6 HOURS
Status: DISCONTINUED | OUTPATIENT
Start: 2024-08-01 | End: 2024-08-01

## 2024-08-01 RX ORDER — SODIUM CHLORIDE 9 MG/ML
INJECTION, SOLUTION INTRAVENOUS PRN
Status: DISCONTINUED | OUTPATIENT
Start: 2024-08-01 | End: 2024-08-01 | Stop reason: HOSPADM

## 2024-08-01 RX ORDER — SODIUM CHLORIDE, SODIUM LACTATE, POTASSIUM CHLORIDE, CALCIUM CHLORIDE 600; 310; 30; 20 MG/100ML; MG/100ML; MG/100ML; MG/100ML
INJECTION, SOLUTION INTRAVENOUS CONTINUOUS PRN
Status: DISCONTINUED | OUTPATIENT
Start: 2024-08-01 | End: 2024-08-01 | Stop reason: HOSPADM

## 2024-08-01 RX ORDER — SODIUM CHLORIDE 0.9 % (FLUSH) 0.9 %
10 SYRINGE (ML) INJECTION PRN
Status: DISCONTINUED | OUTPATIENT
Start: 2024-08-01 | End: 2024-08-03 | Stop reason: HOSPADM

## 2024-08-01 RX ORDER — MIDAZOLAM HYDROCHLORIDE 1 MG/ML
2 INJECTION INTRAMUSCULAR; INTRAVENOUS
Status: DISCONTINUED | OUTPATIENT
Start: 2024-08-01 | End: 2024-08-01 | Stop reason: HOSPADM

## 2024-08-01 RX ORDER — ONDANSETRON 2 MG/ML
4 INJECTION INTRAMUSCULAR; INTRAVENOUS
Status: COMPLETED | OUTPATIENT
Start: 2024-08-01 | End: 2024-08-01

## 2024-08-01 RX ORDER — PROPOFOL 10 MG/ML
INJECTION, EMULSION INTRAVENOUS PRN
Status: DISCONTINUED | OUTPATIENT
Start: 2024-08-01 | End: 2024-08-01 | Stop reason: SDUPTHER

## 2024-08-01 RX ORDER — ROCURONIUM BROMIDE 10 MG/ML
INJECTION, SOLUTION INTRAVENOUS PRN
Status: DISCONTINUED | OUTPATIENT
Start: 2024-08-01 | End: 2024-08-01 | Stop reason: SDUPTHER

## 2024-08-01 RX ORDER — FAMOTIDINE 10 MG/ML
INJECTION, SOLUTION INTRAVENOUS PRN
Status: DISCONTINUED | OUTPATIENT
Start: 2024-08-01 | End: 2024-08-01 | Stop reason: SDUPTHER

## 2024-08-01 RX ORDER — SODIUM CHLORIDE, SODIUM LACTATE, POTASSIUM CHLORIDE, AND CALCIUM CHLORIDE .6; .31; .03; .02 G/100ML; G/100ML; G/100ML; G/100ML
IRRIGANT IRRIGATION PRN
Status: DISCONTINUED | OUTPATIENT
Start: 2024-08-01 | End: 2024-08-01 | Stop reason: HOSPADM

## 2024-08-01 RX ORDER — SODIUM CHLORIDE 9 MG/ML
INJECTION, SOLUTION INTRAVENOUS PRN
Status: DISCONTINUED | OUTPATIENT
Start: 2024-08-01 | End: 2024-08-03 | Stop reason: HOSPADM

## 2024-08-01 RX ORDER — HYDROMORPHONE HYDROCHLORIDE 2 MG/ML
INJECTION, SOLUTION INTRAMUSCULAR; INTRAVENOUS; SUBCUTANEOUS PRN
Status: DISCONTINUED | OUTPATIENT
Start: 2024-08-01 | End: 2024-08-01 | Stop reason: SDUPTHER

## 2024-08-01 RX ORDER — METHOCARBAMOL 100 MG/ML
INJECTION, SOLUTION INTRAMUSCULAR; INTRAVENOUS PRN
Status: DISCONTINUED | OUTPATIENT
Start: 2024-08-01 | End: 2024-08-01 | Stop reason: SDUPTHER

## 2024-08-01 RX ORDER — HEPARIN SODIUM 5000 [USP'U]/ML
5000 INJECTION, SOLUTION INTRAVENOUS; SUBCUTANEOUS EVERY 8 HOURS SCHEDULED
Status: DISCONTINUED | OUTPATIENT
Start: 2024-08-01 | End: 2024-08-03 | Stop reason: HOSPADM

## 2024-08-01 RX ORDER — METOPROLOL TARTRATE 1 MG/ML
2.5 INJECTION, SOLUTION INTRAVENOUS EVERY 6 HOURS
Status: DISCONTINUED | OUTPATIENT
Start: 2024-08-02 | End: 2024-08-03 | Stop reason: HOSPADM

## 2024-08-01 RX ORDER — METOCLOPRAMIDE HYDROCHLORIDE 5 MG/ML
10 INJECTION INTRAMUSCULAR; INTRAVENOUS
Status: COMPLETED | OUTPATIENT
Start: 2024-08-01 | End: 2024-08-01

## 2024-08-01 RX ORDER — KETAMINE HCL IN NACL, ISO-OSM 20 MG/2 ML
SYRINGE (ML) INJECTION PRN
Status: DISCONTINUED | OUTPATIENT
Start: 2024-08-01 | End: 2024-08-01 | Stop reason: SDUPTHER

## 2024-08-01 RX ORDER — ONDANSETRON 2 MG/ML
4 INJECTION INTRAMUSCULAR; INTRAVENOUS EVERY 6 HOURS PRN
Status: DISCONTINUED | OUTPATIENT
Start: 2024-08-01 | End: 2024-08-03 | Stop reason: HOSPADM

## 2024-08-01 RX ORDER — FENTANYL CITRATE 50 UG/ML
50 INJECTION, SOLUTION INTRAMUSCULAR; INTRAVENOUS EVERY 5 MIN PRN
Status: DISCONTINUED | OUTPATIENT
Start: 2024-08-01 | End: 2024-08-01 | Stop reason: HOSPADM

## 2024-08-01 RX ORDER — METOCLOPRAMIDE HYDROCHLORIDE 5 MG/ML
5 INJECTION INTRAMUSCULAR; INTRAVENOUS ONCE
Status: COMPLETED | OUTPATIENT
Start: 2024-08-02 | End: 2024-08-02

## 2024-08-01 RX ORDER — FENTANYL CITRATE 50 UG/ML
INJECTION, SOLUTION INTRAMUSCULAR; INTRAVENOUS PRN
Status: DISCONTINUED | OUTPATIENT
Start: 2024-08-01 | End: 2024-08-01 | Stop reason: SDUPTHER

## 2024-08-01 RX ORDER — ONDANSETRON 2 MG/ML
INJECTION INTRAMUSCULAR; INTRAVENOUS PRN
Status: DISCONTINUED | OUTPATIENT
Start: 2024-08-01 | End: 2024-08-01 | Stop reason: SDUPTHER

## 2024-08-01 RX ORDER — NALOXONE HYDROCHLORIDE 0.4 MG/ML
INJECTION, SOLUTION INTRAMUSCULAR; INTRAVENOUS; SUBCUTANEOUS PRN
Status: DISCONTINUED | OUTPATIENT
Start: 2024-08-01 | End: 2024-08-02

## 2024-08-01 RX ORDER — SODIUM CHLORIDE, SODIUM LACTATE, POTASSIUM CHLORIDE, CALCIUM CHLORIDE 600; 310; 30; 20 MG/100ML; MG/100ML; MG/100ML; MG/100ML
INJECTION, SOLUTION INTRAVENOUS CONTINUOUS PRN
Status: DISCONTINUED | OUTPATIENT
Start: 2024-08-01 | End: 2024-08-01 | Stop reason: SDUPTHER

## 2024-08-01 RX ORDER — METOPROLOL TARTRATE 50 MG/1
50 TABLET, FILM COATED ORAL ONCE
Status: DISCONTINUED | OUTPATIENT
Start: 2024-08-01 | End: 2024-08-03 | Stop reason: HOSPADM

## 2024-08-01 RX ORDER — HYDROMORPHONE HYDROCHLORIDE 1 MG/ML
0.25 INJECTION, SOLUTION INTRAMUSCULAR; INTRAVENOUS; SUBCUTANEOUS EVERY 5 MIN PRN
Status: DISCONTINUED | OUTPATIENT
Start: 2024-08-01 | End: 2024-08-01 | Stop reason: HOSPADM

## 2024-08-01 RX ORDER — SODIUM CHLORIDE 0.9 % (FLUSH) 0.9 %
10 SYRINGE (ML) INJECTION EVERY 12 HOURS SCHEDULED
Status: DISCONTINUED | OUTPATIENT
Start: 2024-08-01 | End: 2024-08-03 | Stop reason: HOSPADM

## 2024-08-01 RX ORDER — LIDOCAINE HYDROCHLORIDE 20 MG/ML
INJECTION, SOLUTION INTRAVENOUS PRN
Status: DISCONTINUED | OUTPATIENT
Start: 2024-08-01 | End: 2024-08-01 | Stop reason: SDUPTHER

## 2024-08-01 RX ORDER — SODIUM CHLORIDE 0.9 % (FLUSH) 0.9 %
5-40 SYRINGE (ML) INJECTION EVERY 12 HOURS SCHEDULED
Status: DISCONTINUED | OUTPATIENT
Start: 2024-08-01 | End: 2024-08-01 | Stop reason: HOSPADM

## 2024-08-01 RX ORDER — SODIUM CHLORIDE 9 MG/ML
INJECTION, SOLUTION INTRAVENOUS CONTINUOUS
Status: DISCONTINUED | OUTPATIENT
Start: 2024-08-01 | End: 2024-08-01 | Stop reason: HOSPADM

## 2024-08-01 RX ORDER — SUCCINYLCHOLINE/SOD CL,ISO/PF 200MG/10ML
SYRINGE (ML) INTRAVENOUS PRN
Status: DISCONTINUED | OUTPATIENT
Start: 2024-08-01 | End: 2024-08-01 | Stop reason: SDUPTHER

## 2024-08-01 RX ORDER — LABETALOL HYDROCHLORIDE 5 MG/ML
10 INJECTION, SOLUTION INTRAVENOUS
Status: DISCONTINUED | OUTPATIENT
Start: 2024-08-01 | End: 2024-08-01 | Stop reason: HOSPADM

## 2024-08-01 RX ORDER — POLYETHYLENE GLYCOL 3350 17 G/17G
17 POWDER, FOR SOLUTION ORAL DAILY PRN
Status: DISCONTINUED | OUTPATIENT
Start: 2024-08-01 | End: 2024-08-02

## 2024-08-01 RX ADMIN — SODIUM CHLORIDE, PRESERVATIVE FREE 40 MG: 5 INJECTION INTRAVENOUS at 18:33

## 2024-08-01 RX ADMIN — ONDANSETRON 4 MG: 2 INJECTION INTRAMUSCULAR; INTRAVENOUS at 16:25

## 2024-08-01 RX ADMIN — METHOCARBAMOL 1000 MG: 100 INJECTION, SOLUTION INTRAMUSCULAR; INTRAVENOUS at 13:22

## 2024-08-01 RX ADMIN — ONDANSETRON 4 MG: 2 INJECTION INTRAMUSCULAR; INTRAVENOUS at 12:55

## 2024-08-01 RX ADMIN — Medication: at 16:32

## 2024-08-01 RX ADMIN — PROPOFOL 50 MG: 10 INJECTION, EMULSION INTRAVENOUS at 12:44

## 2024-08-01 RX ADMIN — HYDROMORPHONE HYDROCHLORIDE 0.25 MG: 1 INJECTION, SOLUTION INTRAMUSCULAR; INTRAVENOUS; SUBCUTANEOUS at 16:47

## 2024-08-01 RX ADMIN — HYDROMORPHONE HYDROCHLORIDE 0.5 MG: 2 INJECTION, SOLUTION INTRAMUSCULAR; INTRAVENOUS; SUBCUTANEOUS at 14:59

## 2024-08-01 RX ADMIN — HYDROMORPHONE HYDROCHLORIDE 0.5 MG: 2 INJECTION, SOLUTION INTRAMUSCULAR; INTRAVENOUS; SUBCUTANEOUS at 13:09

## 2024-08-01 RX ADMIN — DEXMEDETOMIDINE HYDROCHLORIDE 5 MCG: 100 INJECTION, SOLUTION INTRAVENOUS at 15:44

## 2024-08-01 RX ADMIN — PROPOFOL 200 MG: 10 INJECTION, EMULSION INTRAVENOUS at 12:43

## 2024-08-01 RX ADMIN — DEXMEDETOMIDINE HYDROCHLORIDE 5 MCG: 100 INJECTION, SOLUTION INTRAVENOUS at 15:38

## 2024-08-01 RX ADMIN — HEPARIN SODIUM 5000 UNITS: 5000 INJECTION INTRAVENOUS; SUBCUTANEOUS at 22:19

## 2024-08-01 RX ADMIN — FENTANYL CITRATE 100 MCG: 50 INJECTION, SOLUTION INTRAMUSCULAR; INTRAVENOUS at 12:42

## 2024-08-01 RX ADMIN — SUGAMMADEX 200 MG: 100 INJECTION, SOLUTION INTRAVENOUS at 15:41

## 2024-08-01 RX ADMIN — SODIUM CHLORIDE, POTASSIUM CHLORIDE, SODIUM LACTATE AND CALCIUM CHLORIDE: 600; 310; 30; 20 INJECTION, SOLUTION INTRAVENOUS at 23:15

## 2024-08-01 RX ADMIN — WATER 2000 MG: 1 INJECTION INTRAMUSCULAR; INTRAVENOUS; SUBCUTANEOUS at 12:55

## 2024-08-01 RX ADMIN — SODIUM CHLORIDE, POTASSIUM CHLORIDE, SODIUM LACTATE AND CALCIUM CHLORIDE: 600; 310; 30; 20 INJECTION, SOLUTION INTRAVENOUS at 11:10

## 2024-08-01 RX ADMIN — Medication 20 MG: at 15:45

## 2024-08-01 RX ADMIN — SODIUM CHLORIDE, SODIUM LACTATE, POTASSIUM CHLORIDE, AND CALCIUM CHLORIDE: .6; .31; .03; .02 INJECTION, SOLUTION INTRAVENOUS at 12:39

## 2024-08-01 RX ADMIN — SODIUM CHLORIDE, POTASSIUM CHLORIDE, SODIUM LACTATE AND CALCIUM CHLORIDE: 600; 310; 30; 20 INJECTION, SOLUTION INTRAVENOUS at 14:24

## 2024-08-01 RX ADMIN — Medication 160 MG: at 12:44

## 2024-08-01 RX ADMIN — METOCLOPRAMIDE HYDROCHLORIDE 10 MG: 5 INJECTION INTRAMUSCULAR; INTRAVENOUS at 16:43

## 2024-08-01 RX ADMIN — FAMOTIDINE 20 MG: 10 INJECTION, SOLUTION INTRAVENOUS at 12:55

## 2024-08-01 RX ADMIN — HEPARIN SODIUM 5000 UNITS: 5000 INJECTION INTRAVENOUS; SUBCUTANEOUS at 11:11

## 2024-08-01 RX ADMIN — LIDOCAINE HYDROCHLORIDE 100 MG: 20 INJECTION, SOLUTION INTRAVENOUS at 12:43

## 2024-08-01 RX ADMIN — HYDROMORPHONE HYDROCHLORIDE 0.5 MG: 2 INJECTION, SOLUTION INTRAMUSCULAR; INTRAVENOUS; SUBCUTANEOUS at 13:00

## 2024-08-01 RX ADMIN — ROCURONIUM BROMIDE 100 MG: 10 INJECTION, SOLUTION INTRAVENOUS at 12:50

## 2024-08-01 RX ADMIN — HYDROMORPHONE HYDROCHLORIDE 0.5 MG: 2 INJECTION, SOLUTION INTRAMUSCULAR; INTRAVENOUS; SUBCUTANEOUS at 14:22

## 2024-08-01 RX ADMIN — SODIUM CHLORIDE, POTASSIUM CHLORIDE, SODIUM LACTATE AND CALCIUM CHLORIDE: 600; 310; 30; 20 INJECTION, SOLUTION INTRAVENOUS at 11:08

## 2024-08-01 ASSESSMENT — PAIN DESCRIPTION - FREQUENCY
FREQUENCY: CONTINUOUS
FREQUENCY: CONTINUOUS

## 2024-08-01 ASSESSMENT — PAIN SCALES - GENERAL
PAINLEVEL_OUTOF10: 4
PAINLEVEL_OUTOF10: 3
PAINLEVEL_OUTOF10: 5
PAINLEVEL_OUTOF10: 3

## 2024-08-01 ASSESSMENT — LIFESTYLE VARIABLES: SMOKING_STATUS: 0

## 2024-08-01 ASSESSMENT — PAIN DESCRIPTION - DESCRIPTORS
DESCRIPTORS: SHARP
DESCRIPTORS: SHARP

## 2024-08-01 ASSESSMENT — PAIN DESCRIPTION - LOCATION
LOCATION: ABDOMEN
LOCATION: ABDOMEN

## 2024-08-01 ASSESSMENT — PAIN DESCRIPTION - PAIN TYPE
TYPE: SURGICAL PAIN
TYPE: SURGICAL PAIN

## 2024-08-01 ASSESSMENT — PAIN - FUNCTIONAL ASSESSMENT
PAIN_FUNCTIONAL_ASSESSMENT: ACTIVITIES ARE NOT PREVENTED
PAIN_FUNCTIONAL_ASSESSMENT: 0-10
PAIN_FUNCTIONAL_ASSESSMENT: ACTIVITIES ARE NOT PREVENTED

## 2024-08-01 ASSESSMENT — PAIN SCALES - WONG BAKER: WONGBAKER_NUMERICALRESPONSE: NO HURT

## 2024-08-01 ASSESSMENT — PAIN DESCRIPTION - ONSET: ONSET: ON-GOING

## 2024-08-01 ASSESSMENT — PAIN DESCRIPTION - ORIENTATION
ORIENTATION: UPPER
ORIENTATION: UPPER

## 2024-08-01 NOTE — ANESTHESIA POSTPROCEDURE EVALUATION
Department of Anesthesiology  Postprocedure Note    Patient: Daniella Webb  MRN: [id number]  YOB: 1960  Date of evaluation: 8/1/2024    Procedure Summary       Date: 08/01/24 Room / Location: [place] [address] / [facility]    Anesthesia Start: 1239 Anesthesia Stop: 1557    Procedure: ROBOTIC RE-DO HIATAL HERNIA REPAIR WITH MESH, LIGAMENTUM TERES WRAP, LYSIS OF ADHESIONS (Abdomen) Diagnosis:       Gastroesophageal reflux disease      Esophageal hiatal hernia      (Gastroesophageal reflux disease [K21.9])      (Esophageal hiatal hernia [K44.9])    Surgeons: Glen Lake DO Responsible Provider: Darion Forrest MD    Anesthesia Type: general ASA Status: 3            Anesthesia Type: No value filed.    Flash Phase I: Flash Score: 8    Flash Phase II:      Anesthesia Post Evaluation    Patient location during evaluation: PACU  Patient participation: complete - patient participated  Level of consciousness: awake and alert  Pain score: 3  Airway patency: patent  Nausea & Vomiting: no nausea and no vomiting  Cardiovascular status: hemodynamically stable  Respiratory status: acceptable  Hydration status: euvolemic  Pain management: adequate    No notable events documented.

## 2024-08-01 NOTE — BRIEF OP NOTE
Brief Postoperative Note      Patient: Daniella Webb  YOB: 1960  MRN: [id number]    Date of Procedure: 8/1/2024    Pre-Op Diagnosis Codes:     * Gastroesophageal reflux disease [K21.9]     * Esophageal hiatal hernia [K44.9]    Post-Op Diagnosis: Same       Procedure(s):  ROBOTIC RE-DO HIATAL HERNIA REPAIR WITH MESH, LIGAMENTUM TERES WRAP, LYSIS OF ADHESIONS    Surgeon(s):  Glen Lake DO Khan, Yasir, DO    Assistant:  Surgical Assistant: Omer Bird  Resident: Shayy Cummins DO    Anesthesia: General    Estimated Blood Loss (mL): Minimal    Complications: None    Specimens:   * No specimens in log *    Implants:  * No implants in log *      Drains: * No LDAs found *    Findings:  Infection Present At Time Of Surgery (PATOS) (choose all levels that have infection present):  No infection present  Other Findings: Procedure as described above.   PCA for post-operative pain control.  5mg Reglan for one dose tomorrow morning, will continue PPI  UGI in the AM  No cook in place during the case.    Electronically signed by Shayy Cummins DO on 8/1/2024 at 3:43 PM

## 2024-08-01 NOTE — PROGRESS NOTES
Pt ambulated to bathroom with contact guard assist, tolerated well. Pt urinated an unmeasured amount. Pt back in bed with alarm on locked and low. PCA pump in hand, call button within reach. SCD's on bilateral lower extremities.  Electronically signed by Tammie Quijano RN on 8/1/2024 at 7:01 PM

## 2024-08-01 NOTE — ANESTHESIA PRE PROCEDURE
Department of Anesthesiology  Preprocedure Note       Name:  Daniella Webb   Age:  64 y.o.  :  1960                                          MRN:  [id number]         Date:  2024      Surgeon: Surgeon(s):  Glen Lake DO    Procedure: Procedure(s):  ROBOTIC RE-DO HIATAL HERNIA REPAIR WITH MESH, LIGAMENTUM TERES WRAP    Medications prior to admission:   Prior to Admission medications    Medication Sig Start Date End Date Taking? Authorizing Provider   Ibuprofen (ADVIL PO) Take by mouth   Yes Provider, MD Deb   omeprazole (PRILOSEC) 40 MG delayed release capsule Take 1 capsule by mouth every morning (before breakfast) 24   Glen Lake DO   hydrocortisone (ANUSOL-HC) 2.5 % CREA rectal cream Apply 3 x daily PRN 6/3/24   Lizzie Holm APRN - CNP   Semaglutide-Weight Management (WEGOVY) 2.4 MG/0.75ML SOAJ SC injection Inject 2.4 mg into the skin every 7 days 24   Lizzie Holm APRN - CNP   metoprolol-hydroCHLOROthiazide (LOPRESSOR HCT) 50-25 MG per tablet Take 1 tablet by mouth daily FILL 7/7/24 7/7/24 10/5/24  Lizzie Holm APRN - CNP   azelastine (ASTELIN) 0.1 % nasal spray 2 sprays by Nasal route 2 times daily Use in each nostril as directed 24   Manuel Poe APRN - CNP   furosemide (LASIX) 20 MG tablet 0.5- 1 tablet as for edema 23   Manuel Poe APRN - CNP   diclofenac sodium (VOLTAREN) 1 % GEL Apply 4 g topically 4 times daily 23   Arlene Dill PA   ondansetron (ZOFRAN-ODT) 4 MG disintegrating tablet Take 1 tablet by mouth 3 times daily as needed for Nausea or Vomiting 23   Glischinski, Luke A, APRN - CNP   Multiple Vitamins-Minerals (BARIATRIC FUSION) CHEW Take 4 tablets by mouth daily    ProviderDeb MD       Current medications:    Current Facility-Administered Medications   Medication Dose Route Frequency Provider Last Rate Last Admin    lactated ringers IV soln infusion   IntraVENous Continuous Kim,

## 2024-08-01 NOTE — PROGRESS NOTES
PACU Transfer Note    Vitals:    08/01/24 1715   BP: 139/79   Pulse: 89   Resp: 22   Temp: 97.5 °F (36.4 °C)   SpO2: 98%       In: 2019 [I.V.:2019]  Out: 25     Pain assessment:  present - adequately treated  Pain Level: 3    Report given to Receiving unit JUSTYNA Jaeger.    8/1/2024 5:20 PM

## 2024-08-01 NOTE — PROGRESS NOTES
Pt arrived to room 5314 On 1L of supplemental oxygen. Pt lethargic but easy to arouse. VSS. Oral temp 97.7  Pt in bed, alarm on locked and low.   Electronically signed by Tammie Quijano RN on 8/1/2024 at 5:41 PM

## 2024-08-01 NOTE — PROGRESS NOTES
1555 Admitted to PACU om OR. Connected to monitor. Report at bedside. Strict NPO. PCA pump to be ordered. Oral airway in place. No sign of pain or nausea.

## 2024-08-01 NOTE — PROGRESS NOTES
1653 PCA explained and given to patient. Demonstrated can push button.  Nausea better but all the way gone.  Requests ice chips. Explained nothing by mouth. Mouth swabbed and lip balm to lips.

## 2024-08-01 NOTE — OP NOTE
DATE OF PROCEDURE:  08/01/2024     PREOPERATIVE DIAGNOSES:  1.  Recurrent Hiatal hernia with Gastroesophageal reflux disease and esophagitis.  2.  Previous sleeve gastrectomy       POSTOPERATIVE DIAGNOSES:  1.  Recurrent Hiatal hernia with Gastroesophageal reflux disease and esophagitis.  2.  Previous sleeve gastrectomy   3.  Peritoneal Adhesions     PROCEDURES PERFORMED:  1.  Robotic Redo hiatal hernia repair with biosynthetic mesh  2.  Ligamentum teres wrap  3.  Robotic extensive lysis of adhesions      SURGEON:  Glen Lake DO     ASSISTANT:  Maria G     ANESTHESIA:  General endotracheal.     COMPLICATIONS:  None.     CONDITION:  Stable.     EBL: 25cc     INDICATIONS FOR PROCEDURE:  The patient is a 64-year-old female with a moderate hiatal hernia and previous sleeve gastrectomy and complaints of reflux who was scheduled for an elective hiatal hernia repair with mesh and ligamentum teres wrap after understanding all risks, benefits and alternatives of the procedure.      DESCRIPTION OF THE PROCEDURE:  The patient was brought to operative  suite, laid in supine position with arms outstretched and after  induction of general endotracheal anesthesia, tube was confirmed to be  in place with end-tidal CO2 and secured.  The patient was then prepped  and draped in the usual sterile manner.     A small incision was made at the left midclavicular line.  Using the  Veress needle, abdomen was entered and pneumoperitoneum established 15  mm of CO2.  A 5-mm 30-degree camera housed in a 5-mm Optiview trocar was  used to enter the abdomen under direct visualization.  Once inside the  abdomen, additional trocars were placed.       The liver retractor was placed to retract the left lobe of the liver.  The patient was placed in a steep reverse Trendelenburg position.    I then spent significant amount of time taking down dense peritoneal adhesions from the anterior abdominal wall and liver to the stomach. Very meticulous dissection

## 2024-08-01 NOTE — INTERVAL H&P NOTE
Update History & Physical    The patient's History and Physical of July 26, 2024 was reviewed with the patient and I examined the patient. There was no change. The surgical site was confirmed by the patient and Dr. Lake.     Plan: The risks, benefits, expected outcome, and alternative to the recommended procedure have been discussed with the patient. Patient understands and wants to proceed with the procedure.     Electronically signed by Shayy Cummins DO on 8/1/2024 at 11:14 AM

## 2024-08-02 ENCOUNTER — APPOINTMENT (OUTPATIENT)
Dept: GENERAL RADIOLOGY | Age: 64
End: 2024-08-02
Attending: SURGERY
Payer: COMMERCIAL

## 2024-08-02 LAB
ALBUMIN SERPL-MCNC: 3.5 G/DL (ref 3.4–5)
ALBUMIN SERPL-MCNC: 4 G/DL (ref 3.4–5)
ANION GAP SERPL CALCULATED.3IONS-SCNC: 12 MMOL/L (ref 3–16)
ANION GAP SERPL CALCULATED.3IONS-SCNC: 13 MMOL/L (ref 3–16)
BASOPHILS # BLD: 0 K/UL (ref 0–0.2)
BASOPHILS NFR BLD: 0.6 %
BUN SERPL-MCNC: 10 MG/DL (ref 7–20)
BUN SERPL-MCNC: 8 MG/DL (ref 7–20)
CALCIUM SERPL-MCNC: 8.9 MG/DL (ref 8.3–10.6)
CALCIUM SERPL-MCNC: 9.2 MG/DL (ref 8.3–10.6)
CHLORIDE SERPL-SCNC: 102 MMOL/L (ref 99–110)
CHLORIDE SERPL-SCNC: 104 MMOL/L (ref 99–110)
CO2 SERPL-SCNC: 25 MMOL/L (ref 21–32)
CO2 SERPL-SCNC: 26 MMOL/L (ref 21–32)
CREAT SERPL-MCNC: 0.8 MG/DL (ref 0.6–1.2)
CREAT SERPL-MCNC: 0.9 MG/DL (ref 0.6–1.2)
DEPRECATED RDW RBC AUTO: 17 % (ref 12.4–15.4)
EOSINOPHIL # BLD: 0 K/UL (ref 0–0.6)
EOSINOPHIL NFR BLD: 0.5 %
GFR SERPLBLD CREATININE-BSD FMLA CKD-EPI: 72 ML/MIN/{1.73_M2}
GFR SERPLBLD CREATININE-BSD FMLA CKD-EPI: 83 ML/MIN/{1.73_M2}
GLUCOSE SERPL-MCNC: 116 MG/DL (ref 70–99)
GLUCOSE SERPL-MCNC: 93 MG/DL (ref 70–99)
HCT VFR BLD AUTO: 36.5 % (ref 36–48)
HGB BLD-MCNC: 12.2 G/DL (ref 12–16)
LYMPHOCYTES # BLD: 1.8 K/UL (ref 1–5.1)
LYMPHOCYTES NFR BLD: 26.7 %
MAGNESIUM SERPL-MCNC: 2 MG/DL (ref 1.8–2.4)
MCH RBC QN AUTO: 27 PG (ref 26–34)
MCHC RBC AUTO-ENTMCNC: 33.5 G/DL (ref 31–36)
MCV RBC AUTO: 80.6 FL (ref 80–100)
MONOCYTES # BLD: 0.4 K/UL (ref 0–1.3)
MONOCYTES NFR BLD: 5.2 %
NEUTROPHILS # BLD: 4.6 K/UL (ref 1.7–7.7)
NEUTROPHILS NFR BLD: 67 %
PHOSPHATE SERPL-MCNC: 1.7 MG/DL (ref 2.5–4.9)
PHOSPHATE SERPL-MCNC: 2.7 MG/DL (ref 2.5–4.9)
PLATELET # BLD AUTO: 205 K/UL (ref 135–450)
PMV BLD AUTO: 8.6 FL (ref 5–10.5)
POTASSIUM SERPL-SCNC: 2.9 MMOL/L (ref 3.5–5.1)
POTASSIUM SERPL-SCNC: 3.3 MMOL/L (ref 3.5–5.1)
RBC # BLD AUTO: 4.53 M/UL (ref 4–5.2)
SODIUM SERPL-SCNC: 140 MMOL/L (ref 136–145)
SODIUM SERPL-SCNC: 142 MMOL/L (ref 136–145)
WBC # BLD AUTO: 6.8 K/UL (ref 4–11)

## 2024-08-02 PROCEDURE — 6360000002 HC RX W HCPCS: Performed by: NURSE PRACTITIONER

## 2024-08-02 PROCEDURE — 6360000004 HC RX CONTRAST MEDICATION: Performed by: NURSE PRACTITIONER

## 2024-08-02 PROCEDURE — 2580000003 HC RX 258: Performed by: STUDENT IN AN ORGANIZED HEALTH CARE EDUCATION/TRAINING PROGRAM

## 2024-08-02 PROCEDURE — 74240 X-RAY XM UPR GI TRC 1CNTRST: CPT

## 2024-08-02 PROCEDURE — 6360000002 HC RX W HCPCS: Performed by: STUDENT IN AN ORGANIZED HEALTH CARE EDUCATION/TRAINING PROGRAM

## 2024-08-02 PROCEDURE — 85025 COMPLETE CBC W/AUTO DIFF WBC: CPT

## 2024-08-02 PROCEDURE — 83735 ASSAY OF MAGNESIUM: CPT

## 2024-08-02 PROCEDURE — 94761 N-INVAS EAR/PLS OXIMETRY MLT: CPT

## 2024-08-02 PROCEDURE — 2500000003 HC RX 250 WO HCPCS: Performed by: STUDENT IN AN ORGANIZED HEALTH CARE EDUCATION/TRAINING PROGRAM

## 2024-08-02 PROCEDURE — 6370000000 HC RX 637 (ALT 250 FOR IP): Performed by: NURSE PRACTITIONER

## 2024-08-02 PROCEDURE — 1200000000 HC SEMI PRIVATE

## 2024-08-02 PROCEDURE — 36415 COLL VENOUS BLD VENIPUNCTURE: CPT

## 2024-08-02 PROCEDURE — 2580000003 HC RX 258: Performed by: NURSE PRACTITIONER

## 2024-08-02 PROCEDURE — 80069 RENAL FUNCTION PANEL: CPT

## 2024-08-02 RX ORDER — OXYCODONE HCL 5 MG/5 ML
10 SOLUTION, ORAL ORAL EVERY 4 HOURS PRN
Status: DISCONTINUED | OUTPATIENT
Start: 2024-08-02 | End: 2024-08-03 | Stop reason: HOSPADM

## 2024-08-02 RX ORDER — POTASSIUM CHLORIDE 7.45 MG/ML
10 INJECTION INTRAVENOUS
Status: COMPLETED | OUTPATIENT
Start: 2024-08-02 | End: 2024-08-03

## 2024-08-02 RX ORDER — METHOCARBAMOL 750 MG/1
750 TABLET, FILM COATED ORAL EVERY 6 HOURS PRN
Qty: 30 TABLET | Refills: 0 | Status: SHIPPED | OUTPATIENT
Start: 2024-08-02 | End: 2024-08-12

## 2024-08-02 RX ORDER — OXYCODONE HYDROCHLORIDE 5 MG/1
5 TABLET ORAL EVERY 6 HOURS PRN
Qty: 28 TABLET | Refills: 0 | Status: SHIPPED | OUTPATIENT
Start: 2024-08-02 | End: 2024-08-09

## 2024-08-02 RX ORDER — OXYCODONE HCL 5 MG/5 ML
5 SOLUTION, ORAL ORAL EVERY 4 HOURS PRN
Status: DISCONTINUED | OUTPATIENT
Start: 2024-08-02 | End: 2024-08-03 | Stop reason: HOSPADM

## 2024-08-02 RX ORDER — DOCUSATE SODIUM 100 MG/1
100 CAPSULE, LIQUID FILLED ORAL 2 TIMES DAILY
Qty: 60 CAPSULE | Refills: 0 | Status: SHIPPED | OUTPATIENT
Start: 2024-08-02 | End: 2024-09-01

## 2024-08-02 RX ADMIN — POTASSIUM CHLORIDE 10 MEQ: 10 INJECTION, SOLUTION INTRAVENOUS at 17:14

## 2024-08-02 RX ADMIN — METOPROLOL TARTRATE 2.5 MG: 1 INJECTION, SOLUTION INTRAVENOUS at 23:28

## 2024-08-02 RX ADMIN — HEPARIN SODIUM 5000 UNITS: 5000 INJECTION INTRAVENOUS; SUBCUTANEOUS at 13:32

## 2024-08-02 RX ADMIN — POTASSIUM BICARBONATE 40 MEQ: 782 TABLET, EFFERVESCENT ORAL at 15:23

## 2024-08-02 RX ADMIN — METOPROLOL TARTRATE 2.5 MG: 1 INJECTION, SOLUTION INTRAVENOUS at 06:31

## 2024-08-02 RX ADMIN — METOCLOPRAMIDE HYDROCHLORIDE 5 MG: 5 INJECTION INTRAMUSCULAR; INTRAVENOUS at 06:31

## 2024-08-02 RX ADMIN — HEPARIN SODIUM 5000 UNITS: 5000 INJECTION INTRAVENOUS; SUBCUTANEOUS at 23:28

## 2024-08-02 RX ADMIN — SODIUM CHLORIDE, PRESERVATIVE FREE 40 MG: 5 INJECTION INTRAVENOUS at 09:03

## 2024-08-02 RX ADMIN — SODIUM CHLORIDE, PRESERVATIVE FREE 10 ML: 5 INJECTION INTRAVENOUS at 09:10

## 2024-08-02 RX ADMIN — METOPROLOL TARTRATE 2.5 MG: 1 INJECTION, SOLUTION INTRAVENOUS at 17:54

## 2024-08-02 RX ADMIN — POTASSIUM CHLORIDE 10 MEQ: 10 INJECTION, SOLUTION INTRAVENOUS at 15:27

## 2024-08-02 RX ADMIN — SODIUM CHLORIDE, POTASSIUM CHLORIDE, SODIUM LACTATE AND CALCIUM CHLORIDE: 600; 310; 30; 20 INJECTION, SOLUTION INTRAVENOUS at 23:47

## 2024-08-02 RX ADMIN — POTASSIUM CHLORIDE 10 MEQ: 10 INJECTION, SOLUTION INTRAVENOUS at 12:46

## 2024-08-02 RX ADMIN — METOPROLOL TARTRATE 2.5 MG: 1 INJECTION, SOLUTION INTRAVENOUS at 12:49

## 2024-08-02 RX ADMIN — POTASSIUM CHLORIDE 10 MEQ: 10 INJECTION, SOLUTION INTRAVENOUS at 19:33

## 2024-08-02 RX ADMIN — OXYCODONE HYDROCHLORIDE 5 MG: 5 SOLUTION ORAL at 13:31

## 2024-08-02 RX ADMIN — DIATRIZOATE MEGLUMINE AND DIATRIZOATE SODIUM 120 ML: 660; 100 LIQUID ORAL; RECTAL at 08:35

## 2024-08-02 RX ADMIN — HEPARIN SODIUM 5000 UNITS: 5000 INJECTION INTRAVENOUS; SUBCUTANEOUS at 06:31

## 2024-08-02 RX ADMIN — POTASSIUM CHLORIDE 10 MEQ: 10 INJECTION, SOLUTION INTRAVENOUS at 22:30

## 2024-08-02 RX ADMIN — OXYCODONE HYDROCHLORIDE 5 MG: 5 SOLUTION ORAL at 23:54

## 2024-08-02 ASSESSMENT — PAIN DESCRIPTION - LOCATION
LOCATION: ABDOMEN

## 2024-08-02 ASSESSMENT — PAIN SCALES - GENERAL
PAINLEVEL_OUTOF10: 3
PAINLEVEL_OUTOF10: 3
PAINLEVEL_OUTOF10: 4
PAINLEVEL_OUTOF10: 3
PAINLEVEL_OUTOF10: 1

## 2024-08-02 ASSESSMENT — PAIN DESCRIPTION - PAIN TYPE
TYPE: SURGICAL PAIN

## 2024-08-02 ASSESSMENT — PAIN DESCRIPTION - FREQUENCY
FREQUENCY: CONTINUOUS

## 2024-08-02 ASSESSMENT — PAIN DESCRIPTION - ONSET
ONSET: ON-GOING

## 2024-08-02 ASSESSMENT — PAIN DESCRIPTION - DESCRIPTORS
DESCRIPTORS: SPASM
DESCRIPTORS: ACHING;DISCOMFORT
DESCRIPTORS: ACHING;DISCOMFORT

## 2024-08-02 ASSESSMENT — PAIN - FUNCTIONAL ASSESSMENT
PAIN_FUNCTIONAL_ASSESSMENT: ACTIVITIES ARE NOT PREVENTED
PAIN_FUNCTIONAL_ASSESSMENT: ACTIVITIES ARE NOT PREVENTED
PAIN_FUNCTIONAL_ASSESSMENT: PREVENTS OR INTERFERES SOME ACTIVE ACTIVITIES AND ADLS

## 2024-08-02 ASSESSMENT — PAIN DESCRIPTION - ORIENTATION
ORIENTATION: MID

## 2024-08-02 NOTE — PROGRESS NOTES
Patient reporting pain 4/10 on pain scale at surgical sites. PCA pump infusing per order.   Patient reports passing gas, hypoactive bowel sounds. Voided in the bathroom.  Patient denies any nausea.   Patient walked in the hallway with stand by assist and sit in the chair. Tolerated fairly well.   Vitals:    08/02/24 0315   BP: (!) 95/55   Pulse: 75   Resp: 18   Temp: 97.4 °F (36.3 °C)   SpO2: 97%      BP soft, otherwise vitals stable. Patient denies dizziness.   Abdomen soft, non-tender, non-distended.  Incisions clean, dry intact. Abdominal binder in place.

## 2024-08-02 NOTE — CONSULTS
Clinical Pharmacy Progress Note    Admit date: 8/1/2024     Asked to review home medications to see what can be crushed or changed to liquid formulation.    Furosemide- May be crush  Ibuprofen- May be crush, but may not taste very good. There is a liquid formulation OTC if needed.  Omeprazole - Capsules can be opened and contents mixed with 1 T applesauce (something soft enough to swallow granules without chewing).  Metoprolol and Hydrochlorothiazide- May be crushed.  Chewable Bariatric multivitamin- May be chewed      Indy Jennings, PharmD  [id number] (Main Pharmacy)     PROVIDER:[TOKEN:[[id number]]]

## 2024-08-02 NOTE — PROGRESS NOTES
Surgery Daily Progress Note  Daniella Webb  CC:  GERD, esophageal hernia  Subjective :  Had some nausea right after surgery but better now.  Having some \"spasms\" in her chest.  Pain is tolerable at 4/10.   \"I have only pushed the button a handful of times\".  No flatus.  C/o thirst.  \"Dreaming of ice chips\".  Just ambulated in the lynn now up in chair.  Athrombics on.        Objective    Infusions:   sodium chloride      lactated ringers IV soln 75 mL/hr at 08/01/24 2315    HYDROmorphone          I/O:I/O last 3 completed shifts:  In: 2019 [I.V.:2019]  Out: 25 [Blood:25]           Wt Readings from Last 1 Encounters:   08/01/24 103.6 kg (228 lb 6.4 oz)                 LABS:  No results for input(s): \"WBC\", \"HGB\", \"HCT\", \"MCV\", \"PLT\" in the last 72 hours.     Recent Labs     08/01/24  1100   K 3.9      Exam:BP (!) 95/55   Pulse 75   Temp 97.4 °F (36.3 °C) (Axillary)   Resp 18   Ht 1.549 m (5' 0.98\")   Wt 103.6 kg (228 lb 6.4 oz)   LMP 06/02/2009 (Exact Date)   SpO2 97%   BMI 43.18 kg/m²   General appearance: alert, appears stated age and cooperative  Lungs: symmetrical chest rise  Heart: well perfused  Abdomen: soft, obese, appropriately-tender; no guarding  Trocar sites well approx without erythema or drainage      ASSESSMENT/PLAN: Pt. is a 64 y.o. female s/p  robotic re-do hiatal hernia repair with mesh, ligamentum teres wrap, lysis of adhesions POD#1     Levsin prn for esophageal spasms  OK for ice chips this morning  UGI this morning  Once imaging is complete and WNL will d/c PCa and transition to oral pain meds and start on clear liquid diet  She will go home on clears, then full liquid then soft as noted in discharge instructions  Crush meds for two weeks  Will d/w Dr. Lake and Blue Team      Theresa Carcamo, APRN - CNP 8/2/2024 6:24 AM  [phone]

## 2024-08-02 NOTE — PROGRESS NOTES
Department of Surgery:  Post-op Note    CC: GERD, esophageal hiatal hernia    Procedure(s) Performed: robotic re-do hiatal hernia repair with mesh, ligamentum teres wrap, lysis of adhesions     Subjective:   Pain is controlled, denies nausea or vomiting. Voiding. Ambulating    Objective:  Anesthesia type: General  Exam:  Vitals:    08/01/24 1753 08/01/24 2000 08/01/24 2253 08/01/24 2320   BP:  133/85 125/82 139/70   Pulse: 86 84 75 76   Resp: 21 23 16 16   Temp:  97.4 °F (36.3 °C) 97.9 °F (36.6 °C) 98.3 °F (36.8 °C)   TempSrc:  Oral Oral Oral   SpO2: 98% 98% 99% 94%   Weight:       Height:           General appearance: alert, no acute distress  Chest/Lungs:  normal effort, symmetric chest rise   Cardiovascular: RRR  Abdomen: soft, appropriately tender, non-distended, incisions c/d/I, abdominal binder in place  Skin: no erythema or rashes, no cyanosis  Extremities: no edema, no cyanosis  Neuro: A&Ox3, no focal deficits, sensation intact    Assessment and Plan  This is a 64 y.o. year old female s/p  robotic re-do hiatal hernia repair with mesh, ligamentum teres wrap, lysis of adhesions  POD #0    - Continue with dilaudid PCA   - NPO with IVFs  - UGI in AM  - Protonix and Reglan, Zofran prn  - Advancement of diet pending results of UGI    Jamie Omer DO  PGY-1, General Surgery Resident  08/01/24 11:44 PM  [phone]\

## 2024-08-02 NOTE — DISCHARGE INSTRUCTIONS
This diet and sample menu is for people who have recently had Nissen fundoplication surgery to correct GERD -- short for gastroesophageal (GAS-trow-ee-woej-mp-NPB-ol) reflux disease -- or to repair various types of hernias, such as hiatal hernia and intrathoracic stomach.  This diet may also be useful after other gastrointestinal (GI) surgeries, such as Heller myotomy and achalasia repair.  After Nissen fundoplication surgery, your surgeon will slowly advance your diet through the following stages:  A clear liquid diet for 3 days.  A full liquid diet for 4 days.  If you develop symptoms of nausea, increased pain when advancing diet you will go back to the phase of diet that you did not have symptoms. clear liquid diet.   And, eventually a Nissen soft diet for 5 weeks.  Please note: Because each person's tolerance to food is unique, your doctor will advance your diet depending on how well you progress after surgery.  The diet will help control issues that may occur after GI surgery, such as:  Diarrhea  Excess gas  Swallowing problems    Please note:  No breads, No crackers, and meats must be minced, chopped, or ground-no dry, tough meats. Pills crushed for 2 weeks.   Clear Liquid Diet After Nissen Fundoplication Surgery  The first diet after Nissen fundoplication surgery includes the following clear liquids:  Apple juice  Cranberry juice  Grape juice  Chicken and beef broth  Flavored gelatin (Jell-O®) Decaf tea and coffee  Caffeinated beverages (based on tolerance)  Popsicles  Italian ice   Note: You'll need to avoid carbonated drinks (sodas) for the first six to eight weeks after surgery. After this time, you can try them again in small amounts.  Full Liquid Diet After Nissen Fundoplication Surgery  The full liquid diet contains anything on the clear liquid diet, plus:  Milk, soy, rice, and almond (no chocolate)  Cream of wheat, cream of rice, grits  Strained creamed soups (no tomato or broccoli)  Blended, custard  closed.  Avoid any foods that cause stomach gas and distention. These include many raw vegetables, dried beans and peas, and any food from the cabbage family.  Nissen diet -- drinks and foods to choose and avoid  Food Category Choose Avoid   Beverages Milk, such as whole, 2%, 1%, non-fat, or skim, soy, rice, almond.  Caffeinated and decaf tea and coffee.  Powdered drink mixes (in moderation).  Non-citrus juices (apple, grape, cranberry, or blends of these).  Fruit nectars.  Nutritional drinks including Boost®, Ensure®, [place] Instant Breakfast®. Chocolate milk, cocoa, or other chocolate-flavored drinks.  Carbonated drinks.  Alcohol.  Citrus juices like orange, grapefruit, lemon, and lime.   Breads NO BREADS!  bread, bagels, N[place] and hard rolls, English muffins.  Crusty breads.  Fresh, doughy breads such as sweet rolls, coffee cake, and doughnuts.  Crackers with nuts, seeds, fresh or dried fruit, coconut, or highly seasoned (garlic or onion-flavored).   Cereals Well-cooked cereals such as oatmeal (plain or flavored).  Cold cereal (Cornflakes®, Rice Krispies®, Cheerios®, Special K® plain, Rice Chex® and puffed rice). Very coarse cereal such as bran or shredded wheat.  Any cereal with fresh or dried fruit, coconut, seeds, or nuts.   Potatoes and Starches Peeled, mashed, or boiled white or sweet potatoes.  Oven-baked potatoes without skin.   Fried potatoes, potato skins, and potato chips.  Hard and soft taco shells.  Fried, brown, or wild rice.   Eggs Poached, hard-boiled, or scrambled. Fried and highly seasoned eggs (deviled eggs).   Vegetables Well-cooked soft vegetables without seeds or skins (asparagus tips, beets, carrots, green and wax beans, chopped spinach, tender canned baby peas, squash, pumpkin). Raw vegetables.  Gas producing vegetables (broccoli, Brussel sprouts, cabbage, cauliflower, onions, corn, cucumber, green peppers, rutabagas, turnips, radishes, sauerkraut).  Tomatoes, tomato juice, tomato

## 2024-08-02 NOTE — CARE COORDINATION
Case Management Assessment  Initial Evaluation    Date/Time of Evaluation: 8/2/2024 10:23 AM  Assessment Completed by: Paulette Landa RN    If patient is discharged prior to next notation, then this note serves as note for discharge by case management.    Patient Name: Daniella Webb                   YOB: 1960  Diagnosis: Gastroesophageal reflux disease [K21.9]  Esophageal hiatal hernia [K44.9]  Hiatal hernia [K44.9]                   Date / Time: 8/1/2024 10:10 AM    Patient Admission Status: Inpatient   Readmission Risk (Low < 19, Mod (19-27), High > 27): Readmission Risk Score: 4.5    Current PCP: Lizzie Holm APRN - CNP  PCP verified by CM? Yes    Chart Reviewed: Yes      History Provided by: Patient  Patient Orientation: Alert and Oriented    Patient Cognition: Alert    Hospitalization in the last 30 days (Readmission):  No    If yes, Readmission Assessment in CM Navigator will be completed.    Advance Directives:      Code Status: Full Code   Patient's Primary Decision Maker is: Legal Next of Kin      Discharge Planning:    Patient lives with: Alone Type of Home: House  Primary Care Giver: Self  Patient Support Systems include: Children   Current Financial resources: None  Current community resources: None  Current services prior to admission: None            Current DME:              Type of Home Care services:  None    ADLS  Prior functional level: Independent in ADLs/IADLs  Current functional level: Independent in ADLs/IADLs    PT AM-PAC:   /24  OT AM-PAC:   /24    Family can provide assistance at DC: Yes  Would you like Case Management to discuss the discharge plan with any other family members/significant others, and if so, who? No  Plans to Return to Present Housing: Yes  Other Identified Issues/Barriers to RETURNING to current housing: n/a  Potential Assistance needed at discharge: N/A            Potential DME:    Patient expects to discharge to: House  Plan for transportation

## 2024-08-03 VITALS
BODY MASS INDEX: 43.12 KG/M2 | WEIGHT: 228.4 LBS | HEART RATE: 83 BPM | RESPIRATION RATE: 18 BRPM | TEMPERATURE: 98.2 F | HEIGHT: 61 IN | SYSTOLIC BLOOD PRESSURE: 143 MMHG | DIASTOLIC BLOOD PRESSURE: 79 MMHG | OXYGEN SATURATION: 93 %

## 2024-08-03 LAB
ALBUMIN SERPL-MCNC: 3.5 G/DL (ref 3.4–5)
ANION GAP SERPL CALCULATED.3IONS-SCNC: 9 MMOL/L (ref 3–16)
BASOPHILS # BLD: 0 K/UL (ref 0–0.2)
BASOPHILS NFR BLD: 0.3 %
BUN SERPL-MCNC: 7 MG/DL (ref 7–20)
CALCIUM SERPL-MCNC: 8.8 MG/DL (ref 8.3–10.6)
CHLORIDE SERPL-SCNC: 102 MMOL/L (ref 99–110)
CO2 SERPL-SCNC: 26 MMOL/L (ref 21–32)
CREAT SERPL-MCNC: 0.7 MG/DL (ref 0.6–1.2)
DEPRECATED RDW RBC AUTO: 16.8 % (ref 12.4–15.4)
EOSINOPHIL # BLD: 0.1 K/UL (ref 0–0.6)
EOSINOPHIL NFR BLD: 1.4 %
GFR SERPLBLD CREATININE-BSD FMLA CKD-EPI: >90 ML/MIN/{1.73_M2}
GLUCOSE SERPL-MCNC: 100 MG/DL (ref 70–99)
HCT VFR BLD AUTO: 34.2 % (ref 36–48)
HGB BLD-MCNC: 11.2 G/DL (ref 12–16)
LYMPHOCYTES # BLD: 1.4 K/UL (ref 1–5.1)
LYMPHOCYTES NFR BLD: 20.9 %
MAGNESIUM SERPL-MCNC: 1.9 MG/DL (ref 1.8–2.4)
MCH RBC QN AUTO: 26.2 PG (ref 26–34)
MCHC RBC AUTO-ENTMCNC: 32.7 G/DL (ref 31–36)
MCV RBC AUTO: 80.3 FL (ref 80–100)
MONOCYTES # BLD: 0.5 K/UL (ref 0–1.3)
MONOCYTES NFR BLD: 8.1 %
NEUTROPHILS # BLD: 4.6 K/UL (ref 1.7–7.7)
NEUTROPHILS NFR BLD: 69.3 %
PHOSPHATE SERPL-MCNC: 1.3 MG/DL (ref 2.5–4.9)
PLATELET # BLD AUTO: 180 K/UL (ref 135–450)
PMV BLD AUTO: 8.9 FL (ref 5–10.5)
POTASSIUM SERPL-SCNC: 3.8 MMOL/L (ref 3.5–5.1)
RBC # BLD AUTO: 4.26 M/UL (ref 4–5.2)
SODIUM SERPL-SCNC: 137 MMOL/L (ref 136–145)
WBC # BLD AUTO: 6.7 K/UL (ref 4–11)

## 2024-08-03 PROCEDURE — 2500000003 HC RX 250 WO HCPCS: Performed by: STUDENT IN AN ORGANIZED HEALTH CARE EDUCATION/TRAINING PROGRAM

## 2024-08-03 PROCEDURE — 6360000002 HC RX W HCPCS: Performed by: STUDENT IN AN ORGANIZED HEALTH CARE EDUCATION/TRAINING PROGRAM

## 2024-08-03 PROCEDURE — 6370000000 HC RX 637 (ALT 250 FOR IP): Performed by: STUDENT IN AN ORGANIZED HEALTH CARE EDUCATION/TRAINING PROGRAM

## 2024-08-03 PROCEDURE — 2580000003 HC RX 258: Performed by: STUDENT IN AN ORGANIZED HEALTH CARE EDUCATION/TRAINING PROGRAM

## 2024-08-03 PROCEDURE — 85025 COMPLETE CBC W/AUTO DIFF WBC: CPT

## 2024-08-03 PROCEDURE — 6360000002 HC RX W HCPCS: Performed by: NURSE PRACTITIONER

## 2024-08-03 PROCEDURE — 80069 RENAL FUNCTION PANEL: CPT

## 2024-08-03 PROCEDURE — 83735 ASSAY OF MAGNESIUM: CPT

## 2024-08-03 PROCEDURE — 36415 COLL VENOUS BLD VENIPUNCTURE: CPT

## 2024-08-03 RX ADMIN — POTASSIUM BICARBONATE 40 MEQ: 782 TABLET, EFFERVESCENT ORAL at 08:43

## 2024-08-03 RX ADMIN — METOPROLOL TARTRATE 2.5 MG: 1 INJECTION, SOLUTION INTRAVENOUS at 06:37

## 2024-08-03 RX ADMIN — METOPROLOL TARTRATE 2.5 MG: 1 INJECTION, SOLUTION INTRAVENOUS at 13:26

## 2024-08-03 RX ADMIN — HEPARIN SODIUM 5000 UNITS: 5000 INJECTION INTRAVENOUS; SUBCUTANEOUS at 06:37

## 2024-08-03 RX ADMIN — SODIUM CHLORIDE, PRESERVATIVE FREE 40 MG: 5 INJECTION INTRAVENOUS at 08:43

## 2024-08-03 RX ADMIN — POTASSIUM CHLORIDE 10 MEQ: 10 INJECTION, SOLUTION INTRAVENOUS at 00:47

## 2024-08-03 RX ADMIN — SODIUM PHOSPHATE, MONOBASIC, MONOHYDRATE AND SODIUM PHOSPHATE, DIBASIC, ANHYDROUS 20 MMOL: 142; 276 INJECTION, SOLUTION INTRAVENOUS at 11:38

## 2024-08-03 ASSESSMENT — PAIN SCALES - GENERAL: PAINLEVEL_OUTOF10: 0

## 2024-08-03 NOTE — PROGRESS NOTES
Patient alert and oriented. VSS Patient  c/o pain, Oxycodone given per protocol. CDI surgical sites MANOJ. Patient in bed lowest position call light and bedside table within reach. All needs are met at this time. Patient aware to call if any help needed.Plan of care ongoing.   /83   Pulse 90   Temp 98.3 °F (36.8 °C) (Oral)   Resp 18   Ht 1.549 m (5' 0.98\")   Wt 103.6 kg (228 lb 6.4 oz)   LMP 06/02/2009 (Exact Date)   SpO2 93%   BMI 43.18 kg/m²

## 2024-08-03 NOTE — PROGRESS NOTES
Bariatric Surgery   Daily Progress Note  Patient: Daniella Webb      CC: robotic re-do hiatal hernia repair with mesh, ligamentum teres wrap, lysis of adhesions     SUBJECTIVE:   NAEON. Patient resting comfortably in bed. Tolerating regular diet. Denies of nausea or vomiting. Passing gas or BM. No other complaints at this time.     ROS:   A 14 point review of systems was conducted, significant findings as noted above. All other systems negative.    OBJECTIVE:    PHYSICAL EXAM:    Vitals:    08/02/24 2300 08/03/24 0307 08/03/24 0727 08/03/24 1142   BP: 129/84 135/83 129/84 129/81   Pulse: 72 90 84 84   Resp: 18 18 18 18   Temp: 99.9 °F (37.7 °C) 98.3 °F (36.8 °C) 99.1 °F (37.3 °C) 98.8 °F (37.1 °C)   TempSrc: Oral Oral Oral Oral   SpO2: 93% 93% 94% 96%   Weight:       Height:           General appearance: alert, no acute distress, grooming appropriate  Neck: trachea midline, neck supple  Chest/Lungs: Normal effort with no accessory muscle use on RA  Cardiovascular: RRR  Abdomen: Soft, appropriately-tender, incision c/d/i and well approximated with Dermabond  Skin: warm and dry, no rashes  Extremities: no edema, no cyanosis  Neuro: A&Ox3, no focal deficits    LABS:   Recent Labs     08/02/24  0955 08/03/24  0522   WBC 6.8 6.7   HGB 12.2 11.2*   HCT 36.5 34.2*   MCV 80.6 80.3    180        Recent Labs     08/02/24  1817 08/03/24  0522    137   K 3.3* 3.8    102   CO2 26 26   PHOS 1.7* 1.3*   BUN 8 7   CREATININE 0.8 0.7      No results for input(s): \"AST\", \"ALT\", \"BILIDIR\", \"BILITOT\", \"ALKPHOS\" in the last 72 hours.    Invalid input(s): \"ALB\"   No results for input(s): \"LIPASE\", \"AMYLASE\" in the last 72 hours.   No results for input(s): \"INR\", \"APTT\" in the last 72 hours.    Invalid input(s): \"PROT\"   No results for input(s): \"CKTOTAL\", \"CKMB\", \"CKMBINDEX\", \"TROPONINI\" in the last 72 hours.      ASSESSMENT & PLAN:   This is a 64 y.o. female with a diagnosis of  s/p  robotic re-do hiatal hernia

## 2024-08-03 NOTE — CARE COORDINATION
Case Management Assessment            Discharge Note                    Date / Time of Note: 8/3/2024 2:23 PM                  Discharge Note Completed by: Jeovany Nuñez RN    Patient Name: Daniella Webb   YOB: 1960  Diagnosis: Gastroesophageal reflux disease [K21.9]  Esophageal hiatal hernia [K44.9]  Hiatal hernia [K44.9]   Date / Time: 8/1/2024 10:10 AM    Current PCP: Lizzie Holm APRN - CNP  Clinic patient: No    Hospitalization in the last 30 days: No       Advance Directives:  Code Status: Full Code  [place] DNR form completed and on chart: No, Not Indicated    Financial:  Payor: [employer] / Plan: [employer] - [place] PPO / Product Type: *No Product type* /      Pharmacy:    [facility]/pharmacy #[id number] [place], [place] - [address] [address] - P [phone] - F [phone]  [address] [facility] [address]  Phone: [phone] Fax: [phone]    [facility]/pharmacy #[id number] [place], [place] - [address] [facility] -  [phone] - F [phone]  [address] [facility] [address]  Phone: [phone] Fax: [phone]      Assistance purchasing medications?: Potential Assistance Purchasing Medications: No  Assistance provided by Case Management: None at this time    Does patient want to participate in local refill/ meds to beds program?: Yes    Meds To Beds General Rules:  1. Can ONLY be done Monday- Friday between 8:30am-5pm  2. Prescription(s) must be in pharmacy by 3pm to be filled same day  3.Copy of patient's insurance/ prescription drug card and patient face sheet must be sent along with the prescription(s)  4. Cost of Rx cannot be added to hospital bill. If financial assistance is needed, please contact unit  or ;  or  CANNOT provide pharmacy voucher for patients co-pays  5. Patients can then  the prescription on their way out of the hospital at discharge, or pharmacy can deliver to the bedside if staff is available. (payment due at time of  pick-up or delivery - cash, check, or card accepted)     Able to afford home medications/ co-pay costs: Yes    ADLS:  Current PT AM-PAC Score:   /24  Current OT AM-PAC Score:   /24    DISCHARGE Disposition: Home- No Services Needed    LOC at discharge: Not Applicable  ASUNCION Completed: No, Not Indicated    Notification completed in HENS/PAS?:  Not Applicable    IMM Completed:   Not Indicated due to: commercial payor         Transportation:  Transportation PLAN for discharge: family   Mode of Transport: Private Car  Reason for medical transport: Not Applicable  Name of Transport Company: Not Applicable  Time of Transport:     Transport form completed: No, Not Indicated    Home Care:  Home Care ordered at discharge: No, Not Indicated  Home Care Agency: Not Applicable  Orders faxed: No    Durable Medical Equipment:  DME Provider:   Equipment obtained during hospitalization:     Home Oxygen and Respiratory Equipment:  Oxygen needed at discharge?: No, Not Indicated  Home Oxygen Company: Not Applicable  Portable tank available for discharge?: No, Not Indicated    Dialysis:  Dialysis patient: No    Dialysis Center:  Not Applicable    Hospice Services:  Location: Not Applicable  Agency: Not Applicable    Consents signed: No, Not Indicated    Referrals made at DISCHARGE for outpatient continued care:  Not Applicable    Additional CM Notes: DC orders noted, patient to return home at DC, no new needs from CM at time of dc.    COVID Result:    Lab Results   Component Value Date/Time    COVID19 Not Detected 03/12/2021 05:56 PM    COVID19 NOT DETECTED 01/14/2021 10:55 AM       The Plan for Transition of Care is related to the following treatment goals of Gastroesophageal reflux disease [K21.9]  Esophageal hiatal hernia [K44.9]  Hiatal hernia [K44.9]    The Patient and/or patient representative Daniella and her family were provided with a choice of provider and agrees with the discharge plan Yes    Freedom of choice list was provided

## 2024-08-03 NOTE — PROGRESS NOTES
Patient alert and oriented. Vital signs stable.  Ambulatory.  Voiding in the bathroom.  Discharge instruction given. Patient verbalized understanding.  IV removed with no complication.  Patient discharged home with family member.

## 2024-08-04 NOTE — OP NOTE
DATE OF PROCEDURE:  08/01/2024     PREOPERATIVE DIAGNOSES:  1.  Hiatal hernia with Gastroesophageal reflux disease and esophagitis.  2.  Previous sleeve gastrectomy       POSTOPERATIVE DIAGNOSES:  1.  Hiatal hernia with Gastroesophageal reflux disease and esophagitis.  2.  Previous sleeve gastrectomy   3.  Peritoneal Adhesions     PROCEDURES PERFORMED:  1.  Robotic hiatal hernia repair with biosynthetic mesh  2.  Ligamentum teres wrap  3.  Laparoscopic extensive lysis of adhesions      SURGEON:  Glen Lake DO     ASSISTANT:  Vi     ANESTHESIA:  General endotracheal.     COMPLICATIONS:  None.     CONDITION:  Stable.     EBL: 10cc     INDICATIONS FOR PROCEDURE:  The patient is a 64-year-old female with a moderate hiatal hernia and previous sleeve gastrectomy and complaints of reflux who was optimized preoperatively and scheduled for an elective hiatal hernia repair with mesh and ligamentum teres wrap after understanding all risks, benefits and alternatives of the procedure.      DESCRIPTION OF THE PROCEDURE:  The patient was brought to operative  suite, laid in supine position with arms outstretched and after  induction of general endotracheal anesthesia, tube was confirmed to be  in place with end-tidal CO2 and secured.  The patient was then prepped  and draped in the usual sterile manner.     A small incision was made at the left midclavicular line.  Using the  Veress needle, abdomen was entered and pneumoperitoneum established 15  mm of CO2.  A 5-mm 30-degree camera housed in a 5-mm Optiview trocar was  used to enter the abdomen under direct visualization.  Once inside the  abdomen, additional trocars were placed.       I then spent significant amount of time taking down dense peritoneal adhesions from the anterior abdominal wall and peritoneum to the omentum and stomach. Very meticulous dissection was done using both blunt and sharp techniques to take these down safely. Vessel Sealer device was also used where

## 2024-08-05 ENCOUNTER — CARE COORDINATION (OUTPATIENT)
Dept: CASE MANAGEMENT | Age: 64
End: 2024-08-05

## 2024-08-05 DIAGNOSIS — K44.9 ESOPHAGEAL HIATAL HERNIA: Primary | ICD-10-CM

## 2024-08-05 NOTE — CARE COORDINATION
understanding.   Were discharge instructions available to patient? Yes.   Reviewed appropriate site of care based on symptoms and resources available to patient including: PCP and Specialist.   The patient agrees to contact the primary care provider and/or specialist office for questions related to their healthcare.      Advance Care Planning:   Does patient have an Advance Directive: reviewed and needs to be updated.    Medication Reconciliation:  Medication reconciliation was performed with patient,1111F entered: yes.     Remote Patient Monitoring:  Offered patient enrollment in the Remote Patient Monitoring (RPM) program for in-home monitoring: Patient is not eligible for RPM program because: patient does not have qualifying diagnosis.    Assessments:  Care Transitions 24 Hour Call    Care Transitions Interventions          Follow Up Appointment:   Discussed follow up appointments. Patient has hospital follow up appointment scheduled  offered visit, will contact physician.        Care Transition Nurse provided contact information.  Plan for follow-up call in 2-5 days based on severity of symptoms and risk factors.  Plan for next call: symptom management-FU with dressing to abd dry and intact. FU if pain is tolerable.       Ingrid Witt RN

## 2024-08-07 ENCOUNTER — OFFICE VISIT (OUTPATIENT)
Dept: BARIATRICS/WEIGHT MGMT | Age: 64
End: 2024-08-07

## 2024-08-07 VITALS
HEART RATE: 83 BPM | BODY MASS INDEX: 45.55 KG/M2 | DIASTOLIC BLOOD PRESSURE: 83 MMHG | SYSTOLIC BLOOD PRESSURE: 140 MMHG | WEIGHT: 232 LBS | HEIGHT: 60 IN

## 2024-08-07 DIAGNOSIS — Z87.19 S/P REPAIR OF PARAESOPHAGEAL HERNIA: Primary | ICD-10-CM

## 2024-08-07 DIAGNOSIS — Z98.890 S/P REPAIR OF PARAESOPHAGEAL HERNIA: Primary | ICD-10-CM

## 2024-08-07 PROCEDURE — 99024 POSTOP FOLLOW-UP VISIT: CPT | Performed by: SURGERY

## 2024-08-07 NOTE — PROGRESS NOTES
Disp: 28 tablet, Rfl: 0    docusate sodium (COLACE) 100 MG capsule, Take 1 capsule by mouth 2 times daily, Disp: 60 capsule, Rfl: 0    Ibuprofen (ADVIL PO), Take by mouth, Disp: , Rfl:     omeprazole (PRILOSEC) 40 MG delayed release capsule, Take 1 capsule by mouth every morning (before breakfast), Disp: 90 capsule, Rfl: 1    hydrocortisone (ANUSOL-HC) 2.5 % CREA rectal cream, Apply 3 x daily PRN, Disp: 28 g, Rfl: 1    Semaglutide-Weight Management (WEGOVY) 2.4 MG/0.75ML SOAJ SC injection, Inject 2.4 mg into the skin every 7 days, Disp: 9 mL, Rfl: 3    metoprolol-hydroCHLOROthiazide (LOPRESSOR HCT) 50-25 MG per tablet, Take 1 tablet by mouth daily FILL 7/7/24, Disp: 90 tablet, Rfl: 0    azelastine (ASTELIN) 0.1 % nasal spray, 2 sprays by Nasal route 2 times daily Use in each nostril as directed, Disp: 60 mL, Rfl: 0    furosemide (LASIX) 20 MG tablet, 0.5- 1 tablet as for edema, Disp: 180 tablet, Rfl: 1    diclofenac sodium (VOLTAREN) 1 % GEL, Apply 4 g topically 4 times daily, Disp: 100 g, Rfl: 0    ondansetron (ZOFRAN-ODT) 4 MG disintegrating tablet, Take 1 tablet by mouth 3 times daily as needed for Nausea or Vomiting, Disp: 21 tablet, Rfl: 1    Multiple Vitamins-Minerals (BARIATRIC FUSION) CHEW, Take 4 tablets by mouth daily, Disp: , Rfl:     ROS  Review of Systems - History obtained from the patient  General ROS: negative  Psychological ROS: negative  Ophthalmic ROS: negative  Neurological ROS: negative  ENT ROS: negative  Allergy and Immunology ROS: negative  Hematological and Lymphatic ROS: negative  Endocrine ROS: negative  Respiratory ROS: negative  Cardiovascular ROS: negative  Gastrointestinal ROS: negative  Genito-Urinary ROS: negative  Musculoskeletal ROS: negative   Skin ROS: negative        Physical Exam   Abdominal: Soft. Normal appearance and bowel sounds are normal. Patient exhibits no distension or tenderness. There is no rigidity, no rebound, no guarding and no CVA tenderness. Hernia confirmed

## 2024-08-08 ENCOUNTER — CARE COORDINATION (OUTPATIENT)
Dept: CASE MANAGEMENT | Age: 64
End: 2024-08-08

## 2024-08-08 NOTE — CARE COORDINATION
Care Transitions Note    Follow Up Call     Reason for Admission: Gastroesophageal reflux disease [K21.9]  Esophageal hiatal hernia [K44.9]  Hiatal hernia [K44.9]     Attempted to reach patient for transitions of care follow up.  Unable to reach patient.      Outreach Attempts:   HIPAA compliant voicemail left for patient.     Follow Up Appointment:   Future Appointments         Provider Specialty Dept Phone    8/13/2024 2:00 PM Lizzie Holm APRN - CNP Family Medicine [phone]    9/11/2024 7:45 AM Glen Lake DO Bariatrics [phone]            Plan for follow-up call in 2-5 days based on severity of symptoms and risk factors. Plan for next call: symptom management-.  self management-.    Dunia Coello LPN

## 2024-08-13 ENCOUNTER — OFFICE VISIT (OUTPATIENT)
Dept: FAMILY MEDICINE CLINIC | Age: 64
End: 2024-08-13

## 2024-08-13 VITALS
BODY MASS INDEX: 43.51 KG/M2 | OXYGEN SATURATION: 98 % | HEIGHT: 60 IN | WEIGHT: 221.6 LBS | DIASTOLIC BLOOD PRESSURE: 86 MMHG | SYSTOLIC BLOOD PRESSURE: 116 MMHG | HEART RATE: 70 BPM

## 2024-08-13 DIAGNOSIS — Z09 HOSPITAL DISCHARGE FOLLOW-UP: Primary | ICD-10-CM

## 2024-08-13 DIAGNOSIS — K44.9 HIATAL HERNIA: ICD-10-CM

## 2024-08-13 DIAGNOSIS — E66.01 CLASS 3 SEVERE OBESITY WITH SERIOUS COMORBIDITY AND BODY MASS INDEX (BMI) OF 40.0 TO 44.9 IN ADULT, UNSPECIFIED OBESITY TYPE (HCC): ICD-10-CM

## 2024-08-13 DIAGNOSIS — R13.10 DYSPHAGIA, UNSPECIFIED TYPE: ICD-10-CM

## 2024-08-13 DIAGNOSIS — E87.6 HYPOKALEMIA: ICD-10-CM

## 2024-08-13 NOTE — PATIENT INSTRUCTIONS
GENERAL OFFICE POLICIES  [place] - compound weight loss medication    Telephone Calls: Messages will be answered within 1-2 business days, unless the provider is out of the office.  If it is urgent a covering provider will answer. (this does not include Medication refills).    MyChart:  We recommend all patients sign up for [facility].  Through this portal you can see your lab results, request refills, schedule appointments, pay your bill and send messages to the office.   MyChart messages will be answered within 1-2 business days unless the provider is out of the office.  For urgent matters, please call the office.  Appointments:  All appointments must be scheduled.  We ask all patients to schedule their next follow up appointment before they leave the office to make sure you will be able to be seen before you run out of medications.  24 hours notice is required to cancel or reschedule an appointment to avoid being marked as a no show.  You may be dismissed from the practice after 3 no shows.    LATE for Appointment: If you are 15 or more minutes late for your appointment, you may be asked to reschedule.  MA/LAB APPTS: Must be scheduled, cannot accept walk in lab visits.  We only draw labs for patients established in our office.  We only do injections for medications ordered by our office.  Acute Sick Visits:  Nothing other than acute complaint will be addressed at this visit.  TRADITIONAL MEDICARE  DOES NOT COVER PHYSICALS  MEDICARE WELLNESS VISITS: These are NOT physicals but the free annual visit offered by Medicare to discuss wellness issues. Medication refills, checkups, etc. will not be addressed during this visit.  Medication Refills: Refills are handled electronically so please contact your pharmacy for medication refills even if current refills have been exhausted. If you are on a controlled medication you will be referred to a specialist (pain specialist, psychiatry, etc).   Forms: There is a $35

## 2024-08-13 NOTE — PROGRESS NOTES
known as: ZOFRAN-ODT  Take 1 tablet by mouth 3 times daily as needed for Nausea or Vomiting     Wegovy 2.4 MG/0.75ML Soaj SC injection  Generic drug: Semaglutide-Weight Management  Inject 2.4 mg into the skin every 7 days            STOP taking these medications      hydrocortisone 2.5 % Crea rectal cream  Commonly known as: ANUSOL-HC  Stopped by: 0                Medications marked \"taking\" at this time  Outpatient Medications Marked as Taking for the 8/13/24 encounter (Office Visit) with Lizzie Holm APRN - CNP   Medication Sig Dispense Refill    hyoscyamine (LEVSIN/SL) 125 MCG sublingual tablet Place 1 tablet under the tongue every 4 hours as needed (spasm) 30 tablet 0    docusate sodium (COLACE) 100 MG capsule Take 1 capsule by mouth 2 times daily 60 capsule 0    Ibuprofen (ADVIL PO) Take by mouth      omeprazole (PRILOSEC) 40 MG delayed release capsule Take 1 capsule by mouth every morning (before breakfast) 90 capsule 1    Semaglutide-Weight Management (WEGOVY) 2.4 MG/0.75ML SOAJ SC injection Inject 2.4 mg into the skin every 7 days 9 mL 3    metoprolol-hydroCHLOROthiazide (LOPRESSOR HCT) 50-25 MG per tablet Take 1 tablet by mouth daily FILL 7/7/24 90 tablet 0    azelastine (ASTELIN) 0.1 % nasal spray 2 sprays by Nasal route 2 times daily Use in each nostril as directed 60 mL 0    furosemide (LASIX) 20 MG tablet 0.5- 1 tablet as for edema 180 tablet 1    diclofenac sodium (VOLTAREN) 1 % GEL Apply 4 g topically 4 times daily 100 g 0    ondansetron (ZOFRAN-ODT) 4 MG disintegrating tablet Take 1 tablet by mouth 3 times daily as needed for Nausea or Vomiting 21 tablet 1    Multiple Vitamins-Minerals (BARIATRIC FUSION) CHEW Take 4 tablets by mouth daily          Medications patient taking as of now reconciled against medications ordered at time of hospital discharge: Yes    A comprehensive review of systems was negative except for what was noted in the HPI.    Objective:    /86 (Site: Left Upper

## 2024-08-16 ENCOUNTER — CARE COORDINATION (OUTPATIENT)
Dept: CASE MANAGEMENT | Age: 64
End: 2024-08-16

## 2024-08-16 ENCOUNTER — TELEPHONE (OUTPATIENT)
Dept: INPATIENT UNIT | Age: 64
End: 2024-08-16

## 2024-08-16 NOTE — CARE COORDINATION
Patient Current Location:  Home: [address] [place] [id number]    Care Transition Nurse contacted the patient by telephone. Verified name and  as identifiers.    Additional needs identified to be addressed with provider   No needs identified                 Method of communication with provider: face to face and phone.    Care Summary Note: Spoke with patient. States that she has had an increase in loose bowels. States she has had a hard time converting to soft diet. States bowels are moving 8 times a day. Discussed stool softeners and states she stopped those. Discussed yogurt. Patient states she will start eating and will call physician if bowel movements increase. States appetite is fair. States drinking plenty of fluids. States incisions to abdomen are free from redness or drainage and all of the glue have fallen off. States no complications with urination. States started water pill per physician instructions.    Plan of care updates since last contact:  Review of patient management of conditions/medications: Water pill resumed and stool softeners stopped.       Advance Care Planning:   Does patient have an Advance Directive: reviewed and needs to be updated.    Medication Review:  Docusate sodium held due to frequent bm's and water pill furosemide resumed.    Remote Patient Monitoring:  Offered patient enrollment in the Remote Patient Monitoring (RPM) program for in-home monitoring: Patient is not eligible for RPM program because: patient does not have qualifying diagnosis.    Assessments:  No changes since last call    Follow Up Appointment:   Reviewed upcoming appointment(s).  Future Appointments         Provider Specialty Dept Phone    2024 7:45 AM Glen Lake DO Bariatrics [phone]            Care Transition Nurse provided contact information.  Plan for follow-up call in 2-5 days based on severity of symptoms and risk factors.  Plan for next call: symptom management-FU with frequent

## 2024-08-21 ENCOUNTER — CARE COORDINATION (OUTPATIENT)
Dept: CASE MANAGEMENT | Age: 64
End: 2024-08-21

## 2024-08-21 NOTE — CARE COORDINATION
Care Transitions Note    Follow Up Call     Patient Current Location:  Home: [address] [place] [id number]    LPN Care Coordinator contacted the patient by telephone. Verified name and  as identifiers.    Additional needs identified to be addressed with provider   No needs identified                 Method of communication with provider: none.    Care Summary Note: LPN CC spoke with patient. States she's doing pretty good. Incisions CDI. Healing well. Patient has begun tolerating meat, so far just today. She is working on progressing diet to more solid foods. Sabrina is reporting ongoing issues with loose and/or watery stools, possibly as a consequence of soft food only diet. LPN CC advised patient continue working on advancing her diet through the week. If stools do not improve by early next week, patient advised to speak with surgeon. Patient verbalized understanding.     Plan of care updates since last contact:  No change       Advance Care Planning:   Does patient have an Advance Directive: deferred at this time, will discuss on future follow up. .    Medication Review:  No changes since last call.     Remote Patient Monitoring:  Offered patient enrollment in the Remote Patient Monitoring (RPM) program for in-home monitoring: Yes, but did not enroll at this time: already monitoring with home equipment.    Assessments:  Care Transitions Subsequent and Final Call    Subsequent and Final Calls  Do you have any ongoing symptoms?: No  Have your medications changed?: No  Do you have any questions related to your medications?: No  Do you currently have any active services?: No  Do you have any needs or concerns that I can assist you with?: No  Identified Barriers: None  Care Transitions Interventions  Other Interventions:              Follow Up Appointment:   Reviewed upcoming appointment(s).  Future Appointments         Provider Specialty Dept Phone    2024 7:45 AM Glen Lake DO Bariatrics

## 2024-08-26 ENCOUNTER — CARE COORDINATION (OUTPATIENT)
Dept: CASE MANAGEMENT | Age: 64
End: 2024-08-26

## 2024-08-26 NOTE — CARE COORDINATION
Care Transitions Note    Follow Up Call     Patient Current Location:  Home: [address] [place] [id number]    [facility] Care Coordinator contacted the patient by telephone. Verified name and  as identifiers.    Additional needs identified to be addressed with provider   She reports she has pressure in her anal area and it is painful. This has been going on for about a week. She's passing gas and has abdominal bloating. Fluid intake is good. She is slowly introducing meat back into her diet. No urinary issues. Her stools have been watery. Please advise patient.                 Method of communication with provider: chart routing.    Care Summary Note:   Spoke with patient. She reports she has pressure in her anal area and it is painful. This has been going on for about a week. She's passing gas and has abdominal bloating. Fluid intake is good. She is slowly introducing meat back into her diet. She hasn't had much meat at all. No urinary issues. Her stools have been watery. Taking medications as ordered. Will send message to Dr. Lake.         Plan of care updates since last contact:  Education:    Review of patient management of conditions/medications:         Advance Care Planning:   Does patient have an Advance Directive:  not on file .    Medication Review:  No changes since last call.     Remote Patient Monitoring:  Offered patient enrollment in the Remote Patient Monitoring (RPM) program for in-home monitoring: Deferred at this time because  ; will discuss at next outreach.    Assessments:  Care Transitions Subsequent and Final Call    Subsequent and Final Calls  Do you have any ongoing symptoms?: No  Have your medications changed?: No  Do you have any questions related to your medications?: No  Do you currently have any active services?: No  Do you have any needs or concerns that I can assist you with?: No  Identified Barriers: None  Care Transitions Interventions  Other Interventions:              Follow  Up Appointment:   JONATHAN appointment attended as scheduled   Future Appointments         Provider Specialty Dept Phone    9/11/2024 7:45 AM Glen Lake DO Bariatrics [phone]            LPN Care Coordinator provided contact information.  Plan for follow-up call in 2-5 days based on severity of symptoms and risk factors.  Plan for next call: symptom management-gas, anal pressure, pain  self management-       Alicia Lou LPN

## 2024-08-27 ENCOUNTER — TELEPHONE (OUTPATIENT)
Dept: SURGERY | Age: 64
End: 2024-08-27

## 2024-08-27 ENCOUNTER — CARE COORDINATION (OUTPATIENT)
Dept: CASE MANAGEMENT | Age: 64
End: 2024-08-27

## 2024-08-27 NOTE — CARE COORDINATION
Care Transitions Note    Follow Up Call     Attempted to reach patient for transitions of care follow up.  Unable to reach patient.      Outreach Attempts:   HIPAA compliant voicemail left for patient.     Care Summary Note:       Follow Up Appointment:   Future Appointments         Provider Specialty Dept Phone    9/11/2024 7:45 AM Glen Lake DO Bariatrics [phone]            Plan for follow-up on next business day.  based on severity of symptoms and risk factors. Plan for next call: symptom management-gas, anal pressure, pain  self management-has she received message from Dr. Lake?     Alicia Lou LPN

## 2024-08-27 NOTE — TELEPHONE ENCOUNTER
Spoke to patient to schedule office visit with Dr. Proctor-Referral from Dr. Glen Lake for anal pain/pressure. Patient stated she is currently going through an insurance change and would like to verify Dr. Proctor is in her insurance network prior to scheduling an office visit. She was given all of our office information and instructed to call whenever she has spoken to her insurance company and is ready to schedule a visit.

## 2024-09-03 ENCOUNTER — CARE COORDINATION (OUTPATIENT)
Dept: CASE MANAGEMENT | Age: 64
End: 2024-09-03

## 2024-09-03 NOTE — CARE COORDINATION
Care Transitions Note    Final Call     Patient Current Location:  Home: [address] [place] [id number]    [facility] Care Coordinator contacted the patient by telephone. Verified name and  as identifiers.    Patient graduated from the Care Transitions program on 9//3/2024.  Patient/family progressing towards self management. .      Advance Care Planning:   Does patient have an Advance Directive: not on file.    Handoff:   Patient/family agreeable to [facility] outreach. , Condition management for hernia repair.     Care Summary Note:   Patient stated she is pretty good. She has scheduled her appointment with Dr. Proctor for . Denies dizziness, lh, bloating or redness. Patient has headaches, swelling in her feet, anal pain and pressure. She is passing gas.Today she had a watery stool with brb in it. /23. Good appetite and fluid intake. She is introducing more meat into her diet. Patient informed this is the final CTN call. Any medical issues contact her PCP. Patient will expect future f/u from [facility].     Assessments:  Care Transitions Subsequent and Final Call    Subsequent and Final Calls  Do you have any ongoing symptoms?: No  Onset of Patient-reported symptoms: Other  Patient-reported symptoms: Pain  Have your medications changed?: No  Do you have any questions related to your medications?: No  Do you currently have any active services?: No  Do you have any needs or concerns that I can assist you with?: No  Identified Barriers: None  Care Transitions Interventions  Other Interventions:              Upcoming Appointments:    Future Appointments         Provider Specialty Dept Phone    2024 10:00 AM Ulices Proctor MD Colon and Rectal Surgery [phone]    2024 7:45 AM Glen Lake DO Bariatrics [phone]            Patient has agreed to contact primary care provider and/or specialist for any further questions, concerns, or needs.    Alicia Lou LPN

## 2024-09-05 ENCOUNTER — CARE COORDINATION (OUTPATIENT)
Dept: CARE COORDINATION | Age: 64
End: 2024-09-05

## 2024-09-05 NOTE — CARE COORDINATION
Ambulatory Care Coordination Note     9/5/2024 10:57 AM     ACM outreach attempt by this ACM today to offer care management services. ACM was unable to reach the patient by telephone today; left voice message requesting a return phone call to this ACM.     ACM: Carmen Chacko RN     Care Summary Note: CTN handoff    PCP/Specialist follow up:   Future Appointments         Provider Specialty Dept Phone    9/6/2024 10:00 AM Ulices Proctor MD Colon and Rectal Surgery [phone]    9/11/2024 7:45 AM Glen Lake DO Bariatrics [phone]

## 2024-09-06 ENCOUNTER — CARE COORDINATION (OUTPATIENT)
Dept: CARE COORDINATION | Age: 64
End: 2024-09-06

## 2024-09-06 ENCOUNTER — OFFICE VISIT (OUTPATIENT)
Dept: SURGERY | Age: 64
End: 2024-09-06

## 2024-09-06 VITALS
OXYGEN SATURATION: 99 % | WEIGHT: 225.8 LBS | TEMPERATURE: 97.5 F | SYSTOLIC BLOOD PRESSURE: 135 MMHG | BODY MASS INDEX: 44.1 KG/M2 | HEART RATE: 57 BPM | DIASTOLIC BLOOD PRESSURE: 70 MMHG

## 2024-09-06 DIAGNOSIS — R19.7 DIARRHEA, UNSPECIFIED TYPE: Primary | ICD-10-CM

## 2024-09-06 SDOH — ECONOMIC STABILITY: FOOD INSECURITY: WITHIN THE PAST 12 MONTHS, YOU WORRIED THAT YOUR FOOD WOULD RUN OUT BEFORE YOU GOT MONEY TO BUY MORE.: NEVER TRUE

## 2024-09-06 SDOH — HEALTH STABILITY: PHYSICAL HEALTH: ON AVERAGE, HOW MANY MINUTES DO YOU ENGAGE IN EXERCISE AT THIS LEVEL?: 0 MIN

## 2024-09-06 SDOH — ECONOMIC STABILITY: FOOD INSECURITY: WITHIN THE PAST 12 MONTHS, THE FOOD YOU BOUGHT JUST DIDN'T LAST AND YOU DIDN'T HAVE MONEY TO GET MORE.: NEVER TRUE

## 2024-09-06 SDOH — ECONOMIC STABILITY: INCOME INSECURITY: IN THE LAST 12 MONTHS, WAS THERE A TIME WHEN YOU WERE NOT ABLE TO PAY THE MORTGAGE OR RENT ON TIME?: NO

## 2024-09-06 SDOH — HEALTH STABILITY: PHYSICAL HEALTH: ON AVERAGE, HOW MANY DAYS PER WEEK DO YOU ENGAGE IN MODERATE TO STRENUOUS EXERCISE (LIKE A BRISK WALK)?: 0 DAYS

## 2024-09-06 ASSESSMENT — SOCIAL DETERMINANTS OF HEALTH (SDOH): HOW HARD IS IT FOR YOU TO PAY FOR THE VERY BASICS LIKE FOOD, HOUSING, MEDICAL CARE, AND HEATING?: NOT HARD AT ALL

## 2024-09-06 NOTE — PROGRESS NOTES
Subjective:     Patient is a 64 y.o. woman with diarrhea, rectal pressure     HPI: Ms. Webb reports non stop explosive diarrhea since a hernia surgery about a month ago. As this has occurred she has noticed some rectal pressure and swelling as well. She reports she was regular prior to the operation last month. Normal colonoscopy 2021    Patient Active Problem List    Diagnosis Date Noted    Gastroesophageal reflux disease 07/24/2024    Esophageal hiatal hernia 07/24/2024    Ganglion of joint 04/22/2024    Hoarseness of voice 01/06/2020    Reactive airway disease 11/25/2019    Sebaceous cyst 06/25/2018    Premature atrial beats 05/17/2018    Morbid obesity with BMI of 45.0-49.9, adult (HCC) 08/29/2017    Obstructive sleep apnea 04/21/2017    Hiatal hernia     Hiatal hernia with gastroesophageal reflux disease and esophagitis     GERD with esophagitis     Superficial gastritis without hemorrhage     SOB (shortness of breath) 01/16/2017    Morbid obesity (HCC) 01/16/2017    Fatty liver     Cholecystitis     Internal hemorrhoid, bleeding     Positive hepatitis C antibody test 04/19/2013    Right knee pain 03/26/2013    Tinea pedis 11/28/2012    Hypertension 09/24/2012     Past Medical History:   Diagnosis Date    Abnormal antibody titer     Hepatitis C  20014    Chronic gastritis     Esophagitis     Fatty liver     GERD (gastroesophageal reflux disease)     Hiatal hernia     History of atrial tachycardia     Before gastric sleeve    Hx of hiatal hernia     repaired with gastric sleeve surgery    Hypertension     Internal hemorrhoid, bleeding     pt states currently not having any problems with hemorrhoids    Obstructive sleep apnea syndrome 04/21/2017    no cpap    PONV (postoperative nausea and vomiting)     Wears glasses       Past Surgical History:   Procedure Laterality Date    ABDOMEN SURGERY N/A 08/29/2017    Robotic assisted lapascopic gastric sleeve performed at [facility] by MD ZARA Lake

## 2024-09-09 DIAGNOSIS — R19.7 DIARRHEA, UNSPECIFIED TYPE: ICD-10-CM

## 2024-09-09 LAB
C DIFF TOX A+B STL QL IA: NORMAL
LACTOFERRIN STL QL IA: NORMAL

## 2024-09-11 ENCOUNTER — OFFICE VISIT (OUTPATIENT)
Dept: BARIATRICS/WEIGHT MGMT | Age: 64
End: 2024-09-11

## 2024-09-11 VITALS
DIASTOLIC BLOOD PRESSURE: 87 MMHG | SYSTOLIC BLOOD PRESSURE: 141 MMHG | HEART RATE: 73 BPM | HEIGHT: 61 IN | WEIGHT: 222 LBS | BODY MASS INDEX: 41.91 KG/M2

## 2024-09-11 DIAGNOSIS — Z87.19 S/P REPAIR OF PARAESOPHAGEAL HERNIA: Primary | ICD-10-CM

## 2024-09-11 DIAGNOSIS — Z98.890 S/P REPAIR OF PARAESOPHAGEAL HERNIA: Primary | ICD-10-CM

## 2024-09-11 DIAGNOSIS — E66.01 MORBID OBESITY WITH BMI OF 40.0-44.9, ADULT (HCC): ICD-10-CM

## 2024-09-11 DIAGNOSIS — I10 PRIMARY HYPERTENSION: Chronic | ICD-10-CM

## 2024-09-11 RX ORDER — METHSCOPOLAMINE BROMIDE 2.5 MG/1
2.5 TABLET ORAL
Qty: 120 TABLET | Refills: 3 | Status: SHIPPED | OUTPATIENT
Start: 2024-09-11

## 2024-09-13 ENCOUNTER — TELEPHONE (OUTPATIENT)
Dept: SURGERY | Age: 64
End: 2024-09-13

## 2024-09-16 LAB — INTERPRETATION: NEGATIVE

## 2024-09-18 ENCOUNTER — OFFICE VISIT (OUTPATIENT)
Dept: FAMILY MEDICINE CLINIC | Age: 64
End: 2024-09-18

## 2024-09-18 VITALS
OXYGEN SATURATION: 100 % | SYSTOLIC BLOOD PRESSURE: 132 MMHG | WEIGHT: 225 LBS | DIASTOLIC BLOOD PRESSURE: 82 MMHG | HEART RATE: 45 BPM | BODY MASS INDEX: 44.17 KG/M2 | HEIGHT: 60 IN

## 2024-09-18 DIAGNOSIS — Z23 NEED FOR INFLUENZA VACCINATION: ICD-10-CM

## 2024-09-18 DIAGNOSIS — I10 ESSENTIAL (PRIMARY) HYPERTENSION: Primary | ICD-10-CM

## 2024-09-18 DIAGNOSIS — R00.1 BRADYCARDIA: ICD-10-CM

## 2024-09-18 RX ORDER — METOPROLOL TARTRATE 25 MG/1
25 TABLET, FILM COATED ORAL DAILY
Qty: 60 TABLET | Refills: 3 | Status: SHIPPED | OUTPATIENT
Start: 2024-09-18

## 2024-09-18 RX ORDER — HYDROCHLOROTHIAZIDE 25 MG/1
25 TABLET ORAL EVERY MORNING
Qty: 60 TABLET | Refills: 3 | Status: SHIPPED | OUTPATIENT
Start: 2024-09-18

## 2024-09-18 ASSESSMENT — ENCOUNTER SYMPTOMS
CHEST TIGHTNESS: 0
WHEEZING: 0

## 2024-09-19 ENCOUNTER — CARE COORDINATION (OUTPATIENT)
Dept: CARE COORDINATION | Age: 64
End: 2024-09-19

## 2024-09-19 DIAGNOSIS — R19.7 DIARRHEA, UNSPECIFIED TYPE: Primary | ICD-10-CM

## 2024-09-24 ENCOUNTER — CARE COORDINATION (OUTPATIENT)
Dept: CARE COORDINATION | Age: 64
End: 2024-09-24

## 2024-10-04 ENCOUNTER — CARE COORDINATION (OUTPATIENT)
Dept: CARE COORDINATION | Age: 64
End: 2024-10-04

## 2024-10-04 NOTE — CARE COORDINATION
Ambulatory Care Coordination Note     10/4/2024 11:36 AM     Patient Current Location:  Home: [address] [place] [address]     ACM contacted the patient by telephone. Verified name and  with patient as identifiers.         ACM: Carmen Chacko RN     Challenges to be reviewed by the provider   Additional needs identified to be addressed with provider No  none               Method of communication with provider: none.    Has the patient been seen in the ED since your last call? no    Care Summary Note:     ACM made care coordination outreach and spoke with patient. Patient very pleasant on the phone while conversing with ACM. Patient reported that she is doing \"pretty good.\" Denied any chest pain, shortness of breath or swelling. Scheduled for a colonoscopy next week. Discussed medications and patient is taking Lopressor at this time. Politely declined working with clinical pharmacist to discuss medications. Denied any questions regarding medications at this time. Continue to monitor blood pressure at home. Denied any concerns related to blood pressure at this time. Reviewed fall education and HTN zones. ACM discussed with patient signs and symptoms to monitor and importance of reporting concerns to appropriate site of care for early intervention. Patient is aware of provider on call services for non emergent after hours questions and to report to ED with RED flags concerns. Patient reported that she does not have a healthcare power of  or living will completed. Agreeable to working with  to complete them. Denied any other questions or concerns at this time. ACM to follow up at a later date.     Offered patient enrollment in the Remote Patient Monitoring (RPM) program for in-home monitoring: Yes, but did not enroll at this time: already monitoring with home equipment.     Assessments Completed:   Hypertension - Encounter Level          ,   General Assessment    Do you have any symptoms

## 2024-10-07 ENCOUNTER — CARE COORDINATION (OUTPATIENT)
Dept: CARE COORDINATION | Age: 64
End: 2024-10-07

## 2024-10-07 NOTE — CARE COORDINATION
Advance Care Planning Note      Date: 10/7/2024    Patient Current Location:  [place]    ACP Specialist:  BIRD Sánchez    Date Referral Received:10/7/24    Initial Outreach:     Outreach call to patient in follow-up to ACP Specialist referral from: Ambulatory Care Manager Ricco  requesting assistance with new advance directive completion    Next Step:    Mailed Advance Directive  Outreach again in 2 weeks         Thank you for this referral.      Bekah Lopez MSW, BIRD     [phone]

## 2024-10-21 ENCOUNTER — CARE COORDINATION (OUTPATIENT)
Dept: CARE COORDINATION | Age: 64
End: 2024-10-21

## 2024-10-21 NOTE — CARE COORDINATION
Advance Care Planning Note      Date: 10/21/2024    Patient Current Location:  [place]    ACP Specialist:  BIRD Sánchez    Date Referral Received:10/7/24    Initial Outreach:     Outreach call to patient in follow-up to ACP Specialist referral from: Ambulatory Care Manager Ricco  requesting assistance with new advance directive completion  Patient confirmed receipt of AD packet, no questions at this time.     Next Step:    Patient to outreach to this worker with any questions. Closing referral as no further assistance is needed.          Bekah Lopez MSW, ADRIÁNW     [phone]

## 2024-10-25 ENCOUNTER — CARE COORDINATION (OUTPATIENT)
Dept: CARE COORDINATION | Age: 64
End: 2024-10-25

## 2024-10-25 NOTE — CARE COORDINATION
Ambulatory Care Coordination Note     10/25/2024 12:49 PM     Patient Current Location:  Home: [address] [place] [id number]     ACM contacted the patient by telephone. Verified name and  with patient as identifiers.         ACM: Carmen Chacko RN     Challenges to be reviewed by the provider   Additional needs identified to be addressed with provider No  none               Method of communication with provider: none.    Has the patient been seen in the ED since your last call? no    Care Summary Note:     ACM made care coordination call and spoke with patient.  Patient very pleasant on the phone while conversing with ACM. Patient reported that she is doing \"pretty good.\" Denied any chest pain, shortness of breath or swelling. Reported that blood pressure was \"good.\" 133/93 this morning. Discussed HTN zones. Stated that her colonoscopy went well and everything looked good. Patient stated no other questions or concerns at the end of the call. Patient provided with ACM phone number to call for any non-emergent questions. Patient agreeable to future care coordination calls. ACM will follow up at a later time.      Offered patient enrollment in the Remote Patient Monitoring (RPM) program for in-home monitoring: Yes, but did not enroll at this time: already monitoring with home equipment.     Assessments Completed:   Hypertension - Encounter Level          ,   General Assessment    Do you have any symptoms that are causing concern?: No          Medications Reviewed:   Completed during a previous call     Advance Care Planning:   Reviewed during previous call      Care Planning:   Education Documentation  Influenza Vaccine, taught by Carmen Chacko RN at 10/25/2024 12:48 PM.  Learner: Patient  Readiness: Acceptance  Method: Explanation  Response: Verbalizes Understanding    Pneumovax Recommendation, taught by Carmen Chacko RN at 10/25/2024 12:48 PM.  Learner: Patient  Readiness: Acceptance  Method:

## 2024-11-15 ENCOUNTER — CARE COORDINATION (OUTPATIENT)
Dept: CARE COORDINATION | Age: 64
End: 2024-11-15

## 2024-11-15 NOTE — CARE COORDINATION
Ambulatory Care Coordination Note     11/15/2024 11:47 AM     Patient outreach attempt by this ACM today to perform care management follow up . ACM was unable to reach the patient by telephone today;   left voice message requesting a return phone call to this ACM.     ACM: Carmen Chacko RN     Care Summary Note:     PCP/Specialist follow up:   Future Appointments         Provider Specialty Dept Phone    12/18/2024 8:40 AM Lizzie Holm APRN - CNP Family Medicine [phone]

## 2024-11-20 ENCOUNTER — PATIENT MESSAGE (OUTPATIENT)
Dept: FAMILY MEDICINE CLINIC | Age: 64
End: 2024-11-20

## 2024-11-25 ENCOUNTER — HOSPITAL ENCOUNTER (EMERGENCY)
Age: 64
Discharge: HOME | End: 2024-11-25
Attending: EMERGENCY MEDICINE
Payer: MEDICAID

## 2024-11-25 VITALS
WEIGHT: 226.6 LBS | SYSTOLIC BLOOD PRESSURE: 146 MMHG | TEMPERATURE: 97.8 F | HEIGHT: 61 IN | DIASTOLIC BLOOD PRESSURE: 69 MMHG | HEART RATE: 81 BPM | RESPIRATION RATE: 12 BRPM | BODY MASS INDEX: 42.78 KG/M2 | OXYGEN SATURATION: 100 %

## 2024-11-25 DIAGNOSIS — H65.91 RIGHT OTITIS MEDIA WITH EFFUSION: ICD-10-CM

## 2024-11-25 DIAGNOSIS — J01.00 ACUTE MAXILLARY SINUSITIS, RECURRENCE NOT SPECIFIED: Primary | ICD-10-CM

## 2024-11-25 PROCEDURE — 99283 EMERGENCY DEPT VISIT LOW MDM: CPT

## 2024-11-25 PROCEDURE — 6370000000 HC RX 637 (ALT 250 FOR IP): Performed by: EMERGENCY MEDICINE

## 2024-11-25 RX ORDER — FLUTICASONE PROPIONATE 50 MCG
2 SPRAY, SUSPENSION (ML) NASAL DAILY
Qty: 16 G | Refills: 0 | Status: SHIPPED | OUTPATIENT
Start: 2024-11-25

## 2024-11-25 RX ADMIN — AMOXICILLIN AND CLAVULANATE POTASSIUM 1 TABLET: 875; 125 TABLET, FILM COATED ORAL at 05:47

## 2024-11-25 ASSESSMENT — PAIN - FUNCTIONAL ASSESSMENT: PAIN_FUNCTIONAL_ASSESSMENT: 0-10

## 2024-11-25 ASSESSMENT — PAIN SCALES - GENERAL: PAINLEVEL_OUTOF10: 9

## 2024-11-25 ASSESSMENT — PAIN DESCRIPTION - DESCRIPTORS: DESCRIPTORS: ACHING;SORE

## 2024-11-25 ASSESSMENT — PAIN DESCRIPTION - LOCATION: LOCATION: EAR

## 2024-11-25 ASSESSMENT — PAIN DESCRIPTION - ORIENTATION: ORIENTATION: RIGHT

## 2024-11-25 NOTE — ED PROVIDER NOTES
[facility] EMERGENCY DEPT VISIT      Patient Identification  Daniella Webb is a 64 y.o. female.    Chief Complaint   Ear Pain (States she has been having cold symptoms for the past week. Today she blew her nose and now having ringing and pain after feeling something sort of \"pop\" to right ear. Left ear is fine. Last Sunday had her family Thanksgiving and a couple of nieces had colds. ) and Nasal Congestion      History of Present Illness:    History was obtained from patient.  Limitations to history:none  This is a  64 y.o. female who presents ambulatory  to the ED with complaints of right ear pain and ringing/humming. Patient has had a head cold for last 4-5 days. No documented fever but has been having chills and sweats. Sinus pressure and clear nasal drainage. Slight sore throat. No cough. No chest pain or trouble breathing. No vomiting but has had diarrhea. Tonight she blew her nose and felt a pop and has had right ear pain and ringing since. No drainage.     Past Medical History:   Diagnosis Date    Abnormal antibody titer     Hepatitis C  20014    Chronic gastritis     Esophagitis     Fatty liver     GERD (gastroesophageal reflux disease)     Hiatal hernia     History of atrial tachycardia     Before gastric sleeve    Hx of hiatal hernia     repaired with gastric sleeve surgery    Hypertension     Internal hemorrhoid, bleeding     pt states currently not having any problems with hemorrhoids    Obstructive sleep apnea syndrome 04/21/2017    no cpap    PONV (postoperative nausea and vomiting)     Wears glasses        Past Surgical History:   Procedure Laterality Date    ABDOMEN SURGERY N/A 08/29/2017    Robotic assisted lapascopic gastric sleeve performed at [facility] by MD ZARA Lake    BUNIONECTOMY Bilateral 10/02/2020, 2/26/21    CHOLECYSTECTOMY  2015    LAPROSCOPIC    COLONOSCOPY N/A 03/18/2021    COLONOSCOPY DIAGNOSTIC performed by Louisa Freitas MD at [facility] ENDOSCOPY    ENDOSCOPY, COLON, DIAGNOSTIC

## 2024-11-26 ENCOUNTER — CARE COORDINATION (OUTPATIENT)
Dept: CARE COORDINATION | Age: 64
End: 2024-11-26

## 2024-11-26 NOTE — CARE COORDINATION
Ambulatory Care Coordination Note     2024 12:02 PM     Patient Current Location:  [place]     ACM contacted the patient by telephone. Verified name and  with patient as identifiers.         ACM: Carmen Chacko RN     Challenges to be reviewed by the provider   Additional needs identified to be addressed with provider No  none               Method of communication with provider: none.    Utilization: Has the patient been seen in the ED since your last call? Yes,   Discharge Date: 24   Discharge Facility: [facility]  Reason for ED Visit: ear pain    Visit Diagnosis: acute maxillary sinusitis recurrence not specified     Number of ED visits in the last 6 months: 2      Do you have any ongoing symptoms? Yes, there has been no change in my symptoms.   Current symptoms: unable to hear out of right ear.    Did you call your PCP prior to going to the ED? No, did not call the PCP office.     Review of Discharge Instructions:   [x] AVS discharge instructions  [x] Right Care, Right Place, Right Time document  [x] Medication changes  [x] Follow up appointments  [] Referral follow up   []        Care Summary Note:     ACM made care coordination outreach and spoke with patient regarding ED follow up. Patient very pleasant on the phone while conversing with ACM. Went to the ED for not being able to hear out of her right ear. Still unable to hear out of her right ear. Symptoms are about the same. No better or worse. Denied any chest pain, shortness of breath or swelling. AVS reviewed. Picked up new prescriptions. Aware to take entire antibiotic regardless if feeling better. Requesting PCP office call her to schedule ED follow up appointment. Denied any other questions or concerns at this time. Agreeable to future care coordination outreaches. ACM to follow up at a later date.       PCP/Specialist follow up:   Future Appointments         Provider Specialty Dept Phone    2024 8:40 AM Lizzie Holm

## 2024-11-28 DIAGNOSIS — R60.9 DEPENDENT EDEMA: ICD-10-CM

## 2024-12-01 ENCOUNTER — HOSPITAL ENCOUNTER (EMERGENCY)
Age: 64
Discharge: HOME | End: 2024-12-01
Attending: EMERGENCY MEDICINE
Payer: COMMERCIAL

## 2024-12-01 VITALS
WEIGHT: 226.1 LBS | HEART RATE: 77 BPM | DIASTOLIC BLOOD PRESSURE: 74 MMHG | SYSTOLIC BLOOD PRESSURE: 142 MMHG | TEMPERATURE: 97.6 F | HEIGHT: 61 IN | RESPIRATION RATE: 10 BRPM | OXYGEN SATURATION: 100 % | BODY MASS INDEX: 42.69 KG/M2

## 2024-12-01 DIAGNOSIS — R09.81 NASAL CONGESTION: ICD-10-CM

## 2024-12-01 DIAGNOSIS — H91.91 DECREASED HEARING OF RIGHT EAR: ICD-10-CM

## 2024-12-01 DIAGNOSIS — B37.9 YEAST INFECTION: ICD-10-CM

## 2024-12-01 DIAGNOSIS — H65.191 ACUTE MEE (MIDDLE EAR EFFUSION), RIGHT: ICD-10-CM

## 2024-12-01 DIAGNOSIS — H66.90 ACUTE OTITIS MEDIA, UNSPECIFIED OTITIS MEDIA TYPE: Primary | ICD-10-CM

## 2024-12-01 PROCEDURE — 6370000000 HC RX 637 (ALT 250 FOR IP): Performed by: EMERGENCY MEDICINE

## 2024-12-01 PROCEDURE — 99283 EMERGENCY DEPT VISIT LOW MDM: CPT

## 2024-12-01 RX ORDER — MECLIZINE HYDROCHLORIDE 25 MG/1
50 TABLET ORAL ONCE
Status: COMPLETED | OUTPATIENT
Start: 2024-12-01 | End: 2024-12-01

## 2024-12-01 RX ORDER — CEFUROXIME AXETIL 500 MG/1
500 TABLET ORAL 2 TIMES DAILY
Qty: 14 TABLET | Refills: 0 | Status: SHIPPED | OUTPATIENT
Start: 2024-12-01 | End: 2024-12-08

## 2024-12-01 RX ORDER — CEFUROXIME AXETIL 250 MG/1
500 TABLET ORAL EVERY 12 HOURS SCHEDULED
Status: DISCONTINUED | OUTPATIENT
Start: 2024-12-01 | End: 2024-12-01 | Stop reason: HOSPADM

## 2024-12-01 RX ORDER — OXYMETAZOLINE HYDROCHLORIDE 0.05 G/100ML
2 SPRAY NASAL 2 TIMES DAILY
Qty: 6 ML | Refills: 0 | Status: SHIPPED | OUTPATIENT
Start: 2024-12-01 | End: 2024-12-04

## 2024-12-01 RX ORDER — FLUCONAZOLE 100 MG/1
200 TABLET ORAL DAILY
Qty: 4 TABLET | Refills: 0 | Status: SHIPPED | OUTPATIENT
Start: 2024-12-01 | End: 2024-12-03

## 2024-12-01 RX ORDER — MECLIZINE HYDROCHLORIDE 25 MG/1
25 TABLET ORAL 3 TIMES DAILY PRN
Qty: 9 TABLET | Refills: 0 | Status: SHIPPED | OUTPATIENT
Start: 2024-12-01 | End: 2024-12-04

## 2024-12-01 RX ORDER — PREDNISONE 20 MG/1
40 TABLET ORAL ONCE
Status: COMPLETED | OUTPATIENT
Start: 2024-12-01 | End: 2024-12-01

## 2024-12-01 RX ORDER — PREDNISONE 20 MG/1
40 TABLET ORAL DAILY
Qty: 6 TABLET | Refills: 0 | Status: SHIPPED | OUTPATIENT
Start: 2024-12-01 | End: 2024-12-04

## 2024-12-01 RX ADMIN — MECLIZINE HYDROCHLORIDE 50 MG: 25 TABLET ORAL at 09:54

## 2024-12-01 RX ADMIN — PREDNISONE 40 MG: 20 TABLET ORAL at 09:54

## 2024-12-01 RX ADMIN — CEFUROXIME AXETIL 500 MG: 250 TABLET, FILM COATED ORAL at 09:54

## 2024-12-01 ASSESSMENT — PAIN - FUNCTIONAL ASSESSMENT: PAIN_FUNCTIONAL_ASSESSMENT: NONE - DENIES PAIN

## 2024-12-01 NOTE — ED PROVIDER NOTES
Emergency Department Attending Physician Note  Location: [facility] EMERGENCY DEPARTMENT  12/1/2024       Pt Name: Daniella Webb  MRN: [id number]  Birthdate 1960    Date of evaluation: 12/1/2024  Provider: JEFERSON BHAKTA DO  PCP: Lizzie Holm, MARINA - CNP    Note Started: 9:34 AM EST 12/1/24    CHIEF COMPLAINT  Chief Complaint   Patient presents with    Ear Fullness     Was diagnosed with ear infection lost hearing six days ago affecting balance        ROOM: 7    HISTORY OF PRESENT ILLNESS:  History obtained by patient. Limitations to history : None.     Daniella Webb is a 64 y.o. female with a significant PMHx of as below, here in the emerged department today with continued ear pain, fullness, and what she describes as muffled hearing on the right.  She says she was on antibiotics, but does not think are helping.  Says she feels like if she stands up or turns her head too quickly she feels a little off balance, but it is not bad right now.  No other concerns today in the emergency room including any numbness, weakness, tingling, vision changes.  No headache.  No falls or injuries.      Nursing Notes were all reviewed and agreed with or any disagreements were addressed in the HPI.      MEDICAL HISTORY  Past Medical History:   Diagnosis Date    Abnormal antibody titer     Hepatitis C  20014    Chronic gastritis     Esophagitis     Fatty liver     GERD (gastroesophageal reflux disease)     Hiatal hernia     History of atrial tachycardia     Before gastric sleeve    Hx of hiatal hernia     repaired with gastric sleeve surgery    Hypertension     Internal hemorrhoid, bleeding     pt states currently not having any problems with hemorrhoids    Obstructive sleep apnea syndrome 04/21/2017    no cpap    PONV (postoperative nausea and vomiting)     Wears glasses         SURGICAL HISTORY  Past Surgical History:   Procedure Laterality Date    ABDOMEN SURGERY N/A 08/29/2017    Robotic assisted

## 2024-12-02 ENCOUNTER — CARE COORDINATION (OUTPATIENT)
Dept: CARE COORDINATION | Age: 64
End: 2024-12-02

## 2024-12-02 RX ORDER — FUROSEMIDE 20 MG/1
TABLET ORAL
Qty: 90 TABLET | Refills: 0 | Status: SHIPPED | OUTPATIENT
Start: 2024-12-02

## 2024-12-02 NOTE — CARE COORDINATION
Ambulatory Care Coordination Note     12/2/2024 12:23 PM     Patient outreach attempt by this ACM today to perform hospital follow up. ACM was unable to reach the patient by telephone today;   left voice message requesting a return phone call to this ACM.     ACM: Carmen Chacko RN     Care Summary Note: ED follow up     PCP/Specialist follow up:   Future Appointments         Provider Specialty Dept Phone    12/18/2024 8:40 AM Lizzie Holm APRN - CNP Family Medicine [phone]

## 2024-12-03 ENCOUNTER — CARE COORDINATION (OUTPATIENT)
Dept: CARE COORDINATION | Age: 64
End: 2024-12-03

## 2024-12-03 NOTE — CARE COORDINATION
Ambulatory Care Coordination Note     12/3/2024 12:14 PM     Patient Current Location:  Home: [address] [place] [id number]     ACM contacted the patient by telephone. Verified name and  with patient as identifiers.         ACM: Carmen Chacko RN     Challenges to be reviewed by the provider   Additional needs identified to be addressed with provider No  none               Method of communication with provider: none.    Utilization: Has the patient been seen in the ED since your last call? Yes,   Discharge Date: 24   Discharge Facility: [facility]  Reason for ED Visit: ear fullness   Visit Diagnosis: acute otitis media     Number of ED visits in the last 6 months: 3      Do you have any ongoing symptoms? Yes, my symptoms have improved.   Current symptoms: ear fullness.    Did you call your PCP prior to going to the ED? No, did not call the PCP office.     Review of Discharge Instructions:   [x] AVS discharge instructions  [x] Right Care, Right Place, Right Time document  [x] Medication changes  [x] Follow up appointments  [] Referral follow up   []        Care Summary Note:     ACM made care coordination outreach and spoke with patient. Patient very pleasant on the phone while conversing with ACM. Patient reported that she is feeling better. Hearing in right ear is not back to normal but improving. AVS reviewed. Picked up new medications and is taking as prescribed. Scheduled to see PCP 24. Politely declined seeing PCP sooner. Denied any issues with BP at this time. Reported that she has not taken it today. Stated that she has some swelling in her hands and feet. Has compression socks. Reported that she will wear them today. Discussed elevating feet, monitoring fluids and sodium intake. Reviewed foods high in sodium. Patient politely declined working with dietician. Aware that dietician is available if needed. Discussed with patient signs and symptoms to monitor and importance of

## 2024-12-04 ASSESSMENT — ENCOUNTER SYMPTOMS
RHINORRHEA: 0
ABDOMINAL PAIN: 0
EYE REDNESS: 0
COUGH: 0
BACK PAIN: 0
VOMITING: 0
SHORTNESS OF BREATH: 0
CHEST TIGHTNESS: 0
DIARRHEA: 0

## 2024-12-06 DIAGNOSIS — H65.196 OTHER RECURRENT ACUTE NONSUPPURATIVE OTITIS MEDIA OF BOTH EARS: Primary | ICD-10-CM

## 2024-12-11 ENCOUNTER — CARE COORDINATION (OUTPATIENT)
Dept: CASE MANAGEMENT | Age: 64
End: 2024-12-11

## 2024-12-11 NOTE — CARE COORDINATION
Ambulatory Care Coordination Note     12/11/2024 11:50 AM     LPN CC outreach attempt by this ACM today to perform care management follow up . ACM was unable to reach the patient by telephone today;   left voice message requesting a return phone call to this ACM.     ACM: Alicia Lou LPN     Care Summary Note:       PCP/Specialist follow up:   Future Appointments         Provider Specialty Dept Phone    12/18/2024 8:40 AM Lizzie Holm APRN - CNP Family Medicine [phone]    12/26/2024 9:00 AM Av Samaniego MD Otolaryngology [phone]            Follow Up:   Plan for next ACM outreach in approximately 1 week to complete:  - goal progression  - education   F/u hearing, f/u swelling .

## 2024-12-18 ENCOUNTER — TELEPHONE (OUTPATIENT)
Dept: ADMINISTRATIVE | Age: 64
End: 2024-12-18

## 2024-12-18 ENCOUNTER — OFFICE VISIT (OUTPATIENT)
Dept: FAMILY MEDICINE CLINIC | Age: 64
End: 2024-12-18
Payer: COMMERCIAL

## 2024-12-18 VITALS
DIASTOLIC BLOOD PRESSURE: 82 MMHG | HEART RATE: 78 BPM | SYSTOLIC BLOOD PRESSURE: 132 MMHG | BODY MASS INDEX: 42.67 KG/M2 | OXYGEN SATURATION: 96 % | HEIGHT: 61 IN | WEIGHT: 226 LBS

## 2024-12-18 DIAGNOSIS — K58.0 IRRITABLE BOWEL SYNDROME WITH DIARRHEA: ICD-10-CM

## 2024-12-18 DIAGNOSIS — I10 PRIMARY HYPERTENSION: Primary | ICD-10-CM

## 2024-12-18 DIAGNOSIS — E66.813 CLASS 3 SEVERE OBESITY WITH SERIOUS COMORBIDITY AND BODY MASS INDEX (BMI) OF 40.0 TO 44.9 IN ADULT, UNSPECIFIED OBESITY TYPE: ICD-10-CM

## 2024-12-18 DIAGNOSIS — Z13.220 LIPID SCREENING: ICD-10-CM

## 2024-12-18 DIAGNOSIS — E66.01 CLASS 3 SEVERE OBESITY WITH SERIOUS COMORBIDITY AND BODY MASS INDEX (BMI) OF 40.0 TO 44.9 IN ADULT, UNSPECIFIED OBESITY TYPE: ICD-10-CM

## 2024-12-18 LAB
CHOLEST SERPL-MCNC: 176 MG/DL (ref 0–199)
HDLC SERPL-MCNC: 83 MG/DL (ref 40–60)
LDL CHOLESTEROL: 75 MG/DL
TRIGL SERPL-MCNC: 89 MG/DL (ref 0–150)
VLDLC SERPL CALC-MCNC: 18 MG/DL

## 2024-12-18 PROCEDURE — 99214 OFFICE O/P EST MOD 30 MIN: CPT | Performed by: NURSE PRACTITIONER

## 2024-12-18 PROCEDURE — 3079F DIAST BP 80-89 MM HG: CPT | Performed by: NURSE PRACTITIONER

## 2024-12-18 PROCEDURE — 36415 COLL VENOUS BLD VENIPUNCTURE: CPT | Performed by: NURSE PRACTITIONER

## 2024-12-18 PROCEDURE — 3075F SYST BP GE 130 - 139MM HG: CPT | Performed by: NURSE PRACTITIONER

## 2024-12-18 RX ORDER — SEMAGLUTIDE 0.25 MG/.5ML
0.25 INJECTION, SOLUTION SUBCUTANEOUS
Qty: 2 ML | Refills: 0 | Status: SHIPPED | OUTPATIENT
Start: 2024-12-18 | End: 2025-01-15

## 2024-12-18 ASSESSMENT — PATIENT HEALTH QUESTIONNAIRE - PHQ9
SUM OF ALL RESPONSES TO PHQ9 QUESTIONS 1 & 2: 1
SUM OF ALL RESPONSES TO PHQ QUESTIONS 1-9: 1
1. LITTLE INTEREST OR PLEASURE IN DOING THINGS: NOT AT ALL
2. FEELING DOWN, DEPRESSED OR HOPELESS: SEVERAL DAYS
SUM OF ALL RESPONSES TO PHQ QUESTIONS 1-9: 1

## 2024-12-18 NOTE — PROGRESS NOTES
this visit:    Primary hypertension  STABLE ON CURRENT MEDICATION  CONTINUE TOPROL- HCTZ  Lifestyle Recommendations for High Blood Pressure:  Reduce sodium intake to less than 1500 mg daily  Include 5-7 servings of fruits and vegetables daily  Reduce weight to ideal if overweight  30 minutes of physical activity daily   Irritable bowel syndrome with diarrhea   amitriptyline (ELAVIL) 25 MG tablet; Take 1 tablet by mouth nightly  CONTINUE HYOSCAMINE  FOLLOW UP WITH GI    Class 3 severe obesity with serious comorbidity and body mass index (BMI) of 40.0 to 44.9 in adult, unspecified obesity type  Semaglutide-Weight Management (WEGOVY) 0.25 MG/0.5ML SOAJ SC injection; Inject 0.25 mg into the skin every 7 days for 28 days  COMMON SIDE EFFECTS OF  GLP- 1 RECEPTORS NAUSEA, INJECTION SITE REACTIONS- NASOPHARYNGITIS- HEADACHE- ADVISED PT THAT ANY FAMILY HISTORY OF  THYROID C - CELL - MULTIPLE  ENDOCRINE SYNDROME EXCLUDES THEM FROM THE USE OF THIS MEDICATION - INSTRUCTED TO STOP ,INFORM PCP OF ANY ABDOMINAL PAIN OR OTHER CONCERNS     Lipid screening  -     Lipid, Fasting; Future           An electronic signature was used to authenticate this note.    --Lizzie Holm, APRN - CNP

## 2024-12-18 NOTE — TELEPHONE ENCOUNTER
Submitted PA for Wegovy 0.25MG/0.5ML auto-injectors  [facility]ia [facility] Key: [id number] STATUS: PENDING.    Follow up done daily; if no decision with in three days we will refax.  If another three days goes by with no decision will call the insurance for status.

## 2024-12-19 ENCOUNTER — CARE COORDINATION (OUTPATIENT)
Dept: CARE COORDINATION | Age: 64
End: 2024-12-19

## 2024-12-19 NOTE — CARE COORDINATION
Ambulatory Care Coordination Note     12/19/2024 3:37 PM     Patient outreach attempt by this ACM today to perform care management follow up . ACM was unable to reach the patient by telephone today;   left voice message requesting a return phone call to this ACM.     ACM: Carmen Chacko RN     Care Summary Note:     PCP/Specialist follow up:   Future Appointments         Provider Specialty Dept Phone    12/26/2024 9:00 AM Av Samaniego MD Otolaryngology [phone]

## 2024-12-26 ENCOUNTER — OFFICE VISIT (OUTPATIENT)
Dept: ENT CLINIC | Age: 64
End: 2024-12-26
Payer: COMMERCIAL

## 2024-12-26 VITALS
SYSTOLIC BLOOD PRESSURE: 140 MMHG | DIASTOLIC BLOOD PRESSURE: 82 MMHG | HEART RATE: 72 BPM | HEIGHT: 61 IN | WEIGHT: 226 LBS | BODY MASS INDEX: 42.67 KG/M2 | OXYGEN SATURATION: 98 %

## 2024-12-26 DIAGNOSIS — H90.5 SENSORINEURAL HEARING LOSS (SNHL) OF RIGHT EAR, UNSPECIFIED HEARING STATUS ON CONTRALATERAL SIDE: ICD-10-CM

## 2024-12-26 DIAGNOSIS — H92.01 RIGHT EAR PAIN: Primary | ICD-10-CM

## 2024-12-26 PROCEDURE — 99203 OFFICE O/P NEW LOW 30 MIN: CPT | Performed by: OTOLARYNGOLOGY

## 2024-12-26 PROCEDURE — 3077F SYST BP >= 140 MM HG: CPT | Performed by: OTOLARYNGOLOGY

## 2024-12-26 PROCEDURE — 3079F DIAST BP 80-89 MM HG: CPT | Performed by: OTOLARYNGOLOGY

## 2024-12-26 NOTE — PROGRESS NOTES
lateralized to the left side suggesting that sensorineural hearing loss on the right side.  Typically you would not get discomfort with an idiopathic sudden hearing loss.  I would like to get an audiogram for further evaluation.  She scheduled this for Monday and we will discuss afterwards.    2. Sensorineural hearing loss (SNHL) of right ear, unspecified hearing status on contralateral side  As above             I have performed a head and neck physical exam personally or was physically present during the key or critical portions of the service.    This note was generated completely or in part utilizing Dragon dictation speech recognition software.  Occasionally, words are mistranscribed and despite editing, the text may contain inaccuracies due to incorrect word recognition.  If further clarification is needed please contact the office at [phone].

## 2024-12-27 ENCOUNTER — TELEPHONE (OUTPATIENT)
Dept: ADMINISTRATIVE | Age: 64
End: 2024-12-27

## 2024-12-27 NOTE — TELEPHONE ENCOUNTER
Submitted PA for Zepbound  Via [facility] Key: [id number] STATUS: PENDING.    Follow up done daily; if no decision with in three days we will refax.  If another three days goes by with no decision will call the insurance for status.

## 2025-01-02 DIAGNOSIS — H91.90 HEARING LOSS, UNSPECIFIED HEARING LOSS TYPE, UNSPECIFIED LATERALITY: Primary | ICD-10-CM

## 2025-01-03 ENCOUNTER — OFFICE VISIT (OUTPATIENT)
Dept: ENT CLINIC | Age: 65
End: 2025-01-03

## 2025-01-03 ENCOUNTER — PROCEDURE VISIT (OUTPATIENT)
Dept: AUDIOLOGY | Age: 65
End: 2025-01-03

## 2025-01-03 VITALS
BODY MASS INDEX: 43.61 KG/M2 | HEART RATE: 78 BPM | HEIGHT: 61 IN | WEIGHT: 231 LBS | DIASTOLIC BLOOD PRESSURE: 93 MMHG | SYSTOLIC BLOOD PRESSURE: 180 MMHG

## 2025-01-03 DIAGNOSIS — H90.41 SENSORINEURAL HEARING LOSS (SNHL) OF RIGHT EAR WITH UNRESTRICTED HEARING OF LEFT EAR: Primary | ICD-10-CM

## 2025-01-03 DIAGNOSIS — R42 DIZZINESS: ICD-10-CM

## 2025-01-03 DIAGNOSIS — H91.21 SUDDEN RIGHT HEARING LOSS: ICD-10-CM

## 2025-01-03 DIAGNOSIS — H93.13 TINNITUS OF BOTH EARS: ICD-10-CM

## 2025-01-03 RX ORDER — DEXAMETHASONE SODIUM PHOSPHATE 10 MG/ML
10 INJECTION, SOLUTION INTRAMUSCULAR; INTRAVENOUS ONCE
Status: COMPLETED | OUTPATIENT
Start: 2025-01-03 | End: 2025-01-03

## 2025-01-03 RX ADMIN — DEXAMETHASONE SODIUM PHOSPHATE 10 MG: 10 INJECTION, SOLUTION INTRAMUSCULAR; INTRAVENOUS at 08:56

## 2025-01-03 NOTE — PROGRESS NOTES
[facility]  DIVISION OF OTOLARYN  Follow up    Patient Name: Daniella Webb  Medical Record Number:  [id number]  Primary Care Physician:  Lizzie Holm, MARINA - CNP  Date of Consultation: 1/3/2025    Chief Complaint: right ear issues        HISTORY OF PRESENT ILLNESS  Daniella is a(n) 64 y.o. female who presents right ear issues.  She was previously evaluated on 12/26/2020 for right ear pain and hearing loss.    November 17, went to [facility] urgent care, diagnosed with ear infection told it was from sinuses. Given antibiotics, two days later couldn't hear out of her right ear. The next Sunday she lost balance when she got out of the shower. Went back to urgent care, got another antibiotic and steroid and a sinus spray.  She did not feel like the antibiotics or steroids helped much.  She feels like something is stuffed in her ear. She had a hearing test 5-6 years ago due to tinnitus, told it was normal then and tinnitus went away eventually.  Since this episode started she has had pulsatile tinnitus again.  Hearing loss is associated with dizziness 1-2 episodes a month, room spinning, in addition to headaches. She reports the hearing is very mildly improved, she is at least able to hear some sounds now.       REVIEW OF SYSTEMS  As above    PHYSICAL EXAM  GENERAL: No Acute Distress, Alert and Oriented, no Hoarseness, strong voice  EYES: EOMI, Anti-icteric  HENT:   Head: Normocephalic and atraumatic.   Face:  Symmetric, facial nerve intact, no sinus tenderness  Right Ear: Normal external ear, normal external auditory canal, intact tympanic membrane with normal mobility and aerated middle ear  Left Ear: Normal external ear, normal external auditory canal, intact tympanic membrane with normal mobility and aerated middle ear  Mouth/Oral Cavity:  normal lips, Uvula is midline, no mucosal lesions, no trismus, good dentition, normal salivary quality/flow  Oropharynx/Larynx:  normal oropharynx, 1+ tonsils; normal

## 2025-01-03 NOTE — PROGRESS NOTES
Daniella Webb   1960, 64 y.o. female   [id number]       Referring Provider: Av Samaniego MD  Referral Type: In an order in Epic    Reason for Visit: Evaluation of suspected change in hearing, tinnitus, or balance.    ADULT AUDIOLOGIC EVALUATION      Daniella Webb is a 64 y.o. female seen today, 1/3/2025 , for an initial audiologic evaluation.  Patient was seen by Av Samaniego MD following today's evaluation. Patient arrived 13 minutes late to today's appointment.     AUDIOLOGIC AND OTHER PERTINENT MEDICAL HISTORY:      Daniella Webb reports a right-sided ear infection about 1 month ago with subsequent muffled hearing and pain. She stated she was given several rounds of antibiotics which have helped some, but have not resolved the issues in her right ear. She stated she can hear her heartbeat in her right ear at night. She also endorsed dome dizziness in association with the ear infection. She noted she fell once due to the dizziness.    She denied otorrhea, history of noise exposure, history of head trauma, history of ear surgery, and family history of hearing loss    Encounter note from Dr. Samaniego on 12/26/2024:  \"Daniella is a(n) 64 y.o. female who presents for evaluation of right ear issues.  The patient says she started having some discomfort and hearing loss on the right side.  She was diagnosed with the ear infection and given antibiotics.  She went to the emergency room and was given steroids after that.  She said she still has a little bit discomfort, but mostly has muffled hearing.  Its only the right ear.  She does not have a significant ear history.  She is never had ear surgery.  She did have 1 episode of dizziness and fell.     I am not entirely sure what is going on with the patient's ear. She was diagnosed with an otitis media. I do not see obvious fluid in the ear, but she still having hearing loss and discomfort. Carter actually lateralized to the left side suggesting that sensorineural

## 2025-01-09 DIAGNOSIS — H91.90 HEARING LOSS, UNSPECIFIED HEARING LOSS TYPE, UNSPECIFIED LATERALITY: Primary | ICD-10-CM

## 2025-01-11 ENCOUNTER — HOSPITAL ENCOUNTER (OUTPATIENT)
Dept: MRI IMAGING | Age: 65
Discharge: HOME | End: 2025-01-11
Attending: OTOLARYNGOLOGY
Payer: COMMERCIAL

## 2025-01-11 DIAGNOSIS — H90.41 SENSORINEURAL HEARING LOSS (SNHL) OF RIGHT EAR WITH UNRESTRICTED HEARING OF LEFT EAR: ICD-10-CM

## 2025-01-11 DIAGNOSIS — H91.21 SUDDEN RIGHT HEARING LOSS: ICD-10-CM

## 2025-01-11 LAB
CREAT SERPL-MCNC: 0.9 MG/DL (ref 0.6–1.2)
GFR SERPLBLD CREATININE-BSD FMLA CKD-EPI: 72 ML/MIN/{1.73_M2}

## 2025-01-11 PROCEDURE — 2500000003 HC RX 250 WO HCPCS: Performed by: OTOLARYNGOLOGY

## 2025-01-11 PROCEDURE — A9577 INJ MULTIHANCE: HCPCS | Performed by: OTOLARYNGOLOGY

## 2025-01-11 PROCEDURE — 70553 MRI BRAIN STEM W/O & W/DYE: CPT

## 2025-01-11 PROCEDURE — 6360000004 HC RX CONTRAST MEDICATION: Performed by: OTOLARYNGOLOGY

## 2025-01-11 RX ORDER — SODIUM CHLORIDE 0.9 % (FLUSH) 0.9 %
10 SYRINGE (ML) INJECTION ONCE
Status: COMPLETED | OUTPATIENT
Start: 2025-01-11 | End: 2025-01-11

## 2025-01-11 RX ADMIN — Medication 10 ML: at 07:38

## 2025-01-11 RX ADMIN — GADOBENATE DIMEGLUMINE 19 ML: 529 INJECTION, SOLUTION INTRAVENOUS at 07:34

## 2025-01-13 ENCOUNTER — PATIENT MESSAGE (OUTPATIENT)
Dept: FAMILY MEDICINE CLINIC | Age: 65
End: 2025-01-13

## 2025-01-14 ENCOUNTER — CARE COORDINATION (OUTPATIENT)
Dept: CARE COORDINATION | Age: 65
End: 2025-01-14

## 2025-01-14 NOTE — CARE COORDINATION
Ambulatory Care Coordination Note     1/14/2025 10:48 AM     Patient outreach attempt by this ACM today to perform care management follow up . ACM was unable to reach the patient by telephone today;   left voice message requesting a return phone call to this ACM.     ACM: Carmen Chacko RN     Care Summary Note:     PCP/Specialist follow up:   Future Appointments         Provider Specialty Dept Phone    1/16/2025 8:00 AM Shayy Valdez [facility] Audiology [phone]    1/16/2025 8:45 AM Bonifacio Lopez MD Otolaryngology [phone]

## 2025-01-16 ENCOUNTER — PROCEDURE VISIT (OUTPATIENT)
Dept: AUDIOLOGY | Age: 65
End: 2025-01-16
Payer: COMMERCIAL

## 2025-01-16 ENCOUNTER — OFFICE VISIT (OUTPATIENT)
Dept: ENT CLINIC | Age: 65
End: 2025-01-16

## 2025-01-16 VITALS
SYSTOLIC BLOOD PRESSURE: 150 MMHG | OXYGEN SATURATION: 98 % | BODY MASS INDEX: 44.37 KG/M2 | HEART RATE: 66 BPM | DIASTOLIC BLOOD PRESSURE: 89 MMHG | HEIGHT: 61 IN | WEIGHT: 235 LBS

## 2025-01-16 DIAGNOSIS — J34.89 NASAL OBSTRUCTION: ICD-10-CM

## 2025-01-16 DIAGNOSIS — H91.90 HEARING LOSS, UNSPECIFIED HEARING LOSS TYPE, UNSPECIFIED LATERALITY: Primary | ICD-10-CM

## 2025-01-16 DIAGNOSIS — J34.89 CONCHA BULLOSA: ICD-10-CM

## 2025-01-16 DIAGNOSIS — J34.3 HYPERTROPHY OF BOTH INFERIOR NASAL TURBINATES: ICD-10-CM

## 2025-01-16 DIAGNOSIS — H93.11 TINNITUS OF RIGHT EAR: ICD-10-CM

## 2025-01-16 DIAGNOSIS — H90.41 SENSORINEURAL HEARING LOSS (SNHL) OF RIGHT EAR WITH UNRESTRICTED HEARING OF LEFT EAR: ICD-10-CM

## 2025-01-16 DIAGNOSIS — H91.21 SUDDEN RIGHT HEARING LOSS: Primary | ICD-10-CM

## 2025-01-16 DIAGNOSIS — H90.41 SENSORINEURAL HEARING LOSS (SNHL) OF RIGHT EAR WITH UNRESTRICTED HEARING OF LEFT EAR: Primary | ICD-10-CM

## 2025-01-16 PROCEDURE — 92567 TYMPANOMETRY: CPT

## 2025-01-16 PROCEDURE — 92557 COMPREHENSIVE HEARING TEST: CPT

## 2025-01-16 RX ORDER — FLUTICASONE PROPIONATE 50 MCG
2 SPRAY, SUSPENSION (ML) NASAL 2 TIMES DAILY
Qty: 2 EACH | Refills: 5 | Status: SHIPPED | OUTPATIENT
Start: 2025-01-16

## 2025-01-16 NOTE — PROGRESS NOTES
[facility]  Audiologist     Chart CC'd to: Bonifacio Lopez MD        Degree of   Hearing Sensitivity dB Range   Within Normal Limits (WNL) 0 - 20   Mild 20 - 40   Moderate 40 - 55   Moderately-Severe 55 - 70   Severe 70 - 90   Profound 90 +

## 2025-01-16 NOTE — PROGRESS NOTES
[facility]  DIVISION OF OTOLARYNGOLOGY- HEAD & NECK SURGERY      Daniella Webb (:  1960) is a 64 y.o. female, here for evaluation of the following chief complaint(s):  Follow-up, Hearing Problem, and Tinnitus      ASSESSMENT/PLAN:  1. Sudden right hearing loss  2. Sensorineural hearing loss (SNHL) of right ear with unrestricted hearing of left ear  3. Tinnitus of right ear  4. Nasal obstruction  5. Hypertrophy of both inferior nasal turbinates  6. Kayla bullosa      This is a very pleasant 64 y.o. female here today for evaluation of the the above-noted complaints.      -The patient has had interval improvement of her sudden idiopathic sensorineural hearing loss.  Discussed with the patient that we could perform another steroid injection in clinic, as she is not back to 100%.  The patient declined it would like to manage this expectantly.  We discussed the chance of recovery.  I think it is high based on her progress so far.  -We discussed tinnitus management.  We discussed that is related to her hearing loss.  -The patient has physical exam findings and radiographic findings showing turbinate hypertrophy, mild septal deviation and obstructing kayla bullosa.  She has tried fluticasone in the past, but has not been very successful in addressing her nasal obstruction issues.  I would like to begin her on a higher and more extended dose of Flonase.  If she fails this, I discussed that she would be a good candidate for turbinate reduction, possible septoplasty and removal of kayla bullosa.  She will follow-up with my partner to discuss this at her follow-up visit in several months.    Medical Decision Making:  The following items were considered in medical decision making:  Independent review of images  Review / order clinical lab tests  Review / order radiology tests  Decision to obtain old records  Review and summation of old records as accessed through "[employer]" if

## 2025-02-06 ENCOUNTER — OFFICE VISIT (OUTPATIENT)
Dept: FAMILY MEDICINE CLINIC | Age: 65
End: 2025-02-06
Payer: COMMERCIAL

## 2025-02-06 VITALS
OXYGEN SATURATION: 98 % | BODY MASS INDEX: 45.01 KG/M2 | HEART RATE: 63 BPM | WEIGHT: 238.4 LBS | DIASTOLIC BLOOD PRESSURE: 86 MMHG | HEIGHT: 61 IN | SYSTOLIC BLOOD PRESSURE: 124 MMHG

## 2025-02-06 DIAGNOSIS — E66.813 CLASS 3 SEVERE OBESITY WITHOUT SERIOUS COMORBIDITY WITH BODY MASS INDEX (BMI) OF 45.0 TO 49.9 IN ADULT, UNSPECIFIED OBESITY TYPE: Primary | ICD-10-CM

## 2025-02-06 DIAGNOSIS — E66.01 CLASS 3 SEVERE OBESITY WITHOUT SERIOUS COMORBIDITY WITH BODY MASS INDEX (BMI) OF 45.0 TO 49.9 IN ADULT, UNSPECIFIED OBESITY TYPE: Primary | ICD-10-CM

## 2025-02-06 LAB
ALCOHOL URINE: NORMAL
AMPHETAMINE SCREEN URINE: NORMAL
BARBITURATE SCREEN URINE: NEGATIVE
BENZODIAZEPINE SCREEN, URINE: NEGATIVE
BUPRENORPHINE URINE: NORMAL
COCAINE METABOLITE SCREEN URINE: NEGATIVE
FENTANYL SCREEN, URINE: NORMAL
GABAPENTIN SCREEN, URINE: NORMAL
MDMA, URINE: NEGATIVE
METHADONE SCREEN, URINE: NEGATIVE
METHAMPHETAMINE, URINE: NEGATIVE
OPIATE SCREEN URINE: NEGATIVE
OXYCODONE SCREEN URINE: NEGATIVE
PHENCYCLIDINE SCREEN URINE: NEGATIVE
PROPOXYPHENE SCREEN, URINE: NORMAL
SYNTHETIC CANNABINOIDS(K2) SCREEN, URINE: NORMAL
THC SCREEN, URINE: NEGATIVE
TRAMADOL SCREEN URINE: NORMAL
TRICYCLIC ANTIDEPRESSANTS, UR: NEGATIVE

## 2025-02-06 PROCEDURE — 80305 DRUG TEST PRSMV DIR OPT OBS: CPT | Performed by: NURSE PRACTITIONER

## 2025-02-06 PROCEDURE — 99213 OFFICE O/P EST LOW 20 MIN: CPT | Performed by: NURSE PRACTITIONER

## 2025-02-06 PROCEDURE — 3079F DIAST BP 80-89 MM HG: CPT | Performed by: NURSE PRACTITIONER

## 2025-02-06 PROCEDURE — 3074F SYST BP LT 130 MM HG: CPT | Performed by: NURSE PRACTITIONER

## 2025-02-06 RX ORDER — PHENTERMINE HYDROCHLORIDE 37.5 MG/1
37.5 TABLET ORAL
Qty: 90 TABLET | Refills: 0 | Status: SHIPPED | OUTPATIENT
Start: 2025-02-06 | End: 2025-05-07

## 2025-02-06 RX ORDER — MECLIZINE HYDROCHLORIDE 25 MG/1
TABLET ORAL
COMMUNITY

## 2025-02-06 ASSESSMENT — PATIENT HEALTH QUESTIONNAIRE - PHQ9
2. FEELING DOWN, DEPRESSED OR HOPELESS: NOT AT ALL
SUM OF ALL RESPONSES TO PHQ QUESTIONS 1-9: 0
1. LITTLE INTEREST OR PLEASURE IN DOING THINGS: NOT AT ALL
1. LITTLE INTEREST OR PLEASURE IN DOING THINGS: NOT AT ALL
SUM OF ALL RESPONSES TO PHQ9 QUESTIONS 1 & 2: 0
SUM OF ALL RESPONSES TO PHQ9 QUESTIONS 1 & 2: 0
SUM OF ALL RESPONSES TO PHQ QUESTIONS 1-9: 0
2. FEELING DOWN, DEPRESSED OR HOPELESS: NOT AT ALL

## 2025-02-06 NOTE — PROGRESS NOTES
Daniella Webb (:  1960) is a 64 y.o. female,Established patient, here for evaluation of the following chief complaint(s):  Weight Management (Routine follow up for weight loss)         Assessment & Plan  Class 3 severe obesity without serious comorbidity with body mass index (BMI) of 45.0 to 49.9 in adult, unspecified obesity type   Chronic, not at goal (unstable), changes made today: adipex prescribed    Orders:  Controlled substances monitoring: possible medication side effects, risk of tolerance and/or dependence, and alternative treatments discussed, no signs of potential drug abuse or diversion identified, OARRS report reviewed today- activity consistent with treatment plan, medication contract signed today, and random urine drug screen sent today.     phentermine (ADIPEX-P) 37.5 MG tablet; Take 1 tablet by mouth every morning (before breakfast) for 90 days. Max Daily Amount: 37.5 mg    POCT Rapid Drug Screen- consistent  5 % weight loss goal 12 lbs  Follow up in three months       Assessment & Plan  1. Weight management.  A comprehensive discussion was held regarding the potential side effects of Adipex, including dry mouth, constipation, palpitations, and transient insomnia. She was advised to take MiraLAX or a stool softener concurrently with Adipex to mitigate the risk of constipation. A medication contract was signed, and a urine drug screen was ordered. A prescription for a 90-day supply of Adipex will be sent to [facility] on [facility] [facility]. The goal is to achieve a 5 percent reduction in body weight within the first 3 months, which equates to approximately 12 pounds. If this goal is met, the treatment can be extended for an additional 3 months. The ultimate aim is to maintain a weight loss of 12 pounds, with the potential for further reduction, until her BMI reaches 27 or her desired weight.    Follow-up  The patient will follow up in 3 months.       No follow-ups on file.       Subjective   HPI

## 2025-02-10 ENCOUNTER — TELEPHONE (OUTPATIENT)
Dept: ADMINISTRATIVE | Age: 65
End: 2025-02-10

## 2025-02-10 ENCOUNTER — CARE COORDINATION (OUTPATIENT)
Dept: CARE COORDINATION | Age: 65
End: 2025-02-10

## 2025-02-10 NOTE — TELEPHONE ENCOUNTER
Submitted PA for Phentermine HCl 37.5MG tablets   Via [facility] Key: [id number] STATUS: not sent     Question from pa request   Has the patient participated in a comprehensive weight management program that encourages behavioral modification, reduced-calorie diet, AND increased physical activity with continuing follow-up for at least 6 months prior to using drug therapy?*     Please advise         If this requires a response please respond to the pool ( P MHCX PSC MEDICATION PRE-AUTH).      Thank you please advise patient.

## 2025-02-10 NOTE — CARE COORDINATION
Ambulatory Care Coordination Note     2/10/2025 10:01 AM     patient outreach attempt by this ACM today to perform care management follow up . ACM was unable to reach the patient by telephone today;   left voice message requesting a return phone call to this ACM.     Patient closed (unable to reach patient) from the High Risk Care Management program on 2/10/2025.

## 2025-02-10 NOTE — TELEPHONE ENCOUNTER
Called and spoke to patient. She states she had weight loss surgery back in 2018. And prior to having the surgery, she had to do certain things per the bariatric surgeon to get to a point where he could do surgery. Sc

## 2025-02-11 NOTE — TELEPHONE ENCOUNTER
Submitted PA for Phentermine HCl 37.5MG tablets   Via [facility] Key: [id number] STATUS: PENDING.    Follow up done daily; if no decision with in three days we will refax.  If another three days goes by with no decision will call the insurance for status.

## 2025-02-12 DIAGNOSIS — E66.01 CLASS 3 SEVERE OBESITY WITHOUT SERIOUS COMORBIDITY WITH BODY MASS INDEX (BMI) OF 45.0 TO 49.9 IN ADULT, UNSPECIFIED OBESITY TYPE: ICD-10-CM

## 2025-02-12 DIAGNOSIS — E66.813 CLASS 3 SEVERE OBESITY WITHOUT SERIOUS COMORBIDITY WITH BODY MASS INDEX (BMI) OF 45.0 TO 49.9 IN ADULT, UNSPECIFIED OBESITY TYPE: ICD-10-CM

## 2025-02-12 RX ORDER — PHENTERMINE HYDROCHLORIDE 37.5 MG/1
37.5 TABLET ORAL
Qty: 90 TABLET | Refills: 0 | Status: SHIPPED | OUTPATIENT
Start: 2025-02-12 | End: 2025-05-13

## 2025-02-12 NOTE — TELEPHONE ENCOUNTER
CALLED AND LEFT DETAILED MESSAGE FOR PATIENT ADVISING ON MEDICATION DENIAL AND TO USE GOOD RX FOR THIS IF SHE WISHES TO TAKE IT. SC

## 2025-02-12 NOTE — TELEPHONE ENCOUNTER
The medication was DENIED; DENIAL letter is uploaded to MEDIA.    Generic Denial:  Other; please see Denial Letter.           Note :        If you want an APPEAL; please note in this encounter what new information you would like to APPEAL with.  Once complete route back to PA POOL.    If this requires a response please respond to the pool ( P MHCX PSC MEDICATION PRE-AUTH).      Thank you please advise patient.'

## 2025-02-14 NOTE — TELEPHONE ENCOUNTER
CALLED AND SPOKE TO [facility] AND ADVISED ON ANUPAMA NOTE.  SC 79

## 2025-03-15 DIAGNOSIS — K58.0 IRRITABLE BOWEL SYNDROME WITH DIARRHEA: ICD-10-CM

## 2025-04-07 ENCOUNTER — OFFICE VISIT (OUTPATIENT)
Dept: FAMILY MEDICINE CLINIC | Age: 65
End: 2025-04-07
Payer: COMMERCIAL

## 2025-04-07 VITALS
OXYGEN SATURATION: 98 % | HEIGHT: 61 IN | SYSTOLIC BLOOD PRESSURE: 140 MMHG | DIASTOLIC BLOOD PRESSURE: 82 MMHG | BODY MASS INDEX: 47.13 KG/M2 | HEART RATE: 74 BPM | WEIGHT: 249.6 LBS

## 2025-04-07 DIAGNOSIS — M43.6 TORTICOLLIS, ACUTE: Primary | ICD-10-CM

## 2025-04-07 DIAGNOSIS — I10 PRIMARY HYPERTENSION: Chronic | ICD-10-CM

## 2025-04-07 DIAGNOSIS — E66.01 MORBID OBESITY WITH BMI OF 45.0-49.9, ADULT: ICD-10-CM

## 2025-04-07 PROCEDURE — 3079F DIAST BP 80-89 MM HG: CPT | Performed by: NURSE PRACTITIONER

## 2025-04-07 PROCEDURE — 3077F SYST BP >= 140 MM HG: CPT | Performed by: NURSE PRACTITIONER

## 2025-04-07 PROCEDURE — 99214 OFFICE O/P EST MOD 30 MIN: CPT | Performed by: NURSE PRACTITIONER

## 2025-04-07 RX ORDER — METOPROLOL TARTRATE 50 MG
50 TABLET ORAL DAILY
Qty: 90 TABLET | Refills: 0 | Status: SHIPPED | OUTPATIENT
Start: 2025-04-07 | End: 2025-07-06

## 2025-04-07 RX ORDER — CYCLOBENZAPRINE HCL 10 MG
10 TABLET ORAL 3 TIMES DAILY PRN
Qty: 21 TABLET | Refills: 0 | Status: SHIPPED | OUTPATIENT
Start: 2025-04-07 | End: 2025-04-17

## 2025-04-07 RX ORDER — PREDNISONE 10 MG/1
TABLET ORAL
Qty: 20 TABLET | Refills: 0 | Status: SHIPPED | OUTPATIENT
Start: 2025-04-07

## 2025-04-07 NOTE — PROGRESS NOTES
well-developed and well-groomed. She is morbidly obese. She is not ill-appearing, toxic-appearing or diaphoretic.   Neck:      Comments: NECK TENDERNESS NOTED WITH PALPATION SCM- PULLING SENSATION NOTED WITH ROM- ROTATION  Cardiovascular:      Rate and Rhythm: Normal rate and regular rhythm.      Heart sounds: Normal heart sounds, S1 normal and S2 normal.   Pulmonary:      Effort: Pulmonary effort is normal.      Breath sounds: Normal breath sounds and air entry.   Musculoskeletal:      Cervical back: Pain with movement and muscular tenderness present.   Neurological:      Mental Status: She is alert and oriented to person, place, and time.   Psychiatric:         Attention and Perception: Attention and perception normal.         Mood and Affect: Mood and affect normal.         Speech: Speech normal.         Behavior: Behavior normal. Behavior is cooperative.         Thought Content: Thought content normal.         Cognition and Memory: Cognition and memory normal.         Judgment: Judgment normal.       Physical Exam  Neck: Tenderness noted in the lower part of the neck, possible torticollis.  Musculoskeletal: Tenderness in the right shoulder, possible lipoma.               An electronic signature was used to authenticate this note.    --Lizzie Holm, MARINA - CNP

## 2025-04-07 NOTE — ASSESSMENT & PLAN NOTE
CHRONIC NOT AT GOAL  ENCOURAGED TO INCREASE CARDIO ACTIVITY TO AT LEAST  30 MIN3-4 X WEEKLY    CALORIE RESTRICTION 1400 DAILY

## 2025-05-13 DIAGNOSIS — I10 ESSENTIAL (PRIMARY) HYPERTENSION: ICD-10-CM

## 2025-05-13 RX ORDER — HYDROCHLOROTHIAZIDE 25 MG/1
25 TABLET ORAL EVERY MORNING
Qty: 90 TABLET | Refills: 1 | Status: SHIPPED | OUTPATIENT
Start: 2025-05-13

## 2025-05-29 ENCOUNTER — OFFICE VISIT (OUTPATIENT)
Dept: FAMILY MEDICINE CLINIC | Age: 65
End: 2025-05-29
Payer: COMMERCIAL

## 2025-05-29 VITALS
SYSTOLIC BLOOD PRESSURE: 128 MMHG | OXYGEN SATURATION: 98 % | HEIGHT: 61 IN | DIASTOLIC BLOOD PRESSURE: 84 MMHG | BODY MASS INDEX: 48.9 KG/M2 | WEIGHT: 259 LBS | HEART RATE: 80 BPM

## 2025-05-29 DIAGNOSIS — M54.2 NECK PAIN: ICD-10-CM

## 2025-05-29 DIAGNOSIS — S66.911A SPRAIN AND STRAIN OF RIGHT WRIST: Primary | ICD-10-CM

## 2025-05-29 DIAGNOSIS — S63.501A SPRAIN AND STRAIN OF RIGHT WRIST: Primary | ICD-10-CM

## 2025-05-29 PROCEDURE — 99213 OFFICE O/P EST LOW 20 MIN: CPT | Performed by: NURSE PRACTITIONER

## 2025-05-29 PROCEDURE — 3074F SYST BP LT 130 MM HG: CPT | Performed by: NURSE PRACTITIONER

## 2025-05-29 PROCEDURE — 3079F DIAST BP 80-89 MM HG: CPT | Performed by: NURSE PRACTITIONER

## 2025-05-29 RX ORDER — PREDNISONE 10 MG/1
TABLET ORAL
Qty: 20 TABLET | Refills: 0 | Status: SHIPPED | OUTPATIENT
Start: 2025-05-29

## 2025-05-29 RX ORDER — PREDNISONE 10 MG/1
TABLET ORAL
Qty: 20 TABLET | Refills: 0 | Status: SHIPPED | OUTPATIENT
Start: 2025-05-29 | End: 2025-05-29

## 2025-05-29 NOTE — PROGRESS NOTES
Daniella Webb (:  1960) is a 64 y.o. female,Established patient, here for evaluation of the following chief complaint(s):  Hand Pain (Pt c/o right hand pain that has been going on for 2 weeks now )      Assessment & Plan  Sprain and strain of right wrist    The differential diagnosis includes a potential wrist sprain or tendinitis. The presence of bruising suggests a sprain, while the observed swelling aligns more with tendinitis. A tear is unlikely as it would result in immobility. The possibility of carpal tunnel syndrome is considered but deemed less likely due to the timing and nature of the symptoms. A written prescription for a wrist brace will be provided. A course of prednisone will be initiated, with the same dosage as previously prescribed in April. She is advised to perform wrist exercises and apply ice to the affected area. Ibuprofen is recommended for pain management once the steroid course is completed. If there is no improvement in her condition, a referral to physical therapy will be considered.    Orders:    predniSONE (DELTASONE) 10 MG tablet; 4 tabs x 2 d 3 tabs x 2 d 2 tabs x 2 d 1  tab x 2 d in am with food  avoid NSAIDs while on this medication    Neck pain    Presentation muscular in origin given tenderness over the right upper trapezius and sternocleidomastoid muscles.  Pain is reproducible with horizontal rotation of the neck.  She reports recurrent neck pain that was previously managed with steroids. The pain has returned, and there is tenderness and swelling in the neck area. A pinched nerve is considered as a possible cause of the hand discomfort but deemed unlikely due to the absence of shooting pain down the arm. A course of prednisone will be initiated to manage the inflammation and pain.  Follow-up as needed.  Consider PT at that time.                 Return if symptoms worsen or fail to improve.    SUBJECTIVE/OBJECTIVE:  History of Present Illness  The patient is a

## 2025-05-30 ASSESSMENT — ENCOUNTER SYMPTOMS
SHORTNESS OF BREATH: 0
COUGH: 0
BACK PAIN: 0
WHEEZING: 0

## 2025-06-19 ENCOUNTER — OFFICE VISIT (OUTPATIENT)
Dept: ORTHOPEDIC SURGERY | Age: 65
End: 2025-06-19

## 2025-06-19 VITALS — WEIGHT: 259 LBS | HEIGHT: 61 IN | BODY MASS INDEX: 48.9 KG/M2

## 2025-06-19 DIAGNOSIS — M65.4 DE QUERVAIN'S TENOSYNOVITIS, RIGHT: ICD-10-CM

## 2025-06-19 DIAGNOSIS — K58.0 IRRITABLE BOWEL SYNDROME WITH DIARRHEA: ICD-10-CM

## 2025-06-19 DIAGNOSIS — M25.531 RIGHT WRIST PAIN: Primary | ICD-10-CM

## 2025-06-19 NOTE — PROGRESS NOTES
Subjective:      Patient ID: Daniella Webb is a 65 y.o. female who presents to the [facility] for chief complaint of right wrist pain.  She is been experiencing pain for the past several weeks.  She denies any injury.  She does report initial improvement of symptoms when she was taking the oral steroids.  Symptoms have returned.  Pain Scale 7/10 VAS.  Location of pain radial styloid right wrist.  Pain is worse with activity.   Pain improves with rest.   Previous treatments have included use of a cock up wrist splint.  Completed Medrol Dosepak couple weeks ago.     Review of Systems:  I have reviewed the clinically relevant past medical history, medications, allergies, family history, social history, and 13 point Review of Systems from the patient's recent history form & documented any details relevant to today's presenting complaints in the history above. The patient's self-reported past medical history, medications, allergies, family history, social history, and Review of Systems form from today and has been scanned into the chart under the \"Media\" tab.    Constitutional: denies fever, chills, weight loss.  MSK: denies pain in other joints, muscle aches.  Neurological: denies numbness, tingling, weakness.       Past Medical History:   Diagnosis Date    Abnormal antibody titer     Hepatitis C  20014    Chronic gastritis     Esophagitis     Fatty liver     GERD (gastroesophageal reflux disease)     Hiatal hernia     History of atrial tachycardia     Before gastric sleeve    Hx of hiatal hernia     repaired with gastric sleeve surgery    Hypertension     Internal hemorrhoid, bleeding     pt states currently not having any problems with hemorrhoids    Obstructive sleep apnea syndrome 04/21/2017    no cpap    PONV (postoperative nausea and vomiting)     Wears glasses        Family History   Problem Relation Age of Onset    High Blood Pressure Mother     Diabetes Father     High Blood

## 2025-06-25 ENCOUNTER — OFFICE VISIT (OUTPATIENT)
Dept: SURGERY | Age: 65
End: 2025-06-25
Payer: COMMERCIAL

## 2025-06-25 VITALS
HEIGHT: 61 IN | TEMPERATURE: 97.9 F | HEART RATE: 81 BPM | DIASTOLIC BLOOD PRESSURE: 64 MMHG | BODY MASS INDEX: 48.33 KG/M2 | WEIGHT: 256 LBS | SYSTOLIC BLOOD PRESSURE: 128 MMHG

## 2025-06-25 DIAGNOSIS — R19.7 DIARRHEA, UNSPECIFIED TYPE: Primary | ICD-10-CM

## 2025-06-25 DIAGNOSIS — K62.4 ANAL STENOSIS: ICD-10-CM

## 2025-06-25 PROCEDURE — 3074F SYST BP LT 130 MM HG: CPT | Performed by: SURGERY

## 2025-06-25 PROCEDURE — 99214 OFFICE O/P EST MOD 30 MIN: CPT | Performed by: SURGERY

## 2025-06-25 PROCEDURE — 3078F DIAST BP <80 MM HG: CPT | Performed by: SURGERY

## 2025-06-25 NOTE — PROGRESS NOTES
[facility] PHYSICIANS [place] SPECIALTY CARE [facility] COLORECTAL SURGERY  [address] [place] [address]  [address] [place] [address]  Dept: [phone]  Dept Fax: [phone]  Loc: [phone]    Visit Date: 6/25/2025    Daniella Webb is a 65 y.o. female who presents today for: New Patient (Anal sphincter stenosis)      HPI:       Daniella Webb is a 65 y.o. female that actually seen my partner Dr. Proctor in Sept 2024 for complaints of chronic diarrhea.  She underwent workup at that time.    She tells me that her diarrhea is improving but she now feels like she has a tight anal canal.  She did have hemorrhoid banding in [place] 10 years ago but denies hemorrhoid surgery.  Denies any other anorectal surgeries.    States that she has a GI physician, Dr. Reese, and underwent colonoscopy last year.    She is also known to Dr. Lake for hernia repair in August 2024.      Past Medical History:   Diagnosis Date    Abnormal antibody titer     Hepatitis C  20014    Chronic gastritis     Esophagitis     Fatty liver     GERD (gastroesophageal reflux disease)     Hiatal hernia     History of atrial tachycardia     Before gastric sleeve    Hx of hiatal hernia     repaired with gastric sleeve surgery    Hypertension     Internal hemorrhoid, bleeding     pt states currently not having any problems with hemorrhoids    Obstructive sleep apnea syndrome 04/21/2017    no cpap    PONV (postoperative nausea and vomiting)     Wears glasses        Past Surgical History:   Procedure Laterality Date    ABDOMEN SURGERY N/A 08/29/2017    Robotic assisted lapascopic gastric sleeve performed at [facility] by MD ZARA Lake    BUNIONECTOMY Bilateral 10/02/2020, 2/26/21    CHOLECYSTECTOMY  2015    LAPROSCOPIC    COLONOSCOPY N/A 03/18/2021    COLONOSCOPY DIAGNOSTIC performed by Louisa Freitas MD at [facility] ENDOSCOPY    ENDOSCOPY, COLON, DIAGNOSTIC      FOOT SURGERY      GASTRIC FUNDOPLICATION N/A 08/01/2024    ROBOTIC RE-DO HIATAL

## 2025-06-26 ENCOUNTER — PREP FOR PROCEDURE (OUTPATIENT)
Dept: SURGERY | Age: 65
End: 2025-06-26

## 2025-06-26 DIAGNOSIS — K62.4 ANAL STENOSIS: ICD-10-CM

## 2025-06-26 RX ORDER — SODIUM CHLORIDE 9 MG/ML
INJECTION, SOLUTION INTRAVENOUS CONTINUOUS
Status: CANCELLED | OUTPATIENT
Start: 2025-07-22

## 2025-06-26 RX ORDER — METRONIDAZOLE 500 MG/100ML
500 INJECTION, SOLUTION INTRAVENOUS
Status: CANCELLED | OUTPATIENT
Start: 2025-07-22 | End: 2025-07-22

## 2025-06-26 RX ORDER — SODIUM CHLORIDE 0.9 % (FLUSH) 0.9 %
5-40 SYRINGE (ML) INJECTION PRN
Status: CANCELLED | OUTPATIENT
Start: 2025-07-22

## 2025-06-26 RX ORDER — SODIUM CHLORIDE 0.9 % (FLUSH) 0.9 %
5-40 SYRINGE (ML) INJECTION EVERY 12 HOURS SCHEDULED
Status: CANCELLED | OUTPATIENT
Start: 2025-07-22

## 2025-06-26 RX ORDER — SODIUM CHLORIDE 9 MG/ML
INJECTION, SOLUTION INTRAVENOUS PRN
Status: CANCELLED | OUTPATIENT
Start: 2025-07-22

## 2025-06-27 ENCOUNTER — TELEPHONE (OUTPATIENT)
Dept: SURGERY | Age: 65
End: 2025-06-27

## 2025-06-27 PROBLEM — K62.4 ANAL STENOSIS: Status: ACTIVE | Noted: 2025-06-26

## 2025-06-27 NOTE — TELEPHONE ENCOUNTER
Patient has been scheduled for: Surgery    Procedure:Exam under anesthesia, anal dilation, possible Botox injection anal sphincter   Date:7/22/2025  Time:730am  Arrival:530am  Hospital: [facility]    ASA or blood thinning medications?:NO  Any injectable medications for diabetes or weight loss GLP1 (\"tide\" medications)?NO  Any SGLT-2 meds (\"flozin\" meds)?NO    Prep?NPO    Pre-op? PCP    Post-op Appt? 8/11/25@2pm     Patient advised they will need a .    Case request sent and prep for proc orders done     Medication sent to Pharmacy: N/A    Stents or ostomy marking? NO    Instructions have been mailed/emailed to:DAE    Added to outlook calendar

## 2025-06-30 ENCOUNTER — PATIENT MESSAGE (OUTPATIENT)
Dept: SURGERY | Age: 65
End: 2025-06-30

## 2025-06-30 ENCOUNTER — TELEPHONE (OUTPATIENT)
Dept: FAMILY MEDICINE CLINIC | Age: 65
End: 2025-06-30

## 2025-06-30 NOTE — TELEPHONE ENCOUNTER
CALLED AND SPOKE TO PATIENT AND SCHEDULED HER AN APPOINTMENT ON 7/9/25 AT 4:20PM WITH ALIYA CAMARENA

## 2025-06-30 NOTE — TELEPHONE ENCOUNTER
----- Message from Troy SANTANA sent at 6/30/2025 10:32 AM EDT -----  Regarding: [facility] Appointment Request  E[facility] Appointment Request    Patient needs appointment for E[facility] Appointment Type: Pre-Op Visit.    Patient Requested Dates(s): Before July 22, 2025  Patient Requested Time: Anytime  Provider Name: AltaLizzie pabon APRN - CNP    Reason for Appointment Request: Established Patient - Available appointments did not meet patient need. The patient will be having a surgery on July 22 and would like to have an appointment before this date.  --------------------------------------------------------------------------------------------------------------------------    Relationship to Patient: Self     Call Back Information: OK to leave message on voicemail  Preferred Call Back Number: Phone [phone]

## 2025-07-09 ENCOUNTER — OFFICE VISIT (OUTPATIENT)
Dept: FAMILY MEDICINE CLINIC | Age: 65
End: 2025-07-09
Payer: COMMERCIAL

## 2025-07-09 VITALS
HEART RATE: 80 BPM | SYSTOLIC BLOOD PRESSURE: 126 MMHG | TEMPERATURE: 98.6 F | WEIGHT: 257 LBS | OXYGEN SATURATION: 99 % | RESPIRATION RATE: 16 BRPM | DIASTOLIC BLOOD PRESSURE: 80 MMHG | HEIGHT: 61 IN | BODY MASS INDEX: 48.52 KG/M2

## 2025-07-09 DIAGNOSIS — K62.4 ANAL STENOSIS: Primary | ICD-10-CM

## 2025-07-09 DIAGNOSIS — Z01.818 PRE-OP EXAM: ICD-10-CM

## 2025-07-09 DIAGNOSIS — M25.531 RIGHT WRIST PAIN: ICD-10-CM

## 2025-07-09 PROCEDURE — 1036F TOBACCO NON-USER: CPT | Performed by: NURSE PRACTITIONER

## 2025-07-09 PROCEDURE — 99214 OFFICE O/P EST MOD 30 MIN: CPT | Performed by: NURSE PRACTITIONER

## 2025-07-09 PROCEDURE — 3074F SYST BP LT 130 MM HG: CPT | Performed by: NURSE PRACTITIONER

## 2025-07-09 PROCEDURE — 3017F COLORECTAL CA SCREEN DOC REV: CPT | Performed by: NURSE PRACTITIONER

## 2025-07-09 PROCEDURE — G8417 CALC BMI ABV UP PARAM F/U: HCPCS | Performed by: NURSE PRACTITIONER

## 2025-07-09 PROCEDURE — 3079F DIAST BP 80-89 MM HG: CPT | Performed by: NURSE PRACTITIONER

## 2025-07-09 PROCEDURE — 93000 ELECTROCARDIOGRAM COMPLETE: CPT | Performed by: NURSE PRACTITIONER

## 2025-07-09 PROCEDURE — G8427 DOCREV CUR MEDS BY ELIG CLIN: HCPCS | Performed by: NURSE PRACTITIONER

## 2025-07-09 NOTE — ASSESSMENT & PLAN NOTE
Cleared for surgery assuming labs are unremarkable.  Too late to draw in-office today but will obtain at a labs only appointment tomorrow.

## 2025-07-09 NOTE — PROGRESS NOTES
vomiting)     Wears glasses      Past Surgical History:   Procedure Laterality Date    ABDOMEN SURGERY N/A 2017    Robotic assisted lapascopic gastric sleeve performed at [facility] by MD ZARA Lake    BUNIONECTOMY Bilateral 10/02/2020, 21    CHOLECYSTECTOMY  2015    LAPROSCOPIC    COLONOSCOPY N/A 2021    COLONOSCOPY DIAGNOSTIC performed by Louisa Freitas MD at [facility] ENDOSCOPY    ENDOSCOPY, COLON, DIAGNOSTIC      FOOT SURGERY      GASTRIC FUNDOPLICATION N/A 2024    ROBOTIC RE-DO HIATAL HERNIA REPAIR WITH MESH, LIGAMENTUM TERES WRAP, LYSIS OF ADHESIONS performed by Glen Lake DO at [facility] OR    HEMORRHOID SURGERY      HIATAL HERNIA REPAIR      HYSTERECTOMY (CERVIX STATUS UNKNOWN)  2009    Total     KNEE SURGERY      LEG BIOPSY EXCISION Right 2024    RIGHT KNEE GANGLION CYST REMOVAL performed by Bonifacio Soliman MD at [facility] OR    OTHER SURGICAL HISTORY  2017    EGD    RADIOFREQUENCY ABLATION  2023    lower back    TONSILLECTOMY      TUBAL LIGATION  1983    UPPER GASTROINTESTINAL ENDOSCOPY N/A 2021    EGD DIAGNOSTIC ONLY performed by Louisa Freitas MD at [facility] ENDOSCOPY    UTERINE FIBROID EMBOLIZATION       Family History   Problem Relation Age of Onset    High Blood Pressure Mother     Diabetes Father     High Blood Pressure Father     High Blood Pressure Sister     Asthma Sister     Heart Attack Sister 48        HEART ATTACK, POSSIBLE INFECTION IN PORT    High Blood Pressure Maternal Grandmother     Stroke Maternal Grandmother 94         from stroke    Arthritis Maternal Grandmother     Vision Loss Maternal Grandmother         Glaucoma     Social History     Socioeconomic History    Marital status: Single     Spouse name: Not on file    Number of children: Not on file    Years of education: Not on file    Highest education level: Not on file   Occupational History    Not on file   Tobacco Use    Smoking status: Never     Passive

## 2025-07-09 NOTE — H&P (VIEW-ONLY)
Preoperative Consultation      Daniella Webb  YOB: 1960    Date of Service:  7/9/2025    Vitals:    07/09/25 1620   BP: 126/80   BP Site: Left Upper Arm   Patient Position: Sitting   BP Cuff Size: Large Adult   Pulse: 80   Resp: 16   Temp: 98.6 °F (37 °C)   TempSrc: Oral   SpO2: 99%   Weight: 116.6 kg (257 lb)   Height: 1.549 m (5' 1\")      Wt Readings from Last 2 Encounters:   07/09/25 116.6 kg (257 lb)   06/25/25 116.1 kg (256 lb)     BP Readings from Last 3 Encounters:   07/09/25 126/80   06/25/25 128/64   05/29/25 128/84        Chief Complaint   Patient presents with    Pre-op Exam     PRE OP EXAM DR. ZHANG Exam under anesthesia, anal dilation, possible Botox injection anal sphincter TO BE DONE ON 7/22/25 AT [facility]     Allergies   Allergen Reactions    Latex Rash     Surgery bandage- new allergy as of 2021, blisters     Adhesive Tape Swelling and Rash     Outpatient Medications Marked as Taking for the 7/9/25 encounter (Office Visit) with Manuel Poe APRN - CNP   Medication Sig Dispense Refill    amitriptyline (ELAVIL) 25 MG tablet TAKE 1 TABLET BY MOUTH EVERY DAY AT NIGHT 90 tablet 0    hydroCHLOROthiazide (HYDRODIURIL) 25 MG tablet TAKE 1 TABLET BY MOUTH EVERY DAY IN THE MORNING 90 tablet 1    metoprolol tartrate (LOPRESSOR) 50 MG tablet Take 1 tablet by mouth daily 90 tablet 0    meclizine (ANTIVERT) 25 MG tablet TAKE 1 TABLET BY MOUTH 3 TIMES A DAY AS NEEDED FOR DIZZINESS Oral for 3 Days      fluticasone (FLONASE) 50 MCG/ACT nasal spray 2 sprays by Nasal route 2 times daily One month supply equals 2 bottles 2 each 5    furosemide (LASIX) 20 MG tablet TAKE 0.5-1 TABLET BY MOUTH DAILY AS NEEDED FOR EDEMA 90 tablet 0    hyoscyamine (LEVSIN/SL) 125 MCG sublingual tablet Place 1 tablet under the tongue every 4 hours as needed (spasm) 30 tablet 0    Ibuprofen (ADVIL PO) Take by mouth      ondansetron (ZOFRAN-ODT) 4 MG disintegrating tablet Take 1 tablet by mouth 3 times daily as needed for

## 2025-07-10 ENCOUNTER — LAB (OUTPATIENT)
Dept: FAMILY MEDICINE CLINIC | Age: 65
End: 2025-07-10
Payer: COMMERCIAL

## 2025-07-10 DIAGNOSIS — Z01.818 PRE-OP EXAM: ICD-10-CM

## 2025-07-10 LAB
ALBUMIN SERPL-MCNC: 3.9 G/DL (ref 3.4–5)
ALBUMIN/GLOB SERPL: 1.7 {RATIO} (ref 1.1–2.2)
ALP SERPL-CCNC: 174 U/L (ref 40–129)
ALT SERPL-CCNC: 24 U/L (ref 10–40)
ANION GAP SERPL CALCULATED.3IONS-SCNC: 10 MMOL/L (ref 3–16)
AST SERPL-CCNC: 21 U/L (ref 15–37)
BILIRUB SERPL-MCNC: <0.2 MG/DL (ref 0–1)
BUN SERPL-MCNC: 10 MG/DL (ref 7–20)
CALCIUM SERPL-MCNC: 9.3 MG/DL (ref 8.3–10.6)
CHLORIDE SERPL-SCNC: 104 MMOL/L (ref 99–110)
CO2 SERPL-SCNC: 22 MMOL/L (ref 21–32)
CREAT SERPL-MCNC: 0.9 MG/DL (ref 0.6–1.2)
GFR SERPLBLD CREATININE-BSD FMLA CKD-EPI: 71 ML/MIN/{1.73_M2}
GLUCOSE SERPL-MCNC: 85 MG/DL (ref 70–99)
POTASSIUM SERPL-SCNC: 4.4 MMOL/L (ref 3.5–5.1)
PROT SERPL-MCNC: 6.2 G/DL (ref 6.4–8.2)
SODIUM SERPL-SCNC: 136 MMOL/L (ref 136–145)

## 2025-07-10 PROCEDURE — 36415 COLL VENOUS BLD VENIPUNCTURE: CPT | Performed by: NURSE PRACTITIONER

## 2025-07-11 LAB
ANISOCYTOSIS BLD QL SMEAR: ABNORMAL
BASOPHILS # BLD: 0 K/UL (ref 0–0.2)
BASOPHILS NFR BLD: 0 %
BURR CELLS BLD QL SMEAR: ABNORMAL
DEPRECATED RDW RBC AUTO: 17.5 % (ref 12.4–15.4)
EOSINOPHIL # BLD: 0.1 K/UL (ref 0–0.6)
EOSINOPHIL NFR BLD: 1 %
HCT VFR BLD AUTO: 34 % (ref 36–48)
HGB BLD-MCNC: 10.8 G/DL (ref 12–16)
LYMPHOCYTES # BLD: 3.3 K/UL (ref 1–5.1)
LYMPHOCYTES NFR BLD: 63 %
MACROCYTES BLD QL SMEAR: ABNORMAL
MCH RBC QN AUTO: 23.9 PG (ref 26–34)
MCHC RBC AUTO-ENTMCNC: 31.9 G/DL (ref 31–36)
MCV RBC AUTO: 75 FL (ref 80–100)
MICROCYTES BLD QL SMEAR: ABNORMAL
MONOCYTES # BLD: 0.6 K/UL (ref 0–1.3)
MONOCYTES NFR BLD: 11 %
NEUTROPHILS # BLD: 1.3 K/UL (ref 1.7–7.7)
NEUTROPHILS NFR BLD: 25 %
OVALOCYTES BLD QL SMEAR: ABNORMAL
PLATELET # BLD AUTO: 239 K/UL (ref 135–450)
PMV BLD AUTO: 8.9 FL (ref 5–10.5)
RBC # BLD AUTO: 4.53 M/UL (ref 4–5.2)
WBC # BLD AUTO: 5.3 K/UL (ref 4–11)

## 2025-07-12 ENCOUNTER — HOSPITAL ENCOUNTER (OUTPATIENT)
Age: 65
Discharge: HOME | End: 2025-07-12
Payer: COMMERCIAL

## 2025-07-12 DIAGNOSIS — D64.9 ANEMIA, UNSPECIFIED TYPE: ICD-10-CM

## 2025-07-12 DIAGNOSIS — R79.89 ABNORMAL CBC: ICD-10-CM

## 2025-07-12 LAB
BASOPHILS # BLD: 0 K/UL (ref 0–0.2)
BASOPHILS NFR BLD: 0.9 %
DEPRECATED RDW RBC AUTO: 17.4 % (ref 12.4–15.4)
EOSINOPHIL # BLD: 0.1 K/UL (ref 0–0.6)
EOSINOPHIL NFR BLD: 3.8 %
FERRITIN SERPL IA-MCNC: 15.1 NG/ML (ref 15–150)
FOLATE SERPL-MCNC: 10.1 NG/ML (ref 4.78–24.2)
HCT VFR BLD AUTO: 34.1 % (ref 36–48)
HGB BLD-MCNC: 11 G/DL (ref 12–16)
IRON SATN MFR SERPL: 14 % (ref 15–50)
IRON SERPL-MCNC: 59 UG/DL (ref 37–145)
LYMPHOCYTES # BLD: 2.3 K/UL (ref 1–5.1)
LYMPHOCYTES NFR BLD: 59.2 %
MCH RBC QN AUTO: 24.2 PG (ref 26–34)
MCHC RBC AUTO-ENTMCNC: 32.3 G/DL (ref 31–36)
MCV RBC AUTO: 74.8 FL (ref 80–100)
MONOCYTES # BLD: 0.3 K/UL (ref 0–1.3)
MONOCYTES NFR BLD: 7.5 %
NEUTROPHILS # BLD: 1.1 K/UL (ref 1.7–7.7)
NEUTROPHILS NFR BLD: 28.6 %
PLATELET # BLD AUTO: 243 K/UL (ref 135–450)
PMV BLD AUTO: 9 FL (ref 5–10.5)
PROT SERPL-MCNC: 6.6 G/DL (ref 6.4–8.2)
RBC # BLD AUTO: 4.56 M/UL (ref 4–5.2)
TIBC SERPL-MCNC: 421 UG/DL (ref 260–445)
VIT B12 SERPL-MCNC: 260 PG/ML (ref 211–911)
WBC # BLD AUTO: 3.9 K/UL (ref 4–11)

## 2025-07-12 PROCEDURE — 36415 COLL VENOUS BLD VENIPUNCTURE: CPT

## 2025-07-12 PROCEDURE — 86803 HEPATITIS C AB TEST: CPT

## 2025-07-12 PROCEDURE — 84165 PROTEIN E-PHORESIS SERUM: CPT

## 2025-07-12 PROCEDURE — 83020 HEMOGLOBIN ELECTROPHORESIS: CPT

## 2025-07-12 PROCEDURE — 83540 ASSAY OF IRON: CPT

## 2025-07-12 PROCEDURE — 82607 VITAMIN B-12: CPT

## 2025-07-12 PROCEDURE — 82728 ASSAY OF FERRITIN: CPT

## 2025-07-12 PROCEDURE — 82746 ASSAY OF FOLIC ACID SERUM: CPT

## 2025-07-12 PROCEDURE — 84155 ASSAY OF PROTEIN SERUM: CPT

## 2025-07-12 PROCEDURE — 83550 IRON BINDING TEST: CPT

## 2025-07-12 PROCEDURE — 85025 COMPLETE CBC W/AUTO DIFF WBC: CPT

## 2025-07-14 LAB
ALBUMIN SERPL ELPH-MCNC: 3.2 G/DL (ref 3.1–4.9)
ALPHA1 GLOB SERPL ELPH-MCNC: 0.2 G/DL (ref 0.2–0.4)
ALPHA2 GLOB SERPL ELPH-MCNC: 0.8 G/DL (ref 0.4–1.1)
B-GLOBULIN SERPL ELPH-MCNC: 1.2 G/DL (ref 0.9–1.6)
GAMMA GLOB SERPL ELPH-MCNC: 1.2 G/DL (ref 0.6–1.8)
PROT SERPL-MCNC: 6.6 G/DL (ref 6.4–8.2)
SPE/IFE INTERPRETATION: NORMAL

## 2025-07-15 LAB
HCV AB S/CO SERPL IA: 0.06 IV
HCV AB SERPL QL IA: NEGATIVE
HGB FRACT BLD ELPH-IMP: NORMAL
HGB FRACT BLD ELPH-IMP: NORMAL

## 2025-07-16 NOTE — PROGRESS NOTES
7/16/2025 1322 PM    PREP FOR PROCEDURE INSTRUCTIONS/TS:          7/16/2025 1341 PM:    Patient verified Legal name and Telephone Interview (TI) Completed- including review of medications, allergies, medical history, need for ride home from family/friend/caregiver, needs caregiver for at least 24 hours after procedure,  contact surgeon's office for arrival time, notify surgeon office of any changes in health and follow all instructions from surgeon office/ts.     H&P IN [facility] 7/9/25, LABS IN [facility] 7/12/25 ROUTED TO DR ZHANG, EKG IN [facility] 7/9/2025/TS    Confirmed w/[facility] OR -CASE IS IN LATEX FREE ROOM AS FIRST CASE OF DAY/TS    Benzoyl Peroxide Counseling: Patient counseled that medicine may cause skin irritation and bleach clothing.  In the event of skin irritation, the patient was advised to reduce the amount of the drug applied or use it less frequently.   The patient verbalized understanding of the proper use and possible adverse effects of benzoyl peroxide.  All of the patient's questions and concerns were addressed.

## 2025-07-16 NOTE — PROGRESS NOTES
7/16/2025 1342 PM:        [facility] PRE-SURGICAL TESTING INSTRUCTIONS                      PRIOR TO PROCEDURE DATE:    1. PLEASE FOLLOW ANY INSTRUCTIONS GIVEN TO YOU PER YOUR SURGEON, INCLUDING ARRIVAL TIME.      2. Arrange for someone to drive you home and be with you for the first 24 hours after discharge for your safety after your procedure for which you received sedation. Ensure it is someone we can share information with regarding your discharge. Contact surgeon's office for arrival/procedure time.     NOTE: At this time ONLY 2 ADULTS may accompany you. NO CHILDREN UNDER AGE OF 16.    One person ENCOURAGED to stay at hospital entire time if outpatient surgery      3. You must contact your surgeon for instructions IF:  You are taking any blood thinners, aspirin, anti-inflammatory or vitamins.   Contact your ordering physician/surgeon for prescription medication instructions as soon as possible, especially if taking blood thinners, aspirin, heart, or diabetic medication.   There is a change in your physical condition such as a cold, fever, rash, cuts, sores, or any other infection, especially near your surgical site.  Contact Your doctor for instructions of when to stop/hold blood thinners, which includes aspirin    4. Do not drink alcohol the day before or day of your procedure.  Do not use any marijuana at least 24 hours or street drugs (heroin, cocaine) at minimum 5 days prior to your procedure. No gum, candy, mints, or ice chips day of procedure.     5. A Pre-Surgical History and Physical MUST be completed WITHIN 30 DAYS OR LESS prior to your procedure.by your Physician or an Urgent Care        THE DAY OF YOUR PROCEDURE:  1.  Follow instructions for ARRIVAL TIME as DIRECTED BY YOUR SURGEON. [id number] [place], [place] [address]     2. Enter the MAIN entrance from [facility] and follow the signs to the free Parking Garage or  Parking (offered free of charge 7 am-5pm).

## 2025-07-18 ENCOUNTER — TELEPHONE (OUTPATIENT)
Dept: SURGERY | Age: 65
End: 2025-07-18

## 2025-07-18 NOTE — TELEPHONE ENCOUNTER
I have placed a reminder call to patient for upcoming procedure.    Did you speak directly to patient or leave a voicemail? Spoke to patient    Prep? none    NPO    Holding any medications? None    Must have a  that is over the age of 18. Y- Son  Must be a friend or family member that can be responsible for signing them out after surgery.    Arrive at the main [facility] at 6:00 (arrival time changed due to Endo case added on)

## 2025-07-21 ENCOUNTER — OFFICE VISIT (OUTPATIENT)
Dept: ORTHOPEDIC SURGERY | Age: 65
End: 2025-07-21
Payer: COMMERCIAL

## 2025-07-21 ENCOUNTER — ANESTHESIA EVENT (OUTPATIENT)
Dept: OPERATING ROOM | Age: 65
End: 2025-07-21
Payer: COMMERCIAL

## 2025-07-21 VITALS — HEIGHT: 61 IN | BODY MASS INDEX: 48.71 KG/M2 | RESPIRATION RATE: 16 BRPM | WEIGHT: 258 LBS

## 2025-07-21 DIAGNOSIS — M65.4 DE QUERVAIN'S DISEASE (RADIAL STYLOID TENOSYNOVITIS): Primary | ICD-10-CM

## 2025-07-21 PROCEDURE — G8417 CALC BMI ABV UP PARAM F/U: HCPCS | Performed by: ORTHOPAEDIC SURGERY

## 2025-07-21 PROCEDURE — G8427 DOCREV CUR MEDS BY ELIG CLIN: HCPCS | Performed by: ORTHOPAEDIC SURGERY

## 2025-07-21 PROCEDURE — 99244 OFF/OP CNSLTJ NEW/EST MOD 40: CPT | Performed by: ORTHOPAEDIC SURGERY

## 2025-07-21 NOTE — PROGRESS NOTES
Ms. Daniella Webb is a 65 y.o. right handed woman  who is seen today in Hand Surgical Consultation at the request of Lizzie Holm APRN - CNP.    She presents today regarding right wrist symptoms which have been present for approximately 3 months.  A history of antecedent trauma or injury is Absent.  She reports symptoms to include moderate pain along the  right  Radial wrist and distal forearm.  Wrist symptoms are worse with certain twisting or gripping activities.  Symptoms are Frequently worse with use.  She reports moderate pain with thumb extension or pinch. Symptoms show no change over time.      Previous treatment has included conservative measures.  She does not claim relation of her symptoms to her required work activities.  She has undergone any form of testing.    I have today reviewed with Daniella Webb the clinically relevant, past medical history, medications, allergies,  family history, social history, and Review Of Systems & I have documented any details relevant to today's presenting complaints in my history above.  Ms. Daniella Webb's self-reported past medical history, medications, allergies,  family history, social history, and Review Of Systems have been scanned into the chart under the \"Media\" tab.      Physical Exam:  Ms. Daniella Webb's most recent vitals:  Vitals  Respirations: 16  Height: 154.9 cm (5' 1\")  Weight - Scale: 117 kg (258 lb)    She is well nourished, oriented to person, place & time.  She demonstrates appropriate mood and affect as well as normal gait and station.    Skin: Normal in appearance, Normal Color, and Free of Lesions Bilaterally   Digital range of motion is limited by pain on the Right, normal on the Left.  Pain accompanies the flexion and extension of the right Thumb.  There is not triggering associated with active thumb motion.  There is not a palpable mass overlying the site of maximal tenderness right radial wrist.  Wrist range of motion is

## 2025-07-22 ENCOUNTER — ANESTHESIA (OUTPATIENT)
Dept: OPERATING ROOM | Age: 65
End: 2025-07-22
Payer: COMMERCIAL

## 2025-07-22 ENCOUNTER — HOSPITAL ENCOUNTER (OUTPATIENT)
Age: 65
Setting detail: OUTPATIENT SURGERY
Discharge: HOME | End: 2025-07-22
Attending: SURGERY | Admitting: SURGERY
Payer: COMMERCIAL

## 2025-07-22 VITALS
TEMPERATURE: 97 F | BODY MASS INDEX: 48.67 KG/M2 | RESPIRATION RATE: 14 BRPM | SYSTOLIC BLOOD PRESSURE: 160 MMHG | HEART RATE: 67 BPM | OXYGEN SATURATION: 99 % | HEIGHT: 61 IN | DIASTOLIC BLOOD PRESSURE: 88 MMHG | WEIGHT: 257.8 LBS

## 2025-07-22 DIAGNOSIS — K62.4 ANAL STENOSIS: ICD-10-CM

## 2025-07-22 PROCEDURE — 46505 CHEMODENERVATION ANAL MUSC: CPT | Performed by: SURGERY

## 2025-07-22 PROCEDURE — 6370000000 HC RX 637 (ALT 250 FOR IP): Performed by: SURGERY

## 2025-07-22 PROCEDURE — 7100000010 HC PHASE II RECOVERY - FIRST 15 MIN: Performed by: SURGERY

## 2025-07-22 PROCEDURE — 2500000003 HC RX 250 WO HCPCS: Performed by: SURGERY

## 2025-07-22 PROCEDURE — 2580000003 HC RX 258

## 2025-07-22 PROCEDURE — 7100000000 HC PACU RECOVERY - FIRST 15 MIN: Performed by: SURGERY

## 2025-07-22 PROCEDURE — 3600000004 HC SURGERY LEVEL 4 BASE: Performed by: SURGERY

## 2025-07-22 PROCEDURE — 7100000011 HC PHASE II RECOVERY - ADDTL 15 MIN: Performed by: SURGERY

## 2025-07-22 PROCEDURE — 3600000014 HC SURGERY LEVEL 4 ADDTL 15MIN: Performed by: SURGERY

## 2025-07-22 PROCEDURE — 2500000003 HC RX 250 WO HCPCS

## 2025-07-22 PROCEDURE — 6360000002 HC RX W HCPCS

## 2025-07-22 PROCEDURE — 6360000002 HC RX W HCPCS: Performed by: ANESTHESIOLOGY

## 2025-07-22 PROCEDURE — 88304 TISSUE EXAM BY PATHOLOGIST: CPT

## 2025-07-22 PROCEDURE — 2709999900 HC NON-CHARGEABLE SUPPLY: Performed by: SURGERY

## 2025-07-22 PROCEDURE — 3700000000 HC ANESTHESIA ATTENDED CARE: Performed by: SURGERY

## 2025-07-22 PROCEDURE — 3700000001 HC ADD 15 MINUTES (ANESTHESIA): Performed by: SURGERY

## 2025-07-22 PROCEDURE — 6360000002 HC RX W HCPCS: Performed by: SURGERY

## 2025-07-22 PROCEDURE — 7100000001 HC PACU RECOVERY - ADDTL 15 MIN: Performed by: SURGERY

## 2025-07-22 RX ORDER — SODIUM CHLORIDE 0.9 % (FLUSH) 0.9 %
5-40 SYRINGE (ML) INJECTION EVERY 12 HOURS SCHEDULED
Status: DISCONTINUED | OUTPATIENT
Start: 2025-07-22 | End: 2025-07-22 | Stop reason: HOSPADM

## 2025-07-22 RX ORDER — HYDRALAZINE HYDROCHLORIDE 20 MG/ML
10 INJECTION INTRAMUSCULAR; INTRAVENOUS
Status: DISCONTINUED | OUTPATIENT
Start: 2025-07-22 | End: 2025-07-22 | Stop reason: HOSPADM

## 2025-07-22 RX ORDER — PROPOFOL 10 MG/ML
INJECTION, EMULSION INTRAVENOUS
Status: DISCONTINUED | OUTPATIENT
Start: 2025-07-22 | End: 2025-07-22 | Stop reason: SDUPTHER

## 2025-07-22 RX ORDER — SODIUM CHLORIDE 0.9 % (FLUSH) 0.9 %
5-40 SYRINGE (ML) INJECTION PRN
Status: DISCONTINUED | OUTPATIENT
Start: 2025-07-22 | End: 2025-07-22 | Stop reason: HOSPADM

## 2025-07-22 RX ORDER — SUCCINYLCHOLINE/SOD CL,ISO/PF 200MG/10ML
SYRINGE (ML) INTRAVENOUS
Status: DISCONTINUED | OUTPATIENT
Start: 2025-07-22 | End: 2025-07-22 | Stop reason: SDUPTHER

## 2025-07-22 RX ORDER — MAGNESIUM HYDROXIDE 1200 MG/15ML
LIQUID ORAL CONTINUOUS PRN
Status: COMPLETED | OUTPATIENT
Start: 2025-07-22 | End: 2025-07-22

## 2025-07-22 RX ORDER — ROCURONIUM BROMIDE 10 MG/ML
INJECTION, SOLUTION INTRAVENOUS
Status: DISCONTINUED | OUTPATIENT
Start: 2025-07-22 | End: 2025-07-22 | Stop reason: SDUPTHER

## 2025-07-22 RX ORDER — DOCUSATE SODIUM 100 MG/1
100 CAPSULE, LIQUID FILLED ORAL 2 TIMES DAILY
Qty: 60 CAPSULE | Refills: 0 | Status: SHIPPED | OUTPATIENT
Start: 2025-07-22 | End: 2025-08-21

## 2025-07-22 RX ORDER — SODIUM CHLORIDE 9 MG/ML
INJECTION, SOLUTION INTRAVENOUS PRN
Status: DISCONTINUED | OUTPATIENT
Start: 2025-07-22 | End: 2025-07-22 | Stop reason: HOSPADM

## 2025-07-22 RX ORDER — FENTANYL CITRATE 50 UG/ML
INJECTION, SOLUTION INTRAMUSCULAR; INTRAVENOUS
Status: DISCONTINUED | OUTPATIENT
Start: 2025-07-22 | End: 2025-07-22 | Stop reason: SDUPTHER

## 2025-07-22 RX ORDER — KETOROLAC TROMETHAMINE 30 MG/ML
INJECTION, SOLUTION INTRAMUSCULAR; INTRAVENOUS
Status: DISCONTINUED | OUTPATIENT
Start: 2025-07-22 | End: 2025-07-22 | Stop reason: SDUPTHER

## 2025-07-22 RX ORDER — ONDANSETRON 2 MG/ML
INJECTION INTRAMUSCULAR; INTRAVENOUS
Status: DISCONTINUED | OUTPATIENT
Start: 2025-07-22 | End: 2025-07-22 | Stop reason: SDUPTHER

## 2025-07-22 RX ORDER — METOCLOPRAMIDE HYDROCHLORIDE 5 MG/ML
10 INJECTION INTRAMUSCULAR; INTRAVENOUS
Status: DISCONTINUED | OUTPATIENT
Start: 2025-07-22 | End: 2025-07-22 | Stop reason: HOSPADM

## 2025-07-22 RX ORDER — MIDAZOLAM HYDROCHLORIDE 1 MG/ML
INJECTION, SOLUTION INTRAMUSCULAR; INTRAVENOUS
Status: DISCONTINUED | OUTPATIENT
Start: 2025-07-22 | End: 2025-07-22 | Stop reason: SDUPTHER

## 2025-07-22 RX ORDER — METRONIDAZOLE 500 MG/100ML
500 INJECTION, SOLUTION INTRAVENOUS
Status: COMPLETED | OUTPATIENT
Start: 2025-07-22 | End: 2025-07-22

## 2025-07-22 RX ORDER — MEPERIDINE HYDROCHLORIDE 25 MG/ML
12.5 INJECTION INTRAMUSCULAR; INTRAVENOUS; SUBCUTANEOUS EVERY 5 MIN PRN
Status: DISCONTINUED | OUTPATIENT
Start: 2025-07-22 | End: 2025-07-22 | Stop reason: HOSPADM

## 2025-07-22 RX ORDER — LABETALOL HYDROCHLORIDE 5 MG/ML
10 INJECTION, SOLUTION INTRAVENOUS
Status: DISCONTINUED | OUTPATIENT
Start: 2025-07-22 | End: 2025-07-22 | Stop reason: HOSPADM

## 2025-07-22 RX ORDER — DEXAMETHASONE SODIUM PHOSPHATE 4 MG/ML
INJECTION, SOLUTION INTRA-ARTICULAR; INTRALESIONAL; INTRAMUSCULAR; INTRAVENOUS; SOFT TISSUE
Status: DISCONTINUED | OUTPATIENT
Start: 2025-07-22 | End: 2025-07-22 | Stop reason: SDUPTHER

## 2025-07-22 RX ORDER — SODIUM CHLORIDE, SODIUM LACTATE, POTASSIUM CHLORIDE, CALCIUM CHLORIDE 600; 310; 30; 20 MG/100ML; MG/100ML; MG/100ML; MG/100ML
INJECTION, SOLUTION INTRAVENOUS
Status: DISCONTINUED | OUTPATIENT
Start: 2025-07-22 | End: 2025-07-22 | Stop reason: SDUPTHER

## 2025-07-22 RX ORDER — HYDROMORPHONE HYDROCHLORIDE 1 MG/ML
0.5 INJECTION, SOLUTION INTRAMUSCULAR; INTRAVENOUS; SUBCUTANEOUS
Status: DISCONTINUED | OUTPATIENT
Start: 2025-07-22 | End: 2025-07-22 | Stop reason: HOSPADM

## 2025-07-22 RX ORDER — OXYCODONE AND ACETAMINOPHEN 5; 325 MG/1; MG/1
1-2 TABLET ORAL EVERY 6 HOURS PRN
Qty: 42 TABLET | Refills: 0 | Status: SHIPPED | OUTPATIENT
Start: 2025-07-22 | End: 2025-07-29

## 2025-07-22 RX ORDER — SODIUM CHLORIDE 9 MG/ML
INJECTION, SOLUTION INTRAVENOUS CONTINUOUS
Status: DISCONTINUED | OUTPATIENT
Start: 2025-07-22 | End: 2025-07-22 | Stop reason: HOSPADM

## 2025-07-22 RX ORDER — HYDROMORPHONE HYDROCHLORIDE 1 MG/ML
0.5 INJECTION, SOLUTION INTRAMUSCULAR; INTRAVENOUS; SUBCUTANEOUS EVERY 5 MIN PRN
Status: DISCONTINUED | OUTPATIENT
Start: 2025-07-22 | End: 2025-07-22 | Stop reason: HOSPADM

## 2025-07-22 RX ORDER — DIPHENHYDRAMINE HYDROCHLORIDE 50 MG/ML
12.5 INJECTION, SOLUTION INTRAMUSCULAR; INTRAVENOUS
Status: DISCONTINUED | OUTPATIENT
Start: 2025-07-22 | End: 2025-07-22 | Stop reason: HOSPADM

## 2025-07-22 RX ORDER — LIDOCAINE HYDROCHLORIDE 20 MG/ML
INJECTION, SOLUTION INTRAVENOUS
Status: DISCONTINUED | OUTPATIENT
Start: 2025-07-22 | End: 2025-07-22 | Stop reason: SDUPTHER

## 2025-07-22 RX ADMIN — LIDOCAINE HYDROCHLORIDE 100 MG: 20 INJECTION, SOLUTION INTRAVENOUS at 07:52

## 2025-07-22 RX ADMIN — KETOROLAC TROMETHAMINE 30 MG: 30 INJECTION, SOLUTION INTRAMUSCULAR at 08:37

## 2025-07-22 RX ADMIN — SODIUM CHLORIDE, SODIUM LACTATE, POTASSIUM CHLORIDE, AND CALCIUM CHLORIDE: .6; .31; .03; .02 INJECTION, SOLUTION INTRAVENOUS at 07:48

## 2025-07-22 RX ADMIN — FENTANYL CITRATE 50 MCG: 50 INJECTION INTRAMUSCULAR; INTRAVENOUS at 07:52

## 2025-07-22 RX ADMIN — MIDAZOLAM HYDROCHLORIDE 1 MG: 1 INJECTION, SOLUTION INTRAMUSCULAR; INTRAVENOUS at 07:52

## 2025-07-22 RX ADMIN — FENTANYL CITRATE 50 MCG: 50 INJECTION INTRAMUSCULAR; INTRAVENOUS at 08:21

## 2025-07-22 RX ADMIN — ONDANSETRON 4 MG: 2 INJECTION, SOLUTION INTRAMUSCULAR; INTRAVENOUS at 08:12

## 2025-07-22 RX ADMIN — METRONIDAZOLE 500 MG: 500 INJECTION, SOLUTION INTRAVENOUS at 08:06

## 2025-07-22 RX ADMIN — Medication 160 MG: at 07:52

## 2025-07-22 RX ADMIN — LABETALOL HYDROCHLORIDE 10 MG: 5 INJECTION, SOLUTION INTRAVENOUS at 10:02

## 2025-07-22 RX ADMIN — WATER 2000 MG: 1 INJECTION INTRAMUSCULAR; INTRAVENOUS; SUBCUTANEOUS at 08:06

## 2025-07-22 RX ADMIN — SUGAMMADEX 200 MG: 100 INJECTION, SOLUTION INTRAVENOUS at 08:26

## 2025-07-22 RX ADMIN — DEXAMETHASONE SODIUM PHOSPHATE 4 MG: 4 INJECTION INTRA-ARTICULAR; INTRALESIONAL; INTRAMUSCULAR; INTRAVENOUS; SOFT TISSUE at 08:11

## 2025-07-22 RX ADMIN — ROCURONIUM BROMIDE 20 MG: 10 INJECTION, SOLUTION INTRAVENOUS at 08:17

## 2025-07-22 RX ADMIN — PROPOFOL 50 MG: 10 INJECTION, EMULSION INTRAVENOUS at 07:53

## 2025-07-22 RX ADMIN — PROPOFOL 150 MG: 10 INJECTION, EMULSION INTRAVENOUS at 07:52

## 2025-07-22 ASSESSMENT — PAIN - FUNCTIONAL ASSESSMENT: PAIN_FUNCTIONAL_ASSESSMENT: NONE - DENIES PAIN

## 2025-07-22 ASSESSMENT — ENCOUNTER SYMPTOMS: SHORTNESS OF BREATH: 1

## 2025-07-22 NOTE — OP NOTE
Operative Note      Patient: Daniella Webb  YOB: 1960  MRN: [id number]    Date of Procedure: 7/22/2025    Pre-Op Diagnosis Codes:      * Anal stenosis [K62.4]    Post-Op Diagnosis: Same       Procedure(s):  Exam under anesthesia, anal dilation,  Botox injection anal sphincter  .    Surgeon(s):  Ulices Proctor MD    Assistant:   Resident: Em Tirado MD    Anesthesia: General    Estimated Blood Loss (mL): Minimal    Complications: None    Specimens:   ID Type Source Tests Collected by Time Destination   A : A) ANAL TAG Specimen Anus SURGICAL PATHOLOGY Ulices Proctor MD 7/22/2025 0824      Findings:  Present At Time Of Surgery (PATOS) (choose all levels that have infection present):  No infection present  Other Findings: see full note     Detailed Description of Procedure:     After informed consent was obtained the patient was taken to the operating room. General anesthesia was given. Time out was called to confirm key components. The patient was placed in the lithotomy position with appropriate padding.     The patient was then prepped and draped in the usual sterile fashion. We noted mild stenosis of the anal canal but we were able to gently dilate with a digital rectal exam followed by a series of anoscopes. We noted a fissure in the posterior midline though it was not clear if this was from the dilation or from a true fissure. There was a large sentinel pile here which we excised with cautery. Good hemostasis was seen.     We noted a hypertonic sphincter. We then injected 50 units of botox along the intersphincteric groove. We injected Exparel for post operative pain control. Gelfoam was placed in the anus to assist hemostasis. Dr. Proctor was present throughout.       Electronically signed by Ulices Proctor MD on 7/22/2025 at 8:39 AM

## 2025-07-22 NOTE — PROGRESS NOTES
PACU Transfer to [facility]    Vitals:    07/22/25 1100   BP: (!) 167/84   Pulse: 68   Resp: 13   Temp:    SpO2: 98%         Intake/Output Summary (Last 24 hours) at 7/22/2025 1111  Last data filed at 7/22/2025 0846  Gross per 24 hour   Intake 850 ml   Output 5 ml   Net 845 ml       Pain assessment:  none       Patient transferred to care of [facility] RN.    7/22/2025 11:11 AM

## 2025-07-22 NOTE — PROGRESS NOTES
Ambulatory Surgery/Procedure Discharge Note    Vitals:    07/22/25 1114   BP: (!) 160/88   Pulse: 67   Resp: 14   Temp: 97 °F (36.1 °C)   SpO2: 99%     Patient arrived to Phase II recovery Alert and Oriented x4.  Vitals stable.   Patient denies pain.   No nausea or vomiting.   Son at bedside.   Discharge instructions reviewed with patient and son. Both verbalize understanding.     In: 850 [I.V.:850]  Out: 5     Restroom use offered before discharge.  Yes. Patient voided in restroom.    Pain assessment:  none           Patient discharged to home/self care. Patient discharged via wheel chair home with son.      7/22/2025 11:53 AM

## 2025-07-22 NOTE — ANESTHESIA PRE PROCEDURE
Department of Anesthesiology  Preprocedure Note       Name:  Daniella Webb   Age:  65 y.o.  :  1960                                          MRN:  [id number]         Date:  2025      Surgeon: Surgeon(s):  Ulices Proctor MD    Procedure: Procedure(s):  Exam under anesthesia, anal dilation, possible Botox injection anal sphincter  .    Medications prior to admission:   Prior to Admission medications    Medication Sig Start Date End Date Taking? Authorizing Provider   ferrous sulfate (IRON 325) 325 (65 Fe) MG tablet Take 1 tablet by mouth daily (with breakfast) 25   Manuel Poe APRN - CNP   amitriptyline (ELAVIL) 25 MG tablet TAKE 1 TABLET BY MOUTH EVERY DAY AT NIGHT 25   Lizzie Holm APRN - CNP   hydroCHLOROthiazide (HYDRODIURIL) 25 MG tablet TAKE 1 TABLET BY MOUTH EVERY DAY IN THE MORNING 25   Lizzie Holm APRN - CNP   metoprolol tartrate (LOPRESSOR) 50 MG tablet Take 1 tablet by mouth daily 25  Lizzie Holm APRN - CNP   meclizine (ANTIVERT) 25 MG tablet TAKE 1 TABLET BY MOUTH 3 TIMES A DAY AS NEEDED FOR DIZZINESS Oral for 3 Days    ProviderDeb MD   fluticasone (FLONASE) 50 MCG/ACT nasal spray 2 sprays by Nasal route 2 times daily One month supply equals 2 bottles 25   Bonifacio Lopez MD   furosemide (LASIX) 20 MG tablet TAKE 0.5-1 TABLET BY MOUTH DAILY AS NEEDED FOR EDEMA 24   Lizzie Holm APRN - CNP   hyoscyamine (LEVSIN/SL) 125 MCG sublingual tablet Place 1 tablet under the tongue every 4 hours as needed (spasm) 24   Theresa Carcamo APRN - CNP   Ibuprofen (ADVIL PO) Take by mouth    Deb Tariq MD   ondansetron (ZOFRAN-ODT) 4 MG disintegrating tablet Take 1 tablet by mouth 3 times daily as needed for Nausea or Vomiting 23   Glischinski, Luke A, APRN - CNP       Current medications:    Current Facility-Administered Medications   Medication Dose Route Frequency Provider Last Rate Last Admin   •

## 2025-07-22 NOTE — ANESTHESIA POSTPROCEDURE EVALUATION
Department of Anesthesiology  Postprocedure Note    Patient: Daniella Webb  MRN: [id number]  YOB: 1960  Date of evaluation: 7/22/2025    Procedure Summary       Date: 07/22/25 Room / Location: [address]    Anesthesia Start: 0748 Anesthesia Stop: 0848    Procedure: Exam under anesthesia, anal dilation,  Botox injection anal sphincter (Anus) Diagnosis:       Anal stenosis      (Anal stenosis [K62.4])    Surgeons: Ulices Proctor MD Responsible Provider:     Anesthesia Type: general ASA Status: 3            Anesthesia Type: No value filed.    Flash Phase I: Flash Score: 6    Flash Phase II:      Anesthesia Post Evaluation    Patient location during evaluation: PACU  Patient participation: complete - patient participated  Level of consciousness: awake and alert  Pain score: 4  Airway patency: patent  Nausea & Vomiting: no nausea and no vomiting  Cardiovascular status: hemodynamically stable  Respiratory status: acceptable  Hydration status: euvolemic  Pain management: adequate    No notable events documented.

## 2025-07-22 NOTE — DISCHARGE INSTRUCTIONS
POST SURGERY CARE    Call Surgeon if you have:  Temperature greater than 101.4  Persistent nausea and vomiting  Severe uncontrolled pain  Redness, tenderness, or signs of infection (pain, swelling, redness, odor or green/yellow discharge around the site)  Difficulty breathing, headache or visual disturbances  Hives  Persistent dizziness or light-headedness  Extreme fatigue  Inability to urinate  Any other questions or concerns you may have after discharge    In an emergency, call 911 or go to an Emergency Department at a nearby hospital    It is important to bring a complete, current list of your medications to any medical appointments or hospitalizations.    REMINDER:   Carry a list of your medications and allergies with you at all times  Call your pharmacy at least 1 week in advance to refill prescriptions    Diet: Resume your usual diet. Consider mostly liquids over first 3 days post surgery. Good nutrition promotes healing. Increase fluid intake. Take stool softener twice daily but hold if diarrhea develops. You may not have a bowel movement for several days after anorectal surgery. This is normal unless you feel stool is stuck at the exit and can't get out.     Pain medication: You may noticed increased pain in 48-72 hours when local numbing medication wears off. Take pain medication only as directed     Wound Care: A small amount of bleeding is expected and normal. If severe bleeding develops please return to ER. You may wear a pad to protect your clothes. Warm bath 2-3 times daily will help with pain. You may also apply ice pack 10-15 minutes per hour while awake for pain. A small dissolvable sponge was placed in your bottom at the end of surgery. This may fall out prior to dissolving.     Activity:  Avoid heavy lifting (15 pounds) / straining for 1 week. We do not recommend hemorrhoid donut cushions but you may sit on a regular pillow. You may resume regular exercise in 1 weeks time. If still having pain or

## 2025-07-22 NOTE — BRIEF OP NOTE
Brief Postoperative Note      Patient: Daniella Webb  YOB: 1960  MRN: [id number]    Date of Procedure: 7/22/2025    Pre-Op Diagnosis Codes:      * Anal stenosis [K62.4]    Post-Op Diagnosis: Same       Procedure(s):  Exam under anesthesia, anal dilation,  Botox injection anal sphincter, Excision anal tag  .    Surgeon(s):  Ulices Proctor MD    Assistant:  Resident: Em Tirado MD    Anesthesia: General    Estimated Blood Loss (mL): Minimal    Complications: None    Specimens:   ID Type Source Tests Collected by Time Destination   A : A) ANAL TAG Specimen Anus SURGICAL PATHOLOGY Ulices Proctor MD 7/22/2025 0824          Findings:  Present At Time Of Surgery (PATOS) (choose all levels that have infection present):  No infection present  Other Findings: mild stenosis. Possible fissure. 50 units Botox given     Electronically signed by Ulices Proctor MD on 7/22/2025 at 8:26 AM

## 2025-07-23 ENCOUNTER — TELEPHONE (OUTPATIENT)
Dept: FAMILY MEDICINE CLINIC | Age: 65
End: 2025-07-23

## 2025-07-23 NOTE — TELEPHONE ENCOUNTER
CALLED AND SPOKE TO PATIENT. PATIENT STATES HER SURGERY IS 7/8/25. SHE STATES DR. SPANN OFFICE IS WHO CALLED HER AND ASKED HER TO CALL HER PCP OFFICE TO SEE IF YOU WERE OK WITH THIS. I TOLD PATIENT I DID APOLOGIZE BECAUSE THAT PART OF THE MESSAGE WAS NOT RELAYED TO ME. SC

## 2025-07-23 NOTE — TELEPHONE ENCOUNTER
PRINTED UPDATED INFORMATION PER [facility] REQUEST AND PLACED IN BIN TO BE FAXED TO SURGEON OFFICE. CALLED AND SPOKE TO PATIENT TO LET HER KNOW WE WERE TAKEN CARE OF THIS. SC

## 2025-07-23 NOTE — TELEPHONE ENCOUNTER
Does not look like she has a surgery date yet.  This is really up to Dr. Hughes.  If he is okay with me doing an addendum, I am fine with updating it as long as nothing changed.  If he wants a repeat preop, she would need to come in.  If he does want her to do another 1, she should not need to do an EKG as long as the procedure is within 90 days of this other preop.

## 2025-07-23 NOTE — TELEPHONE ENCOUNTER
Note updated with addendum comment the bottom.  Please print off note, patient summary, med list, and fax to Dr. Hughes's office.

## 2025-07-23 NOTE — TELEPHONE ENCOUNTER
----- Message from Citlali COWAN sent at 7/23/2025 11:33 AM EDT -----  Regarding: ECC Message to Provider  ECC Message to Provider    Relationship to Patient: Self     Additional Information: patient already had a pre-op visit for a surgery and she has another upcoming surgery, she would like to know if she can do an amendment with  Efrain Hughes MD so she can no longer book for another pre-op.   --------------------------------------------------------------------------------------------------------------------------    Call Back Information: OK to leave message on voicemail  Preferred Call Back Number: Phone [phone]

## 2025-07-24 PROBLEM — M65.4 DE QUERVAIN'S TENOSYNOVITIS: Status: ACTIVE | Noted: 2025-07-24

## 2025-08-06 ENCOUNTER — ANESTHESIA EVENT (OUTPATIENT)
Age: 65
End: 2025-08-06
Payer: COMMERCIAL

## 2025-08-07 ENCOUNTER — ANESTHESIA (OUTPATIENT)
Age: 65
End: 2025-08-07
Payer: COMMERCIAL

## 2025-08-07 ENCOUNTER — HOSPITAL ENCOUNTER (OUTPATIENT)
Age: 65
Setting detail: OUTPATIENT SURGERY
Discharge: HOME | End: 2025-08-07
Attending: ORTHOPAEDIC SURGERY | Admitting: ORTHOPAEDIC SURGERY
Payer: COMMERCIAL

## 2025-08-07 VITALS
HEIGHT: 61 IN | HEART RATE: 64 BPM | RESPIRATION RATE: 13 BRPM | BODY MASS INDEX: 48.52 KG/M2 | DIASTOLIC BLOOD PRESSURE: 77 MMHG | TEMPERATURE: 97.1 F | SYSTOLIC BLOOD PRESSURE: 140 MMHG | OXYGEN SATURATION: 100 % | WEIGHT: 257 LBS

## 2025-08-07 PROCEDURE — 6360000002 HC RX W HCPCS: Performed by: ANESTHESIOLOGY

## 2025-08-07 PROCEDURE — 6360000002 HC RX W HCPCS: Performed by: ORTHOPAEDIC SURGERY

## 2025-08-07 PROCEDURE — 3600000003 HC SURGERY LEVEL 3 BASE: Performed by: ORTHOPAEDIC SURGERY

## 2025-08-07 PROCEDURE — 7100000001 HC PACU RECOVERY - ADDTL 15 MIN: Performed by: ORTHOPAEDIC SURGERY

## 2025-08-07 PROCEDURE — 7100000000 HC PACU RECOVERY - FIRST 15 MIN: Performed by: ORTHOPAEDIC SURGERY

## 2025-08-07 PROCEDURE — 3600000013 HC SURGERY LEVEL 3 ADDTL 15MIN: Performed by: ORTHOPAEDIC SURGERY

## 2025-08-07 PROCEDURE — 2709999900 HC NON-CHARGEABLE SUPPLY: Performed by: ORTHOPAEDIC SURGERY

## 2025-08-07 PROCEDURE — 6360000002 HC RX W HCPCS: Performed by: NURSE ANESTHETIST, CERTIFIED REGISTERED

## 2025-08-07 PROCEDURE — 7100000010 HC PHASE II RECOVERY - FIRST 15 MIN: Performed by: ORTHOPAEDIC SURGERY

## 2025-08-07 PROCEDURE — 2500000003 HC RX 250 WO HCPCS: Performed by: ORTHOPAEDIC SURGERY

## 2025-08-07 PROCEDURE — 3700000001 HC ADD 15 MINUTES (ANESTHESIA): Performed by: ORTHOPAEDIC SURGERY

## 2025-08-07 PROCEDURE — 2580000003 HC RX 258: Performed by: ANESTHESIOLOGY

## 2025-08-07 PROCEDURE — 7100000011 HC PHASE II RECOVERY - ADDTL 15 MIN: Performed by: ORTHOPAEDIC SURGERY

## 2025-08-07 PROCEDURE — 6370000000 HC RX 637 (ALT 250 FOR IP): Performed by: ANESTHESIOLOGY

## 2025-08-07 PROCEDURE — 3700000000 HC ANESTHESIA ATTENDED CARE: Performed by: ORTHOPAEDIC SURGERY

## 2025-08-07 RX ORDER — ONDANSETRON 2 MG/ML
4 INJECTION INTRAMUSCULAR; INTRAVENOUS
Status: COMPLETED | OUTPATIENT
Start: 2025-08-07 | End: 2025-08-07

## 2025-08-07 RX ORDER — SODIUM CHLORIDE 0.9 % (FLUSH) 0.9 %
5-40 SYRINGE (ML) INJECTION EVERY 12 HOURS SCHEDULED
Status: DISCONTINUED | OUTPATIENT
Start: 2025-08-07 | End: 2025-08-07 | Stop reason: HOSPADM

## 2025-08-07 RX ORDER — MAGNESIUM HYDROXIDE 1200 MG/15ML
LIQUID ORAL CONTINUOUS PRN
Status: COMPLETED | OUTPATIENT
Start: 2025-08-07 | End: 2025-08-07

## 2025-08-07 RX ORDER — SODIUM CHLORIDE 0.9 % (FLUSH) 0.9 %
5-40 SYRINGE (ML) INJECTION PRN
Status: DISCONTINUED | OUTPATIENT
Start: 2025-08-07 | End: 2025-08-07 | Stop reason: HOSPADM

## 2025-08-07 RX ORDER — PROPOFOL 10 MG/ML
INJECTION, EMULSION INTRAVENOUS
Status: DISCONTINUED | OUTPATIENT
Start: 2025-08-07 | End: 2025-08-07 | Stop reason: SDUPTHER

## 2025-08-07 RX ORDER — OXYCODONE HYDROCHLORIDE 5 MG/1
5 TABLET ORAL PRN
Status: COMPLETED | OUTPATIENT
Start: 2025-08-07 | End: 2025-08-07

## 2025-08-07 RX ORDER — SODIUM CHLORIDE 9 MG/ML
INJECTION, SOLUTION INTRAVENOUS PRN
Status: DISCONTINUED | OUTPATIENT
Start: 2025-08-07 | End: 2025-08-07 | Stop reason: HOSPADM

## 2025-08-07 RX ORDER — OXYCODONE HYDROCHLORIDE 5 MG/1
10 TABLET ORAL PRN
Status: COMPLETED | OUTPATIENT
Start: 2025-08-07 | End: 2025-08-07

## 2025-08-07 RX ORDER — LIDOCAINE HYDROCHLORIDE 20 MG/ML
INJECTION, SOLUTION EPIDURAL; INFILTRATION; INTRACAUDAL; PERINEURAL
Status: DISCONTINUED | OUTPATIENT
Start: 2025-08-07 | End: 2025-08-07 | Stop reason: SDUPTHER

## 2025-08-07 RX ADMIN — PROPOFOL 140 MCG/KG/MIN: 10 INJECTION, EMULSION INTRAVENOUS at 09:00

## 2025-08-07 RX ADMIN — SODIUM CHLORIDE: 0.9 INJECTION, SOLUTION INTRAVENOUS at 07:16

## 2025-08-07 RX ADMIN — ONDANSETRON 4 MG: 2 INJECTION, SOLUTION INTRAMUSCULAR; INTRAVENOUS at 09:28

## 2025-08-07 RX ADMIN — HYDROMORPHONE HYDROCHLORIDE 0.25 MG: 1 INJECTION, SOLUTION INTRAMUSCULAR; INTRAVENOUS; SUBCUTANEOUS at 09:29

## 2025-08-07 RX ADMIN — PROPOFOL 100 MG: 10 INJECTION, EMULSION INTRAVENOUS at 08:59

## 2025-08-07 RX ADMIN — OXYCODONE HYDROCHLORIDE 10 MG: 5 TABLET ORAL at 09:53

## 2025-08-07 RX ADMIN — LIDOCAINE HYDROCHLORIDE 40 MG: 20 INJECTION, SOLUTION EPIDURAL; INFILTRATION; INTRACAUDAL; PERINEURAL at 08:59

## 2025-08-07 ASSESSMENT — PAIN DESCRIPTION - ORIENTATION
ORIENTATION: RIGHT
ORIENTATION: RIGHT

## 2025-08-07 ASSESSMENT — ENCOUNTER SYMPTOMS: SHORTNESS OF BREATH: 1

## 2025-08-07 ASSESSMENT — PAIN DESCRIPTION - LOCATION
LOCATION: HAND
LOCATION: HAND

## 2025-08-07 ASSESSMENT — PAIN SCALES - GENERAL
PAINLEVEL_OUTOF10: 5
PAINLEVEL_OUTOF10: 7
PAINLEVEL_OUTOF10: 8
PAINLEVEL_OUTOF10: 5

## 2025-08-07 ASSESSMENT — PAIN DESCRIPTION - DESCRIPTORS: DESCRIPTORS: ACHING

## 2025-08-07 ASSESSMENT — PAIN DESCRIPTION - FREQUENCY: FREQUENCY: CONTINUOUS

## 2025-08-11 ENCOUNTER — OFFICE VISIT (OUTPATIENT)
Dept: SURGERY | Age: 65
End: 2025-08-11

## 2025-08-11 VITALS
WEIGHT: 263 LBS | HEART RATE: 65 BPM | SYSTOLIC BLOOD PRESSURE: 137 MMHG | OXYGEN SATURATION: 99 % | DIASTOLIC BLOOD PRESSURE: 79 MMHG | BODY MASS INDEX: 49.69 KG/M2 | RESPIRATION RATE: 16 BRPM

## 2025-08-11 DIAGNOSIS — K62.4 ANAL STENOSIS: Primary | ICD-10-CM

## 2025-08-11 PROCEDURE — 99024 POSTOP FOLLOW-UP VISIT: CPT | Performed by: SURGERY

## 2025-08-11 RX ORDER — DIPHENHYDRAMINE HCL 25 MG
25 TABLET ORAL EVERY 6 HOURS PRN
COMMUNITY

## 2025-08-15 ENCOUNTER — OFFICE VISIT (OUTPATIENT)
Dept: ORTHOPEDIC SURGERY | Age: 65
End: 2025-08-15

## 2025-08-15 VITALS — RESPIRATION RATE: 16 BRPM | BODY MASS INDEX: 49.65 KG/M2 | WEIGHT: 263 LBS | HEIGHT: 61 IN

## 2025-08-15 DIAGNOSIS — M65.4 DE QUERVAIN'S TENOSYNOVITIS: Primary | ICD-10-CM

## 2025-08-15 PROCEDURE — 99024 POSTOP FOLLOW-UP VISIT: CPT | Performed by: ORTHOPAEDIC SURGERY

## 2025-08-28 ENCOUNTER — PATIENT MESSAGE (OUTPATIENT)
Dept: FAMILY MEDICINE CLINIC | Age: 65
End: 2025-08-28

## 2025-08-28 ENCOUNTER — LAB (OUTPATIENT)
Dept: FAMILY MEDICINE CLINIC | Age: 65
End: 2025-08-28
Payer: COMMERCIAL

## 2025-08-28 DIAGNOSIS — D50.9 MICROCYTIC ANEMIA: ICD-10-CM

## 2025-08-28 DIAGNOSIS — D50.9 MICROCYTIC ANEMIA: Primary | ICD-10-CM

## 2025-08-28 LAB
BASOPHILS # BLD: 0 K/UL (ref 0–0.2)
BASOPHILS NFR BLD: 0.8 %
DEPRECATED RDW RBC AUTO: 16.7 % (ref 12.4–15.4)
EOSINOPHIL # BLD: 0.1 K/UL (ref 0–0.6)
EOSINOPHIL NFR BLD: 2 %
FERRITIN SERPL IA-MCNC: 16.6 NG/ML (ref 15–150)
HCT VFR BLD AUTO: 31.5 % (ref 36–48)
HGB BLD-MCNC: 10.4 G/DL (ref 12–16)
IRON SATN MFR SERPL: 7 % (ref 15–50)
IRON SERPL-MCNC: 33 UG/DL (ref 37–145)
LYMPHOCYTES # BLD: 3.5 K/UL (ref 1–5.1)
LYMPHOCYTES NFR BLD: 55.9 %
MCH RBC QN AUTO: 24.6 PG (ref 26–34)
MCHC RBC AUTO-ENTMCNC: 33.1 G/DL (ref 31–36)
MCV RBC AUTO: 74.4 FL (ref 80–100)
MONOCYTES # BLD: 0.5 K/UL (ref 0–1.3)
MONOCYTES NFR BLD: 7.9 %
NEUTROPHILS # BLD: 2.1 K/UL (ref 1.7–7.7)
NEUTROPHILS NFR BLD: 33.4 %
PLATELET # BLD AUTO: 214 K/UL (ref 135–450)
PMV BLD AUTO: 9 FL (ref 5–10.5)
RBC # BLD AUTO: 4.23 M/UL (ref 4–5.2)
TIBC SERPL-MCNC: 445 UG/DL (ref 260–445)
WBC # BLD AUTO: 6.2 K/UL (ref 4–11)

## 2025-08-28 PROCEDURE — 36415 COLL VENOUS BLD VENIPUNCTURE: CPT | Performed by: NURSE PRACTITIONER

## (undated) DEVICE — LAPAROSCOPIC SCISSORS: Brand: EPIX LAPAROSCOPIC SCISSORS

## (undated) DEVICE — BW-412T DISP COMBO CLEANING BRUSH: Brand: SINGLE USE COMBINATION CLEANING BRUSH

## (undated) DEVICE — GLOVE SURG SZ 75 L12IN THK75MIL DK GRN LTX FREE

## (undated) DEVICE — RECTAL: Brand: MEDLINE INDUSTRIES, INC.

## (undated) DEVICE — MERCY HEALTH WEST TURNOVER: Brand: MEDLINE INDUSTRIES, INC.

## (undated) DEVICE — SYRINGE CATH TIP 50ML

## (undated) DEVICE — GLOVE ORANGE PI 8   MSG9080

## (undated) DEVICE — SOLUTION IRRIG 500ML 0.9% SOD CHLO USP POUR PLAS BTL

## (undated) DEVICE — TRAP FLUID

## (undated) DEVICE — STRL PENROSE DRAIN 18" X 1/2": Brand: CARDINAL HEALTH

## (undated) DEVICE — CLEANER,CAUTERY TIP,2X2",STERILE: Brand: MEDLINE

## (undated) DEVICE — Device

## (undated) DEVICE — AGENT HEMSTAT W2XL4IN OXIDIZED REGENERATED CELOS ABSRB

## (undated) DEVICE — YANKAUER SUCTION INSTRUMENT NO CONTROL VENT, BULB TIP, CLEAR: Brand: YANKAUER

## (undated) DEVICE — UNDERGLOVE SURG SZ 8 FNGR THK0.21MIL GRN LTX BEAD CUF

## (undated) DEVICE — GLOVE SURG SZ 85 L12IN FNGR THK79MIL GRN LTX FREE

## (undated) DEVICE — MINOR SET UP: Brand: MEDLINE INDUSTRIES, INC.

## (undated) DEVICE — PROCEDURE KIT ENDOSCP CUST

## (undated) DEVICE — 40583 XL ADVANCED TRENDELENBURG POSITIONING KIT: Brand: 40583 XL ADVANCED TRENDELENBURG POSITIONING KIT

## (undated) DEVICE — VISIGI 3D®  CALIBRATION SYSTEM  SIZE 36FR STD W/ BULB: Brand: BOEHRINGER® VISIGI 3D™ SLEEVE GASTRECTOMY CALIBRATION SYSTEM, SIZE 36FR W/BULB

## (undated) DEVICE — GLOVE ORANGE PI 7   MSG9070

## (undated) DEVICE — PROTECTOR EYE AD FOAM RIG IGUARD

## (undated) DEVICE — DRAPE HND W114XL142IN BLU POLYPR W O PCH FEN CRD AND TB HLDR

## (undated) DEVICE — STANDARD HYPODERMIC NEEDLE,POLYPROPYLENE HUB: Brand: MONOJECT

## (undated) DEVICE — SUTURE ETHIBOND EXCEL SZ 0 L30IN NONABSORBABLE GRN L26MM SH X834H

## (undated) DEVICE — PACK LAP IV REINF TBL CVR ADH UTIL UNDERBUTTOCK DRP W CUF

## (undated) DEVICE — TOWEL,STOP FLAG GOLD N-W: Brand: MEDLINE

## (undated) DEVICE — MOUTHPIECE ENDOSCP L CTRL OPN AND SIDE PORTS DISP

## (undated) DEVICE — INSUFFLATION NEEDLE TO ESTABLISH PNEUMOPERITONEUM.: Brand: INSUFFLATION NEEDLE

## (undated) DEVICE — CUFF TRNQT W4XL18IN 2 PRT SGL BLDR CYL DISP

## (undated) DEVICE — SOLUTION ANTIFOG VIS SYS CLEARIFY LAPSCP

## (undated) DEVICE — NEEDLE,22GX1.5",REG,BEVEL: Brand: MEDLINE

## (undated) DEVICE — SOLUTION INJ LR VISIV 1000ML BG

## (undated) DEVICE — SYSTEM SMK EVAC LAP TBNG FILTER HSNG BENT STYL PNK SEE CLR

## (undated) DEVICE — ARM DRAPE

## (undated) DEVICE — BINDER ABD UNIV H9IN WAIST 45-62IN E SFT COT PREM 3 PNL

## (undated) DEVICE — TROCAR: Brand: KII FIOS FIRST ENTRY

## (undated) DEVICE — GOWN,SIRUS,POLYRNF,BRTHSLV,XL,30/CS: Brand: MEDLINE

## (undated) DEVICE — SEAL

## (undated) DEVICE — MEDI-VAC NON-CONDUCTIVE SUCTION TUBING: Brand: CARDINAL HEALTH

## (undated) DEVICE — SUTURE VICRYL CTD ANTBCTRL 54 IN TI PLU VLT

## (undated) DEVICE — 1LYRTR 16FR10ML100%SIL UMS SNP: Brand: MEDLINE INDUSTRIES, INC.

## (undated) DEVICE — WRAP COHESIVE W2INXL5YD TAN SELF ADH BNDG HND NON STERILE TEAR CARING

## (undated) DEVICE — GOWN AURORA NONREINF LG: Brand: MEDLINE INDUSTRIES, INC.

## (undated) DEVICE — VESSEL SEALER EXTEND: Brand: ENDOWRIST

## (undated) DEVICE — PREMIUM DRY TRAY LF: Brand: MEDLINE INDUSTRIES, INC.

## (undated) DEVICE — LIQUIBAND RAPID ADHESIVE 36/CS 0.8ML: Brand: MEDLINE

## (undated) DEVICE — SYRINGE MED 10ML LUERLOCK TIP W/O SFTY DISP

## (undated) DEVICE — NEPTUNE E-SEP SMOKE EVACUATION PENCIL, COATED, 70MM BLADE, PUSH BUTTON SWITCH: Brand: NEPTUNE E-SEP

## (undated) DEVICE — SYRINGE MED 50ML LUERLOCK TIP

## (undated) DEVICE — DECANTER BTL 6IN: Brand: MEDLINE INDUSTRIES, INC.

## (undated) DEVICE — BLADELESS OBTURATOR, LONG: Brand: WECK VISTA

## (undated) DEVICE — TROCARS: Brand: KII® OPTICAL ACCESS SYSTEM

## (undated) DEVICE — PUMP SUC IRR TBNG L10FT W/ HNDPC ASSEMB STRYKEFLOW 2

## (undated) DEVICE — SUTURE VICRYL + SZ 2-0 L27IN ABSRB WHT SH 1/2 CIR TAPERCUT VCP417H

## (undated) DEVICE — ROBOTIC: Brand: MEDLINE INDUSTRIES, INC.

## (undated) DEVICE — APPLICATOR MEDICATED 26 CC SOLUTION HI LT ORNG CHLORAPREP

## (undated) DEVICE — SET VLV 3 PC AWS DISPOSABLE GRDIAN SCOPEVALET

## (undated) DEVICE — SOLUTION IV IRRIG WATER 500ML POUR BRL ST 2F7113

## (undated) DEVICE — SUTURE VICRYL SZ 4-0 L18IN ABSRB UD L19MM PS-2 3/8 CIR PRIM J496H

## (undated) DEVICE — WET SKIN PREP TRAY: Brand: MEDLINE INDUSTRIES, INC.

## (undated) DEVICE — SHEET,DRAPE,53X77,STERILE: Brand: MEDLINE

## (undated) DEVICE — NEEDLE HYPO 25GA L1.5IN BLU POLYPR HUB S STL REG BVL STR

## (undated) DEVICE — SOLUTION IRRIG 1000ML 0.9% SOD CHL USP POUR PLAS BTL